# Patient Record
Sex: FEMALE | Race: WHITE | HISPANIC OR LATINO | Employment: UNEMPLOYED | URBAN - METROPOLITAN AREA
[De-identification: names, ages, dates, MRNs, and addresses within clinical notes are randomized per-mention and may not be internally consistent; named-entity substitution may affect disease eponyms.]

---

## 2018-07-16 ENCOUNTER — OFFICE VISIT (OUTPATIENT)
Dept: ENDOCRINOLOGY | Facility: CLINIC | Age: 63
End: 2018-07-16
Payer: COMMERCIAL

## 2018-07-16 VITALS
RESPIRATION RATE: 16 BRPM | DIASTOLIC BLOOD PRESSURE: 70 MMHG | HEIGHT: 65 IN | HEART RATE: 60 BPM | WEIGHT: 176.13 LBS | SYSTOLIC BLOOD PRESSURE: 116 MMHG | BODY MASS INDEX: 29.34 KG/M2

## 2018-07-16 DIAGNOSIS — D35.2 PITUITARY ADENOMA: Primary | ICD-10-CM

## 2018-07-16 DIAGNOSIS — E11.9 TYPE 2 DIABETES MELLITUS WITHOUT COMPLICATION, WITHOUT LONG-TERM CURRENT USE OF INSULIN: ICD-10-CM

## 2018-07-16 DIAGNOSIS — M85.80 OSTEOPENIA, UNSPECIFIED LOCATION: ICD-10-CM

## 2018-07-16 DIAGNOSIS — E11.42 TYPE 2 DIABETES MELLITUS WITH DIABETIC POLYNEUROPATHY, WITHOUT LONG-TERM CURRENT USE OF INSULIN: ICD-10-CM

## 2018-07-16 PROCEDURE — 99999 PR PBB SHADOW E&M-EST. PATIENT-LVL III: CPT | Mod: PBBFAC,,, | Performed by: INTERNAL MEDICINE

## 2018-07-16 PROCEDURE — 3008F BODY MASS INDEX DOCD: CPT | Mod: CPTII,S$GLB,, | Performed by: INTERNAL MEDICINE

## 2018-07-16 PROCEDURE — 99214 OFFICE O/P EST MOD 30 MIN: CPT | Mod: S$GLB,,, | Performed by: INTERNAL MEDICINE

## 2018-07-16 NOTE — PROGRESS NOTES
Subjective:      Patient ID: Tona Carlson is a 62 y.o. female.    Chief Complaint:  Diabetes      History of Present Illness      She was being seen by Dr Padilla for  pituitary adenoma.  She was put on Soniya schedule in error      She was found to have pituitary incidentaloma when MRI brain was done for balance problem. She says surgery was in march 2014. She was told her pituitary adenoma was non secreting and it was touching optic chiasm and carotid artery. She had c/o left hemianopsia ( from what she describes ) improved after surgery surgery was in [facility]/ [facility]    she was  on cabergoline but she is off of medication due to financial reasons       While taking cabergoline she did not have any S/Es          Not taking any hormones. She was treated with DDAVP for 2 months after surgery.      Denies galactorrhea / some  tenderness in breast but present since teen age years.   Post menopausal.      Denies change in shoe or ring size. Denies excessive sweating.        evaluated her in 11/2015 impression as below:     Ms. 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 Her bitemporal visual field defects spare the center of vision. I did not find significant diabetic retinopathy.          Last mri 2015\  Findings suggest postsurgical change from resection of a pituitary macroadenoma.  Enhancing soft tissue remains along the lateral walls of the sella suggesting residual macroadenoma.  For follow-up purposes on the left this measures 5.6 mm, on the right   4.5 mm.      Diagnosed with DM in her 50s      Did not bring log or meter   No recent labs       Known complications : peripheral neuropathy  some numbness and tingling     Last eye exam : > 1 year ago        Current regimen : nothing   Was on  metformin and amaryl 2 mg qd  - diarrhea with metformin   When she checked her bg < 120   Its good when she diets       Other meds tried :  insulin but after good control she was taken off of insulin.     With victoza : abdominal cramps       thyroid u/s 2016  1.) Thyroid gland is normal in size with homogenous echotexture    2.) No thyroid nodules seen    RECOMMENDATIONS:   No need for repeat neck ultrasound unless there is a clinical change.    Review of Systems   Constitutional: Negative for unexpected weight change.   Eyes: Negative for visual disturbance.   Respiratory: Negative for shortness of breath.    Cardiovascular: Negative for chest pain.   Gastrointestinal: Negative for abdominal pain.   Musculoskeletal: Negative for arthralgias.   Skin: Negative for wound.   Neurological: Negative for headaches.   Hematological: Does not bruise/bleed easily.   Psychiatric/Behavioral: Negative for sleep disturbance.       Objective:   Physical Exam   Neck: No thyromegaly present.   Cardiovascular: Normal rate.    Pulmonary/Chest: Effort normal.   Abdominal: Soft.   Musculoskeletal: She exhibits no edema.   Vitals reviewed.  Feet no cuts or  scratches  Shoes appropriate  sensation decreased to vibration and monofilament      Body mass index is 29.31 kg/m².    Lab Review:   Lab Results   Component Value Date    HGBA1C 9.5 (H) 10/05/2016     Lab Results   Component Value Date    CHOL 157 10/05/2016    HDL 37 (L) 10/05/2016    LDLCALC 87.8 10/05/2016    TRIG 161 (H) 10/05/2016    CHOLHDL 23.6 10/05/2016     Lab Results   Component Value Date     10/05/2016    K 4.1 10/05/2016     10/05/2016    CO2 22 (L) 10/05/2016     (H) 10/05/2016    BUN 6 (L) 10/05/2016    CREATININE 0.7 10/05/2016    CALCIUM 9.4 10/05/2016    PROT 6.7 10/05/2016    ALBUMIN 3.6 10/05/2016    BILITOT 1.0 10/05/2016    ALKPHOS 101 10/05/2016    AST 37 10/05/2016    ALT 53 (H) 10/05/2016    ANIONGAP 11 10/05/2016    ESTGFRAFRICA >60.0 10/05/2016    EGFRNONAA >60.0 10/05/2016    TSH 1.504 10/20/2015       Assessment and Plan     Pituitary adenoma  Recheck imaging  Refer to  eye  Check 8 am hormonal labs       Osteopenia  Check vit d and bmd     Type 2 diabetes mellitus with diabetic polyneuropathy, without long-term current use of insulin  Reviewed foot care   Check basic labs

## 2018-07-17 ENCOUNTER — LAB VISIT (OUTPATIENT)
Dept: LAB | Facility: HOSPITAL | Age: 63
End: 2018-07-17
Attending: INTERNAL MEDICINE
Payer: COMMERCIAL

## 2018-07-17 DIAGNOSIS — D35.2 PITUITARY ADENOMA: ICD-10-CM

## 2018-07-17 DIAGNOSIS — M85.80 OSTEOPENIA, UNSPECIFIED LOCATION: ICD-10-CM

## 2018-07-17 DIAGNOSIS — E11.9 TYPE 2 DIABETES MELLITUS WITHOUT COMPLICATION, WITHOUT LONG-TERM CURRENT USE OF INSULIN: ICD-10-CM

## 2018-07-17 LAB
ALBUMIN SERPL BCP-MCNC: 4.1 G/DL
ALP SERPL-CCNC: 100 U/L
ALT SERPL W/O P-5'-P-CCNC: 31 U/L
ANION GAP SERPL CALC-SCNC: 9 MMOL/L
AST SERPL-CCNC: 23 U/L
BILIRUB SERPL-MCNC: 0.8 MG/DL
BUN SERPL-MCNC: 10 MG/DL
CALCIUM SERPL-MCNC: 9.4 MG/DL
CHLORIDE SERPL-SCNC: 109 MMOL/L
CO2 SERPL-SCNC: 22 MMOL/L
CORTIS SERPL-MCNC: 15.3 UG/DL
CREAT SERPL-MCNC: 0.7 MG/DL
EST. GFR  (AFRICAN AMERICAN): >60 ML/MIN/1.73 M^2
EST. GFR  (NON AFRICAN AMERICAN): >60 ML/MIN/1.73 M^2
FSH SERPL-ACNC: 27.3 MIU/ML
GLUCOSE SERPL-MCNC: 133 MG/DL
LH SERPL-ACNC: 8.9 MIU/ML
POTASSIUM SERPL-SCNC: 4.3 MMOL/L
PROLACTIN SERPL IA-MCNC: 39.5 NG/ML
PROT SERPL-MCNC: 7.3 G/DL
SODIUM SERPL-SCNC: 140 MMOL/L
T4 FREE SERPL-MCNC: 0.88 NG/DL
TSH SERPL DL<=0.005 MIU/L-ACNC: 2.61 UIU/ML

## 2018-07-17 PROCEDURE — 83002 ASSAY OF GONADOTROPIN (LH): CPT

## 2018-07-17 PROCEDURE — 83001 ASSAY OF GONADOTROPIN (FSH): CPT

## 2018-07-17 PROCEDURE — 80053 COMPREHEN METABOLIC PANEL: CPT

## 2018-07-17 PROCEDURE — 84305 ASSAY OF SOMATOMEDIN: CPT

## 2018-07-17 PROCEDURE — 36415 COLL VENOUS BLD VENIPUNCTURE: CPT

## 2018-07-17 PROCEDURE — 82024 ASSAY OF ACTH: CPT

## 2018-07-17 PROCEDURE — 84146 ASSAY OF PROLACTIN: CPT

## 2018-07-17 PROCEDURE — 84439 ASSAY OF FREE THYROXINE: CPT

## 2018-07-17 PROCEDURE — 84443 ASSAY THYROID STIM HORMONE: CPT

## 2018-07-17 PROCEDURE — 82533 TOTAL CORTISOL: CPT

## 2018-07-19 LAB
IGF-I SERPL-MCNC: 143 NG/ML (ref 35–201)
IGF-I Z-SCORE SERPL: 1.04 SD

## 2018-07-20 LAB — ACTH PLAS-MCNC: 44 PG/ML

## 2018-07-24 ENCOUNTER — OFFICE VISIT (OUTPATIENT)
Dept: OBSTETRICS AND GYNECOLOGY | Facility: CLINIC | Age: 63
End: 2018-07-24
Payer: COMMERCIAL

## 2018-07-24 ENCOUNTER — TELEPHONE (OUTPATIENT)
Dept: OBSTETRICS AND GYNECOLOGY | Facility: CLINIC | Age: 63
End: 2018-07-24

## 2018-07-24 VITALS
SYSTOLIC BLOOD PRESSURE: 118 MMHG | BODY MASS INDEX: 29.66 KG/M2 | WEIGHT: 178 LBS | DIASTOLIC BLOOD PRESSURE: 70 MMHG | HEIGHT: 65 IN

## 2018-07-24 DIAGNOSIS — Z78.0 POSTMENOPAUSE: ICD-10-CM

## 2018-07-24 DIAGNOSIS — Z12.39 BREAST CANCER SCREENING: ICD-10-CM

## 2018-07-24 DIAGNOSIS — Z01.419 WELL WOMAN EXAM WITH ROUTINE GYNECOLOGICAL EXAM: Primary | ICD-10-CM

## 2018-07-24 PROCEDURE — 88175 CYTOPATH C/V AUTO FLUID REDO: CPT

## 2018-07-24 PROCEDURE — 99386 PREV VISIT NEW AGE 40-64: CPT | Mod: S$GLB,,, | Performed by: NURSE PRACTITIONER

## 2018-07-24 PROCEDURE — 99999 PR PBB SHADOW E&M-EST. PATIENT-LVL III: CPT | Mod: PBBFAC,,, | Performed by: NURSE PRACTITIONER

## 2018-07-24 NOTE — PROGRESS NOTES
HISTORY OF PRESENT ILLNESS:    Tona Carlson is a 62 y.o. female , presents for a routine exam and has no gyn complaints.    -Here to establish care - last saw Dr STEPHANIE Ramos in .  -Having a brain MRI tomorrow to follow up on her pituitary adenoma.    Past Medical History:   Diagnosis Date    Compressive optic atrophy 2015    Diabetes mellitus, type 2     Hyperlipidemia     Prolactinoma     Reflux 8/10/2012    Rosacea        Past Surgical History:   Procedure Laterality Date     SECTION      CHOLECYSTECTOMY      TRANSPHENOIDAL PITUITARY RESECTION          MEDICATIONS AND ALLERGIES:      Current Outpatient Prescriptions:     blood sugar diagnostic Strp, Please give compatible with the meter.  Checks 4 times  A day., Disp: 100 each, Rfl: 11    lancets Misc, Checks 4 times a day, Disp: 150 each, Rfl: 11    aspirin (ECOTRIN) 81 MG EC tablet, Take 1 tablet (81 mg total) by mouth once daily., Disp: , Rfl: 0    azelaic acid (FINACEA) 15 % cream, Apply topically once daily., Disp: 50 g, Rfl: 3    cabergoline (DOSTINEX) 0.5 mg tablet, Take 1 tablet (0.5 mg total) by mouth twice a week., Disp: 12 tablet, Rfl: 6    glimepiride (AMARYL) 2 MG tablet, Take 1 tablet (2 mg total) by mouth before breakfast., Disp: 90 tablet, Rfl: 1    oxybutynin (DITROPAN-XL) 5 MG TR24, Take 1 tablet (5 mg total) by mouth once daily., Disp: 30 tablet, Rfl: 6    Review of patient's allergies indicates:  No Known Allergies    Family History   Problem Relation Age of Onset    Diabetes Mother     Glaucoma Mother     Hypertension Mother     Heart disease Paternal Grandfather     Cancer Paternal Grandmother         liver or stomach    No Known Problems Father     Colon cancer Neg Hx     Ovarian cancer Neg Hx     Breast cancer Neg Hx        Social History     Social History    Marital status:      Spouse name: N/A    Number of children: N/A    Years of education: N/A     Occupational History  "   Not on file.     Social History Main Topics    Smoking status: Never Smoker    Smokeless tobacco: Never Used    Alcohol use No    Drug use: No    Sexual activity: Yes     Partners: Male     Other Topics Concern    Not on file     Social History Narrative    No narrative on file       OB HISTORY: Number of C/S deliveries:1 Number of vaginal deliveries:1 Number of SAB:1    COMPREHENSIVE GYN HISTORY:  PAP History:  Denies abnormal Paps. LAST PAP 8-10-12 NORMAL.  Infection History: Denies STDs. Denies PID.  Benign History: Denies uterine fibroids. Denies ovarian cysts. Denies endometriosis. Denies other conditions.  Cancer History: Denies cervical cancer. Denies uterine cancer or hyperplasia. Denies ovarian cancer. Denies vulvar cancer or pre-cancer. Denies vaginal cancer or pre-cancer. Denies breast cancer. Denies colon cancer.  Sexual Activity History: Reports currently being sexually active  Menstrual History: Denies menses. Pt is  (age 50) not on HRT.     ROS:  GENERAL: No weight changes. No swelling. No fatigue. No fever.  CARDIOVASCULAR: No chest pain. No shortness of breath. No leg cramps.   NEUROLOGICAL: No headaches. No vision changes.  BREASTS: No pain. No lumps. No discharge.  ABDOMEN: No pain. No nausea. No vomiting. No diarrhea. No constipation.  REPRODUCTIVE: No abnormal bleeding.   VULVA: No pain. No lesions. No itching.  VAGINA: No relaxation. No itching. No odor. No discharge. No lesions.  URINARY: No incontinence. No nocturia. No frequency. No dysuria.    /70   Ht 5' 5" (1.651 m)   Wt 80.7 kg (178 lb)   BMI 29.62 kg/m²     PE:  APPEARANCE: Well nourished, well developed, in no acute distress.  AFFECT: WNL, alert and oriented x 3.  SKIN: No hirsutism or acne.  NECK: Neck symmetric without masses or thyromegaly.  NODES: No inguinal, cervical, axillary or femoral lymph node enlargement.  CHEST: Good respiratory effort.   ABDOMEN: Soft. No tenderness or masses. No hepatosplenomegaly. " "No hernias.  BREASTS: Symmetrical, no skin changes or visible lesions. No palpable masses, nipple discharge bilaterally.  PELVIC: ATROPHIC EXTERNAL FEMALE GENITALIA without lesions. Normal hair distribution. Adequate perineal body, normal urethral meatus. VAGINA DRY without lesions or discharge. CERVIX STENOTIC without lesions, discharge or tenderness. No significant cystocele or rectocele. Bimanual exam shows uterus to be normal size, regular, mobile and nontender. Adnexa without masses or tenderness.  RECTAL: Rectovaginal exam confirms above with normal sphincter tone, no masses.  EXTREMITIES: No edema.    DIAGNOSIS:  1. Well woman exam with routine gynecological exam    2. Postmenopause    3. Breast cancer screening        PLAN:    Orders Placed This Encounter    Mammo Digital Screening Bilat with Tomosynthesis_CAD    Liquid-based pap smear, screening   Needs colonoscopy / BMD, but pt states "has to meet the $3000 deductible first".      COUNSELING:  The patient was counseled today on:  -osteoporosis prevention, calcium supplementation, regular weight bearing exercise;  -A.C.S. Pap and pelvic exam guidelines (pap every 3 years, no pap after age 65) and recommendations for yearly mammogram;  -to see her PCP for other health maintenance.    FOLLOW-UP annually.     "

## 2018-07-25 ENCOUNTER — HOSPITAL ENCOUNTER (OUTPATIENT)
Dept: RADIOLOGY | Facility: HOSPITAL | Age: 63
Discharge: HOME | End: 2018-07-25
Attending: INTERNAL MEDICINE
Payer: COMMERCIAL

## 2018-07-25 DIAGNOSIS — D35.2 PITUITARY ADENOMA: ICD-10-CM

## 2018-07-25 DIAGNOSIS — M85.80 OSTEOPENIA, UNSPECIFIED LOCATION: ICD-10-CM

## 2018-07-25 DIAGNOSIS — E11.9 TYPE 2 DIABETES MELLITUS WITHOUT COMPLICATION, WITHOUT LONG-TERM CURRENT USE OF INSULIN: ICD-10-CM

## 2018-07-25 PROCEDURE — 25500020 PHARM REV CODE 255: Performed by: INTERNAL MEDICINE

## 2018-07-25 PROCEDURE — A9585 GADOBUTROL INJECTION: HCPCS | Performed by: INTERNAL MEDICINE

## 2018-07-25 PROCEDURE — 70553 MRI BRAIN STEM W/O & W/DYE: CPT | Mod: TC

## 2018-07-25 PROCEDURE — 70553 MRI BRAIN STEM W/O & W/DYE: CPT | Mod: 26,,, | Performed by: RADIOLOGY

## 2018-07-25 RX ORDER — GADOBUTROL 604.72 MG/ML
4 INJECTION INTRAVENOUS
Status: COMPLETED | OUTPATIENT
Start: 2018-07-25 | End: 2018-07-25

## 2018-07-25 RX ADMIN — GADOBUTROL 4 ML: 604.72 INJECTION INTRAVENOUS at 01:07

## 2018-08-02 ENCOUNTER — TELEPHONE (OUTPATIENT)
Dept: PODIATRY | Facility: CLINIC | Age: 63
End: 2018-08-02

## 2018-08-02 ENCOUNTER — OFFICE VISIT (OUTPATIENT)
Dept: PODIATRY | Facility: CLINIC | Age: 63
End: 2018-08-02
Payer: COMMERCIAL

## 2018-08-02 ENCOUNTER — TELEPHONE (OUTPATIENT)
Dept: INTERNAL MEDICINE | Facility: CLINIC | Age: 63
End: 2018-08-02

## 2018-08-02 ENCOUNTER — HOSPITAL ENCOUNTER (OUTPATIENT)
Dept: RADIOLOGY | Facility: HOSPITAL | Age: 63
Discharge: HOME | End: 2018-08-02
Attending: PODIATRIST
Payer: COMMERCIAL

## 2018-08-02 VITALS
HEIGHT: 66 IN | DIASTOLIC BLOOD PRESSURE: 73 MMHG | BODY MASS INDEX: 28.61 KG/M2 | WEIGHT: 178 LBS | HEART RATE: 88 BPM | SYSTOLIC BLOOD PRESSURE: 114 MMHG

## 2018-08-02 DIAGNOSIS — S92.501A CLOSED FRACTURE OF PHALANX OF RIGHT FOURTH TOE, INITIAL ENCOUNTER: Primary | ICD-10-CM

## 2018-08-02 DIAGNOSIS — M79.674 TOE PAIN, RIGHT: Primary | ICD-10-CM

## 2018-08-02 DIAGNOSIS — S90.121A CONTUSION OF FOURTH TOE OF RIGHT FOOT, INITIAL ENCOUNTER: ICD-10-CM

## 2018-08-02 DIAGNOSIS — M79.671 FOOT PAIN, RIGHT: ICD-10-CM

## 2018-08-02 DIAGNOSIS — S90.121A CONTUSION OF FOURTH TOE OF RIGHT FOOT, INITIAL ENCOUNTER: Primary | ICD-10-CM

## 2018-08-02 PROCEDURE — 73630 X-RAY EXAM OF FOOT: CPT | Mod: TC,RT

## 2018-08-02 PROCEDURE — 28510 TREATMENT OF TOE FRACTURE: CPT | Mod: T8,S$GLB,, | Performed by: PODIATRIST

## 2018-08-02 PROCEDURE — 99999 PR PBB SHADOW E&M-EST. PATIENT-LVL III: CPT | Mod: PBBFAC,,, | Performed by: PODIATRIST

## 2018-08-02 PROCEDURE — 99203 OFFICE O/P NEW LOW 30 MIN: CPT | Mod: 25,S$GLB,, | Performed by: PODIATRIST

## 2018-08-02 PROCEDURE — 3008F BODY MASS INDEX DOCD: CPT | Mod: CPTII,S$GLB,, | Performed by: PODIATRIST

## 2018-08-02 PROCEDURE — 73630 X-RAY EXAM OF FOOT: CPT | Mod: 26,RT,, | Performed by: RADIOLOGY

## 2018-08-02 RX ORDER — LIDOCAINE HYDROCHLORIDE 20 MG/ML
JELLY TOPICAL
Qty: 30 ML | Refills: 2 | Status: SHIPPED | OUTPATIENT
Start: 2018-08-02 | End: 2019-01-02

## 2018-08-02 NOTE — TELEPHONE ENCOUNTER
----- Message from Danni Galloway sent at 8/1/2018  2:08 PM CDT -----  Contact: Self [phone]  Pt is requesting a call back to see if she can get an x-ray ordered for her right foot as she thinks she may have broken a toe.    Pt may be reached at [phone].    Thank you.  LC

## 2018-08-02 NOTE — TELEPHONE ENCOUNTER
----- Message from Danni Galloway sent at 8/1/2018  2:08 PM CDT -----  Contact: Self [phone]  Pt is requesting a call back to see if she can get an x-ray ordered for her right foot as she thinks she may have broken a toe.    Pt may be reached at [phone].    Thank you.  LC

## 2018-08-02 NOTE — TELEPHONE ENCOUNTER
----- Message from Mallory Carnes sent at 8/2/2018 10:34 AM CDT -----  Contact: patient  Ms Figueroa    At your request I told the patient you were doing your best to get her in today & that you would be calling her back.  I know your busy doing what you do and I thank you for being so helpful and friendly when I call you.  Patient's # is [phone]

## 2018-08-02 NOTE — TELEPHONE ENCOUNTER
Called & spoke to patient. She is complaining of decreased sensitivity in her R. 4th toe. This has been ongoing for 2 weeks after she dropped some weight on it. She noticed the toe was bent in the opposite direction. She has been applying ice to it, but is now concerned because she cannot feel the top of her toe.     I asked pt to make a appointment with the next PCP available to have the toe evaluated, due to her history of Diabetes with Polyneuropathy.    Pt voiced understanding.    Pt needs to see provider before xray can be ordered.

## 2018-08-02 NOTE — TELEPHONE ENCOUNTER
Ortho Telephone Triage Call  [phone]  Patient C/O: R 4th toe swelling/numbness s/p injuring 2 weeks ago on jumping over a box at home. Pt states she wrapped R 4th toe to 5th, then, 3rd toe and injured toe became numb. Pt denies discoloration.  Pt has, since, removed wrap stating toe feels better. Pt requests Ortho appt.   Triage Advice: Rest, elevate. Do not rewrap, as can constrict circulation with increased swelling. Fall precautions. Wear hard-sole open toe shoe/sandal and avoid further injury to R 4th toe until seen in Ortho or Podiatry.  Resolution:Pt states understanding. Appt scheduled today with Dr. Alvarez/Podiatry at 5:15pm with arrival at 4:45pm. Time and location confirmed with pt. Pt active in [facility] for additional appt information.

## 2018-08-02 NOTE — PROGRESS NOTES
Subjective:      Patient ID: Tona Carlson is a 62 y.o. female.    Chief Complaint: Diabetes Mellitus (dr villanueva/) and Diabetic Foot Exam    Sharp deep pain swelling 4th toe right.  Sudden onset stubbing about 2 weeks ago with minimal improvement since.  Aggravated by increased weight bearing, shoe gear, pressure.  No previous medical treatment.  OTC pain med not helping. Denies repeat trauma and surgery since.    Review of Systems   Constitution: Negative for chills, diaphoresis, fever, malaise/fatigue and night sweats.   Cardiovascular: Negative for claudication, cyanosis, leg swelling and syncope.   Skin: Negative for color change, dry skin, nail changes, rash, suspicious lesions and unusual hair distribution.   Musculoskeletal: Positive for joint pain. Negative for falls, joint swelling, muscle cramps, muscle weakness and stiffness.   Gastrointestinal: Negative for constipation, diarrhea, nausea and vomiting.   Neurological: Negative for brief paralysis, disturbances in coordination, focal weakness, numbness, paresthesias, sensory change and tremors.           Objective:      Physical Exam   Constitutional: She is oriented to person, place, and time. She appears well-developed and well-nourished. She is cooperative. No distress.   Cardiovascular:   Pulses:       Popliteal pulses are 2+ on the right side, and 2+ on the left side.        Dorsalis pedis pulses are 2+ on the right side, and 2+ on the left side.        Posterior tibial pulses are 2+ on the right side, and 2+ on the left side.   Capillary refill 3 seconds all toes/distal feet, all toes/both feet warm to touch.      Negative lymphadenopathy bilateral popliteal fossa and tarsal tunnel.      Negavie lower extremity edema bilateral.     Musculoskeletal:        Right ankle: She exhibits normal range of motion, no swelling, no ecchymosis, no deformity, no laceration and normal pulse. Achilles tendon normal. Achilles tendon exhibits no pain, no defect and  normal Bourne's test results.   Sharp deep pain to palpation, range of motion right 4th toe in good gross position without loss of function.    Otherwise ,Normal angle, base, station of gait. All ten toes without clubbing, cyanosis, or signs of ischemia.  No pain to palpation bilateral lower extremities.  Range of motion, stability, muscle strength, and muscle tone normal bilateral feet and legs.     Lymphadenopathy:   Negative lymphadenopathy bilateral popliteal fossa and tarsal tunnel.    Negative lymphangitic streaking bilateral feet/ankles/legs.   Neurological: She is alert and oriented to person, place, and time. She has normal strength. She displays no atrophy and no tremor. No sensory deficit. She exhibits normal muscle tone. Gait normal.   Reflex Scores:       Patellar reflexes are 2+ on the right side and 2+ on the left side.       Achilles reflexes are 2+ on the right side and 2+ on the left side.  Negative tinel sign to percussion sural, superficial peroneal, deep peroneal, saphenous, and posterior tibial nerves right and left ankles and feet.     Skin: Skin is warm, dry and intact. Capillary refill takes 2 to 3 seconds. No abrasion, no bruising, no burn, no ecchymosis, no laceration, no lesion and no rash noted. She is not diaphoretic. No cyanosis or erythema. No pallor. Nails show no clubbing.   Skin is normal age and health appropriate color, turgor, texture, and temperature bilateral lower extremities without ulceration, hyperpigmentation, discoloration, masses nodules or cords palpated.  No ecchymosis, erythema, edema, or cardinal signs of infection bilateral lower extremities.       Psychiatric: She has a normal mood and affect.             Assessment:       Encounter Diagnoses   Name Primary?    Closed fracture of phalanx of right fourth toe, initial encounter Yes    Foot pain, right          Plan:       Tona was seen today for diabetes mellitus and diabetic foot exam.    Diagnoses and all  orders for this visit:    Closed fracture of phalanx of right fourth toe, initial encounter  -     X-Ray Foot Complete Right; Future    Foot pain, right    Other orders  -     lidocaine HCL 2% (XYLOCAINE) 2 % jelly; Apply topically as needed. Apply topically once nightly to affected part of foot/feet.      I counseled the patient on her conditions, their implications and medical management.    Rx lidocaine gel nightly.    Buddy splint toes 1-5 right to immobilize, relieve pain, reduce swelling.    Dispense sx shoe right - ambulate to tolerance weaning to shoes when symptoms allow.          Follow-up in about 1 month (around 9/2/2018).

## 2018-08-16 ENCOUNTER — OFFICE VISIT (OUTPATIENT)
Dept: ENDOCRINOLOGY | Facility: CLINIC | Age: 63
End: 2018-08-16
Payer: COMMERCIAL

## 2018-08-16 VITALS
RESPIRATION RATE: 20 BRPM | SYSTOLIC BLOOD PRESSURE: 112 MMHG | WEIGHT: 180.75 LBS | DIASTOLIC BLOOD PRESSURE: 64 MMHG | HEART RATE: 96 BPM | BODY MASS INDEX: 30.12 KG/M2 | HEIGHT: 65 IN

## 2018-08-16 DIAGNOSIS — D35.2 PITUITARY ADENOMA: ICD-10-CM

## 2018-08-16 DIAGNOSIS — M85.80 OSTEOPENIA, UNSPECIFIED LOCATION: ICD-10-CM

## 2018-08-16 DIAGNOSIS — E11.42 TYPE 2 DIABETES MELLITUS WITH DIABETIC POLYNEUROPATHY, WITHOUT LONG-TERM CURRENT USE OF INSULIN: Primary | ICD-10-CM

## 2018-08-16 DIAGNOSIS — D35.2 PROLACTINOMA: ICD-10-CM

## 2018-08-16 LAB
ALBUMIN/CREAT UR: 11.9 UG/MG
CREAT UR-MCNC: 143 MG/DL
MICROALBUMIN UR DL<=1MG/L-MCNC: 17 UG/ML

## 2018-08-16 PROCEDURE — 99999 PR PBB SHADOW E&M-EST. PATIENT-LVL III: CPT | Mod: PBBFAC,,, | Performed by: INTERNAL MEDICINE

## 2018-08-16 PROCEDURE — 3044F HG A1C LEVEL LT 7.0%: CPT | Mod: CPTII,S$GLB,, | Performed by: INTERNAL MEDICINE

## 2018-08-16 PROCEDURE — 82043 UR ALBUMIN QUANTITATIVE: CPT

## 2018-08-16 PROCEDURE — 99214 OFFICE O/P EST MOD 30 MIN: CPT | Mod: S$GLB,,, | Performed by: INTERNAL MEDICINE

## 2018-08-16 PROCEDURE — 3008F BODY MASS INDEX DOCD: CPT | Mod: CPTII,S$GLB,, | Performed by: INTERNAL MEDICINE

## 2018-08-16 NOTE — PROGRESS NOTES
Subjective:      Patient ID: Tona Carlson is a 62 y.o. female.    Chief Complaint:  pituitary adenoma      History of Present Illness  She was found to have pituitary incidentaloma when MRI brain was done for balance problem. She says surgery was in march 2014. She was told her pituitary adenoma was non secreting and it was touching optic chiasm and carotid artery. She had c/o left hemianopsia ( from what she describes ) improved after surgery surgery was in [facility]/ [facility]     she was  on cabergoline but she stopped it  due to financial reasons       While taking cabergoline she did not have any S/Es           Not taking any hormones. She was treated with DDAVP for 2 months after surgery.      Denies galactorrhea / some  tenderness in breast but present since teen age years.   Post menopausal.      Denies change in shoe or ring size. Denies excessive sweating.        evaluated her in 11/2015 impression as below:     Ms. 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 Her bitemporal visual field defects spare the center of vision. I did not find significant diabetic retinopathy.           Last mri  7/25/18  Postsurgical changes from prior pituitary macroadenoma resection.  There are nodular foci of residual enhancement along the floor sella bilaterally.  The residual right-sided enhancement previously measured 4 mm, now measures 6 mm in thickness (for example sequence 15, images 8 and 9 for measurements).  Nodular left-sided lesion is stable.  There is no significant suprasellar mass effect.  Persistent leftward infundibular deviation.  There is subtle apparent atrophy of the right optic nerve and optic chiasm.         Diagnosed with DM in her 50s    Did not bring log or meter       Known complications : peripheral neuropathy  some numbness and tingling      Last eye exam : > 1 year ago        Current regimen : nothing   Was on   metformin and amaryl 2 mg qd  - diarrhea with metformin   When she checked her bg < 120   Its good when she diets         Other meds tried : insulin but after good control she was taken off of insulin.  victoza : abdominal cramps       thyroid u/s 2016  1.) Thyroid gland is normal in size with homogenous echotexture  2.) No thyroid nodules seen    RECOMMENDATIONS:   No need for repeat neck ultrasound unless there is a clinical change.       Review of Systems   Constitutional: Negative for unexpected weight change.   Eyes: Negative for visual disturbance.   Respiratory: Negative for shortness of breath.    Cardiovascular: Negative for chest pain.   Gastrointestinal: Negative for abdominal pain.   Musculoskeletal: Negative for arthralgias.   Skin: Negative for wound.   Neurological: Negative for headaches.   Hematological: Does not bruise/bleed easily.   Psychiatric/Behavioral: Negative for sleep disturbance.       Objective:   Physical Exam   Neck: No thyromegaly present.   Cardiovascular: Normal rate.   Pulmonary/Chest: Effort normal.   Abdominal: Soft.   Musculoskeletal: She exhibits no edema.   Vitals reviewed.      Body mass index is 30.08 kg/m².    Lab Review:   Lab Results   Component Value Date    HGBA1C 5.8 (H) 08/16/2018     Lab Results   Component Value Date    CHOL 205 (H) 08/16/2018    HDL 55 08/16/2018    LDLCALC 85.0 08/16/2018    TRIG 325 (H) 08/16/2018    CHOLHDL 26.8 08/16/2018     Lab Results   Component Value Date     07/17/2018    K 4.3 07/17/2018     07/17/2018    CO2 22 (L) 07/17/2018     (H) 07/17/2018    BUN 10 07/17/2018    CREATININE 0.7 07/17/2018    CALCIUM 9.4 07/17/2018    PROT 7.3 07/17/2018    ALBUMIN 4.1 07/17/2018    BILITOT 0.8 07/17/2018    ALKPHOS 100 07/17/2018    AST 23 07/17/2018    ALT 31 07/17/2018    ANIONGAP 9 07/17/2018    ESTGFRAFRICA >60 07/17/2018    EGFRNONAA >60 07/17/2018    TSH 2.612 07/17/2018     Results for DAX GAFFNEY (MRN [id number]) as of  8/16/2018 09:53   Ref. Range 7/17/2018 08:24   Cortisol -8 AM Latest Ref Range: 4.30 - 22.40 ug/dL 15.3   ACTH Latest Ref Range: 0 - 46 pg/mL 44   TSH Latest Ref Range: 0.400 - 4.000 uIU/mL 2.612   Free T4 Latest Ref Range: 0.71 - 1.51 ng/dL 0.88   FSH Latest Ref Range: See Text mIU/mL 27.30   LH Latest Ref Range: See Text mIU/mL 8.9   Prolactin Latest Ref Range: 5.2 - 26.5 ng/mL 39.5 (H)   Results for DAX GAFFNEY (MRN [id number]) as of 8/16/2018 09:53   Ref. Range 7/17/2018 08:24   Somatomedin (IGF-I) Latest Ref Range: 35 - 201 ng/mL 143     Assessment and Plan     Type 2 diabetes mellitus with diabetic polyneuropathy, without long-term current use of insulin  Labs today     Pituitary adenoma  Serial imaging  No evidence of hormonal dysfunction   Mildly elevated prolactin - follow   Reviewed indications for therapy   Eye exam

## 2018-08-17 NOTE — ASSESSMENT & PLAN NOTE
Serial imaging  No evidence of hormonal dysfunction   Mildly elevated prolactin - follow   Eye exam

## 2018-08-17 NOTE — ASSESSMENT & PLAN NOTE
discussed implications  Reassuring not fracturing.   rda calcium and vit D  Follow over time  discussed indications for offering therapy

## 2018-08-30 ENCOUNTER — HOSPITAL ENCOUNTER (OUTPATIENT)
Dept: RADIOLOGY | Facility: HOSPITAL | Age: 63
Discharge: HOME | End: 2018-08-30
Attending: PODIATRIST
Payer: COMMERCIAL

## 2018-08-30 ENCOUNTER — OFFICE VISIT (OUTPATIENT)
Dept: PODIATRY | Facility: CLINIC | Age: 63
End: 2018-08-30
Payer: COMMERCIAL

## 2018-08-30 VITALS
HEIGHT: 65 IN | DIASTOLIC BLOOD PRESSURE: 78 MMHG | WEIGHT: 182.19 LBS | HEART RATE: 79 BPM | BODY MASS INDEX: 30.35 KG/M2 | SYSTOLIC BLOOD PRESSURE: 114 MMHG

## 2018-08-30 DIAGNOSIS — S92.501A CLOSED FRACTURE OF PHALANX OF RIGHT FOURTH TOE, INITIAL ENCOUNTER: ICD-10-CM

## 2018-08-30 DIAGNOSIS — S90.121A CONTUSION OF FOURTH TOE OF RIGHT FOOT, INITIAL ENCOUNTER: Primary | ICD-10-CM

## 2018-08-30 PROCEDURE — 99024 POSTOP FOLLOW-UP VISIT: CPT | Mod: S$GLB,,, | Performed by: PODIATRIST

## 2018-08-30 PROCEDURE — 73630 X-RAY EXAM OF FOOT: CPT | Mod: TC,RT

## 2018-08-30 PROCEDURE — 73630 X-RAY EXAM OF FOOT: CPT | Mod: 26,RT,, | Performed by: RADIOLOGY

## 2018-08-30 PROCEDURE — 99999 PR PBB SHADOW E&M-EST. PATIENT-LVL III: CPT | Mod: PBBFAC,,, | Performed by: PODIATRIST

## 2018-08-30 NOTE — PROGRESS NOTES
Subjective:      Patient ID: Tona Carlson is a 62 y.o. female.    Chief Complaint: Follow-up (right 4th toe fracture)    Sharp deep pain swelling 4th toe right.  Sudden onset stubbing about 6 weeks ago with minimal improvement since.  Aggravated by increased weight bearing, shoe gear, pressure.  Improved well with splinting and shoe change and reduced activity. Denies repeat trauma and surgery since.    Review of Systems   Constitution: Negative for chills, diaphoresis, fever, malaise/fatigue and night sweats.   Cardiovascular: Negative for claudication, cyanosis, leg swelling and syncope.   Skin: Negative for color change, dry skin, nail changes, rash, suspicious lesions and unusual hair distribution.   Musculoskeletal: Positive for joint pain. Negative for falls, joint swelling, muscle cramps, muscle weakness and stiffness.   Gastrointestinal: Negative for constipation, diarrhea, nausea and vomiting.   Neurological: Negative for brief paralysis, disturbances in coordination, focal weakness, numbness, paresthesias, sensory change and tremors.           Objective:      Physical Exam   Constitutional: She is oriented to person, place, and time. She appears well-developed and well-nourished. She is cooperative. No distress.   Cardiovascular:   Pulses:       Popliteal pulses are 2+ on the right side, and 2+ on the left side.        Dorsalis pedis pulses are 2+ on the right side, and 2+ on the left side.        Posterior tibial pulses are 2+ on the right side, and 2+ on the left side.   Capillary refill 3 seconds all toes/distal feet, all toes/both feet warm to touch.      Negative lymphadenopathy bilateral popliteal fossa and tarsal tunnel.      Negavie lower extremity edema bilateral.     Musculoskeletal:        Right ankle: She exhibits normal range of motion, no swelling, no ecchymosis, no deformity, no laceration and normal pulse. Achilles tendon normal. Achilles tendon exhibits no pain, no defect and normal  Bourne's test results.   No current pain to palpation, range of motion right 4th toe in good gross position without loss of function.    Otherwise ,Normal angle, base, station of gait. All ten toes without clubbing, cyanosis, or signs of ischemia.  No pain to palpation bilateral lower extremities.  Range of motion, stability, muscle strength, and muscle tone normal bilateral feet and legs.     Lymphadenopathy:   Negative lymphadenopathy bilateral popliteal fossa and tarsal tunnel.    Negative lymphangitic streaking bilateral feet/ankles/legs.   Neurological: She is alert and oriented to person, place, and time. She has normal strength. She displays no atrophy and no tremor. No sensory deficit. She exhibits normal muscle tone. Gait normal.   Reflex Scores:       Patellar reflexes are 2+ on the right side and 2+ on the left side.       Achilles reflexes are 2+ on the right side and 2+ on the left side.  Negative tinel sign to percussion sural, superficial peroneal, deep peroneal, saphenous, and posterior tibial nerves right and left ankles and feet.     Skin: Skin is warm, dry and intact. Capillary refill takes 2 to 3 seconds. No abrasion, no bruising, no burn, no ecchymosis, no laceration, no lesion and no rash noted. She is not diaphoretic. No cyanosis or erythema. No pallor. Nails show no clubbing.   Skin is normal age and health appropriate color, turgor, texture, and temperature bilateral lower extremities without ulceration, hyperpigmentation, discoloration, masses nodules or cords palpated.  No ecchymosis, erythema, edema, or cardinal signs of infection bilateral lower extremities.       Psychiatric: She has a normal mood and affect.             Assessment:       No diagnosis found.      Plan:       There are no diagnoses linked to this encounter.  I counseled the patient on her conditions, their implications and medical management.    Discussed conservative treatment with shoes of adequate dimensions,  material, and style to alleviate symptoms and delay or prevent surgical intervention.  Function to tolerance.        No Follow-up on file.

## 2018-11-07 ENCOUNTER — HOSPITAL ENCOUNTER (OUTPATIENT)
Dept: RADIOLOGY | Facility: HOSPITAL | Age: 63
Discharge: HOME | End: 2018-11-07
Attending: NURSE PRACTITIONER
Payer: COMMERCIAL

## 2018-11-07 DIAGNOSIS — Z12.39 BREAST CANCER SCREENING: ICD-10-CM

## 2018-11-07 PROCEDURE — 77063 BREAST TOMOSYNTHESIS BI: CPT | Mod: 26,,, | Performed by: RADIOLOGY

## 2018-11-07 PROCEDURE — 77063 BREAST TOMOSYNTHESIS BI: CPT | Mod: TC

## 2018-11-07 PROCEDURE — 77067 SCR MAMMO BI INCL CAD: CPT | Mod: 26,,, | Performed by: RADIOLOGY

## 2018-11-15 ENCOUNTER — IMMUNIZATION (OUTPATIENT)
Dept: PHARMACY | Facility: CLINIC | Age: 63
End: 2018-11-15
Payer: COMMERCIAL

## 2019-01-02 ENCOUNTER — LAB VISIT (OUTPATIENT)
Dept: LAB | Facility: HOSPITAL | Age: 64
End: 2019-01-02
Attending: HOSPITALIST
Payer: COMMERCIAL

## 2019-01-02 ENCOUNTER — OFFICE VISIT (OUTPATIENT)
Dept: INTERNAL MEDICINE | Facility: CLINIC | Age: 64
End: 2019-01-02
Payer: COMMERCIAL

## 2019-01-02 VITALS
WEIGHT: 186.06 LBS | HEIGHT: 65 IN | OXYGEN SATURATION: 98 % | RESPIRATION RATE: 18 BRPM | DIASTOLIC BLOOD PRESSURE: 74 MMHG | BODY MASS INDEX: 31 KG/M2 | HEART RATE: 95 BPM | TEMPERATURE: 98 F | SYSTOLIC BLOOD PRESSURE: 110 MMHG

## 2019-01-02 DIAGNOSIS — E11.42 TYPE 2 DIABETES MELLITUS WITH DIABETIC POLYNEUROPATHY, WITHOUT LONG-TERM CURRENT USE OF INSULIN: ICD-10-CM

## 2019-01-02 DIAGNOSIS — D35.2 PITUITARY ADENOMA: ICD-10-CM

## 2019-01-02 DIAGNOSIS — N32.81 OVERACTIVE BLADDER: ICD-10-CM

## 2019-01-02 DIAGNOSIS — E11.9 ENCOUNTER FOR DIABETIC FOOT EXAM: ICD-10-CM

## 2019-01-02 DIAGNOSIS — Z12.11 COLON CANCER SCREENING: ICD-10-CM

## 2019-01-02 DIAGNOSIS — Z00.00 ANNUAL PHYSICAL EXAM: Primary | ICD-10-CM

## 2019-01-02 DIAGNOSIS — Z00.00 ANNUAL PHYSICAL EXAM: ICD-10-CM

## 2019-01-02 LAB
ALBUMIN/CREAT UR: 16.7 UG/MG
BACTERIA #/AREA URNS AUTO: ABNORMAL /HPF
BILIRUB UR QL STRIP: NEGATIVE
CLARITY UR REFRACT.AUTO: CLEAR
COLOR UR AUTO: ABNORMAL
CREAT UR-MCNC: 30 MG/DL
GLUCOSE UR QL STRIP: ABNORMAL
HGB UR QL STRIP: NEGATIVE
KETONES UR QL STRIP: ABNORMAL
LEUKOCYTE ESTERASE UR QL STRIP: NEGATIVE
MICROALBUMIN UR DL<=1MG/L-MCNC: 5 UG/ML
MICROSCOPIC COMMENT: ABNORMAL
NITRITE UR QL STRIP: NEGATIVE
PH UR STRIP: 5 [PH] (ref 5–8)
PROT UR QL STRIP: NEGATIVE
RBC #/AREA URNS AUTO: 0 /HPF (ref 0–4)
SP GR UR STRIP: >=1.03 (ref 1–1.03)
SQUAMOUS #/AREA URNS AUTO: 1 /HPF
URN SPEC COLLECT METH UR: ABNORMAL
WBC #/AREA URNS AUTO: 2 /HPF (ref 0–5)
YEAST UR QL AUTO: ABNORMAL

## 2019-01-02 PROCEDURE — 81001 URINALYSIS AUTO W/SCOPE: CPT

## 2019-01-02 PROCEDURE — 90471 PNEUMOCOCCAL POLYSACCHARIDE VACCINE 23-VALENT =>2YO SQ IM: ICD-10-PCS | Mod: S$GLB,,, | Performed by: HOSPITALIST

## 2019-01-02 PROCEDURE — 3044F PR MOST RECENT HEMOGLOBIN A1C LEVEL <7.0%: ICD-10-PCS | Mod: CPTII,S$GLB,, | Performed by: HOSPITALIST

## 2019-01-02 PROCEDURE — 82043 UR ALBUMIN QUANTITATIVE: CPT

## 2019-01-02 PROCEDURE — 90732 PNEUMOCOCCAL POLYSACCHARIDE VACCINE 23-VALENT =>2YO SQ IM: ICD-10-PCS | Mod: S$GLB,,, | Performed by: HOSPITALIST

## 2019-01-02 PROCEDURE — 90732 PPSV23 VACC 2 YRS+ SUBQ/IM: CPT | Mod: S$GLB,,, | Performed by: HOSPITALIST

## 2019-01-02 PROCEDURE — 3044F HG A1C LEVEL LT 7.0%: CPT | Mod: CPTII,S$GLB,, | Performed by: HOSPITALIST

## 2019-01-02 PROCEDURE — 99396 PREV VISIT EST AGE 40-64: CPT | Mod: 25,S$GLB,, | Performed by: HOSPITALIST

## 2019-01-02 PROCEDURE — 99999 PR PBB SHADOW E&M-EST. PATIENT-LVL III: ICD-10-PCS | Mod: PBBFAC,,, | Performed by: HOSPITALIST

## 2019-01-02 PROCEDURE — 90471 IMMUNIZATION ADMIN: CPT | Mod: S$GLB,,, | Performed by: HOSPITALIST

## 2019-01-02 PROCEDURE — 87086 URINE CULTURE/COLONY COUNT: CPT

## 2019-01-02 PROCEDURE — 99999 PR PBB SHADOW E&M-EST. PATIENT-LVL III: CPT | Mod: PBBFAC,,, | Performed by: HOSPITALIST

## 2019-01-02 PROCEDURE — 99396 PR PREVENTIVE VISIT,EST,40-64: ICD-10-PCS | Mod: 25,S$GLB,, | Performed by: HOSPITALIST

## 2019-01-02 RX ORDER — GLIMEPIRIDE 1 MG/1
1 TABLET ORAL
Qty: 30 TABLET | Refills: 3 | Status: SHIPPED | OUTPATIENT
Start: 2019-01-02 | End: 2019-01-11

## 2019-01-02 RX ORDER — OXYBUTYNIN CHLORIDE 5 MG/1
5 TABLET ORAL 2 TIMES DAILY
Qty: 60 TABLET | Refills: 3 | Status: SHIPPED | OUTPATIENT
Start: 2019-01-02 | End: 2022-08-24

## 2019-01-02 NOTE — PROGRESS NOTES
Subjective:     @Patient ID: Tona Carlson is a 63 y.o. female.    Chief Complaint: Establish Care and Annual Exam    HPI    64 yo F presents for urinary frequency x 1 month and annual exam. Has been having urinary incontinence. Reports she has been having urinary accidents/leaks as not making it to the bathroom on time. No painful urination. No hematuria or fever/chills. Denies urinary leak with cough, laughter.   Also reports recently Dm2- blood sugar in 300s. Was diet controlled and had lost 30 lbs so not taking any meds. She was being seen by Otis Gary last year and on metformin but states stopped taking it due to diarrhea. Was on amaryl in the past.     Lipid disorders/ASCVD risk (ages >/= 45 or >/= 20 if increased risk ): ordered  DM (>45y yearly or if obese, HTN): A1c ordered  Hepatitis C (one time if born between 5406-1283): ordered  STD screening: HIV ordered  Eye exam: Due  Breast Cancer (40-50y discretion of pt, 50-74y every 1-2 years): Mammogram Done 2018  Cervical Cancer (Pap Smear ages 21-65 every 3 years or Pap + HPV q5 years after 30 years of age):   Colorectal Cancer (normal risk 50-75yr): Colonoscopy Due  DEXA (F>64 yo, M >69yo, M&F 50-70 yo with risk factors (smoking, previous fx, wt <70kg; etoh abuse, chronic steroids, RA)): Done 2018       Vaccines:   Influenza (yearly): Done 2018  Tetanus (every 10 yrs - 1st tdap): Done within 10 years    PPSV23(>64yo or <65 w/ lung dz, smoking, DM) Due today  PCV13 (> 65 or <65 w/ immunocompromised): n/a today  Zoster (>61yo): defer     Exercise: None  Diet: Regular. Reports she has not been adhering to a diabetic diet.       Past Medical History:   Diagnosis Date    Compressive optic atrophy 2015    Diabetes mellitus, type 2     Hyperlipidemia     Prolactinoma     Reflux 8/10/2012    Rosacea     Toe fracture, right 2018     Past Surgical History:   Procedure Laterality Date     SECTION      CHOLECYSTECTOMY      TRANSPHENOIDAL  PITUITARY RESECTION  2014     Family History   Problem Relation Age of Onset    Diabetes Mother     Glaucoma Mother     Hypertension Mother     Heart disease Paternal Grandfather     Cancer Paternal Grandmother         liver or stomach    No Known Problems Father     Colon cancer Neg Hx     Ovarian cancer Neg Hx     Breast cancer Neg Hx      Social History     Socioeconomic History    Marital status:      Spouse name: Not on file    Number of children: Not on file    Years of education: Not on file    Highest education level: Not on file   Social Needs    Financial resource strain: Not on file    Food insecurity - worry: Not on file    Food insecurity - inability: Not on file    Transportation needs - medical: Not on file    Transportation needs - non-medical: Not on file   Occupational History    Not on file   Tobacco Use    Smoking status: Never Smoker    Smokeless tobacco: Never Used   Substance and Sexual Activity    Alcohol use: No    Drug use: No    Sexual activity: Yes     Partners: Male   Other Topics Concern    Not on file   Social History Narrative    Not on file     Review of patient's allergies indicates:  No Known Allergies    Current Outpatient Medications:     flu vac [id number] 36mos up,PF, 60 mcg (15 mcg x 4)/0.5 mL Syrg, inject 0.5ml into the muscle, Disp: 0.5 mL, Rfl: 0          Review of Systems   Constitutional: Negative for chills and fever.   HENT: Negative for congestion and sore throat.    Eyes: Negative for pain and visual disturbance.   Respiratory: Negative for cough and shortness of breath.    Cardiovascular: Negative for chest pain and leg swelling.   Gastrointestinal: Negative for abdominal pain, nausea and vomiting.   Endocrine: Negative for polydipsia and polyuria.   Genitourinary: Positive for dysuria and frequency. Negative for difficulty urinating.   Musculoskeletal: Negative for arthralgias and back pain.   Skin: Negative for rash and wound.  "  Neurological: Negative for dizziness, weakness and headaches.   Psychiatric/Behavioral: Negative for agitation and confusion.     Past medical history, surgical history, and family medical history reviewed and updated as appropriate.    Medications and allergies reviewed.     Objective:     Vitals:    01/02/19 1512   BP: 110/74   Pulse: 95   Resp: 18   Temp: 97.9 °F (36.6 °C)   SpO2: 98%   Weight: 84.4 kg (186 lb 1.1 oz)   Height: 5' 5" (1.651 m)     Body mass index is 30.96 kg/m².  Physical Exam   Constitutional: She is oriented to person, place, and time. She appears well-developed and well-nourished. No distress.   HENT:   Head: Normocephalic and atraumatic.   Mouth/Throat: Oropharynx is clear and moist. No oropharyngeal exudate.   Eyes: Conjunctivae are normal. Pupils are equal, round, and reactive to light. Right eye exhibits no discharge. Left eye exhibits no discharge.   Neck: Normal range of motion. Neck supple.   Cardiovascular: Normal rate, regular rhythm and intact distal pulses. Exam reveals no friction rub.   No murmur heard.  Pulmonary/Chest: Effort normal and breath sounds normal.   Abdominal: Soft. Bowel sounds are normal. She exhibits no distension. There is no tenderness. There is no guarding.   Musculoskeletal: Normal range of motion. She exhibits no edema.   Lymphadenopathy:     She has no cervical adenopathy.   Neurological: She is alert and oriented to person, place, and time.   Skin: Skin is warm and dry.   Psychiatric: She has a normal mood and affect. Her behavior is normal.   Vitals reviewed.      Lab Results   Component Value Date    WBC 4.57 05/22/2015    HGB 13.1 05/22/2015    HCT 36.6 (L) 05/22/2015     (L) 05/22/2015    CHOL 205 (H) 08/16/2018    TRIG 325 (H) 08/16/2018    HDL 55 08/16/2018    ALT 31 07/17/2018    AST 23 07/17/2018     07/17/2018    K 4.3 07/17/2018     07/17/2018    CREATININE 0.7 07/17/2018    BUN 10 07/17/2018    CO2 22 (L) 07/17/2018    TSH " 2.612 07/17/2018    HGBA1C 5.8 (H) 08/16/2018     Protective Sensation (w/ 10 gram monofilament):  Right: Decreased  Left: Intact    Visual Inspection:  Normal -  Bilateral    Pedal Pulses:   Right: Present  Left: Present    Posterior tibialis:   Right:Present  Left: Diminshed      Assessment:     1. Annual physical exam    2. Type 2 diabetes mellitus with diabetic polyneuropathy, without long-term current use of insulin    3. Encounter for diabetic foot exam    4. Colon cancer screening    5. Overactive bladder    6. Pituitary adenoma      Plan:   Tona was seen today for establish care and annual exam.    Diagnoses and all orders for this visit:    Annual physical exam  - Pneumonia shot today. Encouraged diabetic diet and increased exercise 150 minutes weekly for weight loss   -     Comprehensive metabolic panel; Future  -     CBC auto differential; Future  -     TSH; Future  -     Vitamin D; Future  -     Lipid panel; Future  -     Hepatitis C antibody; Future  -     HIV 1/2 Ag/Ab (4th Gen); Future  -     Urinalysis; Future  -     HEMOGLOBIN A1C; Future  -     Microalbumin/creatinine urine ratio; Future  -     (In Office Administered) Pneumococcal Polysaccharide Vaccine (23 Valent) (SQ/IM)    Type 2 diabetes mellitus with diabetic polyneuropathy, without long-term current use of insulin  -     HEMOGLOBIN A1C; Future  -     Microalbumin/creatinine urine ratio; Future  -     glimepiride (AMARYL) 1 MG tablet; Take 1 tablet (1 mg total) by mouth daily with breakfast.  -     (In Office Administered) Pneumococcal Polysaccharide Vaccine (23 Valent) (SQ/IM)    Encounter for diabetic foot exam    Colon cancer screening  -     Case request GI: COLONOSCOPY    Overactive bladder  -     Urinalysis; Future  -     oxybutynin (DITROPAN) 5 MG Tab; Take 1 tablet (5 mg total) by mouth 2 (two) times daily.  -     Urine culture; Future    Pituitary adenoma          - Monitored by endocrinology      Follow-up in about 3 months  (around 4/2/2019).    Medina Prakash MD  Internal Medicine    1/2/2019

## 2019-01-03 ENCOUNTER — LAB VISIT (OUTPATIENT)
Dept: LAB | Facility: HOSPITAL | Age: 64
End: 2019-01-03
Attending: HOSPITALIST
Payer: COMMERCIAL

## 2019-01-03 DIAGNOSIS — Z00.00 ANNUAL PHYSICAL EXAM: ICD-10-CM

## 2019-01-03 DIAGNOSIS — E11.42 TYPE 2 DIABETES MELLITUS WITH DIABETIC POLYNEUROPATHY, WITHOUT LONG-TERM CURRENT USE OF INSULIN: ICD-10-CM

## 2019-01-03 LAB
25(OH)D3+25(OH)D2 SERPL-MCNC: 19 NG/ML
ALBUMIN SERPL BCP-MCNC: 3.8 G/DL
ALP SERPL-CCNC: 129 U/L
ALT SERPL W/O P-5'-P-CCNC: 40 U/L
ANION GAP SERPL CALC-SCNC: 11 MMOL/L
AST SERPL-CCNC: 30 U/L
BASOPHILS # BLD AUTO: 0.02 K/UL
BASOPHILS NFR BLD: 0.5 %
BILIRUB SERPL-MCNC: 1.6 MG/DL
BUN SERPL-MCNC: 8 MG/DL
CALCIUM SERPL-MCNC: 9.3 MG/DL
CHLORIDE SERPL-SCNC: 106 MMOL/L
CHOLEST SERPL-MCNC: 174 MG/DL
CHOLEST/HDLC SERPL: 4.6 {RATIO}
CO2 SERPL-SCNC: 20 MMOL/L
CREAT SERPL-MCNC: 0.8 MG/DL
DIFFERENTIAL METHOD: NORMAL
EOSINOPHIL # BLD AUTO: 0.1 K/UL
EOSINOPHIL NFR BLD: 2.7 %
ERYTHROCYTE [DISTWIDTH] IN BLOOD BY AUTOMATED COUNT: 12.9 %
EST. GFR  (AFRICAN AMERICAN): >60 ML/MIN/1.73 M^2
EST. GFR  (NON AFRICAN AMERICAN): >60 ML/MIN/1.73 M^2
ESTIMATED AVG GLUCOSE: 280 MG/DL
GLUCOSE SERPL-MCNC: 304 MG/DL
HBA1C MFR BLD HPLC: 11.4 %
HCT VFR BLD AUTO: 40.8 %
HDLC SERPL-MCNC: 38 MG/DL
HDLC SERPL: 21.8 %
HGB BLD-MCNC: 13.7 G/DL
IMM GRANULOCYTES # BLD AUTO: 0.01 K/UL
IMM GRANULOCYTES NFR BLD AUTO: 0.2 %
LDLC SERPL CALC-MCNC: 105 MG/DL
LYMPHOCYTES # BLD AUTO: 2.1 K/UL
LYMPHOCYTES NFR BLD: 47.1 %
MCH RBC QN AUTO: 30.2 PG
MCHC RBC AUTO-ENTMCNC: 33.6 G/DL
MCV RBC AUTO: 90 FL
MONOCYTES # BLD AUTO: 0.3 K/UL
MONOCYTES NFR BLD: 6.3 %
NEUTROPHILS # BLD AUTO: 1.9 K/UL
NEUTROPHILS NFR BLD: 43.2 %
NONHDLC SERPL-MCNC: 136 MG/DL
NRBC BLD-RTO: 0 /100 WBC
PLATELET # BLD AUTO: 182 K/UL
PMV BLD AUTO: 10.7 FL
POTASSIUM SERPL-SCNC: 3.9 MMOL/L
PROT SERPL-MCNC: 7.1 G/DL
RBC # BLD AUTO: 4.54 M/UL
SODIUM SERPL-SCNC: 137 MMOL/L
TRIGL SERPL-MCNC: 155 MG/DL
TSH SERPL DL<=0.005 MIU/L-ACNC: 2.82 UIU/ML
WBC # BLD AUTO: 4.42 K/UL

## 2019-01-03 PROCEDURE — 80053 COMPREHEN METABOLIC PANEL: CPT

## 2019-01-03 PROCEDURE — 86803 HEPATITIS C AB TEST: CPT

## 2019-01-03 PROCEDURE — 80061 LIPID PANEL: CPT

## 2019-01-03 PROCEDURE — 84443 ASSAY THYROID STIM HORMONE: CPT

## 2019-01-03 PROCEDURE — 85025 COMPLETE CBC W/AUTO DIFF WBC: CPT

## 2019-01-03 PROCEDURE — 83036 HEMOGLOBIN GLYCOSYLATED A1C: CPT

## 2019-01-03 PROCEDURE — 82306 VITAMIN D 25 HYDROXY: CPT

## 2019-01-03 PROCEDURE — 86703 HIV-1/HIV-2 1 RESULT ANTBDY: CPT

## 2019-01-03 PROCEDURE — 36415 COLL VENOUS BLD VENIPUNCTURE: CPT | Mod: PO

## 2019-01-04 ENCOUNTER — TELEPHONE (OUTPATIENT)
Dept: INTERNAL MEDICINE | Facility: CLINIC | Age: 64
End: 2019-01-04

## 2019-01-04 ENCOUNTER — PATIENT MESSAGE (OUTPATIENT)
Dept: INTERNAL MEDICINE | Facility: CLINIC | Age: 64
End: 2019-01-04

## 2019-01-04 DIAGNOSIS — B19.20 HEPATITIS C VIRUS INFECTION WITHOUT HEPATIC COMA, UNSPECIFIED CHRONICITY: Primary | ICD-10-CM

## 2019-01-04 LAB
BACTERIA UR CULT: NORMAL
HCV AB SERPL QL IA: POSITIVE
HIV 1+2 AB+HIV1 P24 AG SERPL QL IA: NEGATIVE

## 2019-01-04 NOTE — TELEPHONE ENCOUNTER
Went over lab results with patient.   Pt to check Blood sugars and will touch base with her in 1 week to determine if need to titrate up amaryl.    Also notified of hep c ab. Will refer to hepatology clinic.

## 2019-01-10 ENCOUNTER — TELEPHONE (OUTPATIENT)
Dept: INTERNAL MEDICINE | Facility: CLINIC | Age: 64
End: 2019-01-10

## 2019-01-10 DIAGNOSIS — E11.42 TYPE 2 DIABETES MELLITUS WITH DIABETIC POLYNEUROPATHY, WITHOUT LONG-TERM CURRENT USE OF INSULIN: ICD-10-CM

## 2019-01-11 RX ORDER — GLIMEPIRIDE 2 MG/1
2 TABLET ORAL
Qty: 30 TABLET | Refills: 3 | Status: SHIPPED | OUTPATIENT
Start: 2019-01-11 | End: 2019-05-27 | Stop reason: SDUPTHER

## 2019-01-11 NOTE — TELEPHONE ENCOUNTER
----- Message from Yady Qiu sent at 1/11/2019 11:43 AM CST -----  Contact: [phone]  Patient is returning a phone call.  Who left a message for the patient: Dr. Prakash  Does patient know what this is regarding:  Regarding blood sugar results  Comments:

## 2019-01-21 ENCOUNTER — INITIAL CONSULT (OUTPATIENT)
Dept: HEPATOLOGY | Facility: CLINIC | Age: 64
End: 2019-01-21
Payer: COMMERCIAL

## 2019-01-21 ENCOUNTER — LAB VISIT (OUTPATIENT)
Dept: LAB | Facility: HOSPITAL | Age: 64
End: 2019-01-21
Payer: COMMERCIAL

## 2019-01-21 VITALS
TEMPERATURE: 98 F | BODY MASS INDEX: 30.49 KG/M2 | OXYGEN SATURATION: 100 % | WEIGHT: 183 LBS | SYSTOLIC BLOOD PRESSURE: 131 MMHG | RESPIRATION RATE: 16 BRPM | HEART RATE: 73 BPM | DIASTOLIC BLOOD PRESSURE: 67 MMHG | HEIGHT: 65 IN

## 2019-01-21 DIAGNOSIS — R76.8 HCV ANTIBODY POSITIVE: Primary | ICD-10-CM

## 2019-01-21 DIAGNOSIS — R76.8 HCV ANTIBODY POSITIVE: ICD-10-CM

## 2019-01-21 LAB
ALBUMIN SERPL BCP-MCNC: 4 G/DL
ALP SERPL-CCNC: 118 U/L
ALT SERPL W/O P-5'-P-CCNC: 50 U/L
ANION GAP SERPL CALC-SCNC: 7 MMOL/L
AST SERPL-CCNC: 31 U/L
BASOPHILS # BLD AUTO: 0.03 K/UL
BASOPHILS NFR BLD: 0.6 %
BILIRUB SERPL-MCNC: 1.2 MG/DL
BUN SERPL-MCNC: 7 MG/DL
CALCIUM SERPL-MCNC: 9.9 MG/DL
CHLORIDE SERPL-SCNC: 106 MMOL/L
CO2 SERPL-SCNC: 26 MMOL/L
CREAT SERPL-MCNC: 0.8 MG/DL
DIFFERENTIAL METHOD: NORMAL
EOSINOPHIL # BLD AUTO: 0.1 K/UL
EOSINOPHIL NFR BLD: 2.4 %
ERYTHROCYTE [DISTWIDTH] IN BLOOD BY AUTOMATED COUNT: 13 %
EST. GFR  (AFRICAN AMERICAN): >60 ML/MIN/1.73 M^2
EST. GFR  (NON AFRICAN AMERICAN): >60 ML/MIN/1.73 M^2
GLUCOSE SERPL-MCNC: 243 MG/DL
HBV CORE AB SERPL QL IA: NEGATIVE
HBV SURFACE AB SER-ACNC: NEGATIVE M[IU]/ML
HBV SURFACE AG SERPL QL IA: NEGATIVE
HCT VFR BLD AUTO: 40.6 %
HEPATITIS A ANTIBODY, IGG: POSITIVE
HGB BLD-MCNC: 13.4 G/DL
IMM GRANULOCYTES # BLD AUTO: 0.02 K/UL
IMM GRANULOCYTES NFR BLD AUTO: 0.4 %
INR PPP: 1
LYMPHOCYTES # BLD AUTO: 2.2 K/UL
LYMPHOCYTES NFR BLD: 44.2 %
MCH RBC QN AUTO: 29.6 PG
MCHC RBC AUTO-ENTMCNC: 33 G/DL
MCV RBC AUTO: 90 FL
MONOCYTES # BLD AUTO: 0.4 K/UL
MONOCYTES NFR BLD: 7 %
NEUTROPHILS # BLD AUTO: 2.3 K/UL
NEUTROPHILS NFR BLD: 45.4 %
NRBC BLD-RTO: 0 /100 WBC
PLATELET # BLD AUTO: 177 K/UL
PMV BLD AUTO: 10.2 FL
POTASSIUM SERPL-SCNC: 4 MMOL/L
PROT SERPL-MCNC: 7.7 G/DL
PROTHROMBIN TIME: 10.3 SEC
RBC # BLD AUTO: 4.53 M/UL
SODIUM SERPL-SCNC: 139 MMOL/L
WBC # BLD AUTO: 5.02 K/UL

## 2019-01-21 PROCEDURE — 86704 HEP B CORE ANTIBODY TOTAL: CPT

## 2019-01-21 PROCEDURE — 87522 HEPATITIS C REVRS TRNSCRPJ: CPT

## 2019-01-21 PROCEDURE — 86790 VIRUS ANTIBODY NOS: CPT

## 2019-01-21 PROCEDURE — 85025 COMPLETE CBC W/AUTO DIFF WBC: CPT

## 2019-01-21 PROCEDURE — 99204 OFFICE O/P NEW MOD 45 MIN: CPT | Mod: S$GLB,,, | Performed by: PHYSICIAN ASSISTANT

## 2019-01-21 PROCEDURE — 36415 COLL VENOUS BLD VENIPUNCTURE: CPT

## 2019-01-21 PROCEDURE — 99999 PR PBB SHADOW E&M-EST. PATIENT-LVL IV: CPT | Mod: PBBFAC,,, | Performed by: PHYSICIAN ASSISTANT

## 2019-01-21 PROCEDURE — 99999 PR PBB SHADOW E&M-EST. PATIENT-LVL IV: ICD-10-PCS | Mod: PBBFAC,,, | Performed by: PHYSICIAN ASSISTANT

## 2019-01-21 PROCEDURE — 86706 HEP B SURFACE ANTIBODY: CPT

## 2019-01-21 PROCEDURE — 80053 COMPREHEN METABOLIC PANEL: CPT

## 2019-01-21 PROCEDURE — 85610 PROTHROMBIN TIME: CPT

## 2019-01-21 PROCEDURE — 99204 PR OFFICE/OUTPT VISIT, NEW, LEVL IV, 45-59 MIN: ICD-10-PCS | Mod: S$GLB,,, | Performed by: PHYSICIAN ASSISTANT

## 2019-01-21 PROCEDURE — 3008F BODY MASS INDEX DOCD: CPT | Mod: CPTII,S$GLB,, | Performed by: PHYSICIAN ASSISTANT

## 2019-01-21 PROCEDURE — 87340 HEPATITIS B SURFACE AG IA: CPT

## 2019-01-21 PROCEDURE — 3008F PR BODY MASS INDEX (BMI) DOCUMENTED: ICD-10-PCS | Mod: CPTII,S$GLB,, | Performed by: PHYSICIAN ASSISTANT

## 2019-01-21 NOTE — PROGRESS NOTES
HEPATOLOGY CLINIC VISIT NOTE - HCV clinic    REFERRING PROVIDER:Dr. Medina Prakash  CHIEF COMPLAINT: HCV AB (+)    HISTORY: This is a 63 y.o. White female with HCV AB (+) here for initial evaluation. She recently went for routine physical and was screened according to birth cohort. She had previously been screened twice and HCV AB was negative. She denies h/o blood transfusion,  service, tattoos, incarceration, IV/NAFISA, work exposures. She does report she has gone to a nail salon and over the past two years has had dental work done in [facility]. Pt denies signs of hepatic decompensation including: jaundice, dark urine, abdominal distention, hematemesis, melena, slowed mentation.    Review of labs shows normal synthetic liver function, normal liver enzymes. PLTs are preserved.     She has not had abdominal imaging or fibrosis staging.     Liver enzymes occasionally elevated over time with normalization in 2018. Recent rise in BG, A1c >11.     HCV history:  HCV AB(+)    Risk Factors:  Blood transfusion- (+)   service- (-)  Tattoos- (-)  Incarceration- (-)  IV/NAFISA- (-)  [facility]       Liver staging:  No formal staging  Results for DAX GAFFNEY (MRN [id number]) as of 2019 12:22   Ref. Range 1/3/2019 08:01   Albumin Latest Ref Range: 3.5 - 5.2 g/dL 3.8   Total Bilirubin Latest Ref Range: 0.1 - 1.0 mg/dL 1.6 (H)   AST Latest Ref Range: 10 - 40 U/L 30   ALT Latest Ref Range: 10 - 44 U/L 40     Past Medical History:   Diagnosis Date    Compressive optic atrophy 2015    Diabetes mellitus, type 2     Hyperlipidemia     Prolactinoma     Reflux 8/10/2012    Rosacea     Toe fracture, right 2018     Past Surgical History:   Procedure Laterality Date     SECTION      CHOLECYSTECTOMY      TRANSPHENOIDAL PITUITARY RESECTION       FAMILY HISTORY: Negative for liver disease    SOCIAL HISTORY:   Social History     Tobacco Use   Smoking Status Never Smoker   Smokeless Tobacco  Never Used       Social History     Substance and Sexual Activity   Alcohol Use No       Social History     Substance and Sexual Activity   Drug Use No     ROS:   No fever, chills, weight loss  No chest pain, dyspnea, cough  No abdominal pain,  nausea, vomiting  No headaches, visual changes  No lower extremity edema  No depression or anxiety      PHYSICAL EXAM:  Friendly White female, in no acute distress; alert and oriented to person, place and time  VITALS: reviewed  HEENT: Sclerae anicteric.   NECK: Supple  CVS: Regular rate and rhythm. No murmurs  LUNGS: Normal respiratory effort. Clear bilaterally  ABDOMEN: Flat, soft, nontender. No organomegaly or masses. No ascites or hernias  SKIN: Warm and dry. No jaundice, No obvious rashes.   EXTREMITIES: No lower extremity edema  NEURO/PSYCH: Normal gate. Memory intact. Thought and speech pattern appropriate. Behavior normal. No depression or anxiety noted.    RECENT LABS:  Lab Results   Component Value Date    WBC 4.42 01/03/2019    HGB 13.7 01/03/2019     01/03/2019     No results found for: INR  Lab Results   Component Value Date    AST 30 01/03/2019    ALT 40 01/03/2019    BILITOT 1.6 (H) 01/03/2019    ALBUMIN 3.8 01/03/2019    ALKPHOS 129 01/03/2019    CREATININE 0.8 01/03/2019    BUN 8 01/03/2019     01/03/2019    K 3.9 01/03/2019     RECENT IMAGING:  None   ASSESSMENT  63 y.o. White female with:  1. HCV AB (+)  -- Prior HCV treatment - none, naive  -- Elevated transaminases  -- unknown immunity to HAV and HBV  -- HCV AB previously negative    EDUCATION:  The natural history of Hepatitis C, including potential progression to cirrhosis was reviewed. Complications of cirrhosis, including hepatic decompensation, liver cancer, liver failure, need for liver transplant, and death were reviewed.     We discussed the increased progression of liver disease secondary to alcohol use; patient was advised to avoid alcohol completely.     Transmission of Hepatitis C  was reviewed, including possible sexual transmission. Sexual contacts should be screened.     Risk of vertical transmission of Hepatitis C from mother to baby was reviewed. Children should be screened.     Patient should avoid sharing personal products such as razors, toothbrushes, etc.     We reviewed that vaccination against Hepatitis A and Hepatitis B is recommended if patient does not already have immunity.    Recommend avoiding raw seafood.    Limit acetaminophen to 2000mg daily.    PLAN:  1. Labs to assess viremia, if negative, repeat in 12 weeks   Liver enzymes currently normal, repeat today elevated, will get U/S and fibroscan   2. F/u TBD     Lalo Krueger PA-C

## 2019-01-21 NOTE — LETTER
January 21, 2019      Medina Prakash MD  [address]  [facility] [address]           [facility] - Hepatitis C  [address]  [facility] [address]  Phone: [phone]  Fax: [phone]          Patient: Tona Carlson   MR Number: [id number]   YOB: 1955   Date of Visit: 1/21/2019       Dear Dr. Medina Prakash:    Thank you for referring Tona Carlson to me for evaluation. Attached you will find relevant portions of my assessment and plan of care.    If you have questions, please do not hesitate to call me. I look forward to following Tona Carlson along with you.    Sincerely,    Lalo Krueger PA-C    Enclosure  CC:  No Recipients    If you would like to receive this communication electronically, please contact [email or web address] or [phone] to request more information on [facility] Link access.    For providers and/or their staff who would like to refer a patient to [facility], please contact us through our one-stop-shop provider referral line, [facility], at [phone].    If you feel you have received this communication in error or would no longer like to receive these types of communications, please e-mail [email or web address]

## 2019-01-22 LAB
HCV GENTYP SERPL NAA+PROBE: NORMAL
HCV RNA SERPL NAA+PROBE-LOG IU: <1.08 LOG (10) IU/ML
HCV RNA SERPL QL NAA+PROBE: NOT DETECTED
HCV RNA SPEC NAA+PROBE-ACNC: <12 IU/ML

## 2019-01-24 ENCOUNTER — TELEPHONE (OUTPATIENT)
Dept: HEPATOLOGY | Facility: CLINIC | Age: 64
End: 2019-01-24

## 2019-01-24 DIAGNOSIS — R74.8 ELEVATED LIVER ENZYMES: ICD-10-CM

## 2019-01-24 DIAGNOSIS — R76.8 HCV ANTIBODY POSITIVE: Primary | ICD-10-CM

## 2019-01-24 NOTE — TELEPHONE ENCOUNTER
I spoke with patient and msg from JAIRO Krueger relayed.  Testing scheduled as ordered; appt notices mailed.

## 2019-01-24 NOTE — TELEPHONE ENCOUNTER
Please let her know labs are stable. HCV was not detected, I suspect it was a false positive but would like to repeat in 12 weeks    Her liver enzymes were mildly elevated on labs and I suspect it's fatty liver but would like to get U/S and fibroscan, please schedule    Thanks

## 2019-02-15 ENCOUNTER — HOSPITAL ENCOUNTER (OUTPATIENT)
Dept: RADIOLOGY | Facility: HOSPITAL | Age: 64
Discharge: HOME | End: 2019-02-15
Attending: PHYSICIAN ASSISTANT
Payer: COMMERCIAL

## 2019-02-15 ENCOUNTER — PROCEDURE VISIT (OUTPATIENT)
Dept: HEPATOLOGY | Facility: CLINIC | Age: 64
End: 2019-02-15
Attending: PHYSICIAN ASSISTANT
Payer: COMMERCIAL

## 2019-02-15 DIAGNOSIS — R76.8 HCV ANTIBODY POSITIVE: ICD-10-CM

## 2019-02-15 DIAGNOSIS — R74.8 ELEVATED LIVER ENZYMES: ICD-10-CM

## 2019-02-15 PROCEDURE — 76700 US ABDOMEN COMPLETE: ICD-10-PCS | Mod: 26,,, | Performed by: RADIOLOGY

## 2019-02-15 PROCEDURE — 76700 US EXAM ABDOM COMPLETE: CPT | Mod: TC

## 2019-02-15 PROCEDURE — 76700 US EXAM ABDOM COMPLETE: CPT | Mod: 26,,, | Performed by: RADIOLOGY

## 2019-02-15 PROCEDURE — 91200 LIVER ELASTOGRAPHY: CPT | Mod: S$GLB,,, | Performed by: PHYSICIAN ASSISTANT

## 2019-02-15 PROCEDURE — 91200 PR LIVER ELASTOGRAPHY W/OUT IMAG W/INTERP & REPORT: ICD-10-PCS | Mod: S$GLB,,, | Performed by: PHYSICIAN ASSISTANT

## 2019-02-15 NOTE — PROCEDURES
Fibroscan Procedure     Name: Tona Carlson  Date of Procedure : 02/15/2019   :: Lalo Krueger PA-C  Diagnosis: NAFLD    Probe: XL    Fibroscan readin.1 KPa    Fibrosis:F4     CAP readin dB/m    Steatosis: :S3

## 2019-02-19 ENCOUNTER — TELEPHONE (OUTPATIENT)
Dept: HEPATOLOGY | Facility: CLINIC | Age: 64
End: 2019-02-19

## 2019-02-19 NOTE — TELEPHONE ENCOUNTER
----- Message from Lalo Krueger PA-C sent at 2/19/2019  3:32 PM CST -----  Please schedule f/u visit in 3-4 weeks  Fibroscan F4

## 2019-02-19 NOTE — TELEPHONE ENCOUNTER
MA called patient schedule her follow up visit she accepted 3/25/19 mailed appt reminder to patient.

## 2019-03-27 ENCOUNTER — LAB VISIT (OUTPATIENT)
Dept: LAB | Facility: HOSPITAL | Age: 64
End: 2019-03-27
Payer: COMMERCIAL

## 2019-03-27 ENCOUNTER — OFFICE VISIT (OUTPATIENT)
Dept: HEPATOLOGY | Facility: CLINIC | Age: 64
End: 2019-03-27
Payer: COMMERCIAL

## 2019-03-27 VITALS
OXYGEN SATURATION: 98 % | DIASTOLIC BLOOD PRESSURE: 59 MMHG | SYSTOLIC BLOOD PRESSURE: 115 MMHG | WEIGHT: 192.25 LBS | BODY MASS INDEX: 30.9 KG/M2 | TEMPERATURE: 98 F | HEART RATE: 84 BPM | HEIGHT: 66 IN

## 2019-03-27 DIAGNOSIS — K74.60 CIRRHOSIS OF LIVER WITHOUT ASCITES, UNSPECIFIED HEPATIC CIRRHOSIS TYPE: Primary | ICD-10-CM

## 2019-03-27 DIAGNOSIS — K74.60 CIRRHOSIS OF LIVER WITHOUT ASCITES, UNSPECIFIED HEPATIC CIRRHOSIS TYPE: ICD-10-CM

## 2019-03-27 LAB — AFP SERPL-MCNC: 5.5 NG/ML (ref 0–8.4)

## 2019-03-27 PROCEDURE — 99999 PR PBB SHADOW E&M-EST. PATIENT-LVL IV: CPT | Mod: PBBFAC,,, | Performed by: PHYSICIAN ASSISTANT

## 2019-03-27 PROCEDURE — 99214 PR OFFICE/OUTPT VISIT, EST, LEVL IV, 30-39 MIN: ICD-10-PCS | Mod: S$GLB,,, | Performed by: PHYSICIAN ASSISTANT

## 2019-03-27 PROCEDURE — 3008F PR BODY MASS INDEX (BMI) DOCUMENTED: ICD-10-PCS | Mod: CPTII,S$GLB,, | Performed by: PHYSICIAN ASSISTANT

## 2019-03-27 PROCEDURE — 99999 PR PBB SHADOW E&M-EST. PATIENT-LVL IV: ICD-10-PCS | Mod: PBBFAC,,, | Performed by: PHYSICIAN ASSISTANT

## 2019-03-27 PROCEDURE — 36415 COLL VENOUS BLD VENIPUNCTURE: CPT

## 2019-03-27 PROCEDURE — 82105 ALPHA-FETOPROTEIN SERUM: CPT

## 2019-03-27 PROCEDURE — 3008F BODY MASS INDEX DOCD: CPT | Mod: CPTII,S$GLB,, | Performed by: PHYSICIAN ASSISTANT

## 2019-03-27 PROCEDURE — 99214 OFFICE O/P EST MOD 30 MIN: CPT | Mod: S$GLB,,, | Performed by: PHYSICIAN ASSISTANT

## 2019-03-27 NOTE — PROGRESS NOTES
HEPATOLOGY CLINIC VISIT NOTE - HCV clinic    REFERRING PROVIDER:Dr. Medina Prakash  CHIEF COMPLAINT: HCV AB (+)    HISTORY: This is a 63 y.o. White female here for follow up. She was last seen by me for HCV AB (+) and subsequent testing didn't reveal viremia. However, work up showed elevated liver enzymes and was concerning for NAFLD/ZARCO. Her fibroscan suggested F4 fibrosis. We discussed further fibrosis staging versus continued monitoring with HCC screening. Pt elected not to pursue more aggressive work up at this time. She states she will enroll in medicare in 2 years and will plan to consider then.     Review of labs shows mildly elevated liver enzymes, tbili 1.2-1.6. PLTs WNL     Pt denies signs of hepatic decompensation including: jaundice, dark urine, abdominal distention, hematemesis, melena, slowed mentation.    Liver staging:  Fibroscan F4 at 23.1 kPa   Results for DAX GAFFNEY (MRN [id number]) as of 2019 12:22   Ref. Range 1/3/2019 08:01   Albumin Latest Ref Range: 3.5 - 5.2 g/dL 3.8   Total Bilirubin Latest Ref Range: 0.1 - 1.0 mg/dL 1.6 (H)   AST Latest Ref Range: 10 - 40 U/L 30   ALT Latest Ref Range: 10 - 44 U/L 40     Past Medical History:   Diagnosis Date    Compressive optic atrophy 2015    Diabetes mellitus, type 2     Hyperlipidemia     Prolactinoma     Reflux 8/10/2012    Rosacea     Toe fracture, right 2018     Past Surgical History:   Procedure Laterality Date     SECTION      CHOLECYSTECTOMY      TRANSPHENOIDAL PITUITARY RESECTION       FAMILY HISTORY: Negative for liver disease    SOCIAL HISTORY:   Social History     Tobacco Use   Smoking Status Never Smoker   Smokeless Tobacco Never Used       Social History     Substance and Sexual Activity   Alcohol Use No       Social History     Substance and Sexual Activity   Drug Use No     ROS:   No fever, chills, weight loss  No chest pain, dyspnea, cough  No abdominal pain,  nausea, vomiting  No headaches,  visual changes  No lower extremity edema  No depression or anxiety      PHYSICAL EXAM:  Friendly White female, in no acute distress; alert and oriented to person, place and time  VITALS: reviewed  HEENT: Sclerae anicteric.   NECK: Supple  CVS: Regular rate and rhythm. No murmurs  LUNGS: Normal respiratory effort. Clear bilaterally  ABDOMEN: Flat, soft, nontender. No organomegaly or masses. No ascites or hernias  SKIN: Warm and dry. No jaundice, No obvious rashes.   EXTREMITIES: No lower extremity edema  NEURO/PSYCH: Normal gate. Memory intact. Thought and speech pattern appropriate. Behavior normal. No depression or anxiety noted.    RECENT LABS:  Lab Results   Component Value Date    WBC 5.02 01/21/2019    HGB 13.4 01/21/2019     01/21/2019     Lab Results   Component Value Date    INR 1.0 01/21/2019     Lab Results   Component Value Date    AST 31 01/21/2019    ALT 50 (H) 01/21/2019    BILITOT 1.2 (H) 01/21/2019    ALBUMIN 4.0 01/21/2019    ALKPHOS 118 01/21/2019    CREATININE 0.8 01/21/2019    BUN 7 (L) 01/21/2019     01/21/2019    K 4.0 01/21/2019     RECENT IMAGING:  None   ASSESSMENT  63 y.o. White female with:  1. ?ZARCO CIRRHOSIS  -- intermittent elevated enzymes, tbili 1.2-1.6  -- negative viral studies, HLD, DMII; BMI of 31  -- discussed further staging with MR versus biopsy, pt elected to continue monitoring and may consider once she's on medicare   -- q 6 month HCC screening  -- EGD to be discussed at next visit, PLTs and spleen WNL, however fibroscan KPa >20     EDUCATION:  Discussed evidence that indicates that pt has cirrhosis.   -- Discussed the basics about liver fibrosis /scarring and how that relates to liver function. Reviewed the significance of the MELD scoring system as it relates to indication for transplant.  -- Signs and symptoms of hepatic decompensation were reviewed, including jaundice, ascites, and slowed mentation due to hepatic encephalopathy. The patient should seek  medical attention if any of these things occur. We discussed the potential for bleeding from esophageal varices with symptoms of hematemesis and melena. The patient should report to the Emergency Department for these symptoms.   -- We discussed the increased risk of hepatocellular carcinoma due to cirrhosis.   Continued screening every six months with ultrasound and AFP is recommended.   -- Discussed portal hypertension, including potential development of esophageal varices. Role of EGD in screening for varices was reviewed.   Cirrhosis education booklet given to pt    Recommended patient avoid alcohol and raw seafood. Limit tylenol to 2000mg daily    PLAN:  1. Labs and U/S in 6 months  2. AFP today    Lalo Krueger PA-C

## 2019-05-27 DIAGNOSIS — E11.42 TYPE 2 DIABETES MELLITUS WITH DIABETIC POLYNEUROPATHY, WITHOUT LONG-TERM CURRENT USE OF INSULIN: ICD-10-CM

## 2019-05-28 RX ORDER — GLIMEPIRIDE 2 MG/1
2 TABLET ORAL
Qty: 30 TABLET | Refills: 3 | Status: SHIPPED | OUTPATIENT
Start: 2019-05-28 | End: 2019-08-28 | Stop reason: SDUPTHER

## 2019-08-28 ENCOUNTER — NURSE TRIAGE (OUTPATIENT)
Dept: ADMINISTRATIVE | Facility: CLINIC | Age: 64
End: 2019-08-28

## 2019-08-28 DIAGNOSIS — E11.42 TYPE 2 DIABETES MELLITUS WITH DIABETIC POLYNEUROPATHY, WITHOUT LONG-TERM CURRENT USE OF INSULIN: ICD-10-CM

## 2019-08-28 RX ORDER — GLIMEPIRIDE 2 MG/1
2 TABLET ORAL
Qty: 30 TABLET | Refills: 3 | Status: SHIPPED | OUTPATIENT
Start: 2019-08-28 | End: 2019-08-30

## 2019-08-28 NOTE — TELEPHONE ENCOUNTER
"Patient called to report the following:     -needs refill on Amaryl   -advised to f/u with provider     Reason for Disposition   Caller requesting a NON-URGENT new prescription or refill and triager unable to refill per unit policy    Additional Information   Negative: Drug overdose and nurse unable to answer question   Negative: Caller requesting information not related to medicine   Negative: Caller requesting a prescription for Strep throat and has a positive culture result   Negative: Rash while taking a medication or within 3 days of stopping it   Negative: Immunization reaction suspected   Negative: [1] Asthma AND [2] having symptoms of asthma (cough, wheezing, etc)   Negative: MORE THAN A DOUBLE DOSE of a prescription or over-the-counter (OTC) drug   Negative: [1] DOUBLE DOSE (an extra dose or lesser amount) of over-the-counter (OTC) drug AND [2] any symptoms (e.g., dizziness, nausea, pain, sleepiness)   Negative: [1] DOUBLE DOSE (an extra dose or lesser amount) of prescription drug AND [2] any symptoms (e.g., dizziness, nausea, pain, sleepiness)   Negative: Took another person's prescription drug   Negative: [1] DOUBLE DOSE (an extra dose or lesser amount) of prescription drug AND [2] NO symptoms (Exception: a double dose of antibiotics)   Negative: Diabetes drug error or overdose (e.g., insulin or extra dose)   Negative: [1] Request for URGENT new prescription or refill of "essential" medication (i.e., likelihood of harm to patient if not taken) AND [2] triager unable to fill per unit policy   Negative: [1] Prescription not at pharmacy AND [2] was prescribed today by PCP   Negative: Pharmacy calling with prescription questions and triager unable to answer question   Negative: Caller has urgent medication question about med that PCP prescribed and triager unable to answer question   Negative: Caller has NON-URGENT medication question about med that PCP prescribed and triager unable to " answer question    Protocols used: MEDICATION QUESTION CALL-A-AH

## 2019-08-29 ENCOUNTER — LAB VISIT (OUTPATIENT)
Dept: LAB | Facility: HOSPITAL | Age: 64
End: 2019-08-29
Attending: HOSPITALIST
Payer: COMMERCIAL

## 2019-08-29 DIAGNOSIS — E11.42 TYPE 2 DIABETES MELLITUS WITH DIABETIC POLYNEUROPATHY, WITHOUT LONG-TERM CURRENT USE OF INSULIN: ICD-10-CM

## 2019-08-29 LAB
ESTIMATED AVG GLUCOSE: 223 MG/DL (ref 68–131)
HBA1C MFR BLD HPLC: 9.4 % (ref 4–5.6)

## 2019-08-29 PROCEDURE — 36415 COLL VENOUS BLD VENIPUNCTURE: CPT

## 2019-08-29 PROCEDURE — 83036 HEMOGLOBIN GLYCOSYLATED A1C: CPT

## 2019-08-30 ENCOUNTER — TELEPHONE (OUTPATIENT)
Dept: INTERNAL MEDICINE | Facility: CLINIC | Age: 64
End: 2019-08-30

## 2019-08-30 DIAGNOSIS — E11.42 TYPE 2 DIABETES MELLITUS WITH DIABETIC POLYNEUROPATHY, WITHOUT LONG-TERM CURRENT USE OF INSULIN: ICD-10-CM

## 2019-08-30 RX ORDER — GLIMEPIRIDE 4 MG/1
4 TABLET ORAL
Qty: 30 TABLET | Refills: 3 | Status: SHIPPED | OUTPATIENT
Start: 2019-08-30 | End: 2020-01-30 | Stop reason: SDUPTHER

## 2019-08-30 NOTE — TELEPHONE ENCOUNTER
Spoke w/ pt that A1c improving but not yet at goal. Will increase glimepiride to 4 mg daily with food. Check a1c in 3 months. Pt counseled to make f/u appt

## 2019-12-03 ENCOUNTER — LAB VISIT (OUTPATIENT)
Dept: LAB | Facility: HOSPITAL | Age: 64
End: 2019-12-03
Attending: HOSPITALIST
Payer: COMMERCIAL

## 2019-12-03 DIAGNOSIS — E11.42 TYPE 2 DIABETES MELLITUS WITH DIABETIC POLYNEUROPATHY, WITHOUT LONG-TERM CURRENT USE OF INSULIN: ICD-10-CM

## 2019-12-03 LAB
ESTIMATED AVG GLUCOSE: 117 MG/DL (ref 68–131)
HBA1C MFR BLD HPLC: 5.7 % (ref 4–5.6)

## 2019-12-03 PROCEDURE — 36415 COLL VENOUS BLD VENIPUNCTURE: CPT

## 2019-12-03 PROCEDURE — 83036 HEMOGLOBIN GLYCOSYLATED A1C: CPT

## 2019-12-06 ENCOUNTER — PATIENT MESSAGE (OUTPATIENT)
Dept: INTERNAL MEDICINE | Facility: CLINIC | Age: 64
End: 2019-12-06

## 2019-12-23 ENCOUNTER — TELEPHONE (OUTPATIENT)
Dept: OBSTETRICS AND GYNECOLOGY | Facility: CLINIC | Age: 64
End: 2019-12-23

## 2019-12-23 DIAGNOSIS — Z12.39 BREAST CANCER SCREENING: Primary | ICD-10-CM

## 2019-12-23 NOTE — TELEPHONE ENCOUNTER
----- Message from Carla Kelly sent at 12/23/2019  3:42 PM CST -----  Contact: Pt  Patient called requesting an order be placed for mammogram. Thank you.    Patient can be reached at [phone]

## 2019-12-26 ENCOUNTER — HOSPITAL ENCOUNTER (OUTPATIENT)
Dept: RADIOLOGY | Facility: HOSPITAL | Age: 64
Discharge: HOME | End: 2019-12-26
Attending: NURSE PRACTITIONER
Payer: COMMERCIAL

## 2019-12-26 DIAGNOSIS — Z12.39 BREAST CANCER SCREENING: ICD-10-CM

## 2019-12-26 PROCEDURE — 77063 MAMMO DIGITAL SCREENING BILAT WITH TOMOSYNTHESIS_CAD: ICD-10-PCS | Mod: 26,,, | Performed by: RADIOLOGY

## 2019-12-26 PROCEDURE — 77063 BREAST TOMOSYNTHESIS BI: CPT | Mod: 26,,, | Performed by: RADIOLOGY

## 2019-12-26 PROCEDURE — 77067 MAMMO DIGITAL SCREENING BILAT WITH TOMOSYNTHESIS_CAD: ICD-10-PCS | Mod: 26,,, | Performed by: RADIOLOGY

## 2019-12-26 PROCEDURE — 77067 SCR MAMMO BI INCL CAD: CPT | Mod: 26,,, | Performed by: RADIOLOGY

## 2019-12-26 PROCEDURE — 77067 SCR MAMMO BI INCL CAD: CPT | Mod: TC

## 2019-12-27 ENCOUNTER — CLINICAL SUPPORT (OUTPATIENT)
Dept: INTERNAL MEDICINE | Facility: CLINIC | Age: 64
End: 2019-12-27
Payer: COMMERCIAL

## 2019-12-27 PROCEDURE — 90686 FLU VACCINE (QUAD) GREATER THAN OR EQUAL TO 3YO PRESERVATIVE FREE IM: ICD-10-PCS | Mod: S$GLB,,, | Performed by: INTERNAL MEDICINE

## 2019-12-27 PROCEDURE — 90471 IMMUNIZATION ADMIN: CPT | Mod: S$GLB,,, | Performed by: INTERNAL MEDICINE

## 2019-12-27 PROCEDURE — 90686 IIV4 VACC NO PRSV 0.5 ML IM: CPT | Mod: S$GLB,,, | Performed by: INTERNAL MEDICINE

## 2019-12-27 PROCEDURE — 90471 FLU VACCINE (QUAD) GREATER THAN OR EQUAL TO 3YO PRESERVATIVE FREE IM: ICD-10-PCS | Mod: S$GLB,,, | Performed by: INTERNAL MEDICINE

## 2020-01-09 DIAGNOSIS — E11.65 UNCONTROLLED TYPE 2 DIABETES MELLITUS WITH HYPERGLYCEMIA: Primary | ICD-10-CM

## 2020-01-09 DIAGNOSIS — Z13.5 SCREENING FOR EYE CONDITION: ICD-10-CM

## 2020-01-09 DIAGNOSIS — E11.39 DIABETIC EYES: ICD-10-CM

## 2020-01-13 ENCOUNTER — DOCUMENTATION ONLY (OUTPATIENT)
Dept: INTERNAL MEDICINE | Facility: CLINIC | Age: 65
End: 2020-01-13

## 2020-01-13 NOTE — PROGRESS NOTES
Laura Barrett , , left voice mail for pt  to schedule PCP annual visit, A1C & annual opth followup.

## 2020-01-30 DIAGNOSIS — E11.42 TYPE 2 DIABETES MELLITUS WITH DIABETIC POLYNEUROPATHY, WITHOUT LONG-TERM CURRENT USE OF INSULIN: ICD-10-CM

## 2020-01-30 RX ORDER — GLIMEPIRIDE 4 MG/1
4 TABLET ORAL
Qty: 30 TABLET | Refills: 0 | Status: SHIPPED | OUTPATIENT
Start: 2020-01-30 | End: 2020-02-04 | Stop reason: SDUPTHER

## 2020-02-03 ENCOUNTER — PATIENT OUTREACH (OUTPATIENT)
Dept: ADMINISTRATIVE | Facility: HOSPITAL | Age: 65
End: 2020-02-03

## 2020-02-04 ENCOUNTER — OFFICE VISIT (OUTPATIENT)
Dept: INTERNAL MEDICINE | Facility: CLINIC | Age: 65
End: 2020-02-04
Payer: COMMERCIAL

## 2020-02-04 VITALS
WEIGHT: 185.44 LBS | RESPIRATION RATE: 16 BRPM | BODY MASS INDEX: 30.89 KG/M2 | HEART RATE: 72 BPM | DIASTOLIC BLOOD PRESSURE: 64 MMHG | HEIGHT: 65 IN | TEMPERATURE: 98 F | SYSTOLIC BLOOD PRESSURE: 110 MMHG

## 2020-02-04 DIAGNOSIS — Z00.00 ANNUAL PHYSICAL EXAM: Primary | ICD-10-CM

## 2020-02-04 DIAGNOSIS — R21 RASH: ICD-10-CM

## 2020-02-04 DIAGNOSIS — K76.0 FATTY LIVER DISEASE, NONALCOHOLIC: ICD-10-CM

## 2020-02-04 DIAGNOSIS — E11.42 TYPE 2 DIABETES MELLITUS WITH DIABETIC POLYNEUROPATHY, WITHOUT LONG-TERM CURRENT USE OF INSULIN: ICD-10-CM

## 2020-02-04 DIAGNOSIS — E78.5 HYPERLIPIDEMIA, UNSPECIFIED HYPERLIPIDEMIA TYPE: ICD-10-CM

## 2020-02-04 PROBLEM — R76.8 HEPATITIS C ANTIBODY TEST POSITIVE: Status: ACTIVE | Noted: 2020-02-04

## 2020-02-04 PROCEDURE — 99999 PR PBB SHADOW E&M-EST. PATIENT-LVL III: CPT | Mod: PBBFAC,,, | Performed by: HOSPITALIST

## 2020-02-04 PROCEDURE — 99999 PR PBB SHADOW E&M-EST. PATIENT-LVL III: ICD-10-PCS | Mod: PBBFAC,,, | Performed by: HOSPITALIST

## 2020-02-04 PROCEDURE — 3044F HG A1C LEVEL LT 7.0%: CPT | Mod: CPTII,S$GLB,, | Performed by: HOSPITALIST

## 2020-02-04 PROCEDURE — 99396 PREV VISIT EST AGE 40-64: CPT | Mod: S$GLB,,, | Performed by: HOSPITALIST

## 2020-02-04 PROCEDURE — 3044F PR MOST RECENT HEMOGLOBIN A1C LEVEL <7.0%: ICD-10-PCS | Mod: CPTII,S$GLB,, | Performed by: HOSPITALIST

## 2020-02-04 PROCEDURE — 99396 PR PREVENTIVE VISIT,EST,40-64: ICD-10-PCS | Mod: S$GLB,,, | Performed by: HOSPITALIST

## 2020-02-04 RX ORDER — GLIMEPIRIDE 4 MG/1
4 TABLET ORAL
Qty: 90 TABLET | Refills: 3 | Status: SHIPPED | OUTPATIENT
Start: 2020-02-04 | End: 2020-08-06

## 2020-02-04 NOTE — PROGRESS NOTES
Subjective:     @Patient ID: Tona Carlson is a 64 y.o. female.    Chief Complaint: Follow-up and Sore Throat    HPI    64 y.o. female here for annual exam and f/u of diabetes: Pt reports having sore throat. No f/c, no congestion or runny nose.   Reports left arm rash 1 month, was itchy. Worried about possible bed bugs. How with healing scars/hyperpigmentation.   Reports she has been monitoring her diet and doing well amaryl    Lipid disorders/ASCVD risk (ages >/= 45 or >/= 20 if increased risk ): ordered  DM (>45y yearly or if obese, HTN): A1c ordered  Eye exam:  ordered  Breast Cancer (40-50y discretion of pt, 50-74y every 1-2 years): Mammogram done 12/2019  Cervical Cancer (Pap Smear ages 21-65 every 3 years or Pap + HPV q5 years after 30 years of age): done 2018  Colorectal Cancer (normal risk 50-75yr): Colonoscopy pt prefers to hold off unto medicare kicks in in October  DEXA (F>66 yo, M >69yo, M&F 50-70 yo with risk factors (smoking, previous fx, wt <70kg; etoh abuse, chronic steroids, RA)): n/a     Vaccines:   Influenza (yearly) done 12/27/19  Tetanus (every 10 yrs - 1st tdap) done 2014   PPSV23(>64yo or <65 w/ lung dz, smoking, DM) done 2019.   PCV13 (> 65 or <65 w/ immunocompromised) n/a  Zoster (>59yo) due. Pt thinks she had done before. No records of this. Pt declines for now     Exercise:   Diet: low carb diet       Review of Systems   Constitutional: Negative for chills and fever.   HENT: Negative for congestion and sore throat.    Eyes: Negative for pain and visual disturbance.   Respiratory: Negative for cough and shortness of breath.    Cardiovascular: Negative for chest pain and leg swelling.   Gastrointestinal: Negative for abdominal pain, nausea and vomiting.   Endocrine: Negative for polydipsia and polyuria.   Genitourinary: Negative for difficulty urinating, dysuria and frequency.   Musculoskeletal: Negative for arthralgias and back pain.   Skin: Positive for rash. Negative for wound.  "  Neurological: Negative for dizziness, weakness and headaches.   Psychiatric/Behavioral: Negative for agitation and confusion.     Past medical history, surgical history, and family medical history reviewed and updated as appropriate.    Medications and allergies reviewed.     Objective:     Vitals:    02/04/20 1400   BP: 110/64   BP Location: Right arm   Patient Position: Sitting   BP Method: Large (Manual)   Pulse: 72   Resp: 16   Temp: 97.5 °F (36.4 °C)   TempSrc: Oral   Weight: 84.1 kg (185 lb 6.5 oz)   Height: 5' 5" (1.651 m)     Body mass index is 30.85 kg/m².  Physical Exam   Constitutional: She is oriented to person, place, and time. She appears well-developed and well-nourished. No distress.   HENT:   Head: Normocephalic and atraumatic.   Right Ear: External ear normal.   Left Ear: External ear normal.   Mouth/Throat: Oropharynx is clear and moist. No oropharyngeal exudate.   Eyes: Conjunctivae are normal. Right eye exhibits no discharge. Left eye exhibits no discharge.   Neck: Normal range of motion. Neck supple.   Cardiovascular: Normal rate, regular rhythm and intact distal pulses. Exam reveals no friction rub.   No murmur heard.  Pulmonary/Chest: Effort normal and breath sounds normal.   Abdominal: Soft. Bowel sounds are normal. She exhibits no distension. There is no tenderness. There is no guarding.   Musculoskeletal: Normal range of motion. She exhibits no edema.   Lymphadenopathy:     She has no cervical adenopathy.   Neurological: She is alert and oriented to person, place, and time.   Skin: Skin is warm and dry.   Psychiatric: She has a normal mood and affect. Her behavior is normal.   Vitals reviewed.      Lab Results   Component Value Date    WBC 5.02 01/21/2019    HGB 13.4 01/21/2019    HCT 40.6 01/21/2019     01/21/2019    CHOL 174 01/03/2019    TRIG 155 (H) 01/03/2019    HDL 38 (L) 01/03/2019    ALT 50 (H) 01/21/2019    AST 31 01/21/2019     01/21/2019    K 4.0 01/21/2019    CL " 106 01/21/2019    CREATININE 0.8 01/21/2019    BUN 7 (L) 01/21/2019    CO2 26 01/21/2019    TSH 2.823 01/03/2019    INR 1.0 01/21/2019    HGBA1C 5.7 (H) 12/03/2019       Assessment:     1. Annual physical exam    2. Fatty liver disease, nonalcoholic    3. Type 2 diabetes mellitus with diabetic polyneuropathy, without long-term current use of insulin    4. Hyperlipidemia, unspecified hyperlipidemia type    5. Rash      Plan:   Tona was seen today for follow-up and sore throat.    Diagnoses and all orders for this visit:    Annual physical exam  -     Comprehensive metabolic panel; Future  -     CBC auto differential; Future  -     TSH; Future  -     Vitamin D; Future  -     Lipid panel; Future  -     Urinalysis; Future  -     Microalbumin/creatinine urine ratio; Future    Fatty liver disease, nonalcoholic        - Will continue f/u with hepatology.     Type 2 diabetes mellitus with diabetic polyneuropathy, without long-term current use of insulin  - Check a1c   -     Microalbumin/creatinine urine ratio; Future    Hyperlipidemia, unspecified hyperlipidemia type  -     Lipid panel; Future    Rash        - Healed. Unclear etiology. If returns notify MD           Follow up in about 6 months (around 8/4/2020), or if symptoms worsen or fail to improve.    Medina Prakash MD  Internal Medicine    2/4/2020

## 2020-02-11 ENCOUNTER — LAB VISIT (OUTPATIENT)
Dept: LAB | Facility: HOSPITAL | Age: 65
End: 2020-02-11
Attending: HOSPITALIST
Payer: COMMERCIAL

## 2020-02-11 DIAGNOSIS — E11.42 TYPE 2 DIABETES MELLITUS WITH DIABETIC POLYNEUROPATHY, WITHOUT LONG-TERM CURRENT USE OF INSULIN: ICD-10-CM

## 2020-02-11 DIAGNOSIS — Z00.00 ANNUAL PHYSICAL EXAM: ICD-10-CM

## 2020-02-11 DIAGNOSIS — E78.5 HYPERLIPIDEMIA, UNSPECIFIED HYPERLIPIDEMIA TYPE: ICD-10-CM

## 2020-02-11 LAB
25(OH)D3+25(OH)D2 SERPL-MCNC: 13 NG/ML (ref 30–96)
ALBUMIN SERPL BCP-MCNC: 4 G/DL (ref 3.5–5.2)
ALP SERPL-CCNC: 106 U/L (ref 55–135)
ALT SERPL W/O P-5'-P-CCNC: 40 U/L (ref 10–44)
ANION GAP SERPL CALC-SCNC: 9 MMOL/L (ref 8–16)
AST SERPL-CCNC: 28 U/L (ref 10–40)
BASOPHILS # BLD AUTO: 0.02 K/UL (ref 0–0.2)
BASOPHILS NFR BLD: 0.5 % (ref 0–1.9)
BILIRUB SERPL-MCNC: 0.5 MG/DL (ref 0.1–1)
BUN SERPL-MCNC: 11 MG/DL (ref 8–23)
CALCIUM SERPL-MCNC: 9.2 MG/DL (ref 8.7–10.5)
CHLORIDE SERPL-SCNC: 110 MMOL/L (ref 95–110)
CHOLEST SERPL-MCNC: 158 MG/DL (ref 120–199)
CHOLEST/HDLC SERPL: 4.1 {RATIO} (ref 2–5)
CO2 SERPL-SCNC: 24 MMOL/L (ref 23–29)
CREAT SERPL-MCNC: 0.7 MG/DL (ref 0.5–1.4)
DIFFERENTIAL METHOD: ABNORMAL
EOSINOPHIL # BLD AUTO: 0.2 K/UL (ref 0–0.5)
EOSINOPHIL NFR BLD: 5.4 % (ref 0–8)
ERYTHROCYTE [DISTWIDTH] IN BLOOD BY AUTOMATED COUNT: 12.8 % (ref 11.5–14.5)
EST. GFR  (AFRICAN AMERICAN): >60 ML/MIN/1.73 M^2
EST. GFR  (NON AFRICAN AMERICAN): >60 ML/MIN/1.73 M^2
ESTIMATED AVG GLUCOSE: 120 MG/DL (ref 68–131)
GLUCOSE SERPL-MCNC: 117 MG/DL (ref 70–110)
HBA1C MFR BLD HPLC: 5.8 % (ref 4–5.6)
HCT VFR BLD AUTO: 41.8 % (ref 37–48.5)
HDLC SERPL-MCNC: 39 MG/DL (ref 40–75)
HDLC SERPL: 24.7 % (ref 20–50)
HGB BLD-MCNC: 13.2 G/DL (ref 12–16)
IMM GRANULOCYTES # BLD AUTO: 0.01 K/UL (ref 0–0.04)
IMM GRANULOCYTES NFR BLD AUTO: 0.3 % (ref 0–0.5)
LDLC SERPL CALC-MCNC: 101 MG/DL (ref 63–159)
LYMPHOCYTES # BLD AUTO: 1.5 K/UL (ref 1–4.8)
LYMPHOCYTES NFR BLD: 40.9 % (ref 18–48)
MCH RBC QN AUTO: 29.8 PG (ref 27–31)
MCHC RBC AUTO-ENTMCNC: 31.6 G/DL (ref 32–36)
MCV RBC AUTO: 94 FL (ref 82–98)
MONOCYTES # BLD AUTO: 0.3 K/UL (ref 0.3–1)
MONOCYTES NFR BLD: 8.4 % (ref 4–15)
NEUTROPHILS # BLD AUTO: 1.6 K/UL (ref 1.8–7.7)
NEUTROPHILS NFR BLD: 44.5 % (ref 38–73)
NONHDLC SERPL-MCNC: 119 MG/DL
NRBC BLD-RTO: 0 /100 WBC
PLATELET # BLD AUTO: 186 K/UL (ref 150–350)
PMV BLD AUTO: 10 FL (ref 9.2–12.9)
POTASSIUM SERPL-SCNC: 4.2 MMOL/L (ref 3.5–5.1)
PROT SERPL-MCNC: 7.4 G/DL (ref 6–8.4)
RBC # BLD AUTO: 4.43 M/UL (ref 4–5.4)
SODIUM SERPL-SCNC: 143 MMOL/L (ref 136–145)
TRIGL SERPL-MCNC: 90 MG/DL (ref 30–150)
TSH SERPL DL<=0.005 MIU/L-ACNC: 2.38 UIU/ML (ref 0.4–4)
WBC # BLD AUTO: 3.69 K/UL (ref 3.9–12.7)

## 2020-02-11 PROCEDURE — 80061 LIPID PANEL: CPT

## 2020-02-11 PROCEDURE — 85025 COMPLETE CBC W/AUTO DIFF WBC: CPT

## 2020-02-11 PROCEDURE — 84443 ASSAY THYROID STIM HORMONE: CPT

## 2020-02-11 PROCEDURE — 82306 VITAMIN D 25 HYDROXY: CPT

## 2020-02-11 PROCEDURE — 80053 COMPREHEN METABOLIC PANEL: CPT

## 2020-02-11 PROCEDURE — 36415 COLL VENOUS BLD VENIPUNCTURE: CPT

## 2020-02-11 PROCEDURE — 83036 HEMOGLOBIN GLYCOSYLATED A1C: CPT

## 2020-02-13 ENCOUNTER — TELEPHONE (OUTPATIENT)
Dept: INTERNAL MEDICINE | Facility: CLINIC | Age: 65
End: 2020-02-13

## 2020-03-11 ENCOUNTER — OFFICE VISIT (OUTPATIENT)
Dept: INTERNAL MEDICINE | Facility: CLINIC | Age: 65
End: 2020-03-11
Payer: COMMERCIAL

## 2020-03-11 ENCOUNTER — TELEPHONE (OUTPATIENT)
Dept: INTERNAL MEDICINE | Facility: CLINIC | Age: 65
End: 2020-03-11

## 2020-03-11 VITALS
SYSTOLIC BLOOD PRESSURE: 120 MMHG | TEMPERATURE: 99 F | WEIGHT: 188.94 LBS | HEART RATE: 107 BPM | OXYGEN SATURATION: 99 % | BODY MASS INDEX: 31.48 KG/M2 | HEIGHT: 65 IN | DIASTOLIC BLOOD PRESSURE: 72 MMHG

## 2020-03-11 DIAGNOSIS — R09.82 POST-NASAL DRIP: Primary | ICD-10-CM

## 2020-03-11 DIAGNOSIS — J02.9 SORETHROAT: ICD-10-CM

## 2020-03-11 PROCEDURE — 99999 PR PBB SHADOW E&M-EST. PATIENT-LVL III: CPT | Mod: PBBFAC,,, | Performed by: STUDENT IN AN ORGANIZED HEALTH CARE EDUCATION/TRAINING PROGRAM

## 2020-03-11 PROCEDURE — 99999 PR PBB SHADOW E&M-EST. PATIENT-LVL III: ICD-10-PCS | Mod: PBBFAC,,, | Performed by: STUDENT IN AN ORGANIZED HEALTH CARE EDUCATION/TRAINING PROGRAM

## 2020-03-11 PROCEDURE — 99213 OFFICE O/P EST LOW 20 MIN: CPT | Mod: S$GLB,,, | Performed by: STUDENT IN AN ORGANIZED HEALTH CARE EDUCATION/TRAINING PROGRAM

## 2020-03-11 PROCEDURE — 3008F PR BODY MASS INDEX (BMI) DOCUMENTED: ICD-10-PCS | Mod: CPTII,S$GLB,, | Performed by: STUDENT IN AN ORGANIZED HEALTH CARE EDUCATION/TRAINING PROGRAM

## 2020-03-11 PROCEDURE — 3008F BODY MASS INDEX DOCD: CPT | Mod: CPTII,S$GLB,, | Performed by: STUDENT IN AN ORGANIZED HEALTH CARE EDUCATION/TRAINING PROGRAM

## 2020-03-11 PROCEDURE — 99213 PR OFFICE/OUTPT VISIT, EST, LEVL III, 20-29 MIN: ICD-10-PCS | Mod: S$GLB,,, | Performed by: STUDENT IN AN ORGANIZED HEALTH CARE EDUCATION/TRAINING PROGRAM

## 2020-03-11 RX ORDER — FLUTICASONE PROPIONATE 50 MCG
2 SPRAY, SUSPENSION (ML) NASAL DAILY
Qty: 16 G | Refills: 0 | Status: SHIPPED | OUTPATIENT
Start: 2020-03-11 | End: 2020-04-10

## 2020-03-11 NOTE — PATIENT INSTRUCTIONS
For sinus and allergy issues:    1. Saline nasal rinse (NeilMed or Arm & Hammer Simply Saline) at least 2-3 times/day  2. Flonase (fluticasone): Two sprays (50 mcg/spray) per nostril once daily (200 mcg/day); after 1 week, may adjust to 1 or 2 sprays per nostril once daily (100 to 200 mcg/day).  3. Claritin (loratadine), Zyrtec (cetirizine), or Allegra (fexofenadine) once a day

## 2020-03-11 NOTE — TELEPHONE ENCOUNTER
----- Message from Shaista Anderson sent at 3/11/2020 11:22 AM CDT -----  Contact: Patient [phone]  Patient stated that she missed a call from [facility].    Please call and advise.    Thank You

## 2020-03-11 NOTE — PROGRESS NOTES
Tona Carlson  1955        Subjective     Chief Complaint:    History of Present Illness:  Ms. Tona Carlson is a 64 y.o. female with PMH of DM type 2, fatty liver disease and HLD, who came in to the clinic complaining of sore throat for 6 weeks.    She is a patient of Dr. Prakash, last saw her in 2/4/2020. At that time, patient was complaining of sore throat with no fever/chills. She had sneezing and myalgia. She was instructed to have saline gurggle per patient. She also reports using NightQuil and DayQuil with no significant improvement. She reports resolution of her sneezing, cough, myalgia, however, she still reports sore throat. Also mentions post nasal drip and the need to cleer her throat frequently. She also reports some discomfort in her rt ear with no noted discharge or hearing difficulties.    States she is able to tolerate PO intake and no issues with swallowing. No rash noted and no heart burn acid reflux Hx. Currently has intermittent runny nose. No recent travel and no sick contacts. No fever/chills. No swelling noted in her neck or arm bits.         Review of Systems   Constitutional: Negative for chills, diaphoresis, fever and malaise/fatigue.   HENT: Positive for sore throat. Negative for ear discharge, hearing loss, sinus pain and tinnitus. Congestion: intermittent. Ear pain: has discomfort in Rt ear.    Eyes: Negative for blurred vision, double vision and redness.   Respiratory: Negative for cough, hemoptysis, sputum production, shortness of breath and stridor.    Cardiovascular: Negative for chest pain, palpitations and orthopnea.   Gastrointestinal: Negative for abdominal pain, diarrhea, nausea and vomiting.   Genitourinary: Negative for dysuria, frequency and urgency.   Musculoskeletal: Negative for joint pain, myalgias and neck pain.   Skin: Negative for itching and rash.   Neurological: Negative for dizziness, tingling, weakness and headaches.   Psychiatric/Behavioral: Negative  for depression. The patient is not nervous/anxious.         PAST HISTORY:     Past Medical History:   Diagnosis Date    Compressive optic atrophy 2015    Diabetes mellitus, type 2     Hyperlipidemia     Prolactinoma     Reflux 8/10/2012    Rosacea     Toe fracture, right 2018       Past Surgical History:   Procedure Laterality Date    BREAST BIOPSY Right     core     SECTION      CHOLECYSTECTOMY      TRANSPHENOIDAL PITUITARY RESECTION         Family History   Problem Relation Age of Onset    Diabetes Mother     Glaucoma Mother     Hypertension Mother     Heart disease Paternal Grandfather     Cancer Paternal Grandmother         liver or stomach    No Known Problems Father     Colon cancer Neg Hx     Ovarian cancer Neg Hx     Breast cancer Neg Hx        Social History     Socioeconomic History    Marital status:      Spouse name: Not on file    Number of children: 1    Years of education: Not on file    Highest education level: Not on file   Occupational History    Not on file   Social Needs    Financial resource strain: Not on file    Food insecurity:     Worry: Not on file     Inability: Not on file    Transportation needs:     Medical: Not on file     Non-medical: Not on file   Tobacco Use    Smoking status: Never Smoker    Smokeless tobacco: Never Used   Substance and Sexual Activity    Alcohol use: No    Drug use: No    Sexual activity: Yes     Partners: Male   Lifestyle    Physical activity:     Days per week: Not on file     Minutes per session: Not on file    Stress: Not on file   Relationships    Social connections:     Talks on phone: Not on file     Gets together: Not on file     Attends Holiness service: Not on file     Active member of club or organization: Not on file     Attends meetings of clubs or organizations: Not on file     Relationship status: Not on file   Other Topics Concern    Not on file   Social History Narrative    Not on  "file       MEDICATIONS & ALLERGIES:     Current Outpatient Medications on File Prior to Visit   Medication Sig    flu vac [id number] 36mos up,PF, 60 mcg (15 mcg x 4)/0.5 mL Syrg inject 0.5ml into the muscle    glimepiride (AMARYL) 4 MG tablet Take 1 tablet (4 mg total) by mouth daily with breakfast.    oxybutynin (DITROPAN) 5 MG Tab Take 1 tablet (5 mg total) by mouth 2 (two) times daily.     No current facility-administered medications on file prior to visit.        Review of patient's allergies indicates:  No Known Allergies    OBJECTIVE:     Vital Signs:  Vitals:    03/11/20 1406   BP: 120/72   Pulse: 107   Temp: 98.6 °F (37 °C)   SpO2: 99%   Weight: 85.7 kg (188 lb 15 oz)   Height: 5' 5" (1.651 m)       Body mass index is 31.44 kg/m².     Physical Exam:  Physical Exam   Constitutional: She is oriented to person, place, and time and well-developed, well-nourished, and in no distress. No distress.   HENT:   Head: Normocephalic and atraumatic.   Right Ear: External ear normal.   Left Ear: External ear normal.   Nose: Nose normal.   Limited oropharyngeal exam due to large tongue. No visible palate ulcers or rash.  Ear wax noted in bilateral ears    Eyes: Pupils are equal, round, and reactive to light. Conjunctivae and EOM are normal. Right eye exhibits no discharge. Left eye exhibits no discharge. No scleral icterus.   Neck: Normal range of motion. Neck supple. No JVD present. No tracheal deviation present. No thyromegaly present.   Cardiovascular: Normal rate, regular rhythm, normal heart sounds and intact distal pulses.   No murmur heard.  Pulmonary/Chest: Effort normal and breath sounds normal. No stridor. No respiratory distress. She has no wheezes. She has no rales.   Abdominal: Soft. Bowel sounds are normal. She exhibits no distension. There is no tenderness. There is no rebound.   Musculoskeletal: Normal range of motion. She exhibits no edema or deformity.   Lymphadenopathy:     She has no cervical " adenopathy.   Neurological: She is alert and oriented to person, place, and time. No cranial nerve deficit. Gait normal. GCS score is 15.   Skin: Skin is warm and dry. No rash noted. She is not diaphoretic.   Psychiatric: Memory, affect and judgment normal.          Laboratory  Lab Results   Component Value Date    WBC 3.69 (L) 02/11/2020    HGB 13.2 02/11/2020    HCT 41.8 02/11/2020    MCV 94 02/11/2020     02/11/2020     Lab Results   Component Value Date     (H) 02/11/2020     02/11/2020    K 4.2 02/11/2020     02/11/2020    CO2 24 02/11/2020    BUN 11 02/11/2020    CREATININE 0.7 02/11/2020    CALCIUM 9.2 02/11/2020    MG 2.3 10/20/2015     Lab Results   Component Value Date    INR 1.0 01/21/2019     Lab Results   Component Value Date    HGBA1C 5.8 (H) 02/11/2020     No results for input(s): POCTGLUCOSE in the last 72 hours.        ASSESSMENT & PLAN:   Ms. Tona Carlson is a 64 y.o. female with PMH of DM type 2, fatty liver disease and HLD, who came in to the clinic complaining of sore throat for 6 weeks.    Patient initially had runny nose, sneezing, sore throat, cough, and myalgia 6 weeks ago. Symptoms improved in 3 weeks however, her sore throat persisted and noted post nasal drip and Rt ear discomfort. No current fever/ chills, headache, dizziness or rash. No recent travel or sick contact. Vitals stable with no fever. Exam limited by large tongue obscuring posterior phalangeal wall and tonsils. Rt ear looked benign.       Post-nasal drip  Sorethroat  -     fluticasone propionate (FLONASE) 50 mcg/actuation nasal spray; 2 sprays (100 mcg total) by Each Nostril route once daily for 5 days.  Dispense: 16 g; Refill: 0    -    OTC: Saline nasal rinse (NeilMed or Arm & Hammer Simply Saline) at least 2-3 times/day                 Claritin (loratadine), Zyrtec (cetirizine), or Allegra (fexofenadine) once a day          Beata Diez MD  Internal Medicine PGY2  [facility] Resident  [id number] [place] [address]  [phone]

## 2020-05-21 ENCOUNTER — LAB VISIT (OUTPATIENT)
Dept: PRIMARY CARE CLINIC | Facility: CLINIC | Age: 65
End: 2020-05-21
Payer: COMMERCIAL

## 2020-05-21 DIAGNOSIS — R05.9 COUGH: Primary | ICD-10-CM

## 2020-05-21 PROCEDURE — U0003 INFECTIOUS AGENT DETECTION BY NUCLEIC ACID (DNA OR RNA); SEVERE ACUTE RESPIRATORY SYNDROME CORONAVIRUS 2 (SARS-COV-2) (CORONAVIRUS DISEASE [COVID-19]), AMPLIFIED PROBE TECHNIQUE, MAKING USE OF HIGH THROUGHPUT TECHNOLOGIES AS DESCRIBED BY CMS-2020-01-R: HCPCS

## 2020-05-22 LAB — SARS-COV-2 RNA RESP QL NAA+PROBE: NOT DETECTED

## 2020-08-04 ENCOUNTER — LAB VISIT (OUTPATIENT)
Dept: LAB | Facility: HOSPITAL | Age: 65
End: 2020-08-04
Attending: HOSPITALIST
Payer: COMMERCIAL

## 2020-08-04 ENCOUNTER — OFFICE VISIT (OUTPATIENT)
Dept: INTERNAL MEDICINE | Facility: CLINIC | Age: 65
End: 2020-08-04
Payer: COMMERCIAL

## 2020-08-04 VITALS
TEMPERATURE: 98 F | BODY MASS INDEX: 32.4 KG/M2 | SYSTOLIC BLOOD PRESSURE: 120 MMHG | WEIGHT: 194.44 LBS | DIASTOLIC BLOOD PRESSURE: 80 MMHG | HEIGHT: 65 IN | HEART RATE: 88 BPM

## 2020-08-04 DIAGNOSIS — E11.42 TYPE 2 DIABETES MELLITUS WITH DIABETIC POLYNEUROPATHY, WITHOUT LONG-TERM CURRENT USE OF INSULIN: ICD-10-CM

## 2020-08-04 DIAGNOSIS — E11.42 TYPE 2 DIABETES MELLITUS WITH DIABETIC POLYNEUROPATHY, WITHOUT LONG-TERM CURRENT USE OF INSULIN: Primary | ICD-10-CM

## 2020-08-04 DIAGNOSIS — Z12.11 SCREENING FOR COLORECTAL CANCER: ICD-10-CM

## 2020-08-04 DIAGNOSIS — K74.60 CIRRHOSIS OF LIVER WITHOUT ASCITES, UNSPECIFIED HEPATIC CIRRHOSIS TYPE: ICD-10-CM

## 2020-08-04 DIAGNOSIS — M79.645 PAIN OF LEFT THUMB: ICD-10-CM

## 2020-08-04 DIAGNOSIS — Z12.12 SCREENING FOR COLORECTAL CANCER: ICD-10-CM

## 2020-08-04 DIAGNOSIS — E11.9 ENCOUNTER FOR DIABETIC FOOT EXAM: ICD-10-CM

## 2020-08-04 LAB
BASOPHILS # BLD AUTO: 0.02 K/UL (ref 0–0.2)
BASOPHILS NFR BLD: 0.4 % (ref 0–1.9)
DIFFERENTIAL METHOD: ABNORMAL
EOSINOPHIL # BLD AUTO: 0.1 K/UL (ref 0–0.5)
EOSINOPHIL NFR BLD: 2.6 % (ref 0–8)
ERYTHROCYTE [DISTWIDTH] IN BLOOD BY AUTOMATED COUNT: 12.8 % (ref 11.5–14.5)
HCT VFR BLD AUTO: 41.5 % (ref 37–48.5)
HGB BLD-MCNC: 13.5 G/DL (ref 12–16)
IMM GRANULOCYTES # BLD AUTO: 0.02 K/UL (ref 0–0.04)
IMM GRANULOCYTES NFR BLD AUTO: 0.4 % (ref 0–0.5)
LYMPHOCYTES # BLD AUTO: 2.7 K/UL (ref 1–4.8)
LYMPHOCYTES NFR BLD: 49.9 % (ref 18–48)
MCH RBC QN AUTO: 30.5 PG (ref 27–31)
MCHC RBC AUTO-ENTMCNC: 32.5 G/DL (ref 32–36)
MCV RBC AUTO: 94 FL (ref 82–98)
MONOCYTES # BLD AUTO: 0.3 K/UL (ref 0.3–1)
MONOCYTES NFR BLD: 6 % (ref 4–15)
NEUTROPHILS # BLD AUTO: 2.2 K/UL (ref 1.8–7.7)
NEUTROPHILS NFR BLD: 40.7 % (ref 38–73)
NRBC BLD-RTO: 0 /100 WBC
PLATELET # BLD AUTO: 170 K/UL (ref 150–350)
PMV BLD AUTO: 11 FL (ref 9.2–12.9)
RBC # BLD AUTO: 4.43 M/UL (ref 4–5.4)
WBC # BLD AUTO: 5.35 K/UL (ref 3.9–12.7)

## 2020-08-04 PROCEDURE — 3008F BODY MASS INDEX DOCD: CPT | Mod: CPTII,S$GLB,, | Performed by: HOSPITALIST

## 2020-08-04 PROCEDURE — 83036 HEMOGLOBIN GLYCOSYLATED A1C: CPT

## 2020-08-04 PROCEDURE — 99214 OFFICE O/P EST MOD 30 MIN: CPT | Mod: S$GLB,,, | Performed by: HOSPITALIST

## 2020-08-04 PROCEDURE — 80048 BASIC METABOLIC PNL TOTAL CA: CPT

## 2020-08-04 PROCEDURE — 3008F PR BODY MASS INDEX (BMI) DOCUMENTED: ICD-10-PCS | Mod: CPTII,S$GLB,, | Performed by: HOSPITALIST

## 2020-08-04 PROCEDURE — 99214 PR OFFICE/OUTPT VISIT, EST, LEVL IV, 30-39 MIN: ICD-10-PCS | Mod: S$GLB,,, | Performed by: HOSPITALIST

## 2020-08-04 PROCEDURE — 85025 COMPLETE CBC W/AUTO DIFF WBC: CPT

## 2020-08-04 PROCEDURE — 99999 PR PBB SHADOW E&M-EST. PATIENT-LVL III: ICD-10-PCS | Mod: PBBFAC,,, | Performed by: HOSPITALIST

## 2020-08-04 PROCEDURE — 3044F PR MOST RECENT HEMOGLOBIN A1C LEVEL <7.0%: ICD-10-PCS | Mod: CPTII,S$GLB,, | Performed by: HOSPITALIST

## 2020-08-04 PROCEDURE — 36415 COLL VENOUS BLD VENIPUNCTURE: CPT | Mod: PO

## 2020-08-04 PROCEDURE — 3044F HG A1C LEVEL LT 7.0%: CPT | Mod: CPTII,S$GLB,, | Performed by: HOSPITALIST

## 2020-08-04 PROCEDURE — 99999 PR PBB SHADOW E&M-EST. PATIENT-LVL III: CPT | Mod: PBBFAC,,, | Performed by: HOSPITALIST

## 2020-08-04 NOTE — PROGRESS NOTES
Subjective:     @Patient ID: Tona Carlson is a 64 y.o. female.    Chief Complaint: Follow-up (6 month) and Hand Pain (left)    HPI  64-year-old female presents for follow-up:  1. DM2:  Patient reports that blood sugars have been elevated lately in the past month.  Reports having highs in the 200s.  Notes that she has been having increased urination.  States that she has been compliant with her glimepiride.  She prefers to defer I appointment until her Medicare kicks in later this year.  2. Left thumb pain:  Reports chronic issues with left thumb pain.  using cream and advil and they have helped. Reports thinks due arthritis.  States that in the past she has had issues with her neck that affected her whole arm and she was told was due to arthritis.  States now it is currently her left thumb.    Review of Systems   Constitutional: Negative for chills and fever.   HENT: Negative for congestion and sore throat.    Eyes: Negative for pain and visual disturbance.   Respiratory: Negative for cough and shortness of breath.    Cardiovascular: Negative for chest pain and leg swelling.   Gastrointestinal: Negative for abdominal pain, nausea and vomiting.   Endocrine: Positive for polyphagia and polyuria. Negative for polydipsia.   Genitourinary: Negative for difficulty urinating and dysuria.   Musculoskeletal: Negative for arthralgias and back pain.   Skin: Negative for rash and wound.   Neurological: Negative for dizziness, weakness and headaches.   Psychiatric/Behavioral: Negative for agitation and confusion. The patient is nervous/anxious.      Past medical history, surgical history, and family medical history reviewed and updated as appropriate.    Medications and allergies reviewed.     Objective:     Vitals:    08/04/20 1436   BP: 120/80   BP Location: Right arm   Patient Position: Sitting   BP Method: Large (Manual)   Pulse: 88   Temp: 98.2 °F (36.8 °C)   TempSrc: Oral   Weight: 88.2 kg (194 lb 7.1 oz)   Height: 5'  "5" (1.651 m)     Body mass index is 32.36 kg/m².  Physical Exam  Vitals signs reviewed.   Constitutional:       General: She is not in acute distress.     Appearance: She is well-developed.   HENT:      Head: Normocephalic and atraumatic.      Mouth/Throat:      Mouth: Mucous membranes are moist.      Pharynx: No oropharyngeal exudate.   Eyes:      General:         Right eye: No discharge.         Left eye: No discharge.      Conjunctiva/sclera: Conjunctivae normal.   Neck:      Musculoskeletal: Normal range of motion and neck supple.   Cardiovascular:      Rate and Rhythm: Normal rate and regular rhythm.      Pulses:           Dorsalis pedis pulses are 2+ on the right side and 2+ on the left side.        Posterior tibial pulses are 2+ on the right side and 2+ on the left side.      Heart sounds: No murmur. No friction rub.   Pulmonary:      Effort: Pulmonary effort is normal.      Breath sounds: Normal breath sounds.   Abdominal:      General: Bowel sounds are normal. There is no distension.      Palpations: Abdomen is soft.      Tenderness: There is no abdominal tenderness. There is no guarding.   Musculoskeletal: Normal range of motion.      Right lower leg: No edema.      Left lower leg: No edema.      Right foot: No deformity.      Left foot: No deformity.   Feet:      Right foot:      Protective Sensation: 7 sites tested. 7 sites sensed.      Skin integrity: Dry skin present. No ulcer or skin breakdown.      Left foot:      Protective Sensation: 7 sites tested. 7 sites sensed.      Skin integrity: Dry skin present. No ulcer or skin breakdown.   Skin:     General: Skin is warm and dry.   Neurological:      Mental Status: She is alert and oriented to person, place, and time.   Psychiatric:         Mood and Affect: Mood normal.         Behavior: Behavior normal.         Lab Results   Component Value Date    WBC 3.69 (L) 02/11/2020    HGB 13.2 02/11/2020    HCT 41.8 02/11/2020     02/11/2020    CHOL 158 " 02/11/2020    TRIG 90 02/11/2020    HDL 39 (L) 02/11/2020    ALT 40 02/11/2020    AST 28 02/11/2020     02/11/2020    K 4.2 02/11/2020     02/11/2020    CREATININE 0.7 02/11/2020    BUN 11 02/11/2020    CO2 24 02/11/2020    TSH 2.381 02/11/2020    INR 1.0 01/21/2019    HGBA1C 5.8 (H) 02/11/2020       Assessment:     1. Type 2 diabetes mellitus with diabetic polyneuropathy, without long-term current use of insulin    2. Screening for colorectal cancer    3. Encounter for diabetic foot exam    4. Cirrhosis of liver without ascites, unspecified hepatic cirrhosis type    5. Pain of left thumb      Plan:   Tona was seen today for follow-up and hand pain.    Diagnoses and all orders for this visit:    Type 2 diabetes mellitus with diabetic polyneuropathy, without long-term current use of insulin  - at this time will continue glimepiride.  -     CBC auto differential; Future  -     HEMOGLOBIN A1C; Future  -     Basic metabolic panel; Future    Screening for colorectal cancer  -     Case request GI: COLONOSCOPY    Encounter for diabetic foot exam    Cirrhosis of liver without ascites         - Stable. No acute issues    Pain of left thumb          - Chronic. Suspect due to arthritis. Pt prefers to stay with advil prn. Declines xray    RTC 6 months for annual    Medina Prakash MD  Internal Medicine    8/4/2020

## 2020-08-05 LAB
ANION GAP SERPL CALC-SCNC: 11 MMOL/L (ref 8–16)
BUN SERPL-MCNC: 11 MG/DL (ref 8–23)
CALCIUM SERPL-MCNC: 9.3 MG/DL (ref 8.7–10.5)
CHLORIDE SERPL-SCNC: 109 MMOL/L (ref 95–110)
CO2 SERPL-SCNC: 20 MMOL/L (ref 23–29)
CREAT SERPL-MCNC: 0.7 MG/DL (ref 0.5–1.4)
EST. GFR  (AFRICAN AMERICAN): >60 ML/MIN/1.73 M^2
EST. GFR  (NON AFRICAN AMERICAN): >60 ML/MIN/1.73 M^2
ESTIMATED AVG GLUCOSE: 174 MG/DL (ref 68–131)
GLUCOSE SERPL-MCNC: 125 MG/DL (ref 70–110)
HBA1C MFR BLD HPLC: 7.7 % (ref 4–5.6)
POTASSIUM SERPL-SCNC: 3.7 MMOL/L (ref 3.5–5.1)
SODIUM SERPL-SCNC: 140 MMOL/L (ref 136–145)

## 2020-08-06 DIAGNOSIS — E11.42 TYPE 2 DIABETES MELLITUS WITH DIABETIC POLYNEUROPATHY, WITHOUT LONG-TERM CURRENT USE OF INSULIN: ICD-10-CM

## 2020-08-06 RX ORDER — GLIMEPIRIDE 4 MG/1
8 TABLET ORAL
Qty: 180 TABLET | Refills: 3 | Status: SHIPPED | OUTPATIENT
Start: 2020-08-06 | End: 2020-08-31

## 2020-08-07 ENCOUNTER — TELEPHONE (OUTPATIENT)
Dept: INTERNAL MEDICINE | Facility: CLINIC | Age: 65
End: 2020-08-07

## 2020-08-07 NOTE — TELEPHONE ENCOUNTER
----- Message from Medina Prakash MD sent at 8/6/2020 10:03 PM CDT -----  Please notify pt that A1c is now 7.7. Recommend follow a healthy diet/exercise. Will increase glimepiride to 2 tablets daily with breakfast. [facility]    Also electrolytes, kidney function and blood counts are within normal range    Need to repeat A1c in 3 months

## 2020-08-24 ENCOUNTER — TELEPHONE (OUTPATIENT)
Dept: INTERNAL MEDICINE | Facility: CLINIC | Age: 65
End: 2020-08-24

## 2020-08-24 NOTE — TELEPHONE ENCOUNTER
Patient is felling anxious and nervious  when she take the 8 mg glimepiride  in the am  with  her breakfast. She now taking 4 .5  And she still feel a anxious but not as bad . Her blood  sugar are running in the 300.

## 2020-08-24 NOTE — TELEPHONE ENCOUNTER
----- Message from Flip Davis sent at 8/24/2020 12:09 PM CDT -----  Regarding: call back  Patient called in to speak with someone at the office regarding her prescription. The patient mention that she would like for the doctor to prescribed a different medication.  would like a call back from the office and can be reached at    [phone]

## 2020-08-26 ENCOUNTER — TELEPHONE (OUTPATIENT)
Dept: INTERNAL MEDICINE | Facility: CLINIC | Age: 65
End: 2020-08-26

## 2020-08-26 NOTE — TELEPHONE ENCOUNTER
----- Message from Mallory Cho sent at 8/26/2020  4:31 PM CDT -----  Regarding: returned call  Contact: Patient [phone]  Patient is returning a phone call.  Who left a message for the patient: Dr. Prakash  Does patient know what this is regarding:    Comments:

## 2020-08-31 ENCOUNTER — PATIENT OUTREACH (OUTPATIENT)
Dept: ADMINISTRATIVE | Facility: OTHER | Age: 65
End: 2020-08-31

## 2020-08-31 ENCOUNTER — OFFICE VISIT (OUTPATIENT)
Dept: ENDOCRINOLOGY | Facility: CLINIC | Age: 65
End: 2020-08-31
Payer: COMMERCIAL

## 2020-08-31 VITALS
RESPIRATION RATE: 16 BRPM | WEIGHT: 193.56 LBS | HEART RATE: 88 BPM | HEIGHT: 65 IN | DIASTOLIC BLOOD PRESSURE: 84 MMHG | SYSTOLIC BLOOD PRESSURE: 118 MMHG | BODY MASS INDEX: 32.25 KG/M2 | OXYGEN SATURATION: 98 %

## 2020-08-31 DIAGNOSIS — M85.89 OSTEOPENIA OF MULTIPLE SITES: ICD-10-CM

## 2020-08-31 DIAGNOSIS — E11.42 TYPE 2 DIABETES MELLITUS WITH DIABETIC POLYNEUROPATHY, WITHOUT LONG-TERM CURRENT USE OF INSULIN: Primary | ICD-10-CM

## 2020-08-31 DIAGNOSIS — E55.9 VITAMIN D DEFICIENCY: ICD-10-CM

## 2020-08-31 DIAGNOSIS — D35.2 PITUITARY ADENOMA: ICD-10-CM

## 2020-08-31 DIAGNOSIS — E78.2 MIXED HYPERLIPIDEMIA: ICD-10-CM

## 2020-08-31 DIAGNOSIS — M85.80 OSTEOPENIA, UNSPECIFIED LOCATION: ICD-10-CM

## 2020-08-31 PROBLEM — M79.674 TOE PAIN, RIGHT: Status: RESOLVED | Noted: 2018-08-02 | Resolved: 2020-08-31

## 2020-08-31 PROCEDURE — 99999 PR PBB SHADOW E&M-EST. PATIENT-LVL V: CPT | Mod: PBBFAC,,, | Performed by: INTERNAL MEDICINE

## 2020-08-31 PROCEDURE — 3008F BODY MASS INDEX DOCD: CPT | Mod: CPTII,S$GLB,, | Performed by: INTERNAL MEDICINE

## 2020-08-31 PROCEDURE — 99214 PR OFFICE/OUTPT VISIT, EST, LEVL IV, 30-39 MIN: ICD-10-PCS | Mod: S$GLB,,, | Performed by: INTERNAL MEDICINE

## 2020-08-31 PROCEDURE — 3051F PR MOST RECENT HEMOGLOBIN A1C LEVEL 7.0 - < 8.0%: ICD-10-PCS | Mod: CPTII,S$GLB,, | Performed by: INTERNAL MEDICINE

## 2020-08-31 PROCEDURE — 3051F HG A1C>EQUAL 7.0%<8.0%: CPT | Mod: CPTII,S$GLB,, | Performed by: INTERNAL MEDICINE

## 2020-08-31 PROCEDURE — 99214 OFFICE O/P EST MOD 30 MIN: CPT | Mod: S$GLB,,, | Performed by: INTERNAL MEDICINE

## 2020-08-31 PROCEDURE — 3008F PR BODY MASS INDEX (BMI) DOCUMENTED: ICD-10-PCS | Mod: CPTII,S$GLB,, | Performed by: INTERNAL MEDICINE

## 2020-08-31 PROCEDURE — 99999 PR PBB SHADOW E&M-EST. PATIENT-LVL V: ICD-10-PCS | Mod: PBBFAC,,, | Performed by: INTERNAL MEDICINE

## 2020-08-31 RX ORDER — ACETAMINOPHEN 500 MG
1 TABLET ORAL DAILY
Start: 2020-08-31

## 2020-08-31 RX ORDER — METFORMIN HYDROCHLORIDE 500 MG/1
500 TABLET, EXTENDED RELEASE ORAL DAILY
Qty: 90 TABLET | Refills: 3 | Status: SHIPPED | OUTPATIENT
Start: 2020-08-31 | End: 2020-09-03

## 2020-08-31 RX ORDER — GLIMEPIRIDE 4 MG/1
4 TABLET ORAL
Qty: 90 TABLET | Refills: 3 | Status: SHIPPED | OUTPATIENT
Start: 2020-08-31 | End: 2021-03-09

## 2020-08-31 RX ORDER — ATORVASTATIN CALCIUM 20 MG/1
20 TABLET, FILM COATED ORAL DAILY
Qty: 90 TABLET | Refills: 3 | Status: SHIPPED | OUTPATIENT
Start: 2020-08-31 | End: 2022-05-23 | Stop reason: SDUPTHER

## 2020-08-31 NOTE — PROGRESS NOTES
Subjective:      Patient ID: Tona Carlson is a 64 y.o. female.    Chief Complaint:  Diabetes      History of Present Illness      I last saw her in 2018     Diagnosed with DM in her 50s       Known complications : peripheral neuropathy  Less numbness and tingling      Last eye exam : > 1 year ago        Current regimen :amaryl 8 mg qam   She doesn't feel good on this dose - anxious       When she checked her bg > 200       Other meds tried : insulin but after good control she was taken off of insulin.  victoza : abdominal cramps   metformin  - diarrhea     With regards to her pituitary incidentaloma:  She was found to have pituitary incidentaloma when MRI brain was done for balance problem. She says surgery was in march 2014. She was told her pituitary adenoma was non secreting and it was touching optic chiasm and carotid artery. She had c/o left hemianopsia improved after surgery surgery was in [facility]/ [facility]     she was  on cabergoline but she stopped it  due to financial reasons       While taking cabergoline she did not have any S/Es   She was treated with DDAVP for 2 months after surgery.      currently not taking any hormones.       Denies galactorrhea    Post menopausal.      Denies change in shoe or ring size. Denies excessive sweating.        evaluated her in 11/2015 impression as below:     Ms. 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 Her bitemporal visual field defects spare the center of vision. I did not find significant diabetic retinopathy.           Last mri  7/25/18  Postsurgical changes from prior pituitary macroadenoma resection.  There are nodular foci of residual enhancement along the floor sella bilaterally.  The residual right-sided enhancement previously measured 4 mm, now measures 6 mm in thickness (for example sequence 15, images 8 and 9 for measurements).  Nodular left-sided lesion is stable.  " There is no significant suprasellar mass effect.  Persistent leftward infundibular deviation.  There is subtle apparent atrophy of the right optic nerve and optic chiasm.      With regards to the vitamin d deficiency:  currently taking otc 2000 iu a day     Last BMD: osteopenia - > 2 years ago  No recent fractures           Review of Systems   Constitutional: Negative for unexpected weight change.   Eyes: Negative for visual disturbance.   Respiratory: Negative for shortness of breath.    Cardiovascular: Negative for chest pain.   Gastrointestinal: Negative for abdominal pain.   Musculoskeletal: Negative for arthralgias.   Skin: Negative for wound.   Neurological: Negative for headaches.   Hematological: Does not bruise/bleed easily.   Psychiatric/Behavioral: Negative for sleep disturbance.       Objective:     /84   Pulse 88   Resp 16   Ht 5' 5" (1.651 m)   Wt 87.8 kg (193 lb 9 oz)   SpO2 98%   BMI 32.21 kg/m²   BP Readings from Last 3 Encounters:   08/31/20 118/84   08/04/20 120/80   03/11/20 120/72     Wt Readings from Last 1 Encounters:   08/31/20 0957 87.8 kg (193 lb 9 oz)     Body mass index is 32.21 kg/m².      Physical Exam  Vitals signs reviewed.   Neck:      Thyroid: No thyromegaly.   Cardiovascular:      Rate and Rhythm: Normal rate.   Pulmonary:      Effort: Pulmonary effort is normal.   Abdominal:      Palpations: Abdomen is soft.       Protective Sensation (w/ 10 gram monofilament):  Right: Decreased  Left: Decreased    Visual Inspection:  Normal -  Bilateral    Pedal Pulses:   Right: Present  Left: Present    Posterior tibialis:   Right:Present  Left: Present        Lab Review:   Lab Results   Component Value Date    HGBA1C 7.7 (H) 08/04/2020     Lab Results   Component Value Date    CHOL 158 02/11/2020    HDL 39 (L) 02/11/2020    LDLCALC 101.0 02/11/2020    TRIG 90 02/11/2020    CHOLHDL 24.7 02/11/2020     Lab Results   Component Value Date     08/04/2020    K 3.7 08/04/2020    CL " 109 08/04/2020    CO2 20 (L) 08/04/2020     (H) 08/04/2020    BUN 11 08/04/2020    CREATININE 0.7 08/04/2020    CALCIUM 9.3 08/04/2020    PROT 7.4 02/11/2020    ALBUMIN 4.0 02/11/2020    BILITOT 0.5 02/11/2020    ALKPHOS 106 02/11/2020    AST 28 02/11/2020    ALT 40 02/11/2020    ANIONGAP 11 08/04/2020    ESTGFRAFRICA >60.0 08/04/2020    EGFRNONAA >60.0 08/04/2020    TSH 2.381 02/11/2020     Vit D, 25-Hydroxy   Date Value Ref Range Status   02/11/2020 13 (L) 30 - 96 ng/mL Final     Comment:     Vitamin D deficiency.........<10 ng/mL                              Vitamin D insufficiency......10-29 ng/mL       Vitamin D sufficiency........> or equal to 30 ng/mL  Vitamin D toxicity............>100 ng/mL         Assessment and Plan     Type 2 diabetes mellitus with diabetic polyneuropathy, without long-term current use of insulin  Reviewed goals of therapy are to get the best control we can without hypoglycemia    Refer to education    Medication changes:   Decrease  amaryl to 4 mg with the 1st meal  Start:  Januvia 100, metformin extended release 500 mg once a day       -Advised self blood glucose monitoring.  Patient encouraged to document glucose results and bring them to every clinic visit      -Hypoglycemia precautions discussed. Instructed on precautions before driving.      -Close adherence to lifestyle changes recommended.      -Periodic follow ups for eye evaluations, foot care and dental care suggested.  Refer for an eye exam  rtc in 3 months           Pituitary adenoma  Recheck MRI.  Check anterior pituitary hormone    Osteopenia  Recheck bone density    Osteopenia of multiple sites  Replete vitamin-D.    Recheck bone density    Hyperlipidemia  Start statin per ADA Recs    Vitamin D deficiency   over the counter vitamin D 2000 iu a day

## 2020-08-31 NOTE — ASSESSMENT & PLAN NOTE
Reviewed goals of therapy are to get the best control we can without hypoglycemia    Refer to education    Medication changes:   Decrease  amaryl to 4 mg with the 1st meal  Start:  Januvia 100, metformin extended release 500 mg once a day       -Advised self blood glucose monitoring.  Patient encouraged to document glucose results and bring them to every clinic visit      -Hypoglycemia precautions discussed. Instructed on precautions before driving.      -Close adherence to lifestyle changes recommended.      -Periodic follow ups for eye evaluations, foot care and dental care suggested.  Refer for an eye exam  rtc in 3 months

## 2020-08-31 NOTE — PROGRESS NOTES
Care Everywhere:   Immunization: updated  Health Maintenance: updated  Media Review: review for outside colon cancer report  Legacy Review:   Order placed:   Upcoming appts:  Referral to ophthalmology 1/9/2020  Colonoscopy case request 8/4/2020

## 2020-09-03 DIAGNOSIS — E11.42 TYPE 2 DIABETES MELLITUS WITH DIABETIC POLYNEUROPATHY, WITHOUT LONG-TERM CURRENT USE OF INSULIN: Primary | ICD-10-CM

## 2020-09-03 RX ORDER — METFORMIN HYDROCHLORIDE 500 MG/1
TABLET, EXTENDED RELEASE ORAL
Qty: 90 TABLET | Refills: 3 | Status: SHIPPED | OUTPATIENT
Start: 2020-09-03 | End: 2021-03-09

## 2020-09-04 ENCOUNTER — TELEPHONE (OUTPATIENT)
Dept: ENDOCRINOLOGY | Facility: CLINIC | Age: 65
End: 2020-09-04

## 2020-09-04 NOTE — TELEPHONE ENCOUNTER
----- Message from Dona Finch sent at 9/4/2020  2:26 PM CDT -----  Regarding: Cost for medication  Pt called to speak with the nurse concerning medication  SITagliptin (JANUVIA) 100 MG Tab price is $1,500.00.    Please give a call back at  [phone]    Thank you!

## 2020-09-25 ENCOUNTER — PATIENT MESSAGE (OUTPATIENT)
Dept: OTHER | Facility: OTHER | Age: 65
End: 2020-09-25

## 2020-10-05 ENCOUNTER — PATIENT MESSAGE (OUTPATIENT)
Dept: ADMINISTRATIVE | Facility: HOSPITAL | Age: 65
End: 2020-10-05

## 2020-10-09 ENCOUNTER — TELEPHONE (OUTPATIENT)
Dept: ENDOCRINOLOGY | Facility: CLINIC | Age: 65
End: 2020-10-09

## 2020-10-13 ENCOUNTER — HOSPITAL ENCOUNTER (OUTPATIENT)
Dept: RADIOLOGY | Facility: HOSPITAL | Age: 65
Discharge: HOME | End: 2020-10-13
Attending: INTERNAL MEDICINE
Payer: MEDICARE

## 2020-10-13 DIAGNOSIS — D35.2 PITUITARY ADENOMA: ICD-10-CM

## 2020-10-13 PROCEDURE — 25500020 PHARM REV CODE 255: Mod: HCNC | Performed by: INTERNAL MEDICINE

## 2020-10-13 PROCEDURE — 70553 MRI BRAIN STEM W/O & W/DYE: CPT | Mod: TC,HCNC

## 2020-10-13 PROCEDURE — 70553 MRI BRAIN W WO CONTRAST: ICD-10-PCS | Mod: 26,HCNC,, | Performed by: RADIOLOGY

## 2020-10-13 PROCEDURE — A9585 GADOBUTROL INJECTION: HCPCS | Mod: HCNC | Performed by: INTERNAL MEDICINE

## 2020-10-13 PROCEDURE — 70553 MRI BRAIN STEM W/O & W/DYE: CPT | Mod: 26,HCNC,, | Performed by: RADIOLOGY

## 2020-10-13 RX ORDER — GADOBUTROL 604.72 MG/ML
5 INJECTION INTRAVENOUS
Status: COMPLETED | OUTPATIENT
Start: 2020-10-13 | End: 2020-10-13

## 2020-10-13 RX ADMIN — GADOBUTROL 5 ML: 604.72 INJECTION INTRAVENOUS at 03:10

## 2020-10-19 ENCOUNTER — PATIENT OUTREACH (OUTPATIENT)
Dept: ADMINISTRATIVE | Facility: OTHER | Age: 65
End: 2020-10-19

## 2020-10-19 NOTE — PROGRESS NOTES
Chart reviewed.   Immunizations: updated  Orders placed: n/a  Upcoming appts to satisfy SELINA topics: Optometry 10/20, Hemoglobin A1c 11/19  Open case request for Colonoscopy.

## 2020-10-20 ENCOUNTER — HOSPITAL ENCOUNTER (OUTPATIENT)
Dept: RADIOLOGY | Facility: CLINIC | Age: 65
Discharge: HOME | End: 2020-10-20
Attending: INTERNAL MEDICINE
Payer: MEDICARE

## 2020-10-20 ENCOUNTER — OFFICE VISIT (OUTPATIENT)
Dept: OPTOMETRY | Facility: CLINIC | Age: 65
End: 2020-10-20
Payer: MEDICARE

## 2020-10-20 DIAGNOSIS — H47.393 OPTIC NERVE CUPPING OF BOTH EYES: ICD-10-CM

## 2020-10-20 DIAGNOSIS — Z83.511 FAMILY HISTORY OF GLAUCOMA IN MOTHER: ICD-10-CM

## 2020-10-20 DIAGNOSIS — E11.42 TYPE 2 DIABETES MELLITUS WITH DIABETIC POLYNEUROPATHY, WITHOUT LONG-TERM CURRENT USE OF INSULIN: Primary | ICD-10-CM

## 2020-10-20 DIAGNOSIS — H04.123 DRY EYE SYNDROME, BILATERAL: ICD-10-CM

## 2020-10-20 DIAGNOSIS — E11.9 TYPE 2 DIABETES MELLITUS WITHOUT OPHTHALMIC MANIFESTATIONS: ICD-10-CM

## 2020-10-20 DIAGNOSIS — H25.13 NS (NUCLEAR SCLEROSIS), BILATERAL: ICD-10-CM

## 2020-10-20 DIAGNOSIS — M85.80 OSTEOPENIA, UNSPECIFIED LOCATION: ICD-10-CM

## 2020-10-20 PROCEDURE — 99499 UNLISTED E&M SERVICE: CPT | Mod: S$GLB,,, | Performed by: OPTOMETRIST

## 2020-10-20 PROCEDURE — 92004 COMPRE OPH EXAM NEW PT 1/>: CPT | Mod: HCNC,S$GLB,, | Performed by: OPTOMETRIST

## 2020-10-20 PROCEDURE — 77080 DXA BONE DENSITY AXIAL: CPT | Mod: TC,HCNC

## 2020-10-20 PROCEDURE — 99999 PR PBB SHADOW E&M-EST. PATIENT-LVL III: ICD-10-PCS | Mod: PBBFAC,HCNC,, | Performed by: OPTOMETRIST

## 2020-10-20 PROCEDURE — 92133 CPTRZD OPH DX IMG PST SGM ON: CPT | Mod: HCNC,S$GLB,, | Performed by: OPTOMETRIST

## 2020-10-20 PROCEDURE — 77080 DEXA BONE DENSITY SPINE HIP: ICD-10-PCS | Mod: 26,HCNC,, | Performed by: INTERNAL MEDICINE

## 2020-10-20 PROCEDURE — 92004 PR EYE EXAM, NEW PATIENT,COMPREHESV: ICD-10-PCS | Mod: HCNC,S$GLB,, | Performed by: OPTOMETRIST

## 2020-10-20 PROCEDURE — 99499 RISK ADDL DX/OHS AUDIT: ICD-10-PCS | Mod: S$GLB,,, | Performed by: OPTOMETRIST

## 2020-10-20 PROCEDURE — 92133 OCT, OPTIC NERVE - OU - BOTH EYES: ICD-10-PCS | Mod: HCNC,S$GLB,, | Performed by: OPTOMETRIST

## 2020-10-20 PROCEDURE — 99999 PR PBB SHADOW E&M-EST. PATIENT-LVL III: CPT | Mod: PBBFAC,HCNC,, | Performed by: OPTOMETRIST

## 2020-10-20 PROCEDURE — 77080 DXA BONE DENSITY AXIAL: CPT | Mod: 26,HCNC,, | Performed by: INTERNAL MEDICINE

## 2020-10-20 NOTE — LETTER
October 20, 2020      Danielle Latif MD  [address] [facility] [address] [address]  [facility] [address]           [facility]  [address]  [facility] [address]  Phone: [phone]  Fax: [phone]          Patient: Tona Carlson   MR Number: [id number]   YOB: 1955   Date of Visit: 10/20/2020       Dear Dr. Danielle Latif:    Thank you for referring Tona Carlson to me for evaluation. Attached you will find relevant portions of my assessment and plan of care.    If you have questions, please do not hesitate to call me. I look forward to following Tona Carlson along with you.    Sincerely,    Dorinda Oliver, OD    Enclosure  CC:  No Recipients    If you would like to receive this communication electronically, please contact [email or web address] or [phone] to request more information on [employer] access.    For providers and/or their staff who would like to refer a patient to [facility], please contact us through our one-stop-shop provider referral line, [facility], at [phone].    If you feel you have received this communication in error or would no longer like to receive these types of communications, please e-mail [email or web address]

## 2020-10-20 NOTE — PROGRESS NOTES
HPI     Pt here for annual visit   Patient denies diplopia, headaches, flashes/floaters, pain, and   itching/burning/tearing.    Uses eye drops seldomly   Pt has rx glasses that she rarely uses because she feels like she cant   read well with them  Only uses otc reading glasses  Ok with uncorrected distance va    Hemoglobin A1C       Date                     Value               Ref Range             Status                08/04/2020               7.7 (H)             4.0 - 5.6 %           Final                     02/11/2020               5.8 (H)             4.0 - 5.6 %           Final                     12/03/2019               5.7 (H)             4.0 - 5.6 %           Final                  Last edited by Nica Zaldivar on 10/20/2020  3:17 PM. (History)            Assessment /Plan     For exam results, see Encounter Report.    Type 2 diabetes mellitus with diabetic polyneuropathy, without long-term current use of insulin  Type 2 diabetes mellitus without ophthalmic manifestations   No retinopathy, monitor yearly    Optic nerve cupping of both eyes  Family history of glaucoma in mother  -     OCT, Optic Nerve - OU - Overall thin, stable compared to previous years   RTC 6 mo HVF 24-2 and repeat OCT, IOP      NS (nuclear sclerosis), bilateral   Rx specs    RTC 1 year, sooner PRN

## 2020-10-30 ENCOUNTER — PATIENT MESSAGE (OUTPATIENT)
Dept: ADMINISTRATIVE | Facility: HOSPITAL | Age: 65
End: 2020-10-30

## 2020-11-04 DIAGNOSIS — E11.42 TYPE 2 DIABETES MELLITUS WITH DIABETIC POLYNEUROPATHY, WITHOUT LONG-TERM CURRENT USE OF INSULIN: Primary | ICD-10-CM

## 2020-11-11 DIAGNOSIS — E11.42 TYPE 2 DIABETES MELLITUS WITH DIABETIC POLYNEUROPATHY, WITHOUT LONG-TERM CURRENT USE OF INSULIN: ICD-10-CM

## 2020-12-02 ENCOUNTER — PATIENT OUTREACH (OUTPATIENT)
Dept: ADMINISTRATIVE | Facility: HOSPITAL | Age: 65
End: 2020-12-02

## 2020-12-11 ENCOUNTER — PATIENT MESSAGE (OUTPATIENT)
Dept: ENDOCRINOLOGY | Facility: CLINIC | Age: 65
End: 2020-12-11

## 2020-12-11 DIAGNOSIS — M81.0 OSTEOPOROSIS, UNSPECIFIED OSTEOPOROSIS TYPE, UNSPECIFIED PATHOLOGICAL FRACTURE PRESENCE: Primary | ICD-10-CM

## 2020-12-11 DIAGNOSIS — R79.9 ABNORMAL FINDING OF BLOOD CHEMISTRY, UNSPECIFIED: ICD-10-CM

## 2020-12-28 ENCOUNTER — LAB VISIT (OUTPATIENT)
Dept: PRIMARY CARE CLINIC | Facility: OTHER | Age: 65
End: 2020-12-28
Attending: INTERNAL MEDICINE
Payer: MEDICARE

## 2020-12-28 DIAGNOSIS — Z03.818 ENCOUNTER FOR OBSERVATION FOR SUSPECTED EXPOSURE TO OTHER BIOLOGICAL AGENTS RULED OUT: ICD-10-CM

## 2020-12-28 PROCEDURE — U0003 INFECTIOUS AGENT DETECTION BY NUCLEIC ACID (DNA OR RNA); SEVERE ACUTE RESPIRATORY SYNDROME CORONAVIRUS 2 (SARS-COV-2) (CORONAVIRUS DISEASE [COVID-19]), AMPLIFIED PROBE TECHNIQUE, MAKING USE OF HIGH THROUGHPUT TECHNOLOGIES AS DESCRIBED BY CMS-2020-01-R: HCPCS | Mod: HCNC

## 2020-12-29 LAB — SARS-COV-2 RNA RESP QL NAA+PROBE: NOT DETECTED

## 2021-01-12 ENCOUNTER — LAB VISIT (OUTPATIENT)
Dept: LAB | Facility: HOSPITAL | Age: 66
End: 2021-01-12
Attending: INTERNAL MEDICINE
Payer: MEDICARE

## 2021-01-12 DIAGNOSIS — R79.9 ABNORMAL FINDING OF BLOOD CHEMISTRY, UNSPECIFIED: ICD-10-CM

## 2021-01-12 DIAGNOSIS — M81.0 OSTEOPOROSIS, UNSPECIFIED OSTEOPOROSIS TYPE, UNSPECIFIED PATHOLOGICAL FRACTURE PRESENCE: ICD-10-CM

## 2021-01-12 LAB
25(OH)D3+25(OH)D2 SERPL-MCNC: 27 NG/ML (ref 30–96)
ALBUMIN SERPL BCP-MCNC: 4.2 G/DL (ref 3.5–5.2)
ALP SERPL-CCNC: 104 U/L (ref 55–135)
ALT SERPL W/O P-5'-P-CCNC: 53 U/L (ref 10–44)
ANION GAP SERPL CALC-SCNC: 8 MMOL/L (ref 8–16)
AST SERPL-CCNC: 34 U/L (ref 10–40)
BILIRUB SERPL-MCNC: 1 MG/DL (ref 0.1–1)
BUN SERPL-MCNC: 10 MG/DL (ref 8–23)
CALCIUM SERPL-MCNC: 9.4 MG/DL (ref 8.7–10.5)
CHLORIDE SERPL-SCNC: 104 MMOL/L (ref 95–110)
CO2 SERPL-SCNC: 27 MMOL/L (ref 23–29)
CREAT SERPL-MCNC: 0.8 MG/DL (ref 0.5–1.4)
EST. GFR  (AFRICAN AMERICAN): >60 ML/MIN/1.73 M^2
EST. GFR  (NON AFRICAN AMERICAN): >60 ML/MIN/1.73 M^2
ESTIMATED AVG GLUCOSE: 183 MG/DL (ref 68–131)
GLUCOSE SERPL-MCNC: 163 MG/DL (ref 70–110)
HBA1C MFR BLD HPLC: 8 % (ref 4–5.6)
POTASSIUM SERPL-SCNC: 4.1 MMOL/L (ref 3.5–5.1)
PROT SERPL-MCNC: 7.5 G/DL (ref 6–8.4)
SODIUM SERPL-SCNC: 139 MMOL/L (ref 136–145)

## 2021-01-12 PROCEDURE — 36415 COLL VENOUS BLD VENIPUNCTURE: CPT | Mod: HCNC

## 2021-01-12 PROCEDURE — 82306 VITAMIN D 25 HYDROXY: CPT | Mod: HCNC

## 2021-01-12 PROCEDURE — 80053 COMPREHEN METABOLIC PANEL: CPT | Mod: HCNC

## 2021-01-12 PROCEDURE — 83036 HEMOGLOBIN GLYCOSYLATED A1C: CPT | Mod: HCNC

## 2021-01-15 ENCOUNTER — CLINICAL SUPPORT (OUTPATIENT)
Dept: URGENT CARE | Facility: CLINIC | Age: 66
End: 2021-01-15
Payer: MEDICARE

## 2021-01-15 VITALS — OXYGEN SATURATION: 96 % | HEART RATE: 91 BPM | TEMPERATURE: 99 F

## 2021-01-15 DIAGNOSIS — Z11.9 SCREENING EXAMINATION FOR INFECTIOUS DISEASE: Primary | ICD-10-CM

## 2021-01-15 LAB
CTP QC/QA: YES
SARS-COV-2 RDRP RESP QL NAA+PROBE: NEGATIVE

## 2021-01-16 ENCOUNTER — PATIENT OUTREACH (OUTPATIENT)
Dept: ADMINISTRATIVE | Facility: OTHER | Age: 66
End: 2021-01-16

## 2021-01-16 DIAGNOSIS — Z12.11 SCREENING FOR COLON CANCER: ICD-10-CM

## 2021-01-16 DIAGNOSIS — Z12.31 BREAST CANCER SCREENING BY MAMMOGRAM: Primary | ICD-10-CM

## 2021-02-18 ENCOUNTER — PATIENT MESSAGE (OUTPATIENT)
Dept: ADMINISTRATIVE | Facility: HOSPITAL | Age: 66
End: 2021-02-18

## 2021-02-22 ENCOUNTER — HOSPITAL ENCOUNTER (OUTPATIENT)
Dept: RADIOLOGY | Facility: HOSPITAL | Age: 66
Discharge: HOME | End: 2021-02-22
Attending: HOSPITALIST
Payer: MEDICARE

## 2021-02-22 DIAGNOSIS — Z12.31 BREAST CANCER SCREENING BY MAMMOGRAM: ICD-10-CM

## 2021-02-22 PROCEDURE — 77063 MAMMO DIGITAL SCREENING BILAT WITH TOMO: ICD-10-PCS | Mod: 26,,, | Performed by: RADIOLOGY

## 2021-02-22 PROCEDURE — 77067 SCR MAMMO BI INCL CAD: CPT | Mod: 26,,, | Performed by: RADIOLOGY

## 2021-02-22 PROCEDURE — 77067 SCR MAMMO BI INCL CAD: CPT | Mod: TC

## 2021-02-22 PROCEDURE — 77063 BREAST TOMOSYNTHESIS BI: CPT | Mod: 26,,, | Performed by: RADIOLOGY

## 2021-02-22 PROCEDURE — 77067 MAMMO DIGITAL SCREENING BILAT WITH TOMO: ICD-10-PCS | Mod: 26,,, | Performed by: RADIOLOGY

## 2021-02-26 ENCOUNTER — IMMUNIZATION (OUTPATIENT)
Dept: PHARMACY | Facility: CLINIC | Age: 66
End: 2021-02-26
Payer: MEDICARE

## 2021-02-26 DIAGNOSIS — Z23 NEED FOR VACCINATION: Primary | ICD-10-CM

## 2021-03-04 ENCOUNTER — NURSE TRIAGE (OUTPATIENT)
Dept: ADMINISTRATIVE | Facility: CLINIC | Age: 66
End: 2021-03-04

## 2021-03-08 ENCOUNTER — PATIENT OUTREACH (OUTPATIENT)
Dept: ADMINISTRATIVE | Facility: OTHER | Age: 66
End: 2021-03-08

## 2021-03-09 ENCOUNTER — OFFICE VISIT (OUTPATIENT)
Dept: ENDOCRINOLOGY | Facility: CLINIC | Age: 66
End: 2021-03-09
Payer: MEDICARE

## 2021-03-09 ENCOUNTER — TELEPHONE (OUTPATIENT)
Dept: ENDOCRINOLOGY | Facility: CLINIC | Age: 66
End: 2021-03-09

## 2021-03-09 ENCOUNTER — CLINICAL SUPPORT (OUTPATIENT)
Dept: DIABETES | Facility: CLINIC | Age: 66
End: 2021-03-09
Payer: MEDICARE

## 2021-03-09 VITALS
HEIGHT: 65 IN | OXYGEN SATURATION: 99 % | BODY MASS INDEX: 31.29 KG/M2 | DIASTOLIC BLOOD PRESSURE: 85 MMHG | HEART RATE: 85 BPM | SYSTOLIC BLOOD PRESSURE: 132 MMHG | WEIGHT: 187.81 LBS

## 2021-03-09 DIAGNOSIS — E55.9 VITAMIN D DEFICIENCY: ICD-10-CM

## 2021-03-09 DIAGNOSIS — E11.42 TYPE 2 DIABETES MELLITUS WITH DIABETIC POLYNEUROPATHY, WITHOUT LONG-TERM CURRENT USE OF INSULIN: Primary | ICD-10-CM

## 2021-03-09 DIAGNOSIS — D35.2 PITUITARY ADENOMA: ICD-10-CM

## 2021-03-09 DIAGNOSIS — E11.42 TYPE 2 DIABETES MELLITUS WITH DIABETIC POLYNEUROPATHY, WITHOUT LONG-TERM CURRENT USE OF INSULIN: ICD-10-CM

## 2021-03-09 DIAGNOSIS — M81.0 OSTEOPOROSIS WITHOUT CURRENT PATHOLOGICAL FRACTURE, UNSPECIFIED OSTEOPOROSIS TYPE: ICD-10-CM

## 2021-03-09 DIAGNOSIS — E78.2 MIXED HYPERLIPIDEMIA: ICD-10-CM

## 2021-03-09 PROCEDURE — 99214 PR OFFICE/OUTPT VISIT, EST, LEVL IV, 30-39 MIN: ICD-10-PCS | Mod: S$GLB,,, | Performed by: INTERNAL MEDICINE

## 2021-03-09 PROCEDURE — 3072F LOW RISK FOR RETINOPATHY: CPT | Mod: S$GLB,,, | Performed by: INTERNAL MEDICINE

## 2021-03-09 PROCEDURE — 99999 PR PBB SHADOW E&M-EST. PATIENT-LVL I: CPT | Mod: PBBFAC,,,

## 2021-03-09 PROCEDURE — 99999 PR PBB SHADOW E&M-EST. PATIENT-LVL IV: CPT | Mod: PBBFAC,,, | Performed by: INTERNAL MEDICINE

## 2021-03-09 PROCEDURE — 3052F HG A1C>EQUAL 8.0%<EQUAL 9.0%: CPT | Mod: CPTII,S$GLB,, | Performed by: INTERNAL MEDICINE

## 2021-03-09 PROCEDURE — G0108 PR DIAB MANAGE TRN  PER INDIV: ICD-10-PCS | Mod: S$GLB,,, | Performed by: INTERNAL MEDICINE

## 2021-03-09 PROCEDURE — 99214 OFFICE O/P EST MOD 30 MIN: CPT | Mod: S$GLB,,, | Performed by: INTERNAL MEDICINE

## 2021-03-09 PROCEDURE — 99999 PR PBB SHADOW E&M-EST. PATIENT-LVL IV: ICD-10-PCS | Mod: PBBFAC,,, | Performed by: INTERNAL MEDICINE

## 2021-03-09 PROCEDURE — 99999 PR PBB SHADOW E&M-EST. PATIENT-LVL I: ICD-10-PCS | Mod: PBBFAC,,,

## 2021-03-09 PROCEDURE — 3008F BODY MASS INDEX DOCD: CPT | Mod: CPTII,S$GLB,, | Performed by: INTERNAL MEDICINE

## 2021-03-09 PROCEDURE — 1126F PR PAIN SEVERITY QUANTIFIED, NO PAIN PRESENT: ICD-10-PCS | Mod: S$GLB,,, | Performed by: INTERNAL MEDICINE

## 2021-03-09 PROCEDURE — G0108 DIAB MANAGE TRN  PER INDIV: HCPCS | Mod: S$GLB,,, | Performed by: INTERNAL MEDICINE

## 2021-03-09 PROCEDURE — 3008F PR BODY MASS INDEX (BMI) DOCUMENTED: ICD-10-PCS | Mod: CPTII,S$GLB,, | Performed by: INTERNAL MEDICINE

## 2021-03-09 PROCEDURE — 3072F PR LOW RISK FOR RETINOPATHY: ICD-10-PCS | Mod: S$GLB,,, | Performed by: INTERNAL MEDICINE

## 2021-03-09 PROCEDURE — 3288F PR FALLS RISK ASSESSMENT DOCUMENTED: ICD-10-PCS | Mod: CPTII,S$GLB,, | Performed by: INTERNAL MEDICINE

## 2021-03-09 PROCEDURE — 99499 RISK ADDL DX/OHS AUDIT: ICD-10-PCS | Mod: S$GLB,,, | Performed by: INTERNAL MEDICINE

## 2021-03-09 PROCEDURE — 3052F PR MOST RECENT HEMOGLOBIN A1C LEVEL 8.0 - < 9.0%: ICD-10-PCS | Mod: CPTII,S$GLB,, | Performed by: INTERNAL MEDICINE

## 2021-03-09 PROCEDURE — 99499 UNLISTED E&M SERVICE: CPT | Mod: S$GLB,,, | Performed by: INTERNAL MEDICINE

## 2021-03-09 PROCEDURE — 3288F FALL RISK ASSESSMENT DOCD: CPT | Mod: CPTII,S$GLB,, | Performed by: INTERNAL MEDICINE

## 2021-03-09 PROCEDURE — 1101F PT FALLS ASSESS-DOCD LE1/YR: CPT | Mod: CPTII,S$GLB,, | Performed by: INTERNAL MEDICINE

## 2021-03-09 PROCEDURE — 1101F PR PT FALLS ASSESS DOC 0-1 FALLS W/OUT INJ PAST YR: ICD-10-PCS | Mod: CPTII,S$GLB,, | Performed by: INTERNAL MEDICINE

## 2021-03-09 PROCEDURE — 1126F AMNT PAIN NOTED NONE PRSNT: CPT | Mod: S$GLB,,, | Performed by: INTERNAL MEDICINE

## 2021-03-09 RX ORDER — METFORMIN HYDROCHLORIDE 500 MG/1
500 TABLET, EXTENDED RELEASE ORAL 2 TIMES DAILY WITH MEALS
Qty: 180 TABLET | Refills: 3 | Status: SHIPPED | OUTPATIENT
Start: 2021-03-09 | End: 2022-02-24 | Stop reason: SDUPTHER

## 2021-03-09 RX ORDER — INSULIN GLARGINE 100 [IU]/ML
15 INJECTION, SOLUTION SUBCUTANEOUS NIGHTLY
Qty: 15 ML | Refills: 11 | Status: SHIPPED | OUTPATIENT
Start: 2021-03-09 | End: 2021-06-30 | Stop reason: SDUPTHER

## 2021-03-09 RX ORDER — ALENDRONATE SODIUM 70 MG/1
70 TABLET ORAL
Qty: 12 TABLET | Refills: 3 | Status: SHIPPED | OUTPATIENT
Start: 2021-03-09 | End: 2021-10-11

## 2021-03-09 RX ORDER — EMPAGLIFLOZIN 10 MG/1
10 TABLET, FILM COATED ORAL DAILY
Qty: 30 TABLET | Refills: 6 | Status: SHIPPED | OUTPATIENT
Start: 2021-03-09 | End: 2021-06-30

## 2021-03-09 RX ORDER — PEN NEEDLE, DIABETIC 31 GX5/16"
NEEDLE, DISPOSABLE MISCELLANEOUS
Qty: 100 EACH | Refills: 3 | Status: SHIPPED | OUTPATIENT
Start: 2021-03-09

## 2021-03-18 ENCOUNTER — PATIENT OUTREACH (OUTPATIENT)
Dept: ADMINISTRATIVE | Facility: HOSPITAL | Age: 66
End: 2021-03-18

## 2021-03-18 DIAGNOSIS — Z12.11 SCREENING FOR COLON CANCER: Primary | ICD-10-CM

## 2021-03-23 ENCOUNTER — CLINICAL SUPPORT (OUTPATIENT)
Dept: DIABETES | Facility: CLINIC | Age: 66
End: 2021-03-23
Payer: MEDICARE

## 2021-03-23 DIAGNOSIS — E11.42 TYPE 2 DIABETES MELLITUS WITH DIABETIC POLYNEUROPATHY, WITHOUT LONG-TERM CURRENT USE OF INSULIN: ICD-10-CM

## 2021-03-23 PROCEDURE — G0108 DIAB MANAGE TRN  PER INDIV: HCPCS | Mod: S$GLB,,, | Performed by: INTERNAL MEDICINE

## 2021-03-23 PROCEDURE — G0108 PR DIAB MANAGE TRN  PER INDIV: ICD-10-PCS | Mod: S$GLB,,, | Performed by: INTERNAL MEDICINE

## 2021-03-26 ENCOUNTER — IMMUNIZATION (OUTPATIENT)
Dept: PHARMACY | Facility: CLINIC | Age: 66
End: 2021-03-26
Payer: MEDICARE

## 2021-03-26 DIAGNOSIS — Z23 NEED FOR VACCINATION: Primary | ICD-10-CM

## 2021-04-05 ENCOUNTER — PATIENT MESSAGE (OUTPATIENT)
Dept: ADMINISTRATIVE | Facility: HOSPITAL | Age: 66
End: 2021-04-05

## 2021-04-14 LAB — NONINV COLON CA DNA+OCC BLD SCRN STL QL: NEGATIVE

## 2021-04-28 RX ORDER — INSULIN PUMP SYRINGE, 3 ML
EACH MISCELLANEOUS
Qty: 1 EACH | Refills: 1 | Status: SHIPPED | OUTPATIENT
Start: 2021-04-28

## 2021-04-28 RX ORDER — ISOPROPYL ALCOHOL 70 ML/100ML
1 SWAB TOPICAL
Qty: 100 EACH | Refills: 11 | Status: SHIPPED | OUTPATIENT
Start: 2021-04-28 | End: 2021-04-28

## 2021-06-21 ENCOUNTER — LAB VISIT (OUTPATIENT)
Dept: LAB | Facility: HOSPITAL | Age: 66
End: 2021-06-21
Attending: INTERNAL MEDICINE
Payer: MEDICARE

## 2021-06-21 DIAGNOSIS — D35.2 PITUITARY ADENOMA: ICD-10-CM

## 2021-06-21 DIAGNOSIS — E11.42 TYPE 2 DIABETES MELLITUS WITH DIABETIC POLYNEUROPATHY, WITHOUT LONG-TERM CURRENT USE OF INSULIN: ICD-10-CM

## 2021-06-21 LAB
ALBUMIN SERPL BCP-MCNC: 4 G/DL (ref 3.5–5.2)
ALP SERPL-CCNC: 90 U/L (ref 55–135)
ALT SERPL W/O P-5'-P-CCNC: 73 U/L (ref 10–44)
ANION GAP SERPL CALC-SCNC: 11 MMOL/L (ref 8–16)
AST SERPL-CCNC: 43 U/L (ref 10–40)
BILIRUB SERPL-MCNC: 0.8 MG/DL (ref 0.1–1)
BUN SERPL-MCNC: 7 MG/DL (ref 8–23)
CALCIUM SERPL-MCNC: 9.5 MG/DL (ref 8.7–10.5)
CHLORIDE SERPL-SCNC: 110 MMOL/L (ref 95–110)
CHOLEST SERPL-MCNC: 156 MG/DL (ref 120–199)
CHOLEST/HDLC SERPL: 3.5 {RATIO} (ref 2–5)
CO2 SERPL-SCNC: 22 MMOL/L (ref 23–29)
CORTIS SERPL-MCNC: 12.1 UG/DL (ref 4.3–22.4)
CREAT SERPL-MCNC: 0.7 MG/DL (ref 0.5–1.4)
EST. GFR  (AFRICAN AMERICAN): >60 ML/MIN/1.73 M^2
EST. GFR  (NON AFRICAN AMERICAN): >60 ML/MIN/1.73 M^2
ESTIMATED AVG GLUCOSE: 151 MG/DL (ref 68–131)
FSH SERPL-ACNC: 25.8 MIU/ML
GLUCOSE SERPL-MCNC: 144 MG/DL (ref 70–110)
HBA1C MFR BLD: 6.9 % (ref 4–5.6)
HDLC SERPL-MCNC: 44 MG/DL (ref 40–75)
HDLC SERPL: 28.2 % (ref 20–50)
LDLC SERPL CALC-MCNC: 81 MG/DL (ref 63–159)
NONHDLC SERPL-MCNC: 112 MG/DL
POTASSIUM SERPL-SCNC: 4.1 MMOL/L (ref 3.5–5.1)
PROLACTIN SERPL IA-MCNC: 20.1 NG/ML (ref 5.2–26.5)
PROT SERPL-MCNC: 7.1 G/DL (ref 6–8.4)
SODIUM SERPL-SCNC: 143 MMOL/L (ref 136–145)
T4 FREE SERPL-MCNC: 0.91 NG/DL (ref 0.71–1.51)
TRIGL SERPL-MCNC: 155 MG/DL (ref 30–150)
TSH SERPL DL<=0.005 MIU/L-ACNC: 2.52 UIU/ML (ref 0.4–4)

## 2021-06-21 PROCEDURE — 82533 TOTAL CORTISOL: CPT | Performed by: INTERNAL MEDICINE

## 2021-06-21 PROCEDURE — 84146 ASSAY OF PROLACTIN: CPT | Performed by: INTERNAL MEDICINE

## 2021-06-21 PROCEDURE — 80061 LIPID PANEL: CPT | Performed by: INTERNAL MEDICINE

## 2021-06-21 PROCEDURE — 84305 ASSAY OF SOMATOMEDIN: CPT | Performed by: INTERNAL MEDICINE

## 2021-06-21 PROCEDURE — 80053 COMPREHEN METABOLIC PANEL: CPT | Performed by: INTERNAL MEDICINE

## 2021-06-21 PROCEDURE — 36415 COLL VENOUS BLD VENIPUNCTURE: CPT | Performed by: INTERNAL MEDICINE

## 2021-06-21 PROCEDURE — 84443 ASSAY THYROID STIM HORMONE: CPT | Performed by: INTERNAL MEDICINE

## 2021-06-21 PROCEDURE — 83036 HEMOGLOBIN GLYCOSYLATED A1C: CPT | Performed by: INTERNAL MEDICINE

## 2021-06-21 PROCEDURE — 84439 ASSAY OF FREE THYROXINE: CPT | Performed by: INTERNAL MEDICINE

## 2021-06-21 PROCEDURE — 82024 ASSAY OF ACTH: CPT | Performed by: INTERNAL MEDICINE

## 2021-06-21 PROCEDURE — 83001 ASSAY OF GONADOTROPIN (FSH): CPT | Performed by: INTERNAL MEDICINE

## 2021-06-22 LAB — ACTH PLAS-MCNC: 26 PG/ML (ref 0–46)

## 2021-06-24 LAB
IGF-I SERPL-MCNC: 94 NG/ML (ref 35–201)
IGF-I Z-SCORE SERPL: 0 SD

## 2021-06-29 ENCOUNTER — PATIENT OUTREACH (OUTPATIENT)
Dept: ADMINISTRATIVE | Facility: OTHER | Age: 66
End: 2021-06-29

## 2021-06-30 ENCOUNTER — OFFICE VISIT (OUTPATIENT)
Dept: ENDOCRINOLOGY | Facility: CLINIC | Age: 66
End: 2021-06-30
Payer: MEDICARE

## 2021-06-30 VITALS
WEIGHT: 187.75 LBS | SYSTOLIC BLOOD PRESSURE: 132 MMHG | HEIGHT: 65 IN | BODY MASS INDEX: 31.28 KG/M2 | DIASTOLIC BLOOD PRESSURE: 75 MMHG

## 2021-06-30 DIAGNOSIS — D35.2 PITUITARY ADENOMA: ICD-10-CM

## 2021-06-30 DIAGNOSIS — M81.0 OSTEOPOROSIS WITHOUT CURRENT PATHOLOGICAL FRACTURE, UNSPECIFIED OSTEOPOROSIS TYPE: ICD-10-CM

## 2021-06-30 DIAGNOSIS — K76.0 FATTY LIVER DISEASE, NONALCOHOLIC: ICD-10-CM

## 2021-06-30 DIAGNOSIS — E11.42 TYPE 2 DIABETES MELLITUS WITH DIABETIC POLYNEUROPATHY, WITHOUT LONG-TERM CURRENT USE OF INSULIN: Primary | ICD-10-CM

## 2021-06-30 DIAGNOSIS — E55.9 VITAMIN D DEFICIENCY: ICD-10-CM

## 2021-06-30 DIAGNOSIS — E78.2 MIXED HYPERLIPIDEMIA: ICD-10-CM

## 2021-06-30 PROCEDURE — 99499 RISK ADDL DX/OHS AUDIT: ICD-10-PCS | Mod: S$GLB,,, | Performed by: INTERNAL MEDICINE

## 2021-06-30 PROCEDURE — 3288F PR FALLS RISK ASSESSMENT DOCUMENTED: ICD-10-PCS | Mod: CPTII,S$GLB,, | Performed by: INTERNAL MEDICINE

## 2021-06-30 PROCEDURE — 99214 PR OFFICE/OUTPT VISIT, EST, LEVL IV, 30-39 MIN: ICD-10-PCS | Mod: S$GLB,,, | Performed by: INTERNAL MEDICINE

## 2021-06-30 PROCEDURE — 99499 UNLISTED E&M SERVICE: CPT | Mod: S$GLB,,, | Performed by: INTERNAL MEDICINE

## 2021-06-30 PROCEDURE — 99999 PR PBB SHADOW E&M-EST. PATIENT-LVL III: ICD-10-PCS | Mod: PBBFAC,,, | Performed by: INTERNAL MEDICINE

## 2021-06-30 PROCEDURE — 3008F PR BODY MASS INDEX (BMI) DOCUMENTED: ICD-10-PCS | Mod: CPTII,S$GLB,, | Performed by: INTERNAL MEDICINE

## 2021-06-30 PROCEDURE — 3072F PR LOW RISK FOR RETINOPATHY: ICD-10-PCS | Mod: S$GLB,,, | Performed by: INTERNAL MEDICINE

## 2021-06-30 PROCEDURE — 1126F PR PAIN SEVERITY QUANTIFIED, NO PAIN PRESENT: ICD-10-PCS | Mod: S$GLB,,, | Performed by: INTERNAL MEDICINE

## 2021-06-30 PROCEDURE — 3072F LOW RISK FOR RETINOPATHY: CPT | Mod: S$GLB,,, | Performed by: INTERNAL MEDICINE

## 2021-06-30 PROCEDURE — 99214 OFFICE O/P EST MOD 30 MIN: CPT | Mod: S$GLB,,, | Performed by: INTERNAL MEDICINE

## 2021-06-30 PROCEDURE — 1101F PT FALLS ASSESS-DOCD LE1/YR: CPT | Mod: CPTII,S$GLB,, | Performed by: INTERNAL MEDICINE

## 2021-06-30 PROCEDURE — 3288F FALL RISK ASSESSMENT DOCD: CPT | Mod: CPTII,S$GLB,, | Performed by: INTERNAL MEDICINE

## 2021-06-30 PROCEDURE — 1126F AMNT PAIN NOTED NONE PRSNT: CPT | Mod: S$GLB,,, | Performed by: INTERNAL MEDICINE

## 2021-06-30 PROCEDURE — 3008F BODY MASS INDEX DOCD: CPT | Mod: CPTII,S$GLB,, | Performed by: INTERNAL MEDICINE

## 2021-06-30 PROCEDURE — 3044F HG A1C LEVEL LT 7.0%: CPT | Mod: CPTII,S$GLB,, | Performed by: INTERNAL MEDICINE

## 2021-06-30 PROCEDURE — 3044F PR MOST RECENT HEMOGLOBIN A1C LEVEL <7.0%: ICD-10-PCS | Mod: CPTII,S$GLB,, | Performed by: INTERNAL MEDICINE

## 2021-06-30 PROCEDURE — 1101F PR PT FALLS ASSESS DOC 0-1 FALLS W/OUT INJ PAST YR: ICD-10-PCS | Mod: CPTII,S$GLB,, | Performed by: INTERNAL MEDICINE

## 2021-06-30 PROCEDURE — 99999 PR PBB SHADOW E&M-EST. PATIENT-LVL III: CPT | Mod: PBBFAC,,, | Performed by: INTERNAL MEDICINE

## 2021-06-30 RX ORDER — INSULIN GLARGINE 100 [IU]/ML
20 INJECTION, SOLUTION SUBCUTANEOUS NIGHTLY
Qty: 15 ML | Refills: 11
Start: 2021-06-30 | End: 2021-10-11

## 2021-08-26 ENCOUNTER — TELEPHONE (OUTPATIENT)
Dept: INTERNAL MEDICINE | Facility: CLINIC | Age: 66
End: 2021-08-26

## 2021-10-04 ENCOUNTER — PATIENT MESSAGE (OUTPATIENT)
Dept: ADMINISTRATIVE | Facility: HOSPITAL | Age: 66
End: 2021-10-04

## 2021-10-04 ENCOUNTER — LAB VISIT (OUTPATIENT)
Dept: LAB | Facility: HOSPITAL | Age: 66
End: 2021-10-04
Attending: INTERNAL MEDICINE
Payer: MEDICARE

## 2021-10-04 DIAGNOSIS — E11.42 TYPE 2 DIABETES MELLITUS WITH DIABETIC POLYNEUROPATHY, WITHOUT LONG-TERM CURRENT USE OF INSULIN: ICD-10-CM

## 2021-10-04 LAB
ALBUMIN SERPL BCP-MCNC: 4 G/DL (ref 3.5–5.2)
ALP SERPL-CCNC: 102 U/L (ref 55–135)
ALT SERPL W/O P-5'-P-CCNC: 71 U/L (ref 10–44)
ANION GAP SERPL CALC-SCNC: 10 MMOL/L (ref 8–16)
AST SERPL-CCNC: 46 U/L (ref 10–40)
BILIRUB SERPL-MCNC: 1.1 MG/DL (ref 0.1–1)
BUN SERPL-MCNC: 6 MG/DL (ref 8–23)
CALCIUM SERPL-MCNC: 9.4 MG/DL (ref 8.7–10.5)
CHLORIDE SERPL-SCNC: 108 MMOL/L (ref 95–110)
CO2 SERPL-SCNC: 22 MMOL/L (ref 23–29)
CREAT SERPL-MCNC: 0.8 MG/DL (ref 0.5–1.4)
EST. GFR  (AFRICAN AMERICAN): >60 ML/MIN/1.73 M^2
EST. GFR  (NON AFRICAN AMERICAN): >60 ML/MIN/1.73 M^2
ESTIMATED AVG GLUCOSE: 180 MG/DL (ref 68–131)
GLUCOSE SERPL-MCNC: 215 MG/DL (ref 70–110)
HBA1C MFR BLD: 7.9 % (ref 4–5.6)
POTASSIUM SERPL-SCNC: 4 MMOL/L (ref 3.5–5.1)
PROT SERPL-MCNC: 7 G/DL (ref 6–8.4)
SODIUM SERPL-SCNC: 140 MMOL/L (ref 136–145)

## 2021-10-04 PROCEDURE — 83036 HEMOGLOBIN GLYCOSYLATED A1C: CPT | Mod: HCNC | Performed by: INTERNAL MEDICINE

## 2021-10-04 PROCEDURE — 80053 COMPREHEN METABOLIC PANEL: CPT | Mod: HCNC | Performed by: INTERNAL MEDICINE

## 2021-10-04 PROCEDURE — 36415 COLL VENOUS BLD VENIPUNCTURE: CPT | Mod: HCNC | Performed by: INTERNAL MEDICINE

## 2021-10-11 ENCOUNTER — TELEPHONE (OUTPATIENT)
Dept: DIABETES | Facility: CLINIC | Age: 66
End: 2021-10-11

## 2021-10-11 ENCOUNTER — OFFICE VISIT (OUTPATIENT)
Dept: ENDOCRINOLOGY | Facility: CLINIC | Age: 66
End: 2021-10-11
Payer: MEDICARE

## 2021-10-11 DIAGNOSIS — E78.2 MIXED HYPERLIPIDEMIA: ICD-10-CM

## 2021-10-11 DIAGNOSIS — K76.0 FATTY LIVER DISEASE, NONALCOHOLIC: ICD-10-CM

## 2021-10-11 DIAGNOSIS — M81.0 OSTEOPOROSIS WITHOUT CURRENT PATHOLOGICAL FRACTURE, UNSPECIFIED OSTEOPOROSIS TYPE: ICD-10-CM

## 2021-10-11 DIAGNOSIS — D35.2 PITUITARY ADENOMA: ICD-10-CM

## 2021-10-11 DIAGNOSIS — E11.42 TYPE 2 DIABETES MELLITUS WITH DIABETIC POLYNEUROPATHY, WITHOUT LONG-TERM CURRENT USE OF INSULIN: Primary | ICD-10-CM

## 2021-10-11 PROCEDURE — 1159F PR MEDICATION LIST DOCUMENTED IN MEDICAL RECORD: ICD-10-PCS | Mod: HCNC,CPTII,95, | Performed by: INTERNAL MEDICINE

## 2021-10-11 PROCEDURE — 3072F LOW RISK FOR RETINOPATHY: CPT | Mod: HCNC,CPTII,95, | Performed by: INTERNAL MEDICINE

## 2021-10-11 PROCEDURE — 99499 RISK ADDL DX/OHS AUDIT: ICD-10-PCS | Mod: 95,,, | Performed by: INTERNAL MEDICINE

## 2021-10-11 PROCEDURE — 1160F RVW MEDS BY RX/DR IN RCRD: CPT | Mod: HCNC,CPTII,95, | Performed by: INTERNAL MEDICINE

## 2021-10-11 PROCEDURE — 3051F HG A1C>EQUAL 7.0%<8.0%: CPT | Mod: HCNC,CPTII,95, | Performed by: INTERNAL MEDICINE

## 2021-10-11 PROCEDURE — 3066F PR DOCUMENTATION OF TREATMENT FOR NEPHROPATHY: ICD-10-PCS | Mod: HCNC,CPTII,95, | Performed by: INTERNAL MEDICINE

## 2021-10-11 PROCEDURE — 3066F NEPHROPATHY DOC TX: CPT | Mod: HCNC,CPTII,95, | Performed by: INTERNAL MEDICINE

## 2021-10-11 PROCEDURE — 1160F PR REVIEW ALL MEDS BY PRESCRIBER/CLIN PHARMACIST DOCUMENTED: ICD-10-PCS | Mod: HCNC,CPTII,95, | Performed by: INTERNAL MEDICINE

## 2021-10-11 PROCEDURE — 1159F MED LIST DOCD IN RCRD: CPT | Mod: HCNC,CPTII,95, | Performed by: INTERNAL MEDICINE

## 2021-10-11 PROCEDURE — 3061F NEG MICROALBUMINURIA REV: CPT | Mod: HCNC,CPTII,95, | Performed by: INTERNAL MEDICINE

## 2021-10-11 PROCEDURE — 3061F PR NEG MICROALBUMINURIA RESULT DOCUMENTED/REVIEW: ICD-10-PCS | Mod: HCNC,CPTII,95, | Performed by: INTERNAL MEDICINE

## 2021-10-11 PROCEDURE — 3051F PR MOST RECENT HEMOGLOBIN A1C LEVEL 7.0 - < 8.0%: ICD-10-PCS | Mod: HCNC,CPTII,95, | Performed by: INTERNAL MEDICINE

## 2021-10-11 PROCEDURE — 99214 OFFICE O/P EST MOD 30 MIN: CPT | Mod: HCNC,95,, | Performed by: INTERNAL MEDICINE

## 2021-10-11 PROCEDURE — 99499 UNLISTED E&M SERVICE: CPT | Mod: 95,,, | Performed by: INTERNAL MEDICINE

## 2021-10-11 PROCEDURE — 99214 PR OFFICE/OUTPT VISIT, EST, LEVL IV, 30-39 MIN: ICD-10-PCS | Mod: HCNC,95,, | Performed by: INTERNAL MEDICINE

## 2021-10-11 PROCEDURE — 3072F PR LOW RISK FOR RETINOPATHY: ICD-10-PCS | Mod: HCNC,CPTII,95, | Performed by: INTERNAL MEDICINE

## 2021-10-11 RX ORDER — ZOLEDRONIC ACID 5 MG/100ML
5 INJECTION, SOLUTION INTRAVENOUS
Status: CANCELLED | OUTPATIENT
Start: 2021-10-11

## 2021-10-11 RX ORDER — HEPARIN 100 UNIT/ML
500 SYRINGE INTRAVENOUS
Status: CANCELLED | OUTPATIENT
Start: 2021-10-11

## 2021-10-11 RX ORDER — SODIUM CHLORIDE 0.9 % (FLUSH) 0.9 %
10 SYRINGE (ML) INJECTION
Status: CANCELLED | OUTPATIENT
Start: 2021-10-11

## 2021-10-11 RX ORDER — ACETAMINOPHEN 500 MG
500 TABLET ORAL
Status: CANCELLED | OUTPATIENT
Start: 2021-10-11

## 2021-10-11 RX ORDER — INSULIN GLARGINE 100 [IU]/ML
25 INJECTION, SOLUTION SUBCUTANEOUS NIGHTLY
Qty: 15 ML | Refills: 11
Start: 2021-10-11 | End: 2021-12-07 | Stop reason: SDUPTHER

## 2021-11-16 ENCOUNTER — INFUSION (OUTPATIENT)
Dept: INFECTIOUS DISEASES | Facility: HOSPITAL | Age: 66
End: 2021-11-16
Payer: MEDICARE

## 2021-11-16 VITALS
OXYGEN SATURATION: 97 % | RESPIRATION RATE: 17 BRPM | SYSTOLIC BLOOD PRESSURE: 121 MMHG | DIASTOLIC BLOOD PRESSURE: 60 MMHG | HEART RATE: 83 BPM | BODY MASS INDEX: 30.35 KG/M2 | WEIGHT: 182.19 LBS | TEMPERATURE: 98 F | HEIGHT: 65 IN

## 2021-11-16 DIAGNOSIS — M81.0 OSTEOPOROSIS WITHOUT CURRENT PATHOLOGICAL FRACTURE, UNSPECIFIED OSTEOPOROSIS TYPE: Primary | ICD-10-CM

## 2021-11-16 PROCEDURE — 63600175 PHARM REV CODE 636 W HCPCS: Mod: HCNC | Performed by: INTERNAL MEDICINE

## 2021-11-16 PROCEDURE — 25000003 PHARM REV CODE 250: Mod: HCNC | Performed by: INTERNAL MEDICINE

## 2021-11-16 PROCEDURE — 96365 THER/PROPH/DIAG IV INF INIT: CPT | Mod: HCNC

## 2021-11-16 RX ORDER — SODIUM CHLORIDE 0.9 % (FLUSH) 0.9 %
10 SYRINGE (ML) INJECTION
Status: DISCONTINUED | OUTPATIENT
Start: 2021-11-16 | End: 2021-11-16 | Stop reason: HOSPADM

## 2021-11-16 RX ORDER — ZOLEDRONIC ACID 5 MG/100ML
5 INJECTION, SOLUTION INTRAVENOUS
Status: COMPLETED | OUTPATIENT
Start: 2021-11-16 | End: 2021-11-16

## 2021-11-16 RX ORDER — ACETAMINOPHEN 500 MG
500 TABLET ORAL
Status: DISCONTINUED | OUTPATIENT
Start: 2021-11-16 | End: 2021-11-16 | Stop reason: HOSPADM

## 2021-11-16 RX ORDER — HEPARIN 100 UNIT/ML
500 SYRINGE INTRAVENOUS
Status: CANCELLED | OUTPATIENT
Start: 2021-11-16

## 2021-11-16 RX ORDER — SODIUM CHLORIDE 0.9 % (FLUSH) 0.9 %
10 SYRINGE (ML) INJECTION
Status: CANCELLED | OUTPATIENT
Start: 2021-11-16

## 2021-11-16 RX ORDER — ACETAMINOPHEN 500 MG
500 TABLET ORAL
Status: CANCELLED | OUTPATIENT
Start: 2021-11-16

## 2021-11-16 RX ORDER — ZOLEDRONIC ACID 5 MG/100ML
5 INJECTION, SOLUTION INTRAVENOUS
Status: CANCELLED | OUTPATIENT
Start: 2021-11-16

## 2021-11-16 RX ADMIN — ZOLEDRONIC ACID 5 MG: 5 INJECTION, SOLUTION INTRAVENOUS at 01:11

## 2021-11-16 RX ADMIN — ACETAMINOPHEN 500 MG: 500 TABLET ORAL at 01:11

## 2021-11-30 ENCOUNTER — OFFICE VISIT (OUTPATIENT)
Dept: URGENT CARE | Facility: CLINIC | Age: 66
End: 2021-11-30
Payer: MEDICARE

## 2021-11-30 VITALS
SYSTOLIC BLOOD PRESSURE: 109 MMHG | HEART RATE: 88 BPM | DIASTOLIC BLOOD PRESSURE: 67 MMHG | OXYGEN SATURATION: 96 % | TEMPERATURE: 98 F | HEIGHT: 65 IN | BODY MASS INDEX: 29.99 KG/M2 | WEIGHT: 180 LBS | RESPIRATION RATE: 17 BRPM

## 2021-11-30 DIAGNOSIS — H92.01 ACUTE OTALGIA, RIGHT: Primary | ICD-10-CM

## 2021-11-30 DIAGNOSIS — H61.21 IMPACTED CERUMEN OF RIGHT EAR: ICD-10-CM

## 2021-11-30 PROCEDURE — 3066F NEPHROPATHY DOC TX: CPT | Mod: CPTII,S$GLB,, | Performed by: PHYSICIAN ASSISTANT

## 2021-11-30 PROCEDURE — 3072F LOW RISK FOR RETINOPATHY: CPT | Mod: CPTII,S$GLB,, | Performed by: PHYSICIAN ASSISTANT

## 2021-11-30 PROCEDURE — 3061F NEG MICROALBUMINURIA REV: CPT | Mod: CPTII,S$GLB,, | Performed by: PHYSICIAN ASSISTANT

## 2021-11-30 PROCEDURE — 3066F PR DOCUMENTATION OF TREATMENT FOR NEPHROPATHY: ICD-10-PCS | Mod: CPTII,S$GLB,, | Performed by: PHYSICIAN ASSISTANT

## 2021-11-30 PROCEDURE — 69209 REMOVE IMPACTED EAR WAX UNI: CPT | Mod: RT,S$GLB,, | Performed by: PHYSICIAN ASSISTANT

## 2021-11-30 PROCEDURE — 3061F PR NEG MICROALBUMINURIA RESULT DOCUMENTED/REVIEW: ICD-10-PCS | Mod: CPTII,S$GLB,, | Performed by: PHYSICIAN ASSISTANT

## 2021-11-30 PROCEDURE — 69209 EAR CERUMEN REMOVAL: ICD-10-PCS | Mod: RT,S$GLB,, | Performed by: PHYSICIAN ASSISTANT

## 2021-11-30 PROCEDURE — 99213 OFFICE O/P EST LOW 20 MIN: CPT | Mod: 25,S$GLB,, | Performed by: PHYSICIAN ASSISTANT

## 2021-11-30 PROCEDURE — 99213 PR OFFICE/OUTPT VISIT, EST, LEVL III, 20-29 MIN: ICD-10-PCS | Mod: 25,S$GLB,, | Performed by: PHYSICIAN ASSISTANT

## 2021-11-30 PROCEDURE — 3072F PR LOW RISK FOR RETINOPATHY: ICD-10-PCS | Mod: CPTII,S$GLB,, | Performed by: PHYSICIAN ASSISTANT

## 2021-12-07 ENCOUNTER — TELEPHONE (OUTPATIENT)
Dept: ENDOCRINOLOGY | Facility: CLINIC | Age: 66
End: 2021-12-07
Payer: MEDICARE

## 2021-12-07 ENCOUNTER — PATIENT MESSAGE (OUTPATIENT)
Dept: ENDOCRINOLOGY | Facility: CLINIC | Age: 66
End: 2021-12-07
Payer: MEDICARE

## 2021-12-08 RX ORDER — INSULIN GLARGINE 100 [IU]/ML
25 INJECTION, SOLUTION SUBCUTANEOUS NIGHTLY
Qty: 30 ML | Refills: 3 | Status: SHIPPED | OUTPATIENT
Start: 2021-12-08 | End: 2022-02-24

## 2021-12-08 RX ORDER — INSULIN GLARGINE 100 [IU]/ML
25 INJECTION, SOLUTION SUBCUTANEOUS NIGHTLY
Qty: 15 ML | Refills: 11
Start: 2021-12-08 | End: 2021-12-08 | Stop reason: SDUPTHER

## 2022-01-18 ENCOUNTER — LAB VISIT (OUTPATIENT)
Dept: PRIMARY CARE CLINIC | Facility: CLINIC | Age: 67
End: 2022-01-18

## 2022-01-18 DIAGNOSIS — Z20.822 CONTACT WITH AND (SUSPECTED) EXPOSURE TO COVID-19: ICD-10-CM

## 2022-01-18 LAB
CTP QC/QA: YES
SARS-COV-2 AG RESP QL IA.RAPID: NEGATIVE

## 2022-01-18 PROCEDURE — 87811 SARS-COV-2 COVID19 W/OPTIC: CPT

## 2022-01-25 ENCOUNTER — PATIENT MESSAGE (OUTPATIENT)
Dept: ADMINISTRATIVE | Facility: HOSPITAL | Age: 67
End: 2022-01-25

## 2022-01-28 ENCOUNTER — LAB VISIT (OUTPATIENT)
Dept: PRIMARY CARE CLINIC | Facility: CLINIC | Age: 67
End: 2022-01-28

## 2022-01-28 DIAGNOSIS — Z20.822 CONTACT WITH AND (SUSPECTED) EXPOSURE TO COVID-19: ICD-10-CM

## 2022-01-28 LAB
CTP QC/QA: YES
SARS-COV-2 AG RESP QL IA.RAPID: NEGATIVE

## 2022-01-28 PROCEDURE — 87811 SARS-COV-2 COVID19 W/OPTIC: CPT

## 2022-02-01 ENCOUNTER — PATIENT OUTREACH (OUTPATIENT)
Dept: ADMINISTRATIVE | Facility: OTHER | Age: 67
End: 2022-02-01
Payer: MEDICARE

## 2022-02-01 DIAGNOSIS — Z12.31 ENCOUNTER FOR SCREENING MAMMOGRAM FOR MALIGNANT NEOPLASM OF BREAST: Primary | ICD-10-CM

## 2022-02-01 DIAGNOSIS — E11.9 TYPE 2 DIABETES MELLITUS WITHOUT COMPLICATION, UNSPECIFIED WHETHER LONG TERM INSULIN USE: ICD-10-CM

## 2022-02-01 NOTE — PROGRESS NOTES
Care Everywhere: updated  Immunization: updated  Health Maintenance: updated  Media Review: review for outside eye exam report   Legacy Review:   DIS:  Order placed: mammogram, diabetic eye screening photo    Upcoming appts:  EFAX:  Task Tickets:  Referrals:

## 2022-02-02 ENCOUNTER — CLINICAL SUPPORT (OUTPATIENT)
Dept: DIABETES | Facility: CLINIC | Age: 67
End: 2022-02-02
Payer: MEDICARE

## 2022-02-02 ENCOUNTER — LAB VISIT (OUTPATIENT)
Dept: LAB | Facility: HOSPITAL | Age: 67
End: 2022-02-02
Attending: INTERNAL MEDICINE
Payer: MEDICARE

## 2022-02-02 DIAGNOSIS — E11.42 TYPE 2 DIABETES MELLITUS WITH DIABETIC POLYNEUROPATHY, WITHOUT LONG-TERM CURRENT USE OF INSULIN: ICD-10-CM

## 2022-02-02 LAB
ALBUMIN SERPL BCP-MCNC: 3.7 G/DL (ref 3.5–5.2)
ALP SERPL-CCNC: 97 U/L (ref 55–135)
ALT SERPL W/O P-5'-P-CCNC: 80 U/L (ref 10–44)
ANION GAP SERPL CALC-SCNC: 11 MMOL/L (ref 8–16)
AST SERPL-CCNC: 74 U/L (ref 10–40)
BILIRUB SERPL-MCNC: 1 MG/DL (ref 0.1–1)
BUN SERPL-MCNC: 8 MG/DL (ref 8–23)
CALCIUM SERPL-MCNC: 9.4 MG/DL (ref 8.7–10.5)
CHLORIDE SERPL-SCNC: 104 MMOL/L (ref 95–110)
CO2 SERPL-SCNC: 22 MMOL/L (ref 23–29)
CREAT SERPL-MCNC: 0.8 MG/DL (ref 0.5–1.4)
EST. GFR  (AFRICAN AMERICAN): >60 ML/MIN/1.73 M^2
EST. GFR  (NON AFRICAN AMERICAN): >60 ML/MIN/1.73 M^2
ESTIMATED AVG GLUCOSE: 255 MG/DL (ref 68–131)
GLUCOSE SERPL-MCNC: 400 MG/DL (ref 70–110)
HBA1C MFR BLD: 10.5 % (ref 4–5.6)
POTASSIUM SERPL-SCNC: 4 MMOL/L (ref 3.5–5.1)
PROT SERPL-MCNC: 7.2 G/DL (ref 6–8.4)
SODIUM SERPL-SCNC: 137 MMOL/L (ref 136–145)

## 2022-02-02 PROCEDURE — 36415 COLL VENOUS BLD VENIPUNCTURE: CPT | Mod: PO | Performed by: INTERNAL MEDICINE

## 2022-02-02 PROCEDURE — G0108 DIAB MANAGE TRN  PER INDIV: HCPCS | Mod: ,,, | Performed by: DIETITIAN, REGISTERED

## 2022-02-02 PROCEDURE — 99999 PR PBB SHADOW E&M-EST. PATIENT-LVL I: ICD-10-PCS | Mod: PBBFAC,,, | Performed by: DIETITIAN, REGISTERED

## 2022-02-02 PROCEDURE — 80053 COMPREHEN METABOLIC PANEL: CPT | Performed by: INTERNAL MEDICINE

## 2022-02-02 PROCEDURE — 99999 PR PBB SHADOW E&M-EST. PATIENT-LVL I: CPT | Mod: PBBFAC,,, | Performed by: DIETITIAN, REGISTERED

## 2022-02-02 PROCEDURE — 83036 HEMOGLOBIN GLYCOSYLATED A1C: CPT | Performed by: INTERNAL MEDICINE

## 2022-02-02 PROCEDURE — G0108 PR DIAB MANAGE TRN  PER INDIV: ICD-10-PCS | Mod: ,,, | Performed by: DIETITIAN, REGISTERED

## 2022-02-02 NOTE — PROGRESS NOTES
Diabetes Care Specialist Progress Note  Author: Christy Hill RD, CDE  Date: 2/2/2022    Program Intake  Reason for Diabetes Program Visit:: Intervention  Type of Intervention:: Individual  Individual: Education  Education: Self-Management Skill Review,Nutrition and Meal Planning  Current diabetes risk level:: moderate  In the last 12 months, have you:: none    Lab Results   Component Value Date    HGBA1C 7.9 (H) 10/04/2021       Clinical    Problem Review  Reviewed Problem List with Patient: yes  Comorbidities: Neuropathy  Reviewed health maintenance: yes    Clinical Assessment  Current Diabetes Treatment: Oral Medication,Insulin  Have you ever experienced hypoglycemia (low blood sugar)?: no  Have you ever experienced hyperglycemia (high blood sugar)?: yes  Are you able to tell when your blood sugar is high?: No (comment)  Have you ever been hospitalized because your blood sugar was high?: no    Medication Information  How do you obtain your medications?: Patient drives  How many days a week do you miss your medications?:  (Occasionally skips Metformin due to diarrhea)  Do you sometimes have difficulty refilling your medications?: No  Medication adherence impacting ability to self-manage diabetes?: Yes    Labs  Do you have regular lab work to monitor your medications?: Yes    Nutritional Status  Diet: Regular (3 meals daily plus snacks; drinks water, coffee w/ splenda, diet drinks)  Change in appetite?: No  Dentation:: Intact  Recent Changes in Weight: No Recent Weight Change  Current nutritional status an area of need that is impacting patient's ability to self-manage diabetes?: No    Additional Social History    Support  Does anyone support you with your diabetes care?: yes  Who supports you?: family member,self  Who takes you to your medical appointments?: self  Does the current support meet the patient's needs?: Yes  Is Support an area impacting ability to self-manage diabetes?: No    Access to [employer] &  Technology  Does the patient have access to any of the following devices or technologies?: Smart phone  Media or technology needs impacting ability to self-manage diabetes?: No    Cognitive/Behavioral Health  Alert and Oriented: Yes  Difficulty Thinking: No  Requires Prompting: No  Requires assistance for routine expression?: No  Cognitive or behavioral barriers impacting ability to self-manage diabetes?: No    Culture/Christian  Culture or Restorationist beliefs that may impact ability to access healthcare: No    Communication  Language preference: English  Hearing Problems: No  Vision Problems: No  Communication needs impacting ability to self-manage diabetes?: No    Health Literacy  Preferred Learning Method: Face to Face  How often do you need to have someone help you read instructions, pamphlets, or written material from your doctor or pharmacy?: Never  Health literacy needs impacting ability to self-manage diabetes?: No      Diabetes Self-Management Skills Assessment    Diabetes Disease Process/Treatment Options  Patient/caregiver able to state what happens when someone has diabetes.: somewhat  Patient/caregiver knows what type of diabetes they have.: yes  Diabetes Type : Type II  Patient/caregiver able to identify at least three signs and symptoms of diabetes.: no  Patient able to identify at least three risk factors for diabetes.: no  Diabetes Disease Process/Treatment Options: Skills Assessment Completed: Yes  Assessment indicates:: Instruction Needed  Area of need?: Yes     Reviewed diabetes disease process and treatment options    Nutrition/Healthy Eating  Challenges to healthy eating:: snacking between meals and at night  Method of carbohydrate measurement:: carb counting/reading labels  Patient can identify foods that impact blood sugar.: yes  Patient-identified foods:: sweets  Nutrition/Healthy Eating Skills Assessment Completed:: Yes  Assessment indicates:: Instruction Needed  Area of need?: Yes      Reviewed CHO counting, label reading and addt'l resources to assist w/ CHO counting; plate method reviewed; alternatives to sugary beverages discussed    Physical Activity/Exercise  Patient's daily activity level:: sedentary  Patient formally exercises outside of work.: no  Reasons for not exercising:: other (see comments) (No limitations)  Patient can identify forms of physical activity.: yes  Stated forms of physical activity:: any movement performed by muscles that uses energy  Patient can identify reasons why exercise/physical activity is important in diabetes management.: yes  Identified reasons:: lowers blood glucose, blood pressure, and cholesterol  Physical Activity/Exercise Skills Assessment Completed: : Yes  Assessment indicates:: Instruction Needed  Area of need?: Yes     Reviewed goals and benefits; discussed possible use of foot/arm cycle for exercise    Medications  Patient is able to describe current diabetes management routine.: yes  Diabetes management routine:: insulin,oral medications  Patient is able to identify current diabetes medications, dosages, and appropriate timing of medications.: yes  Patient understands the purpose of the medications taken for diabetes.: yes  Patient reports problems or concerns with current medication regimen.: yes  Medication regimen problems/concerns:: concerned about side effects  Medication Skills Assessment Completed:: Yes  Assessment indicates:: Instruction Needed  Area of need?: Yes     Reviewed MOA of medication and regimen; advised to discuss with provider issues with Metformin to determine if should continue or try something else    Home Blood Glucose Monitoring  Patient states that blood sugar is checked at home daily.: yes  Monitoring Method:: home glucometer  How often do you check your blood sugar?: Once a day  Blood glucose logs reviewed today?: no  Home Blood Glucose Monitoring Skills Assessment Completed: : Yes  Assessment indicates:: Instruction  Needed  Area of need?: Yes     Reviewed SMBG schedule and BG goals    Acute Complications  Patient is able to identify types of acute complications: No  Acute Complications Skills Assessment Completed: : Yes  Assessment indicates:: Instruction Needed  Area of need?: Yes    Reviewed s/s and treatment of hyperglycemia/hypoglycemia    Chronic Complications  Patient can identify major chronic complications of diabetes.: no  Patient can identify ways to prevent or delay diabetes complications.: no  Patient is taking statin?: Yes  Chronic Complications Skills Assessment Completed: : Yes  Assessment indicates:: Instruction Needed  Area of need?: Yes     Reviewed standards of care    Psychosocial/Coping  Patient can identify ways of coping with chronic disease.: yes  Patient-stated ways of coping with chronic disease:: support from loved ones  Psychosocial/Coping Skills Assessment Completed: : Yes  Assessment indicates:: Adequate understanding  Area of need?: No      Assessment Summary and Plan    Based on today's diabetes care assessment, the following areas of need were identified:      Social 2/2/2022   Support No   Access to Mass Media/Tech No   Cognitive/Behavioral Health No   Culture/Judaism No   Communication No   Health Literacy No        Clinical 2/2/2022   Medication Adherence Yes   Nutritional Status No        Diabetes Self-Management Skills 2/2/2022   Diabetes Disease Process/Treatment Options Yes   Nutrition/Healthy Eating Yes   Physical Activity/Exercise Yes   Medication Yes   Home Blood Glucose Monitoring Yes   Acute Complications Yes   Chronic Complications Yes   Psychosocial/Coping No          Today's interventions were provided through individual discussion, instruction, and written materials were provided.      Patient verbalized understanding of instruction and written materials.  Pt was able to return back demonstration of instructions today. Patient understood key points, needs reinforcement and  further instruction.     Diabetes Self-Management Care Plan:    Today's Diabetes Self-Management Care Plan was developed with Tona's input. Tona has agreed to work toward the following goal(s) to improve his/her overall diabetes control.      Care Plan: Diabetes Management   Updates made since 1/3/2022 12:00 AM      Problem: Medications       Goal: Patient Agrees to take Diabetes Medication(s) as prescribed.    Start Date: 3/10/2021   Expected End Date: 6/14/2021   This Visit's Progress: On track   Note:    Patient agrees to take all her diabetes medications as ordered     Problem: Healthy Eating       Goal: Eat 2-3 meals daily with 30-45g/2-3 servings of Carbohydrate per meal.    Start Date: 3/23/2021   Expected End Date: 6/21/2021   This Visit's Progress: On track   Note:    Patient agreed to choose healthier snacks and to avoid consuming sweets.          Follow Up Plan     Follow up in about 6 months (around 8/2/2022).    Today's care plan and follow up schedule was discussed with patient.  Tona verbalized understanding of the care plan, goals, and agrees to follow up plan.        The patient was encouraged to communicate with his/her health care provider/physician and care team regarding his/her condition(s) and treatment.  I provided the patient with my contact information today and encouraged to contact me via phone or [facility] Patient Portal as needed.     Length of Visit   Total Time: 60 Minutes

## 2022-02-24 ENCOUNTER — LAB VISIT (OUTPATIENT)
Dept: LAB | Facility: HOSPITAL | Age: 67
End: 2022-02-24
Attending: INTERNAL MEDICINE
Payer: MEDICARE

## 2022-02-24 ENCOUNTER — OFFICE VISIT (OUTPATIENT)
Dept: ENDOCRINOLOGY | Facility: CLINIC | Age: 67
End: 2022-02-24
Payer: MEDICARE

## 2022-02-24 VITALS
WEIGHT: 176.38 LBS | SYSTOLIC BLOOD PRESSURE: 115 MMHG | HEIGHT: 65 IN | DIASTOLIC BLOOD PRESSURE: 70 MMHG | BODY MASS INDEX: 29.38 KG/M2

## 2022-02-24 DIAGNOSIS — D35.2 PITUITARY ADENOMA: ICD-10-CM

## 2022-02-24 DIAGNOSIS — E11.42 TYPE 2 DIABETES MELLITUS WITH DIABETIC POLYNEUROPATHY, WITHOUT LONG-TERM CURRENT USE OF INSULIN: Primary | ICD-10-CM

## 2022-02-24 DIAGNOSIS — E78.2 MIXED HYPERLIPIDEMIA: ICD-10-CM

## 2022-02-24 DIAGNOSIS — B37.31 CANDIDA VAGINITIS: ICD-10-CM

## 2022-02-24 DIAGNOSIS — E11.42 TYPE 2 DIABETES MELLITUS WITH DIABETIC POLYNEUROPATHY, WITHOUT LONG-TERM CURRENT USE OF INSULIN: ICD-10-CM

## 2022-02-24 DIAGNOSIS — M81.0 OSTEOPOROSIS WITHOUT CURRENT PATHOLOGICAL FRACTURE, UNSPECIFIED OSTEOPOROSIS TYPE: ICD-10-CM

## 2022-02-24 LAB
ALBUMIN/CREAT UR: 43.9 UG/MG (ref 0–30)
CREAT UR-MCNC: 132 MG/DL (ref 15–325)
MICROALBUMIN UR DL<=1MG/L-MCNC: 58 UG/ML

## 2022-02-24 PROCEDURE — 99999 PR PBB SHADOW E&M-EST. PATIENT-LVL III: ICD-10-PCS | Mod: PBBFAC,,, | Performed by: INTERNAL MEDICINE

## 2022-02-24 PROCEDURE — 82570 ASSAY OF URINE CREATININE: CPT | Performed by: INTERNAL MEDICINE

## 2022-02-24 PROCEDURE — 99214 OFFICE O/P EST MOD 30 MIN: CPT | Mod: S$GLB,,, | Performed by: INTERNAL MEDICINE

## 2022-02-24 PROCEDURE — 99214 PR OFFICE/OUTPT VISIT, EST, LEVL IV, 30-39 MIN: ICD-10-PCS | Mod: S$GLB,,, | Performed by: INTERNAL MEDICINE

## 2022-02-24 PROCEDURE — 99999 PR PBB SHADOW E&M-EST. PATIENT-LVL III: CPT | Mod: PBBFAC,,, | Performed by: INTERNAL MEDICINE

## 2022-02-24 RX ORDER — METFORMIN HYDROCHLORIDE 500 MG/1
500 TABLET, EXTENDED RELEASE ORAL 2 TIMES DAILY WITH MEALS
Qty: 180 TABLET | Refills: 3 | Status: SHIPPED | OUTPATIENT
Start: 2022-02-24 | End: 2022-02-28

## 2022-02-24 RX ORDER — INSULIN ASPART 100 [IU]/ML
10 INJECTION, SOLUTION INTRAVENOUS; SUBCUTANEOUS
Qty: 15 ML | Refills: 11 | Status: SHIPPED | OUTPATIENT
Start: 2022-02-24 | End: 2023-02-23

## 2022-02-24 RX ORDER — LANCETS
EACH MISCELLANEOUS
Qty: 204 EACH | Refills: 3 | Status: SHIPPED | OUTPATIENT
Start: 2022-02-24 | End: 2023-02-23

## 2022-02-24 RX ORDER — INSULIN GLARGINE 100 [IU]/ML
30 INJECTION, SOLUTION SUBCUTANEOUS NIGHTLY
Qty: 30 ML | Refills: 3 | Status: SHIPPED | OUTPATIENT
Start: 2022-02-24 | End: 2023-02-23

## 2022-02-24 RX ORDER — FLUCONAZOLE 150 MG/1
150 TABLET ORAL ONCE
Qty: 1 TABLET | Refills: 1 | Status: SHIPPED | OUTPATIENT
Start: 2022-02-24 | End: 2022-02-25

## 2022-02-24 NOTE — ASSESSMENT & PLAN NOTE
Recheck bmd 10/2022     Reviewed basics of quantity versus quality  Reassuring she is not fracturing         RDA of calcium (8136-7854 mg /day in divided doses) and vitamin D 2000iu a day  discussed  Calcium from food sources preferred  Fall precautions emphasized  +weight bearing exercise  [email or web address] website information given    Treatment options and potential side effects discussed         Low risk for ONJ and atypical fractures reviewed and discussed. Alerted that if dental work needs to be done it should be done prior to initiating therapy

## 2022-02-24 NOTE — PROGRESS NOTES
Subjective:      Patient ID: Tona Carlson is a 66 y.o. female.    Chief Complaint:  DM     History of Present Illness  Diagnosed with DM in her 50s       Known complications : peripheral neuropathy  Less numbness and tingling      Last eye exam : > 1 year ago        Current regimen :      metformin 500 mg bid xr- some gi issues  - will skip if she has diarrhea   basal lantus 25     When she checks her bg 200-400           Other meds tried :  victoza : abdominal cramps    Off amaryl   januvia 100 mg qd - stopped due to cost    jardiance 10 -stopped due to cost        Some polyuria, polydipsia, nocturia,   Some weight loss   C/o yeast infection     With regards to her pituitary incidentaloma:  She was found to have pituitary incidentaloma when MRI brain was done for balance problem. She says surgery was in march 2014. She was told her pituitary adenoma was non secreting and it was touching optic chiasm and carotid artery. She had c/o left hemianopsia improved after surgery surgery was in [facility]/ [facility]     she was  on cabergoline but she stopped it  due to financial reasons       While taking cabergoline she did not have any S/Es   She was treated with DDAVP for 2 months after surgery.      currently not taking any pituitary hormones.       Denies galactorrhea    Post menopausal.      Denies change in shoe or ring size. Denies excessive sweating.        evaluated her in 11/2015 impression as below:     Ms. 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 Her bitemporal visual field defects spare the center of vision. I did not find significant diabetic retinopathy.           Last mri  10/13/20  Impression:     Postsurgical change prior transsphenoidal pituitary adenoma resection.  Residual lobular enhancing tissue along the margin of the sella appears stable from the prior exam of 07/25/2018.    With regards to the vitamin d  "deficiency and osteoporosis:  currently taking otc 2000 iu a day     Last BMD: 10/20/20  FRAX:     7.4% risk of a major osteoporotic fracture in the next 10 years.     1.6% risk of hip fracture in the next 10 years.     Impression:     1. Osteoporosis of the lumbar spine and hip.  2. Can not compare to prior study done using different analysis method.      No recent fractures     Was on fosamax weekly  - march 2021 then we changed to relcast #1 11/16/21      Was dx with fatty liver - saw hepatology in the past     ROS:   As above    Objective:     /70   Ht 5' 5" (1.651 m)   Wt 80 kg (176 lb 5.9 oz)   BMI 29.35 kg/m²   BP Readings from Last 3 Encounters:   02/24/22 115/70   11/30/21 109/67   11/16/21 121/60     Wt Readings from Last 1 Encounters:   02/24/22 0843 80 kg (176 lb 5.9 oz)     Body mass index is 29.35 kg/m².      Physical Exam  Vitals reviewed.   Constitutional:       Appearance: Normal appearance.   Psychiatric:         Mood and Affect: Mood normal.         Behavior: Behavior normal.         Thought Content: Thought content normal.         Judgment: Judgment normal.          Protective Sensation (w/ 10 gram monofilament):  Right: Intact  Left: Intact    Visual Inspection:  Normal -  Bilateral    Pedal Pulses:   Right: Present  Left: Present    Posterior tibialis:   Right:Present  Left: Present          Lab Review:   Lab Results   Component Value Date    HGBA1C 10.5 (H) 02/02/2022     Lab Results   Component Value Date    CHOL 156 06/21/2021    HDL 44 06/21/2021    LDLCALC 81.0 06/21/2021    TRIG 155 (H) 06/21/2021    CHOLHDL 28.2 06/21/2021     Lab Results   Component Value Date     02/02/2022    K 4.0 02/02/2022     02/02/2022    CO2 22 (L) 02/02/2022     (H) 02/02/2022    BUN 8 02/02/2022    CREATININE 0.8 02/02/2022    CALCIUM 9.4 02/02/2022    PROT 7.2 02/02/2022    ALBUMIN 3.7 02/02/2022    BILITOT 1.0 02/02/2022    ALKPHOS 97 02/02/2022    AST 74 (H) 02/02/2022    ALT 80 " (H) 02/02/2022    ANIONGAP 11 02/02/2022    ESTGFRAFRICA >60.0 02/02/2022    EGFRNONAA >60.0 02/02/2022    TSH 2.519 06/21/2021     Vit D, 25-Hydroxy   Date Value Ref Range Status   01/12/2021 27 (L) 30 - 96 ng/mL Final     Comment:     Vitamin D deficiency.........<10 ng/mL                              Vitamin D insufficiency......10-29 ng/mL       Vitamin D sufficiency........> or equal to 30 ng/mL  Vitamin D toxicity............>100 ng/mL         Assessment and Plan     Type 2 diabetes mellitus with diabetic polyneuropathy, without long-term current use of insulin  Reviewed goals of therapy are to get the best control we can without hypoglycemia     Medication changes:    continue metformin extended release 500 mg  Bid ( we are unable to push up the dose due to diarrhea)    Increase  Basal insulin  To 30  Start prandial 10 ac tid   Refer to eye exam   Needs education      Off Januvia and SGLT2 due to cost        -Advised self blood glucose monitoring.  Patient encouraged to document glucose results and bring them to every clinic visit      -Hypoglycemia precautions discussed. Instructed on precautions before driving.      -Close adherence to lifestyle changes recommended.      -Periodic follow ups for eye evaluations, foot care and dental care suggested.     rtc in 3  months           Pituitary adenoma  Periodic  MRI.  Periodic anterior pituitary hormones    Osteoporosis    Recheck bmd 10/2022     Reviewed basics of quantity versus quality  Reassuring she is not fracturing         RDA of calcium (2374-0725 mg /day in divided doses) and vitamin D 2000iu a day  discussed  Calcium from food sources preferred  Fall precautions emphasized  +weight bearing exercise  [email or web address] website information given    Treatment options and potential side effects discussed         Low risk for ONJ and atypical fractures reviewed and discussed. Alerted that if dental work needs to be done it should be done prior to initiating therapy          Hyperlipidemia   On statin per ADA Recs     yeast infection-  Address hyperglycemia  Diflucan x 1 - urged ot see gyn

## 2022-02-26 ENCOUNTER — OFFICE VISIT (OUTPATIENT)
Dept: URGENT CARE | Facility: CLINIC | Age: 67
End: 2022-02-26
Payer: MEDICARE

## 2022-02-26 VITALS
HEART RATE: 87 BPM | BODY MASS INDEX: 29.32 KG/M2 | OXYGEN SATURATION: 96 % | WEIGHT: 176 LBS | RESPIRATION RATE: 18 BRPM | HEIGHT: 65 IN | SYSTOLIC BLOOD PRESSURE: 134 MMHG | TEMPERATURE: 97 F | DIASTOLIC BLOOD PRESSURE: 72 MMHG

## 2022-02-26 DIAGNOSIS — R09.81 NASAL CONGESTION: Primary | ICD-10-CM

## 2022-02-26 LAB
CTP QC/QA: YES
SARS-COV-2 RDRP RESP QL NAA+PROBE: NEGATIVE

## 2022-02-26 PROCEDURE — U0002 COVID-19 LAB TEST NON-CDC: HCPCS | Mod: QW,S$GLB,,

## 2022-02-26 PROCEDURE — 99213 OFFICE O/P EST LOW 20 MIN: CPT | Mod: S$GLB,CS,,

## 2022-02-26 PROCEDURE — 99213 PR OFFICE/OUTPT VISIT, EST, LEVL III, 20-29 MIN: ICD-10-PCS | Mod: S$GLB,CS,,

## 2022-02-26 PROCEDURE — U0002: ICD-10-PCS | Mod: QW,S$GLB,,

## 2022-02-26 NOTE — PROGRESS NOTES
"Subjective:       Patient ID: Tona Carlson is a 66 y.o. female.    Vitals:  height is 5' 5" (1.651 m) and weight is 79.8 kg (176 lb). Her temporal temperature is 97.3 °F (36.3 °C). Her blood pressure is 134/72 and her pulse is 87. Her respiration is 18 and oxygen saturation is 96%.     Chief Complaint: Sore Throat    Patient presents with a sore throat, congestion, and post nasal drip with a slight cough. Symptoms began Wednesday. Patient has not been directly exposed to covid or strep. Patient is vaccinated and boosted. She has been taking tylenol for severe cold which has helped. She feels better today.     Sore Throat   This is a new problem. The current episode started in the past 7 days. The problem has been gradually improving. Neither side of throat is experiencing more pain than the other. There has been no fever. The pain is at a severity of 7/10. The pain is moderate. Associated symptoms include congestion and coughing. Pertinent negatives include no diarrhea, ear pain, headaches, trouble swallowing or vomiting. She has had no exposure to strep or mono. She has tried acetaminophen for the symptoms. The treatment provided moderate relief.       Constitution: Positive for fatigue. Negative for chills, sweating and fever.   HENT: Positive for congestion, postnasal drip, sinus pain, sinus pressure, sore throat and voice change. Negative for ear pain and trouble swallowing.    Eyes: Negative for eye pain and eye redness.   Respiratory: Positive for cough. Negative for sputum production.    Gastrointestinal: Negative for nausea, vomiting and diarrhea.   Musculoskeletal: Positive for muscle ache.   Neurological: Negative for dizziness, headaches, disorientation and altered mental status.   Psychiatric/Behavioral: Negative for altered mental status, disorientation and confusion.       Objective:      Physical Exam   Constitutional: She is oriented to person, place, and time. She appears well-developed. She is " cooperative.  Non-toxic appearance. She does not appear ill. No distress.      Comments:Patient sits comfortably in exam chair. Answers questions in complete sentences. Does not show any signs of distress or discoloration.      HENT:   Head: Normocephalic and atraumatic.   Ears:   Right Ear: Hearing, tympanic membrane, external ear and ear canal normal.   Left Ear: Hearing, tympanic membrane, external ear and ear canal normal. impacted cerumen  Nose: Rhinorrhea and congestion present. No mucosal edema or nasal deformity. No epistaxis. Right sinus exhibits no maxillary sinus tenderness and no frontal sinus tenderness. Left sinus exhibits no maxillary sinus tenderness and no frontal sinus tenderness.   Mouth/Throat: Uvula is midline and mucous membranes are normal. No trismus in the jaw. Normal dentition. No uvula swelling. Posterior oropharyngeal erythema present. No oropharyngeal exudate or posterior oropharyngeal edema.   Eyes: Conjunctivae and lids are normal. No scleral icterus.   Neck: Trachea normal and phonation normal. Neck supple. No edema present. No erythema present. No neck rigidity present.   Cardiovascular: Normal rate, regular rhythm, normal heart sounds and normal pulses.   Pulmonary/Chest: Effort normal and breath sounds normal. No stridor. No respiratory distress. She has no decreased breath sounds. She has no wheezes. She has no rhonchi. She has no rales.   Abdominal: Normal appearance.   Musculoskeletal: Normal range of motion.         General: No deformity. Normal range of motion.   Lymphadenopathy:     She has no cervical adenopathy.        Right cervical: No superficial cervical, no deep cervical and no posterior cervical adenopathy present.       Left cervical: No superficial cervical, no deep cervical and no posterior cervical adenopathy present.   Neurological: She is alert and oriented to person, place, and time. She exhibits normal muscle tone. Coordination normal.   Skin: Skin is warm,  dry, intact, not diaphoretic and not pale.   Psychiatric: Her speech is normal and behavior is normal. Judgment and thought content normal.   Nursing note and vitals reviewed.        Results for orders placed or performed in visit on 02/26/22   POCT COVID-19 Rapid Screening   Result Value Ref Range    POC Rapid COVID Negative Negative     Acceptable Yes        Assessment:       1. Nasal congestion          Plan:         Nasal congestion  -     POCT COVID-19 Rapid Screening                  Patient Instructions   Cerumen Impaction:  - Mix equal parts of hydrogen peroxide and warm water together. Place in affected ear and let sit for 5 minutes. Then let the affected ear drain. This should help soften the wax, so that it easily comes out. Steam from hot showers also help to loosen up ear wax as well.     - Rest.    - Drink plenty of fluids.    - Acetaminophen (tylenol) or Ibuprofen (advil,motrin) as directed as needed for fever/pain. Avoid tylenol if you have a history of liver disease. Do not take ibuprofen if you have a history of GI bleeding, kidney disease, or if you take blood thinners.     - You must understand that you have received an Urgent Care treatment only and that you may be released before all of your medical problems are known or treated.   - You, the patient, will arrange for follow up care as instructed.   - If your condition worsens or fails to improve we recommend that you receive another evaluation at the ER immediately or contact your PCP to discuss your concerns or return here.   - Follow up with your PCP or specialty clinic as directed in the next 1-2 weeks if not improved or as needed.  You can call [phone] to schedule an appointment with the appropriate provider.    If your symptoms do not improve or worsen, go to the emergency room immediately.

## 2022-02-26 NOTE — PATIENT INSTRUCTIONS
Cerumen Impaction:  - Mix equal parts of hydrogen peroxide and warm water together. Place in affected ear and let sit for 5 minutes. Then let the affected ear drain. This should help soften the wax, so that it easily comes out. Steam from hot showers also help to loosen up ear wax as well.     - Rest.    - Drink plenty of fluids.    - Acetaminophen (tylenol) or Ibuprofen (advil,motrin) as directed as needed for fever/pain. Avoid tylenol if you have a history of liver disease. Do not take ibuprofen if you have a history of GI bleeding, kidney disease, or if you take blood thinners.     - You must understand that you have received an Urgent Care treatment only and that you may be released before all of your medical problems are known or treated.   - You, the patient, will arrange for follow up care as instructed.   - If your condition worsens or fails to improve we recommend that you receive another evaluation at the ER immediately or contact your PCP to discuss your concerns or return here.   - Follow up with your PCP or specialty clinic as directed in the next 1-2 weeks if not improved or as needed.  You can call [phone] to schedule an appointment with the appropriate provider.    If your symptoms do not improve or worsen, go to the emergency room immediately.

## 2022-03-08 DIAGNOSIS — E11.42 TYPE 2 DIABETES MELLITUS WITH DIABETIC POLYNEUROPATHY, WITHOUT LONG-TERM CURRENT USE OF INSULIN: ICD-10-CM

## 2022-03-08 RX ORDER — METFORMIN HYDROCHLORIDE 500 MG/1
500 TABLET, EXTENDED RELEASE ORAL DAILY
Qty: 90 TABLET | Refills: 0 | Status: SHIPPED | OUTPATIENT
Start: 2022-03-08 | End: 2022-05-23 | Stop reason: SDUPTHER

## 2022-03-16 ENCOUNTER — PATIENT MESSAGE (OUTPATIENT)
Dept: ADMINISTRATIVE | Facility: HOSPITAL | Age: 67
End: 2022-03-16
Payer: MEDICARE

## 2022-03-20 ENCOUNTER — OFFICE VISIT (OUTPATIENT)
Dept: URGENT CARE | Facility: CLINIC | Age: 67
End: 2022-03-20
Payer: MEDICARE

## 2022-03-20 VITALS
WEIGHT: 178 LBS | RESPIRATION RATE: 17 BRPM | TEMPERATURE: 98 F | HEART RATE: 80 BPM | SYSTOLIC BLOOD PRESSURE: 120 MMHG | OXYGEN SATURATION: 97 % | BODY MASS INDEX: 29.66 KG/M2 | HEIGHT: 65 IN | DIASTOLIC BLOOD PRESSURE: 76 MMHG

## 2022-03-20 DIAGNOSIS — E11.42 TYPE 2 DIABETES MELLITUS WITH DIABETIC POLYNEUROPATHY, WITHOUT LONG-TERM CURRENT USE OF INSULIN: ICD-10-CM

## 2022-03-20 DIAGNOSIS — S90.32XA CONTUSION OF LEFT FOOT, INITIAL ENCOUNTER: Primary | ICD-10-CM

## 2022-03-20 PROCEDURE — 99214 PR OFFICE/OUTPT VISIT, EST, LEVL IV, 30-39 MIN: ICD-10-PCS | Mod: S$GLB,,, | Performed by: FAMILY MEDICINE

## 2022-03-20 PROCEDURE — 73630 X-RAY EXAM OF FOOT: CPT | Mod: FY,LT,S$GLB, | Performed by: RADIOLOGY

## 2022-03-20 PROCEDURE — 73630 XR FOOT COMPLETE 3 VIEW LEFT: ICD-10-PCS | Mod: FY,LT,S$GLB, | Performed by: RADIOLOGY

## 2022-03-20 PROCEDURE — 99214 OFFICE O/P EST MOD 30 MIN: CPT | Mod: S$GLB,,, | Performed by: FAMILY MEDICINE

## 2022-03-20 NOTE — PROGRESS NOTES
"Subjective:       Patient ID: Tona Carlson is a 66 y.o. female.    Vitals:  height is 5' 5" (1.651 m) and weight is 80.7 kg (178 lb). Her temperature is 97.9 °F (36.6 °C). Her blood pressure is 120/76 and her pulse is 80. Her respiration is 17 and oxygen saturation is 97%.     Chief Complaint: Fall    Pt reports that she fell around four days ago. She tripped over uneven floor and hurt her left foot.     Fall  The accident occurred 3 to 5 days ago. The fall occurred while walking. She fell from a height of 3 to 5 ft. She landed on concrete. There was no blood loss. The point of impact was the left foot. The pain is present in the left foot. The pain is at a severity of 5/10. The pain is mild. The symptoms are aggravated by pressure on injury, standing and ambulation. She has tried NSAID for the symptoms. The treatment provided mild relief.     ROS    Objective:      Physical Exam   Constitutional: She is oriented to person, place, and time. She does not appear ill. No distress. obesity  HENT:   Head: Normocephalic and atraumatic.   Cardiovascular: Normal rate, regular rhythm, normal heart sounds and normal pulses.   Pulmonary/Chest: Effort normal and breath sounds normal.   Abdominal: Normal appearance.   Neurological: no focal deficit. She is alert and oriented to person, place, and time.   Skin: bruising (2-4 MTP joint left foot dorsum, mildly tender. No pincture wounds noted)   Nursing note and vitals reviewed.        Assessment:       1. Contusion of left foot, initial encounter    2. Type 2 diabetes mellitus with diabetic polyneuropathy, without long-term current use of insulin          Plan:         Contusion of left foot, initial encounter  -     X-Ray Foot Complete 3 view Left; Future; Expected date: 03/20/2022    Type 2 diabetes mellitus with diabetic polyneuropathy, without long-term current use of insulin  -     Ambulatory referral/consult to Podiatry    RICE to affected limb. RTC as needed.           "

## 2022-03-28 ENCOUNTER — OFFICE VISIT (OUTPATIENT)
Dept: PODIATRY | Facility: CLINIC | Age: 67
End: 2022-03-28
Payer: MEDICARE

## 2022-03-28 VITALS
HEART RATE: 81 BPM | SYSTOLIC BLOOD PRESSURE: 124 MMHG | HEIGHT: 65 IN | DIASTOLIC BLOOD PRESSURE: 73 MMHG | BODY MASS INDEX: 29.65 KG/M2 | WEIGHT: 177.94 LBS

## 2022-03-28 DIAGNOSIS — E11.9 ENCOUNTER FOR COMPREHENSIVE DIABETIC FOOT EXAMINATION, TYPE 2 DIABETES MELLITUS: Primary | ICD-10-CM

## 2022-03-28 PROCEDURE — 99203 PR OFFICE/OUTPT VISIT, NEW, LEVL III, 30-44 MIN: ICD-10-PCS | Mod: S$GLB,,, | Performed by: PODIATRIST

## 2022-03-28 PROCEDURE — 99999 PR PBB SHADOW E&M-EST. PATIENT-LVL III: ICD-10-PCS | Mod: PBBFAC,,, | Performed by: PODIATRIST

## 2022-03-28 PROCEDURE — 99999 PR PBB SHADOW E&M-EST. PATIENT-LVL III: CPT | Mod: PBBFAC,,, | Performed by: PODIATRIST

## 2022-03-28 PROCEDURE — 99203 OFFICE O/P NEW LOW 30 MIN: CPT | Mod: S$GLB,,, | Performed by: PODIATRIST

## 2022-03-29 NOTE — PROGRESS NOTES
Subjective:      Patient ID: Tona Carlson is a 66 y.o. female.    Chief Complaint: Foot Injury (Left foot /foot turned blackish purple/swelling/Pcp-VANDANA  2 years ago)    Tona is a 66 y.o. female who presents to the clinic upon referral from Dr. Garcia  for evaluation and treatment of diabetic feet. 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 Patient relates no major problem with feet. Only complaints today consist of diabetic foot exam and some foot pain.  Pt states she fell last week and had some bruising, pt went to urgent care for x-rays that were negative for fractures. Pt relates some mild pain in foot.     PCP: Medina Prakash MD    Date Last Seen by PCP:   Chief Complaint   Patient presents with    Foot Injury     Left foot /foot turned blackish purple/swelling  Pcp-VANDANA  2 years ago         Current shoe gear: Tennis shoes    Hemoglobin A1C   Date Value Ref Range Status   02/24/2022 10.4 (H) 4.0 - 5.6 % Final     Comment:     ADA Screening Guidelines:  5.7-6.4%  Consistent with prediabetes  >or=6.5%  Consistent with diabetes    High levels of fetal hemoglobin interfere with the HbA1C  assay. Heterozygous hemoglobin variants (HbS, HgC, etc)do  not significantly interfere with this assay.   However, presence of multiple variants may affect accuracy.     02/02/2022 10.5 (H) 4.0 - 5.6 % Final     Comment:     ADA Screening Guidelines:  5.7-6.4%  Consistent with prediabetes  >or=6.5%  Consistent with diabetes    High levels of fetal hemoglobin interfere with the HbA1C  assay. Heterozygous hemoglobin variants (HbS, HgC, etc)do  not significantly interfere with this assay.   However, presence of multiple variants may affect accuracy.     10/04/2021 7.9 (H) 4.0 - 5.6 % Final     Comment:     ADA Screening Guidelines:  5.7-6.4%  Consistent with  prediabetes  >or=6.5%  Consistent with diabetes    High levels of fetal hemoglobin interfere with the HbA1C  assay. Heterozygous hemoglobin variants (HbS, HgC, etc)do  not significantly interfere with this assay.   However, presence of multiple variants may affect accuracy.             Review of Systems   Constitutional: Negative for chills, fever and malaise/fatigue.   HENT: Negative for hearing loss.    Cardiovascular: Negative for claudication.   Respiratory: Negative for shortness of breath.    Skin: Negative for flushing and rash.   Musculoskeletal: Negative for joint pain and myalgias.   Gastrointestinal: Negative for nausea and vomiting.   Neurological: Negative for loss of balance, numbness, paresthesias and sensory change.   Psychiatric/Behavioral: Negative for altered mental status.           Objective:      Physical Exam  Vitals reviewed.   Cardiovascular:      Pulses:           Dorsalis pedis pulses are 2+ on the right side and 2+ on the left side.        Posterior tibial pulses are 2+ on the right side and 2+ on the left side.      Comments: No edema noted to b/L LEs  Musculoskeletal:         General: Normal range of motion.      Comments: Adequate joint ROM noted to all lower extremity muscle groups with no pain or crepitation noted. Muscle strength is 5/5 in all groups bilaterally.     Feet:      Right foot:      Protective Sensation: 5 sites tested. 5 sites sensed.      Left foot:      Protective Sensation: 5 sites tested. 5 sites sensed.   Skin:     General: Skin is warm.      Capillary Refill: Capillary refill takes 2 to 3 seconds.      Findings: Bruising present.      Comments: Normal skin tugor noted.   No open lesion noted b/L  Skin temp is warm to warm from proximal to distal b/L.  Webspaces clean, dry, and intact  Nails x10 short  Mild bruising noted to left foot   Neurological:      Mental Status: She is alert and oriented to person, place, and time.      Comments: Intact gross sensation  noted to b/L LEs               Assessment:       Encounter Diagnosis   Name Primary?    Encounter for comprehensive diabetic foot examination, type 2 diabetes mellitus Yes         Plan:       Tona was seen today for foot injury.    Diagnoses and all orders for this visit:    Encounter for comprehensive diabetic foot examination, type 2 diabetes mellitus      I counseled the patient on her conditions, their implications and medical management.      Radiographics independently reviewed in detail with patient. Findings discussed. All questions in regards to radiographs were answered  Discussed DM foot care:  Wear comfortable, proper fitting shoes. Wash feet daily. Dry well. After drying, apply moisturizer to feet (no lotion to webspaces). Inspect feet daily for skin breaks, blisters, swelling, or redness. Wear cotton socks (preferably white)  Change socks every day. Do NOT walk barefoot. Do NOT use heating pads or warm/hot water soaks      - Discussed importance of daily moisturizer to the feet such as Gold bonds diabetic foot cream     - Patient is low risk for developing lower extremity issues secondary to diabetes     - Advised the patient that fungus likes warm, dark, and moist environments such as bathrooms, gyms, and pools. Advised to spray shower, shower mat , and any shoes w/ Lysol periodically  RTC in 1 year or sooner if any new pedal problems should arise.  .

## 2022-05-03 ENCOUNTER — OFFICE VISIT (OUTPATIENT)
Dept: DERMATOLOGY | Facility: CLINIC | Age: 67
End: 2022-05-03
Payer: MEDICARE

## 2022-05-03 DIAGNOSIS — L71.9 ROSACEA: Primary | ICD-10-CM

## 2022-05-03 DIAGNOSIS — L72.0 EIC (EPIDERMAL INCLUSION CYST): ICD-10-CM

## 2022-05-03 DIAGNOSIS — L66.9 SCARRING ALOPECIA: ICD-10-CM

## 2022-05-03 PROCEDURE — 99204 OFFICE O/P NEW MOD 45 MIN: CPT | Mod: S$GLB,,, | Performed by: DERMATOLOGY

## 2022-05-03 PROCEDURE — 99204 PR OFFICE/OUTPT VISIT, NEW, LEVL IV, 45-59 MIN: ICD-10-PCS | Mod: S$GLB,,, | Performed by: DERMATOLOGY

## 2022-05-03 RX ORDER — METRONIDAZOLE 7.5 MG/G
GEL TOPICAL
Qty: 45 G | Refills: 5 | Status: SHIPPED | OUTPATIENT
Start: 2022-05-03

## 2022-05-03 NOTE — PROGRESS NOTES
Patient Information  Name: Tona Carlson  : 1955  MRN: [id number]     Referring Physician:  Self   Primary Care Physician:  Medina Prakash MD   Date of Visit: 2022      Subjective:     History of Present lllness:    Tona Carlson is a 66 y.o. female who presents with a chief complaint of hair loss.    Location: L frontal scalp  Duration: 1 year  Signs/Symptoms: baldness in one area of the left frontal scalp  Relieving factors/Prior treatments: OTC treatments with minoxidil     Location: midline chest  Duration: 5 years  Signs/Symptoms: pus-filled bump under the skin growing  Relieving factors/Prior treatments: none (it has burst before and filled back up)    Location: face  Duration:  Few years  Signs/Symptoms: red blemishes come and go  Relieving factors/Prior treatments: Unsure of name of previous tx, SPF     Clinical documentation obtained by nursing staff reviewed.    Review of Systems   Constitutional: Negative for weight gain, fatigue and malaise.   HENT: Negative for headaches.    Eyes: Negative for itching, eye watering, eye irritation and eyelid inflammation.   Gastrointestinal: Negative for nausea, vomiting, diarrhea and Sensitivity to oral antibiotics.   Skin: Positive for activity-related sunscreen use. Negative for itching, rash, dry skin, daily sunscreen use and recent sunburn.   Neurological: Negative for headaches.   Psychiatric/Behavioral: Negative for high stress.   Endocrine: Negative for cold intolerance and heat intolerance.       Objective:   Physical Exam   Constitutional: She appears well-developed and well-nourished. No distress.   Neurological: She is alert and oriented to person, place, and time. She is not disoriented.   Psychiatric: She has a normal mood and affect.   Skin:   Areas Examined (abnormalities noted in diagram):   Head / Face Inspection Performed  Neck Inspection Performed  Chest / Axilla Inspection Performed                 Diagram Legend     Erythematous  scaling macule/papule c/w actinic keratosis       Vascular papule c/w angioma      Pigmented verrucoid papule/plaque c/w seborrheic keratosis      Yellow umbilicated papule c/w sebaceous hyperplasia      Irregularly shaped tan macule c/w lentigo     1-2 mm smooth white papules consistent with Milia      Movable subcutaneous cyst with punctum c/w epidermal inclusion cyst      Subcutaneous movable cyst c/w pilar cyst      Firm pink to brown papule c/w dermatofibroma      Pedunculated fleshy papule(s) c/w skin tag(s)      Evenly pigmented macule c/w junctional nevus     Mildly variegated pigmented, slightly irregular-bordered macule c/w mildly atypical nevus      Flesh colored to evenly pigmented papule c/w intradermal nevus       Pink pearly papule/plaque c/w basal cell carcinoma      Erythematous hyperkeratotic cursted plaque c/w SCC      Surgical scar with no sign of skin cancer recurrence      Open and closed comedones      Inflammatory papules and pustules      Verrucoid papule consistent consistent with wart     Erythematous eczematous patches and plaques     Dystrophic onycholytic nail with subungual debris c/w onychomycosis     Umbilicated papule    Erythematous-base heme-crusted tan verrucoid plaque consistent with inflamed seborrheic keratosis     Erythematous Silvery Scaling Plaque c/w Psoriasis     See annotation      Assessment / Plan:        Rosacea  - chronic problem, not at treatment goal  Rosacea is a chronic condition without a definitive cure.  There are several well-known triggers, such as exercise, temperature extremes, alcohol, and spicy foods, that should be avoided to prevent a rosacea flare.  Use gentle, ceramide-containing products (such as CeraVe Moisturizing Cream or La Roche-Posay Toleriane Double Repair Face Moisturizer) to repair the skin barrier and to minimize irritation. Avoid harsh soaps, exfoliants, and scrubs.  -     metroNIDAZOLE (METROGEL) 0.75 % gel; Apply to face BID.  Dispense:  45 g; Refill: 5    EIC (epidermal inclusion cyst)  This is a benign cyst of the hair follicle. Reassurance provided. Discussed excision as it has been symptomatic.   Discussed risks, benefits, and alternatives of excision, including but not limited to scarring, bleeding, infection, recurrence, and need for additional treatment of site.  The patient was given the phone number for the [facility] cost transparency educators who can review her out-of-pocket costs for procedures based on her specific insurance plan. The appropriate ICD-10 and CPT codes were provided.    Scarring alopecia  - new problem with uncertain prognosis  Patient was referred to hair specialist Jackie Stark MD, for further evaluation and management.    Follow up in about 3 months (around 8/3/2022) for follow up, or sooner if symptoms worsening or not improving, or for surgery.      Ofelia Bradford MD, FAAD  [facility]

## 2022-05-03 NOTE — PATIENT INSTRUCTIONS
Patient Financial Resources    Online   Available at [email or web address], our online  tool is the most convenient way for patients to estimate care costs. The tool is available 24 hours a day, 7 days a week, and helps patients create estimates for 300 of the most common services and procedures. Patients generated nearly 3,000 estimates last year through online tools.    Personal Assistance  Our expert financial counselors are available to provide patients with assistance for personalized cost estimates.   For personal assistance, patients can:    Call, [phone] to speak with an [employer] financial counselor.   Live Chat, accessed through [email or web address] or the [employer] patient portal.   Email, request a personalized estimate by email through [email or web address].   [employer] messaging, accessed through the [employer] patient portal.     ICD-10 code: L72.0  CPT codes: 64073 and 03847

## 2022-05-10 ENCOUNTER — PES CALL (OUTPATIENT)
Dept: ADMINISTRATIVE | Facility: CLINIC | Age: 67
End: 2022-05-10
Payer: MEDICARE

## 2022-05-19 NOTE — PROGRESS NOTES
"Subjective:      Patient ID: Tona Carlson is a 66 y.o. female.    Chief Complaint:  DM     History of Present Illness  66 y.o. woman with Type 2 diabetes complicated by PN, pituitary microadenoma, osteoporosis, dyslipidemia, cirrhosis, vitamin D deficiency, and reflux who presents for follow up. Previous patient of Dr. Latif. Last visit in Feb 2022. This is her first visit with me.     Denies changes in medical history since her last visit.     With regards to the diabetes:    Diagnosed with Type 2 DM in her 50's  Family History of Type 2 DM: mother and MGM  Known complications:  DKA/HHS: on diagnosis   RN: needs appt  PN: +- right comes and goes  Nephropathy: +  Gastroparesis: denies  CAD: denies     Current regimen:  metformin  mg 1-2 times daily- some gi issues  - will skip if she has diarrhea   Lantus 30 units daily   novolog 10 units before meals    She is taking right before or in the middle of her meal. She is changing needle every time and rotating injection sites.     Missed doses- metformin as above; "seldom" missed doses of novolog or lantus but reports she skips lantus if she falls asleep before taking    She has been missing doses of novolog when she is going out to lunch-- on weekends.     Other meds tried :    victoza : abdominal cramps   Off amaryl   januvia 100 mg qd - stopped due to cost   jardiance 10 -stopped due to cost       1 # times a day testing  Log reviewed: oral recall   States average 124  States yesterday at 104    Hypoglycemic event-denies and denies s/s of hypoglycemia  Yes, did not need assistance   Knows how to correct with 15 grams of carbs- juice, coke, or a peppermint.     Dietary recall: She feels that she is "most of time" following diabetic diet.   Eats 3 meals a day.   Snacks: fruit -- bananas or oranges or mauri  Drinks: water; diet soda; coffee with splenda     Exercise - tried to stay active. No formal exercise but plans on going back.     Education - last " visit: [facility]    Has a Medic alert tag-given information   Glucagon/Baqsimi- lives alone    Denies FH of thyroid cancer or personal history of pancreatitis.    Diabetes Management Status  Statin: Not taking  ACE/ARB: Not taking    Lab Results   Component Value Date    HGBA1C 10.4 (H) 02/24/2022    HGBA1C 10.5 (H) 02/02/2022    HGBA1C 7.9 (H) 10/04/2021     Screening or Prevention Patient's value Goal Complete/Controlled?   HgA1C Testing and Control   Lab Results   Component Value Date    HGBA1C 10.4 (H) 02/24/2022      Annually/Less than 8% No   Lipid profile : 06/21/2021 Annually Yes   LDL control Lab Results   Component Value Date    LDLCALC 81.0 06/21/2021    Annually/Less than 100 mg/dl  Yes   Nephropathy screening Lab Results   Component Value Date    LABMICR 58.0 02/24/2022     Lab Results   Component Value Date    PROTEINUA Negative 02/11/2020    Annually Yes   Blood pressure BP Readings from Last 1 Encounters:   05/23/22 108/82    Less than 140/90 Yes   Dilated retinal exam : 10/20/2020 Annually Yes   Foot exam   : 02/24/2022 Annually Yes     With regards to her pituitary incidentaloma:    She was found to have pituitary incidentaloma when MRI brain was done for balance problem. She says surgery was in march 2014. She was told her pituitary adenoma was non secreting and it was touching optic chiasm and carotid artery. She had c/o left hemianopsia improved after surgery surgery was in [facility]/ [facility]     she was  on cabergoline but she stopped it  due to financial reasons       While taking cabergoline she did not have any S/Es  She was treated with DDAVP for 2 months after surgery.  currently not taking any pituitary hormones.       Denies galactorrhea    Post menopausal.      Denies change in shoe or ring size. Denies excessive sweating.       evaluated her in 11/2015 impression as below:     Ms. Carlson has evidence of permanent optic nerve damage from the pituitary  "adenoma but is functioning well. Her bitemporal visual field defects spare the center of vision. I did not find significant diabetic retinopathy.        Last mri  10/13/20  Impression:     Postsurgical change prior transsphenoidal pituitary adenoma resection.  Residual lobular enhancing tissue along the margin of the sella appears stable from the prior exam of 07/25/2018.    With regards to the vitamin d deficiency and osteoporosis:  currently taking otc 2000 iu a day; has calcium in diet  FH of osteoporosis in her MGM    Was on fosamax weekly  - March 2021 then we changed to relcast #1 11/16/21      Last BMD: 10/20/20  FRAX:   7.4% risk of a major osteoporotic fracture in the next 10 years.   1.6% risk of hip fracture in the next 10 years.  Impression:   1. Osteoporosis of the lumbar spine and hip.  2. Can not compare to prior study done using different analysis method.    Denies alcohol or tobacco   Denies exercise    No recent fractures   Fall around March tripped picking up trash can     Latest Reference Range & Units 01/12/21 08:40   Vit D, 25-Hydroxy 30 - 96 ng/mL 27 (L) [1]     With regards to dyslipidemia,     She ran out of atorvastatin "a while ago"       Was dx with fatty liver - saw hepatology in the past     Review of Systems   Constitutional: Negative for fatigue and unexpected weight change.   Eyes: Negative for visual disturbance.   Endocrine: Positive for polydipsia and polyphagia. Negative for polyuria.       Objective:     /82   Pulse 80   Ht 5' 5" (1.651 m)   Wt 82.6 kg (182 lb 1.6 oz)   SpO2 98%   BMI 30.30 kg/m²   BP Readings from Last 3 Encounters:   05/23/22 108/82   03/28/22 124/73   03/20/22 120/76     Wt Readings from Last 1 Encounters:   05/23/22 1032 82.6 kg (182 lb 1.6 oz)     Body mass index is 30.3 kg/m².      Physical Exam  Vitals reviewed.   Constitutional:       Appearance: Normal appearance.   Psychiatric:         Mood and Affect: Mood normal.         Behavior: Behavior " normal.         Thought Content: Thought content normal.         Judgment: Judgment normal.     injection sites are without edema or erythema. No lipo hypertropthy or atrophy  Diabetes Foot Exam:   Denies sores or lesions to bilateral feet  Shoes appropriate  DM foot exam done in Feb 2022; deferred today      Lab Review:   Lab Results   Component Value Date    HGBA1C 10.4 (H) 02/24/2022     Lab Results   Component Value Date    CHOL 156 06/21/2021    HDL 44 06/21/2021    LDLCALC 81.0 06/21/2021    TRIG 155 (H) 06/21/2021    CHOLHDL 28.2 06/21/2021     Lab Results   Component Value Date     02/24/2022    K 3.8 02/24/2022     02/24/2022    CO2 25 02/24/2022     (H) 02/24/2022    BUN 7 (L) 02/24/2022    CREATININE 0.8 02/24/2022    CALCIUM 9.7 02/24/2022    PROT 7.4 02/24/2022    ALBUMIN 4.1 02/24/2022    BILITOT 1.2 (H) 02/24/2022    ALKPHOS 131 02/24/2022    AST 48 (H) 02/24/2022    ALT 80 (H) 02/24/2022    ANIONGAP 10 02/24/2022    ESTGFRAFRICA >60.0 02/24/2022    EGFRNONAA >60.0 02/24/2022    TSH 2.519 06/21/2021     Vit D, 25-Hydroxy   Date Value Ref Range Status   01/12/2021 27 (L) 30 - 96 ng/mL Final     Comment:     Vitamin D deficiency.........<10 ng/mL                              Vitamin D insufficiency......10-29 ng/mL       Vitamin D sufficiency........> or equal to 30 ng/mL  Vitamin D toxicity............>100 ng/mL         Assessment and Plan     1. Type 2 diabetes mellitus with diabetic polyneuropathy, without long-term current use of insulin  atorvastatin (LIPITOR) 20 MG tablet    Comprehensive Metabolic Panel    Hemoglobin A1C    metFORMIN (GLUCOPHAGE-XR) 500 MG ER 24hr tablet    dulaglutide (TRULICITY) 0.75 mg/0.5 mL pen injector    GLUCOSE MONITORING CONTINUOUS MIN 72 HOURS   2. Pituitary adenoma  TSH    T4, Free   3. Osteoporosis without current pathological fracture, unspecified osteoporosis type     4. Mixed hyperlipidemia  Lipid Panel   5. Vitamin D deficiency       Type 2  diabetes mellitus with diabetic polyneuropathy, without long-term current use of insulin  -- Reviewed goals of therapy are to get the best control we can without hypoglycemia  -- Declines diabetes education  A1c goal <7%  Encouraged to make appointment with eye doctor    Medication changes:   I fear she is missing more doses of insulin than she admits. We will perform a professional continuous glucose monitor in order to assess blood sugar trends and patterns, highs and lows, and determine treatment plan. Consider personal CGM next time.    She is interested in trying medications that help her lose weight. Has had issues with cost in the past but is willing to pay.     Check A1c   Encouraged exercise    Medication Changes:  Increase Metformin to 2 tabs daily  Continue Lantus 30 units daily  Continue Novolog 10 units before meals.     Start Trulicity 0.75mg weekly for 4 weeks & then increase to 1.5 mg weekly indefinitely. Discussed MOA & SE. Return demonstration of how to use pen done in office today. Discussed one pen can stay out of the refrigerator for 14 days. Please take Trulicity pen out of the refrigerator 10 minutes prior to injection. Let me know if you have intolerable side effects.     -- Reviewed patient's current insulin regimen. Clarified proper insulin dose and timing in relation to meals, etc. Insulin injection sites and proper rotation instructed.    -- Advised frequent self blood glucose monitoring.  Patient encouraged to document glucose results and bring them to every clinic visit    -- Hypoglycemia precautions discussed. Instructed on precautions before driving.    -- Call for Bg repeatedly < 90 or > 180.   -- Close adherence to lifestyle changes recommended.   -- Periodic follow ups for eye evaluations, foot care and dental care suggested.    Pituitary adenoma  Periodic  MRI.  Periodic anterior pituitary hormones    Osteoporosis   Recheck bmd 10/2022     Continue Reclast    Reviewed basics of  quantity versus quality  Reassuring she is not fracturing       RDA of calcium (0905-9006 mg /day in divided doses) and vitamin D 2000iu a day  discussed  Calcium from food sources preferred  Fall precautions emphasized  +weight bearing exercise  [email or web address] website information given    Treatment options and potential side effects discussed       Low risk for ONJ and atypical fractures reviewed and discussed. Alerted that if dental work needs to be done it should be done prior to initiating therapy         Hyperlipidemia  No longer on statin per ADA Recs  Check LP and reorder statin ()    Vitamin D deficiency  Continue Vitamin D 2000 IU daily    Follow up in about 4 months (around 2022).  Labs tomorrow

## 2022-05-23 ENCOUNTER — OFFICE VISIT (OUTPATIENT)
Dept: ENDOCRINOLOGY | Facility: CLINIC | Age: 67
End: 2022-05-23
Payer: MEDICARE

## 2022-05-23 VITALS
OXYGEN SATURATION: 98 % | WEIGHT: 182.13 LBS | BODY MASS INDEX: 30.34 KG/M2 | HEART RATE: 80 BPM | HEIGHT: 65 IN | DIASTOLIC BLOOD PRESSURE: 82 MMHG | SYSTOLIC BLOOD PRESSURE: 108 MMHG

## 2022-05-23 DIAGNOSIS — D35.2 PITUITARY ADENOMA: Chronic | ICD-10-CM

## 2022-05-23 DIAGNOSIS — E11.42 TYPE 2 DIABETES MELLITUS WITH DIABETIC POLYNEUROPATHY, WITHOUT LONG-TERM CURRENT USE OF INSULIN: Chronic | ICD-10-CM

## 2022-05-23 DIAGNOSIS — E78.2 MIXED HYPERLIPIDEMIA: Chronic | ICD-10-CM

## 2022-05-23 DIAGNOSIS — E55.9 VITAMIN D DEFICIENCY: ICD-10-CM

## 2022-05-23 DIAGNOSIS — M81.0 OSTEOPOROSIS WITHOUT CURRENT PATHOLOGICAL FRACTURE, UNSPECIFIED OSTEOPOROSIS TYPE: Chronic | ICD-10-CM

## 2022-05-23 PROCEDURE — 99214 OFFICE O/P EST MOD 30 MIN: CPT | Mod: S$GLB,,, | Performed by: NURSE PRACTITIONER

## 2022-05-23 PROCEDURE — 99999 PR PBB SHADOW E&M-EST. PATIENT-LVL III: CPT | Mod: PBBFAC,,, | Performed by: NURSE PRACTITIONER

## 2022-05-23 PROCEDURE — 99214 PR OFFICE/OUTPT VISIT, EST, LEVL IV, 30-39 MIN: ICD-10-PCS | Mod: S$GLB,,, | Performed by: NURSE PRACTITIONER

## 2022-05-23 PROCEDURE — 99999 PR PBB SHADOW E&M-EST. PATIENT-LVL III: ICD-10-PCS | Mod: PBBFAC,,, | Performed by: NURSE PRACTITIONER

## 2022-05-23 RX ORDER — METFORMIN HYDROCHLORIDE 500 MG/1
500 TABLET, EXTENDED RELEASE ORAL 2 TIMES DAILY WITH MEALS
Qty: 90 TABLET | Refills: 0 | Status: SHIPPED | OUTPATIENT
Start: 2022-05-23 | End: 2022-10-12 | Stop reason: SDUPTHER

## 2022-05-23 RX ORDER — DULAGLUTIDE 0.75 MG/.5ML
0.75 INJECTION, SOLUTION SUBCUTANEOUS
Qty: 4 PEN | Refills: 0 | Status: SHIPPED | OUTPATIENT
Start: 2022-05-23 | End: 2022-06-21

## 2022-05-23 RX ORDER — ATORVASTATIN CALCIUM 20 MG/1
20 TABLET, FILM COATED ORAL DAILY
Qty: 90 TABLET | Refills: 3 | Status: SHIPPED | OUTPATIENT
Start: 2022-05-23 | End: 2023-06-30

## 2022-05-23 NOTE — PATIENT INSTRUCTIONS
Pituitary incidentaloma   Check TSH and fT4    Consider MRI in Oct 2022    Bone Health    Avoid falls and fractures   Continue OTC Vitamin D 2000 IU daily and calcium in diet.   Recommend calcium in diet 4450-7004 mg daily, preferably from food and given list below  Encouraged exercise    Estimated Calcium Content of Foods:  Produce  Serving Size Estimated Calcium*    Roel greens, frozen 8 oz 360 mg   Broccoli john 8 oz 200 mg   Kale, frozen 8 oz 180 mg   Soy Beans, green, boiled 8 oz 175 mg   Bok Laure, cooked, boiled 8 oz 160 mg   Figs, dried 2 figs 65 mg   Broccoli, fresh, cooked 8 oz 60 mg   Oranges 1 whole 55 mg   Seafood Serving Size Estimated Calcium*    Sardines, canned with bones 3 oz 325 mg   [place], canned with bones 3 oz 180 mg   Shrimp, canned 3 oz 125 mg   Dairy Serving Size Estimated Calcium*    Ricotta, part-skim 4 oz 335 mg   Yogurt, plain, low-fat 6 oz 310 mg   Milk, skim, low-fat, whole 8 oz 300 mg   Yogurt with fruit, low-fat 6 oz 260 mg   Mozzarella, part-skim 1 oz 210 mg   Cheddar 1 oz 205 mg   Yogurt, Greek 6 oz 200 mg   American Cheese 1 oz 195 mg   Feta Cheese 4 oz 140 mg   Cottage Cheese, 2% 4 oz 105 mg   Frozen yogurt, vanilla 8 oz 105 mg   Ice Cream, vanilla 8 oz 85 mg   Parmesan 1 tbsp 55 mg   Fortified Food Serving Size Estimated Calcium*   [place] milk, rice milk or soy milk, fortified 8 oz 300 mg   Orange juice and other fruit juices, fortified 8 oz 300 mg   Tofu, prepared with calcium 4 oz 205 mg   Waffle, frozen, fortified 2 pieces 200 mg   Oatmeal, fortified 1 packet 140 mg   English muffin, fortified 1 muffin 100 mg   Cereal, fortified 8 oz 100-1,000 mg   Other Serving Size Estimated Calcium*   Mac & cheese, frozen 1 package 325 mg   Pizza, cheese, frozen 1 serving 115 mg   Pudding, chocolate, prepared with 2% milk 4 oz 160 mg   Beans, baked, canned 4 oz 160 mg   *The calcium content listed for most foods is estimated and can vary due to multiple factors. Check the food label to  "determine how much calcium is in a particular product.  If you read the nutrition label for a food source, it lists the % calcium in that food.  For an 8 oz glass of milk, for example, the label states calcium 30%.  This is equivalent to 300 mg of calcium (multiply the listed number by 10).   **Table from the National Osteoporosis Foundation    Cholesterol    Check LP    She is off of cholesterol medication for "awhile"    Diabetes   Please make appointment with eye doctor    We will perform a professional continuous glucose monitor in order to assess blood sugar trends and patterns, highs and lows, and determine treatment plan.     Check A1c   Encouraged exercise    Medication Changes:  Increase Metformin to 2 tabs daily  Continue Lantus 30 units daily  Continue Novolog 10 units before meals.     Start Trulicity 0.75mg weekly for 4 weeks & then increase to 1.5 mg weekly indefinitely. Discussed MOA & SE. Return demonstration of how to use pen done in office today. Discussed one pen can stay out of the refrigerator for 14 days. Please take Trulicity pen out of the refrigerator 10 minutes prior to injection. Let me know if you have intolerable side effects.     UNDERSTANDING CONTINUOUS GLUCOSE MONITORING     What is continuous glucose monitoring?   Continuous glucose monitoring system (CGMS) is a method used to record your blood sugar levels over a period of time (usually 5 days). Your home blood glucose monitoring is very helpful, but only measures blood glucose levels at various points in time and can miss dangerous high and low patterns, especially during the night while you sleep. Continuous glucose monitoring provides a continuous 24-hour measurement of your blood glucose levels.     Who benefits from continuous glucose monitoring?   ? People who experience dangerous high and low blood glucose readings.   ? People who suffer from hypoglycemia unawareness and cannot feel when their blood glucose is dropping.   ? " People who desire better blood glucose control.   ? People with elevated A1C levels.     How does continuous glucose monitoring work?   A glucose sensor is inserted on the top of your skin on the back of your arm. We do not leave a needle under your skin. The blood glucose sensor measures your blood sugar level every 15 minutes, 24 hours a day. At the end of the 5 days, the CGM Sensor is then downloaded and reviewed so your healthcare provider can see your blood sugar trends and patterns.     What are your responsibilities to ensure successful testing?   It is recommended that you monitor your blood readings as directed by your healthcare provider.  It is vital that you document the correct times of your medications, meals, snacks, blood sugar readings, and any exercise.     How do you prepare for the test?   There is nothing you need to do to prepare for the test. You do not need to fast (restrict food intake) the night before the test.     Revised: 01/2017    © 2015 [facility] ([email or web address]) is a non-profit, academic, multi-specialty, healthcare delivery system dedicated to patient care, research and education.         Snacks can be an important part of a balanced, healthy meal plan. They allow you to eat more frequently, feeling full and satisfied throughout the day. Also, they allow you to spread carbohydrates evenly, which may stabilize blood sugars.  Plus, snacks are enjoyable!     The amount of carbohydrate needed at snacks varies. Generally, about 15-30 grams of carbohydrate per snack is recommended.  Below you will find some tasty treats.       0-5 gm carb  Crystal Light  Vitamin Water Zero  Herbal tea, unsweetened  2 tsp peanut butter on celery  1./2 cup sugar-free jell-o  1 sugar-free popsicle  ¼ cup blueberries  8oz Blue Eun unsweetened almond milk  5 baby carrots & celery sticks, cucumbers, bell peppers dipped in ¼ cup salsa, 2Tbsp light ranch dressing or 2Tbsp plain Greek yogurt  10  Goldfish crackers  ½ oz low-fat cheese or string cheese  1 closed handful of nuts, unsalted  1 Tbsp of sunflower seeds, unsalted  1 cup Smart Pop popcorn  1 whole grain brown rice cake        15 gm carb  1 small piece of fruit or ½ banana or 1/2 cup lite canned fruit  3 tomás cracker squares  3 cups Smart Pop popcorn, top spray butter, Grigsby lite salt or cinnamon and Truvia  5 Vanilla Wafers  ½ cup low fat, no added sugar ice cream or frozen yogurt (Blue bell, Blue Bunny, Weight Watchers, Skinny Cow)  ½ turkey, ham, or chicken sandwich  ½ c fruit with ½ c Cottage cheese  4-6 unsalted wheat crackers with 1 oz low fat cheese or 1 tbsp peanut butter   30-45 goldfish crackers (depending on flavor)   7-8 Amish mini brown rice cakes (caramel, apple cinnamon, chocolate)   12 Amish mini brown rice cakes (cheddar, bbq, ranch)   1/3 cup hummus dip with raw veg  1/2 whole wheat farhad, 1Tbsp hummus  Mini Pizza (1/2 whole wheat English muffin, low-fat  cheese, tomato sauce)  100 calorie snack pack (Oreo, Chips Ahoy, Ritz Mix, Baked Cheetos)  4-6 oz. light or Greek Style yogurt (Chobani, Yoplait, Okios, [place])  ½ cup sugar-free pudding    6 in. wheat tortilla or farhad oven toasted chips (topped with spray butter flavoring, cinnamon, Truvia OR spray butter, garlic powder, chili powder)   18 BBQ Popchips (available at [employer], Whole Foods, Fresh Market)       Eating the Right Number of Calories (9943-6038 Guidelines)    Calories are a measure of the energy you get from food. If you eat more calories than you use, you will gain weight. If you eat fewer calories than you use, you will lose weight. Below are tables that give the number of calories needed each day. Look for your gender, age, and activity level. If you stick to this number, you should neither gain nor lose weight. Note that this is an estimated number of calories.* Your exact number may differ.    Women  Age in years Low activity level (calories/day) Moderate  activity level (calories/day) High activity level (calories/day)   19 to 30 1,800-2,000 2,000-2,200 2,400   31 to 50 1,800 2,000 2,200   51 and older 1,600 1,800 2,000-2,200      Men  Age in years Low activity level  (calories/day) Moderate activity level (calories/day) High activity level (calories/day)   19 to 30 2,400-2,600 2,600-2,800 3,000   31 to 50 2,200-2,400 2,400-2,600 2,800-3,000   51 and older 2,000-2,200 2,200-2,400 2,400-2,800     Activity levels defined  Low. Only light physical activity such as that done during typical daily life.  Moderate. Light physical activity done during typical daily life AND physical activity equal to walking about 1.5 to 3 miles a day at 3 to 4 miles per hour.  High. Light physical activity done during typical daily life AND physical activity equal to walking more than 3 miles a day at 3 to 4 miles per hour.  *From Dietary Guidelines for Americans, 7095-6341, [place] Department of Health and Human Services.    Eat less fat  A gram of fat has almost 2.5 times the calories of a gram of protein or carbohydrates. Try to balance your food choices so that only 20% to 35% of your calories comes from total fat. This means an average of 2½ to 3½ grams of fat for each 100 calories you eat.    Eat more fiber  High-fiber foods are digested more slowly than low-fiber foods, so you feel full longer. Try to get at least 25 grams of fiber each day for a 2000 calorie diet. Foods high in fiber include:  Vegetables and fruits  Whole-grain or bran breads, pastas, and cereals  Legumes (beans) and peas  As you begin to eat more fiber, be sure to drink plenty of water to keep your digestive system working smoothly.    Tips  Do's and don'ts include:   Dont skip meals. This often leads to overeating later on. Its best to spread your eating throughout the day.  Eat a variety of foods, not just a few favorites.  If you find yourself eating when youre not hungry, ask yourself why. Many of us eat when  were bored, stressed, or just to be polite. Listen to your body. If youre not hungry, get busy doing something else instead of eating.  Eat slower, shooting for 20 to 30 minutes for each meal. It takes 20 minutes for your stomach to tell your brain that its full. Slow eaters tend to eat less and are still satisfied, while fast eaters may tend to be overeaters.   Pay attention to what you eat. Dont read or watch TV during your meal.     ---------------------------------------------------------------------------------------------------------------------------------------------------    Losing Weight for Heart Health  Excess weight is a major risk factor for heart disease. Losing weight has many benefits including lowering your blood pressure, improving your cholesterol level, and decreasing your risk for diseases such as diabetes and heart disease. It may help keep your arteries open so that your heart can get the oxygen-rich blood it needs. All in all, losing weight makes you healthier.       Exercise with a friend. When activity is fun, you're more likely to stick with it.     Calories and weight loss  Calories are the fuel your body burns for energy. You get the calories you need from the food you eat. For healthy weight loss, women should eat at least 1,200 calories a day, men at least 1,500.  When you eat more calories than you need, your body stores the extra calories as fat. One pound of fat equals 3,500 calories.  To lose weight, try to reduce your total calorie intake by 500 calories. To do this, eat 250 calories less each day. Add activity to burn the other 250 calories. Walking 2.5 miles burns about 250 calories. Other more intense activities can burn more calories in the time you spend doing them, such as swimming and running. It is important to understand that reducing calorie intake is much more effective at weight loss than is exercise.  Eat a variety of healthy foods to get the nutrients you  need.    Tips for losing weight  Drink 8 to 10 glasses of water a day.  Dont skip meals. Instead, eat smaller portions.  Eat your meals earlier in the day.  Cut out sugary drinks such as soda and fruit juices.  Make your later meals lighter than your earlier meals.     What can exercise help?  Blood sugar. Regular exercise improves blood sugar control by helping your body use insulin.  Mental and emotional health. Physical activity relieves stress and helps you sleep better.  Heart health. With regular exercise, you can reduce your risk of heart disease and high blood pressure. You can also improve your cholesterol and triglyceride levels.  Weight. Exercise helps you lose fat, gain muscle, and control your weight.  Health of blood vessels and nerves. Activity helps lower blood sugar. This helps prevent damage to blood vessels and nerves that can cause problems with your brain, eyes, feet, and legs.  Finances. If you manage your blood sugar, you may spend less on medical care.    2 types of exercise  Two types of exercise help your body use blood sugar. Experts advise both types of exercise for people with diabetes:  Aerobic exercise. This is a rhythmic, repeated, continued movement of large muscle groups for at least 10 minutes at a time. You should do this about 30 minutes a day on most days of the week. Examples include walking, bicycling, jogging, swimming, water aerobics, and many sports.  Resistance exercise (strength training). This type of exercise uses muscles to move weight or work against resistance. You can do it with free weights, machines, resistance tubing, or your own body weight. Adults with diabetes should aim for 2 to 3 sessions of resistance exercise each week. Its best to skip a day in between.    A goal to shoot for  Your main goal is to become more active. Even a little bit helps. Choose an activity that you like. Walking is one great form of exercise that everyone can do. Talk to your  healthcare provider about any limits you may have before starting with an exercise program. Then aim for 150 minutes a week of physical activity. Dont let more than 2 days go by without exercise. When you are sitting for long periods of time, get up for short sessions of light activity every 30 minutes.    Getting activity into your day  Being more active doesnt have to be hard work. Try these to get more activity into your day:  Take the stairs instead of the elevator  Garden, do housework, and yard work  Choose a parking space farther from the store  Walk to talk to a co-worker instead of calling  Take a 10-minute walk around the block at lunch  Walk to a bus stop a little farther from your home or office  Walk the dog after dinner     ---------------------------------------------------------------------------------------------------------------------------------------------------    Reading Food Labels  Look for the Nutrition Facts label on packaged foods. Reading labels is a big step toward eating healthier. The tips below help you know what to look for.    Serving size. Read this closely because the package, jar, or can may contain more than 1 serving. This is how to measure 1 serving of the food in the package. If you eat more than 1 serving, you get more of everything on the label -- including fat, cholesterol, and calories.  Total fat. This tells you how many grams (g) of fat are in 1 serving. Fat is high in calories. A healthy goal is to have less than 25% of your daily calories come from fat.  Saturated fat. This tells you how much saturated fat is in 1 serving. Saturated fat raises your cholesterol the most. Look for foods that have little or no saturated fat.  Trans fat. This tells you how much trans fat is in 1 serving. Even a small amount of trans fat can harm your health. Choose foods that have no trans fat.  Cholesterol. This tells you how much cholesterol is in 1 serving. For many years, it had  been recommended to eat less than 300 milligrams (mg) of cholesterol a day. New guidelines have removed this limitation as cholesterol has been recently shown to not raise blood cholesterol levels as significantly as previously thought. However, many foods high in cholesterol are also high in saturated fat. It is recommended to limit saturated fat in your diet.  Calories from fat. This number tells you how many calories from fat are in 1 serving (there are 9 calories per gram of fat). Look for foods with few calories from fat.  % Daily value. The higher the number, the more 1 serving has of that nutrient. Look for foods that have low numbers for total fat, saturated fat, cholesterol, and sodium.  Sodium. This tells you how much sodium (salt) is in 1 serving. Choose foods with low numbers for sodium.  Dietary fiber. This number tells you how much fiber is in 1 serving. Foods that are high in fiber can help you feel full. They can also be good for your heart and digestion. The recommended daily amount of fiber is 25 grams for women and 38 grams for men. After age 50, your daily fiber needs drop to 21 grams for women and 30 grams for men.       ---------------------------------------------------------------------------------------------------------------------------------------------------    Understanding Carbohydrates, Fats, and Protein  Food is a source of fuel and nourishment for your body. Its also a source of pleasure. Having diabetes doesnt mean you have to eat special foods or give up desserts. Instead, your dietitian can show you how to plan meals to suit your body. To start, learn how different foods affect blood sugar.    Carbohydrates  Carbohydrates are the main source of fuel for the body. Carbohydrates raise blood sugar. Many people think carbohydrates are only found in pasta or bread. But carbohydrates are actually in many kinds of foods:  Sugars occur naturally in foods such as fruit, milk, honey, and  molasses. Sugars can also be added to many foods, from cereals and yogurt to candy and desserts. Sugars raise blood sugar.  Starches are found in bread, cereals, pasta, and dried beans. Theyre also found in corn, peas, potatoes, yam, acorn squash, and butternut squash. Starches also raise blood sugar.   Fiber is found in foods such as vegetables, fruits, beans, and whole grains. Unlike other carbs, fiber isnt digested or absorbed. So it doesnt raise blood sugar. In fact, fiber can help keep blood sugar from rising too fast. It also helps keep blood cholesterol at a healthy level.  Did you know?  Even though carbohydrates raise blood sugar, its best to have some in every meal. They are an important part of a healthy diet.     Fat  Fat is an energy source that can be stored until needed. Fat does not raise blood sugar. However, it can raise blood cholesterol, increasing the risk of heart disease. Fat is also high in calories, which can cause weight gain. Not all types of fat are the same.    More Healthy:  Monounsaturated fats are mostly found in vegetable oils, such as olive, canola, and peanut oils. They are also found in avocados and some nuts. Monounsaturated fats are healthy for your heart. Thats because they lower LDL (unhealthy) cholesterol.  Polyunsaturated fats are mostly found in vegetable oils, such as corn, safflower, and soybean oils. They are also found in some seeds, nuts, and fish. Polyunsaturated fats lower LDL (unhealthy) cholesterol. So, choosing them instead of saturated fats is healthy for your heart. Certain unsaturated fats can help lower triglycerides.     Less Healthy:  Saturated fats are found in animal products, such as meat, poultry, whole milk, lard, and butter. Saturated fats raise LDL cholesterol and are not healthy for your heart.  Hydrogenated oils and trans fats are formed when vegetable oils are processed into solid fats. They are found in many processed foods. Hydrogenated oils  and trans fats raise LDL cholesterol and lower HDL (healthy) cholesterol. They are not healthy for your heart.    Protein  Protein helps the body build and repair muscle and other tissue. Protein has little or no effect on blood sugar. However, many foods that contain protein also contain saturated fat. By choosing low-fat protein sources, you can get the benefits of protein without the extra fat:  Plant protein is found in dry beans and peas, nuts, and soy products, such as tofu and soymilk. These sources tend to be cholesterol-free and low in saturated fat.  Animal protein is found in fish, poultry, meat, cheese, milk, and eggs. These contain cholesterol and can be high in saturated fat. Aim for lean, lower-fat choices.    ---------------------------------------------------------------------------------------------------------------------------------------------------    Understanding Carbohydrates    A car needs the right type of fuel to run. And you need the right kind of food to function. To keep your energy level up, your body needs food that has carbohydrates. But carbohydrates raise blood sugar levels higher and faster than other kinds of food. Your dietitian will work with you to figure out the amount of carbohydrates you need.    Starches  Starches are found in grains, some vegetables, and beans. Grain products include bread, pasta, cereal, and tortillas. Starchy vegetables include potatoes, peas, corn, lima beans, yams, and squash. Kidney beans, enriquez beans, black beans, garbanzo beans, and lentils also contain starches.    Sugars  Sugars are found naturally in many foods. Or sugar can be added. Foods that contain natural sugar include fruits and fruit juices, dairy products, honey, and molasses. Added sugars are found in most desserts, processed foods, candy, regular soda, and fruit drinks. These are very helpful for treating low blood sugar, or hypoglycemia. They provide sugar quickly. Try to keep at  least 15 to 20 grams of these simple sugars with you at all times. Eat this if you begin to have low blood sugar symptoms.    Fiber  Fiber comes from plant foods. Most fiber isnt digested by the body. Instead of raising blood sugar levels like other carbohydrates, it actually stops blood sugar from rising too fast. Fiber is found in fruits, vegetables, whole grains, beans, peas, and many nuts.    Carb counting  Keep track of the amount of carbohydrates you eat. This can help you keep the right balance of physical activity and medicine. The amount of carbohydrates needed will vary for each person. It depends on many things such as your health, the medicines you take, and how active you are. Your healthcare team will help you figure out the right amount of carbohydrates for you. You may start with around 45 to 60 grams of carbohydrate per meal, depending on your situation. Carb counting is a system that helps you keep track of the carbohydrates you eat at each meal.  Carbohydrates come from a variety of foods. These include grains, starchy vegetables, fruit, milk, beans, and snack foods. You can either count carbohydrate grams or carbohydrate servings. When you count carbohydrate servings, 1 carbohydrate serving = 15 grams of carbohydrates.  Here are some examples of foods containing about 15 grams of carbohydrates (1 serving of carbohydrates):  1/2 cup of canned or frozen fruit  A small piece of fresh fruit (4 ounces)  1 slice of bread  1/2 cup of oatmeal  1/3 cup of rice  4 to 6 crackers  1/2 English muffin  1/2 cup of black beans  1/4 of a large baked potato (3 ounces)  2/3 cup of plain fat-free yogurt  1 cup of soup  1/2 cup of casserole  6 chicken nuggets  2-inch-square brownie or cake without frosting  2 small cookies  1/2 cup of ice cream or sherbet    Carb counting is easier when food labels are available. Look at the label to see how many grams of total carbohydrates the food contains. Then you can figure  out how much you should eat.  Two very important lines to look at on the label are the serving size and the total carbohydrate amount. Here are some tips for using food labels to count your carbohydrate intake:  Check the serving size. The information on the label is based on that serving size. If you eat more than the listed serving size, you may have to double or triple the other information on the label.   Check the total grams of carbohydrates. Total carbohydrate from the label includes sugar, starch, and fiber. Be sure to use the total carbohydrate number and not sugar alone.  Know how many grams of carbohydrates you can have.  Be familiar with the matching portion sizes.  Compare labels. Compare the labels of different products, looking at serving sizes and total carbohydrates to find the products that work best for you.   Don't forget protein and fat. With all the focus on carb counting, it might be easy to forget protein and fat in your meals. Don't forget to include sources of protein and healthy fat to balance your meals.  Its also important to be consistent with the amount and time you eat when taking a fixed dose of diabetes medicine. Work with your healthcare provider or dietitian if you need additional help. He or she can help you keep track of your carbohydrate intake. He or she can also help you figure out how many grams of carbohydrates you should have.      ---------------------------------------------------------------------------------------------------------------------------------------------------    Diabetes: Learning About Serving and Portion Sizes     A good rule of thumb: Devote half your plate to vegetables and green salad. Split the other half between protein and starchy carbohydrates. Fruit makes a good dessert.     Servings and portions. Whats the difference? These terms can be very confusing. But learning to measure serving sizes can help you figure out how many carbohydrates  (carbs) and other foods you eat each day. They are also powerful tools for managing your weight.    Servings and portions  Many different words are used to describe amounts of food. If your health care provider uses a term youre not sure of, dont be afraid to ask. It helps to know the difference between servings and portions:  A serving size is a fixed size. Food producers use this term to describe their products. For example, the label on a cereal box could say that 1 cup of dry cereal = 1 serving.  A portion (also called a helping) is how much you eat or how much you put on your plate at a meal. For example, you might eat 2 cups of cereal at breakfast.    Using serving information  The portion you choose to eat (such as 2 cups of cereal) may be more than one serving as listed on the food label (such as 1 cup of cereal). Thats why it helps to measure or weigh the food you eat. Because the food label values are based on servings, youll need to know how many servings you eat at one sitting.     Ounces: 2 to 3 ounces is about the size of your palm.       1 Cup: 1 cup (or a medium-sized piece) is about the size of your fist.       1/2 Cup: 1/2 cup is about the size of your cupped hand.      One tablespoon is about the size of your thumb.  One teaspoon is about the size of the tip of your thumb.    Keeping track of serving sizes  When youre planning for a snack or a meal, keep servings in mind. If you dont have measuring cups or a scale handy, there are ways to eyeball serving sizes, such as comparing your food to the size of your hand (see pictures above).    Managing portion sizes  If your weight is a concern, reducing your portions can help. You can eat more than one serving of a food at once. But to keep from eating too much at one meal, learn how to manage your portions. A portion is the amount of each type of food on your plate. See the plate diagram for an example of balanced  "portions.    ---------------------------------------------------------------------------------------------------------------------------------------------------    Diabetes: Shopping for and Preparing Meals    Having diabetes doesnt mean you have to shop in a special aisle or look for special foods. But you will need to make choices. By comparing items and reading food labels, you can find the healthiest foods for you and your family.  Comparing items  When you shop, compare items to find the best ones for your needs. Keep these facts in mind:  No sugar added does not mean a product is sugar-free.  "Sugar-free" means less than 1/2 gram (g) of sugar per serving.  Fat free means less than 1/2 g of fat per serving. This does not necessarily mean the product is low in calories.  Low fat means 3 g fat or less per serving. Reduced fat or less fat means 25% less fat than the regular version. Some of this fat may be saturated or trans fat. And calories per serving may be similar to the regular version.    Making small changes  Dont try to change all of your eating habits at once. Here are some ideas to start with:  Try fat-free or low-fat cheese, milk, and yogurt. Also try leaner cuts of meat. This will help you cut down on saturated fat.  Try whole-grain breads, brown rice, and whole-wheat pasta.  Load up on fresh or frozen vegetables. If you buy canned, choose low-sodium vegetables.  Avoid processed foods as much as possible. They tend to be low in fiber and high in trans fats and sodium.  Try tofu, soymilk, or meat substitutes.?They can help you cut cholesterol and saturated fat out of your diet.    Learning to read food labels  To find healthy foods that help you control blood sugar, learn how to read food labels. Look for the Nutrition Facts label on packaged foods. It will tell you how much carbohydrate, sugar, fat, and fiber is in each serving. Then, you can decide whether or not the food fits into your " meal plan.    Using the food label  So, once you have the food label, what do you do with it? The food label helps in many ways. Use it to:  Compare items and decide which is the best for your health needs.  Track the number of carbohydrates in your portions.  Figure out how many servings of a food you can have and still stay within the number of carbohydrates for that meal.    Planning meals  For good blood sugar control, plan what and when youll eat. Start by creating a meal plan that includes all the food groups. Then, time your meals to help keep your blood sugar level steady. You may need to adjust your plan for special situations.    Eat from all the food groups  The basis of a healthy meal plan is variety (eating many different types of foods). Look for lean meats, fresh fruits and vegetables, whole grains, and low-fat or non-fat dairy products. Eating a wide variety of foods provides the nutrients your body needs. It can also keep you from getting bored with your meal plan.    Reduce liquid sugars  Extra calories from sodas, sports drinks, and fruit drinks make it hard to keep blood sugar in range. Cut as many liquid sugars from your meal plan as you can. This includes most fruit juices, which are often high in natural or added sugar. Instead, drink plenty of water and other sugar-free beverages.    Eat less fat  If you need to lose some weight, try to reduce the amount of fat in your diet. This can also help lower your cholesterol level to keep blood vessels healthier. Cut fat by using only small amounts of liquid oil for cooking. Read food labels carefully to avoid foods with unhealthy trans fats.    Timing your meals  When it comes to blood sugar control, when you eat is as important as what you eat. You may need to eat several small meals spaced evenly throughout the day to stay in your target range. So dont skip breakfast or wait until late in the day to get most of your calories. Doing so can cause  your blood sugar to rise too high or fall too low.    Cooking wisely  Broil, steam, bake, or grill meats and vegetables, instead of frying.  Instead of cream-based sauces or sugary glazes, flavor foods with vegetable purée, lemon or lime juice, or herb seasonings.  Remove skin from chicken and turkey before serving.  Look in cookbooks for easy, low-fat, low-sugar recipes. When making your usual recipes, cut sugar by 1/2 and fat by 1/3.      ---------------------------------------------------------------------------------------------------------------------------------------------------    Healthy Meals for Diabetes    Ask your healthcare team to help you make a meal plan that fits your needs. Your meal plan tells you when to eat your meals and snacks, what kinds of foods to eat, and how much of each food to eat. You dont have to give up all the foods you like. But you do need to follow some guidelines.  Choose healthy carbohydrates  Starches, sugars, and fiber are all types of carbohydrates. Fiber can help lower your cholesterol and triglycerides. Fiber is also healthy for your heart. You should have 20 to 35 grams of total fiber each day. Fiber-rich foods include:  Whole-grain breads and cereals  Bulgur wheat  Brown rice     Whole-wheat pasta  Fruits and vegetables  Dry beans, and peas   Keep track of the amount of carbohydrates you eat. This can help you keep the right balance of physical activity and medicine. The amount of carbohydrates needed will vary for each person. It depends on many things such as your health, the medicines you take, and how active you are. Your healthcare team will help you figure out the right amount of carbohydrates for you. You may start with around 45 to 60 grams of carbohydrates per meal, depending on your situation.   Here are some examples of foods containing about 15 grams of carbohydrates (1 serving of carbohydrates):  1/2 cup of canned or frozen fruit  A small piece of fresh  fruit (4 ounces)  1 slice of bread  1/2 cup of oatmeal  1/3 cup of rice  4 to 6 crackers  1/2 English muffin  1/2 cup of black beans  1/4 of a large baked potato (3 ounces)  2/3 cup of plain fat-free yogurt  1 cup of soup  1/2 cup of casserole  6 chicken nuggets  2-inch-square brownie or cake without frosting  2 small cookies  1/2 cup of ice cream or sherbet    Choose healthy protein foods  Eating protein that is low in fat can help you control your weight. It also helps keep your heart healthy. Low-fat protein foods include:  Fish  Plant proteins, such as dry beans and peas, nuts, and soy products like tofu and soymilk  Lean meat with all visible fat removed  Poultry with the skin removed  Low-fat or nonfat milk, cheese, and yogurt    Limit unhealthy fats and sugar  Saturated and trans fats are unhealthy for your heart. They raise LDL (bad) cholesterol. Fat is also high in calories, so it can make you gain weight. To cut down on unhealthy fats and sugar, limit these foods:  Butter or margarine  Palm and palm kernel oils and coconut oil  Cream  Cheese  Javier  Lunch meats     Ice cream  Sweet bakery goods such as pies, muffins, and donuts  Jams and jellies  Candy bars  Regular sodas     How much to eat  The amount of food you eat affects your blood sugar. It also affects your weight. Your healthcare team will tell you how much of each type of food you should eat.  Use measuring cups and spoons and a food scale to measure serving sizes.  Learn what a correct serving size looks like on your plate. This will help when you are away from home and cant measure your servings.  Eat only the number of servings given on your meal plan for each food. Dont take seconds.  Learn to read food labels. Be sure to look at serving size, total carbohydrates, fiber, calories, sugar, and saturated and trans fats. Look for healthier alternatives to foods that have added sugar.  Plan ahead for parties so you can still have a good time  without going overboard with unhealthy food choices. Set a good example yourself by bringing a healthy dish to pot lucks.     Choose healthy snacks  When it comes to snacks, we usually think about foods with added sugar and fats. But there are many other options for healthier snack choices. Here are a few snack ideas to choose from:  Snacks with less than 5 grams of carbohydrates  1 piece of string cheese  3 celery sticks plus 1 tablespoon of peanut butter  5 cherry tomatoes plus 1 tablespoon of ranch dressing  1 hard-boiled egg  1/4 cup of fresh blueberries   5 baby carrots  1 cup of light popcorn  1/2 cup of sugar-free gelatin  15 almonds  Snacks with about 10 to 20 grams of carbohydrates  1/3 cup of hummus plus 1 cup of fresh cut nonstarchy vegetables (carrots, green peppers, broccoli, celery, or a combination)  1/2 cup of fresh or canned fruit plus 1/4 cup of cottage cheese  1/2 cup of tuna salad with 4 crackers  2 rice cakes and a tablespoon of peanut butter  1 small apple or orange  3 cups light popcorn  1/2 of a turkey sandwich (1 slice of whole-wheat bread, 2 ounces of turkey, and mustard)  Portion sizes are important to controlling your blood sugar and staying at a healthy weight. Stock up on healthy snack items so you always have them on hand.    When to eat  Your meal plan will likely include breakfast, lunch, dinner, and some snacks.  Try to eat your meals and snacks at about the same times each day.  Eat all your meals and snacks. Skipping a meal or snack can make your blood sugar drop too low. It can also cause you to eat too much at the next meal or snack. Then your blood sugar could get too high.    ---------------------------------------------------------------------------------------------------------------------------------------------------    Eating Out When You Have Diabetes  Eating right is an important part of keeping your blood sugar in your target range. You just need to make healthy  choices.    Be creative when eating out. Many places dont make you stick strictly to the menu. Instead of ordering a large entree, choose an appetizer or two and a bowl of soup. A mix of side orders can also make a good meal. Read the descriptions of other entrees and specials. If another entree comes with baby carrots, ask for a side order of them even if they dont come with your entree. Ask how food is prepared or if it can be made differently.  Tips for making the most of your meal out  Ask to have high-fat, high-calorie extras, such as French fries and potato chips, left off your plate so you wont be tempted.   Ask what substitutions are available. Instead of French fries, you may be able to have a side of salad with low-calorie dressing.  Order low-fat milk instead of cream for your coffee.  Ask for vegetables and main courses to be served without sauces, butter, margarine, or oil.  Be aware of portion size. You dont have to clean your plate. Take half your meal home in a doggie bag to eat the next day.  Look for heart-healthy or low-fat entrees that include whole grains, vegetables, or fruits.  Choose foods that are grilled, broiled, or steamed.  Avoid dishes that are described on the menu as fried, breaded, smothered, rich, or creamy.    Tips for restaurant meals  When you eat away from home try these tips:  Try to schedule your dining-out meal at your normal meal time. Make a reservation if possible, so you don't have to wait to eat. If you can't make a reservation, try to arrive at the restaurant at a less-busy time to cut down your wait time. Eat a small fruit or starch snack at your regular mealtime if your restaurant meal is going to be later than usual.   Call ahead to see if the restaurant can meet your dietary needs if you've never been there before. Or you can go online to see the menu ahead of time.  Carry some crackers with you in case the restaurant needs you to wait until you can be  served.  Ask how foods are prepared before you order.  Instead of fried, sautéed, or breaded foods, choose ones that are broiled, steamed, grilled, or baked.  Ask for sauces, gravies, and dressings on the side.  Only eat an amount that fits your meal plan. Remember: You can take home the leftovers.  Save dessert for special occasions. Then choose a small dessert or share one with a friend or family member.    Make healthy choices  Fast food  Garden salad with light dressing on the side  Baked potato with vegetables or herbs  Broiled, roasted, or grilled chicken sandwich  Sliced turkey or lean roast beef sandwich    Mexican  Chicken enchilada, without cheese or sour cream   Small burrito with whole beans and chicken  Whole beans (not refried) and rice  Chicken or fish fajitas    Steakhouse  Grilled or broiled lean cuts of beef  Baked potato with vegetables or herbs  Broiled or baked chicken. Dont eat the skin.  Steamed vegetables    Asian  Steamed dumplings or potstickers  Broiled, boiled, or steamed meats or fish  Sushi or sashimi  Steamed rice or boiled noodles. One serving is equal to 1/3 cup.      © 0158-7854 [employer]. [address] [place] [address], [place] [address]. All rights reserved. This information is not intended as a substitute for professional medical care. Always follow your healthcare professional's instructions.

## 2022-05-23 NOTE — ASSESSMENT & PLAN NOTE
-- Reviewed goals of therapy are to get the best control we can without hypoglycemia  -- Declines diabetes education  A1c goal <7%  Encouraged to make appointment with eye doctor    Medication changes:   I fear she is missing more doses of insulin than she admits. We will perform a professional continuous glucose monitor in order to assess blood sugar trends and patterns, highs and lows, and determine treatment plan. Consider personal CGM next time.    She is interested in trying medications that help her lose weight. Has had issues with cost in the past but is willing to pay.     Check A1c   Encouraged exercise    Medication Changes:  Increase Metformin to 2 tabs daily  Continue Lantus 30 units daily  Continue Novolog 10 units before meals.     Start Trulicity 0.75mg weekly for 4 weeks & then increase to 1.5 mg weekly indefinitely. Discussed MOA & SE. Return demonstration of how to use pen done in office today. Discussed one pen can stay out of the refrigerator for 14 days. Please take Trulicity pen out of the refrigerator 10 minutes prior to injection. Let me know if you have intolerable side effects.     -- Reviewed patient's current insulin regimen. Clarified proper insulin dose and timing in relation to meals, etc. Insulin injection sites and proper rotation instructed.    -- Advised frequent self blood glucose monitoring.  Patient encouraged to document glucose results and bring them to every clinic visit    -- Hypoglycemia precautions discussed. Instructed on precautions before driving.    -- Call for Bg repeatedly < 90 or > 180.   -- Close adherence to lifestyle changes recommended.   -- Periodic follow ups for eye evaluations, foot care and dental care suggested.

## 2022-05-23 NOTE — ASSESSMENT & PLAN NOTE
Recheck bmd 10/2022     Continue Reclast    Reviewed basics of quantity versus quality  Reassuring she is not fracturing       RDA of calcium (9478-1602 mg /day in divided doses) and vitamin D 2000iu a day  discussed  Calcium from food sources preferred  Fall precautions emphasized  +weight bearing exercise  [email or web address] website information given    Treatment options and potential side effects discussed       Low risk for ONJ and atypical fractures reviewed and discussed. Alerted that if dental work needs to be done it should be done prior to initiating therapy

## 2022-05-24 ENCOUNTER — LAB VISIT (OUTPATIENT)
Dept: LAB | Facility: HOSPITAL | Age: 67
End: 2022-05-24
Attending: NURSE PRACTITIONER
Payer: MEDICARE

## 2022-05-24 DIAGNOSIS — E11.42 TYPE 2 DIABETES MELLITUS WITH DIABETIC POLYNEUROPATHY, WITHOUT LONG-TERM CURRENT USE OF INSULIN: Chronic | ICD-10-CM

## 2022-05-24 DIAGNOSIS — E78.2 MIXED HYPERLIPIDEMIA: Chronic | ICD-10-CM

## 2022-05-24 DIAGNOSIS — D35.2 PITUITARY ADENOMA: Chronic | ICD-10-CM

## 2022-05-24 LAB
ALBUMIN SERPL BCP-MCNC: 3.7 G/DL (ref 3.5–5.2)
ALP SERPL-CCNC: 94 U/L (ref 55–135)
ALT SERPL W/O P-5'-P-CCNC: 105 U/L (ref 10–44)
ANION GAP SERPL CALC-SCNC: 9 MMOL/L (ref 8–16)
AST SERPL-CCNC: 79 U/L (ref 10–40)
BILIRUB SERPL-MCNC: 0.9 MG/DL (ref 0.1–1)
BUN SERPL-MCNC: 5 MG/DL (ref 8–23)
CALCIUM SERPL-MCNC: 9.2 MG/DL (ref 8.7–10.5)
CHLORIDE SERPL-SCNC: 108 MMOL/L (ref 95–110)
CHOLEST SERPL-MCNC: 159 MG/DL (ref 120–199)
CHOLEST/HDLC SERPL: 3.3 {RATIO} (ref 2–5)
CO2 SERPL-SCNC: 26 MMOL/L (ref 23–29)
CREAT SERPL-MCNC: 0.7 MG/DL (ref 0.5–1.4)
EST. GFR  (AFRICAN AMERICAN): >60 ML/MIN/1.73 M^2
EST. GFR  (NON AFRICAN AMERICAN): >60 ML/MIN/1.73 M^2
ESTIMATED AVG GLUCOSE: 154 MG/DL (ref 68–131)
GLUCOSE SERPL-MCNC: 123 MG/DL (ref 70–110)
HBA1C MFR BLD: 7 % (ref 4–5.6)
HDLC SERPL-MCNC: 48 MG/DL (ref 40–75)
HDLC SERPL: 30.2 % (ref 20–50)
LDLC SERPL CALC-MCNC: 90.4 MG/DL (ref 63–159)
NONHDLC SERPL-MCNC: 111 MG/DL
POTASSIUM SERPL-SCNC: 3.8 MMOL/L (ref 3.5–5.1)
PROT SERPL-MCNC: 6.7 G/DL (ref 6–8.4)
SODIUM SERPL-SCNC: 143 MMOL/L (ref 136–145)
T4 FREE SERPL-MCNC: 0.87 NG/DL (ref 0.71–1.51)
TRIGL SERPL-MCNC: 103 MG/DL (ref 30–150)
TSH SERPL DL<=0.005 MIU/L-ACNC: 3.24 UIU/ML (ref 0.4–4)

## 2022-05-24 PROCEDURE — 80061 LIPID PANEL: CPT | Performed by: NURSE PRACTITIONER

## 2022-05-24 PROCEDURE — 84439 ASSAY OF FREE THYROXINE: CPT | Performed by: NURSE PRACTITIONER

## 2022-05-24 PROCEDURE — 84443 ASSAY THYROID STIM HORMONE: CPT | Performed by: NURSE PRACTITIONER

## 2022-05-24 PROCEDURE — 83036 HEMOGLOBIN GLYCOSYLATED A1C: CPT | Performed by: NURSE PRACTITIONER

## 2022-05-24 PROCEDURE — 80053 COMPREHEN METABOLIC PANEL: CPT | Performed by: NURSE PRACTITIONER

## 2022-05-24 PROCEDURE — 36415 COLL VENOUS BLD VENIPUNCTURE: CPT | Performed by: NURSE PRACTITIONER

## 2022-05-25 ENCOUNTER — PATIENT MESSAGE (OUTPATIENT)
Dept: ENDOCRINOLOGY | Facility: CLINIC | Age: 67
End: 2022-05-25
Payer: MEDICARE

## 2022-05-25 DIAGNOSIS — E23.7 DISORDER OF PITUITARY GLAND, UNSPECIFIED: ICD-10-CM

## 2022-05-25 NOTE — TELEPHONE ENCOUNTER
Latest Reference Range & Units 05/24/22 08:26   Sodium 136 - 145 mmol/L 143   Potassium 3.5 - 5.1 mmol/L 3.8   Chloride 95 - 110 mmol/L 108   CO2 23 - 29 mmol/L 26   Anion Gap 8 - 16 mmol/L 9   BUN 8 - 23 mg/dL 5 (L)   Creatinine 0.5 - 1.4 mg/dL 0.7   eGFR if non African American >60 mL/min/1.73 m^2 >60.0   eGFR if African American >60 mL/min/1.73 m^2 >60.0   Glucose 70 - 110 mg/dL 123 (H)   Calcium 8.7 - 10.5 mg/dL 9.2   Alkaline Phosphatase 55 - 135 U/L 94   PROTEIN TOTAL 6.0 - 8.4 g/dL 6.7   Albumin 3.5 - 5.2 g/dL 3.7   BILIRUBIN TOTAL 0.1 - 1.0 mg/dL 0.9   AST 10 - 40 U/L 79 (H)   ALT 10 - 44 U/L 105 (H)   Cholesterol 120 - 199 mg/dL 159   HDL 40 - 75 mg/dL 48   HDL/Cholesterol Ratio 20.0 - 50.0 % 30.2   LDL Cholesterol External 63.0 - 159.0 mg/dL 90.4   Non-HDL Cholesterol mg/dL 111   Total Cholesterol/HDL Ratio 2.0 - 5.0  3.3   Triglycerides 30 - 150 mg/dL 103   Hemoglobin A1C External 4.0 - 5.6 % 7.0 (H)   Estimated Avg Glucose 68 - 131 mg/dL 154 (H)   TSH 0.400 - 4.000 uIU/mL 3.239   Free T4 0.71 - 1.51 ng/dL 0.87   (L): Data is abnormally low  (H): Data is abnormally high    Message sent with below  - Your A1c has decreased significantly to 7.0%__ great job! How are you doing on the Trulicity? We can likely decrease your insulin doses. Please send me a log in one week  - Your electrolytes were normal   - your kidney function was normal  - Your liver function tests were elevated-- Please make appointment with hepatology if you have not seen them in awhile  - Your cholesterol was above goal- please restart the atorvastatin (I called this in for you at your visit)

## 2022-05-25 NOTE — PROGRESS NOTES
Patient needs to reestablish care in pituitary Clinic with me and Dr. Farmer (last seen by Dr. Farmer in 2015 with plans to repeat MRI and follow-up visit in 1 year).  Please schedule MRI in 10/2022 with same day visit with me and Dr. Farmer in pituitary clinic.  Will need 8 am fasting labs 1 wk prior to visit.   Please send medical record request for her pathology report from pituitary surgery in [place] or see if patient can bring copy to her visit.     Will continue to be followed in general endocrine clinic for T2DM.     1. Disorder of pituitary gland, unspecified  - ACTH; Future  - Cortisol, 8AM; Future  - Follicle Stimulating Hormone; Future  - Prolactin; Future  - T4, Free; Future  - TSH; Future  - MRI Pituitary W W/O Contrast; Future    Paris Young MD

## 2022-05-26 PROBLEM — S90.121A: Status: RESOLVED | Noted: 2018-08-02 | Resolved: 2022-05-26

## 2022-05-26 PROBLEM — E11.9 DM TYPE 2 WITHOUT RETINOPATHY: Status: ACTIVE | Noted: 2022-05-26

## 2022-05-30 ENCOUNTER — OFFICE VISIT (OUTPATIENT)
Dept: INTERNAL MEDICINE | Facility: CLINIC | Age: 67
End: 2022-05-30
Payer: MEDICARE

## 2022-05-30 VITALS
BODY MASS INDEX: 30.34 KG/M2 | HEIGHT: 65 IN | WEIGHT: 182.13 LBS | HEART RATE: 84 BPM | OXYGEN SATURATION: 97 % | SYSTOLIC BLOOD PRESSURE: 135 MMHG | RESPIRATION RATE: 14 BRPM | DIASTOLIC BLOOD PRESSURE: 70 MMHG

## 2022-05-30 DIAGNOSIS — H47.20 COMPRESSIVE OPTIC ATROPHY: ICD-10-CM

## 2022-05-30 DIAGNOSIS — Z13.5 SCREENING FOR EYE CONDITION: ICD-10-CM

## 2022-05-30 DIAGNOSIS — H61.22 IMPACTED CERUMEN OF LEFT EAR: ICD-10-CM

## 2022-05-30 DIAGNOSIS — E78.5 HYPERLIPIDEMIA ASSOCIATED WITH TYPE 2 DIABETES MELLITUS: ICD-10-CM

## 2022-05-30 DIAGNOSIS — E66.09 CLASS 1 OBESITY DUE TO EXCESS CALORIES WITH SERIOUS COMORBIDITY AND BODY MASS INDEX (BMI) OF 30.0 TO 30.9 IN ADULT: ICD-10-CM

## 2022-05-30 DIAGNOSIS — M81.0 AGE RELATED OSTEOPOROSIS, UNSPECIFIED PATHOLOGICAL FRACTURE PRESENCE: Chronic | ICD-10-CM

## 2022-05-30 DIAGNOSIS — K76.0 FATTY LIVER DISEASE, NONALCOHOLIC: ICD-10-CM

## 2022-05-30 DIAGNOSIS — E11.42 TYPE 2 DIABETES MELLITUS WITH DIABETIC POLYNEUROPATHY, WITHOUT LONG-TERM CURRENT USE OF INSULIN: Chronic | ICD-10-CM

## 2022-05-30 DIAGNOSIS — Z00.00 ENCOUNTER FOR PREVENTIVE HEALTH EXAMINATION: ICD-10-CM

## 2022-05-30 DIAGNOSIS — K74.60 CIRRHOSIS OF LIVER WITHOUT ASCITES, UNSPECIFIED HEPATIC CIRRHOSIS TYPE: ICD-10-CM

## 2022-05-30 DIAGNOSIS — E11.9 DM TYPE 2 WITHOUT RETINOPATHY: ICD-10-CM

## 2022-05-30 DIAGNOSIS — E11.69 HYPERLIPIDEMIA ASSOCIATED WITH TYPE 2 DIABETES MELLITUS: ICD-10-CM

## 2022-05-30 DIAGNOSIS — R79.89 ELEVATED LFTS: ICD-10-CM

## 2022-05-30 DIAGNOSIS — Z01.419 WELL WOMAN EXAM WITH ROUTINE GYNECOLOGICAL EXAM: ICD-10-CM

## 2022-05-30 DIAGNOSIS — E55.9 VITAMIN D DEFICIENCY: Primary | ICD-10-CM

## 2022-05-30 DIAGNOSIS — D35.2 PITUITARY ADENOMA: Chronic | ICD-10-CM

## 2022-05-30 DIAGNOSIS — H53.47 HETERONYMOUS BILATERAL VISUAL FIELD DEFECTS: ICD-10-CM

## 2022-05-30 DIAGNOSIS — B19.20 HEPATITIS C TEST POSITIVE: ICD-10-CM

## 2022-05-30 DIAGNOSIS — R76.8 HEPATITIS C ANTIBODY TEST POSITIVE: ICD-10-CM

## 2022-05-30 PROBLEM — E66.9 CLASS 1 OBESITY WITH SERIOUS COMORBIDITY IN ADULT: Status: ACTIVE | Noted: 2022-05-30

## 2022-05-30 PROBLEM — E66.811 CLASS 1 OBESITY WITH SERIOUS COMORBIDITY IN ADULT: Status: ACTIVE | Noted: 2022-05-30

## 2022-05-30 PROCEDURE — 99999 PR PBB SHADOW E&M-EST. PATIENT-LVL V: ICD-10-PCS | Mod: PBBFAC,,, | Performed by: NURSE PRACTITIONER

## 2022-05-30 PROCEDURE — 99999 PR PBB SHADOW E&M-EST. PATIENT-LVL V: CPT | Mod: PBBFAC,,, | Performed by: NURSE PRACTITIONER

## 2022-05-30 PROCEDURE — G0439 PR MEDICARE ANNUAL WELLNESS SUBSEQUENT VISIT: ICD-10-PCS | Mod: S$GLB,,, | Performed by: NURSE PRACTITIONER

## 2022-05-30 PROCEDURE — G0439 PPPS, SUBSEQ VISIT: HCPCS | Mod: S$GLB,,, | Performed by: NURSE PRACTITIONER

## 2022-05-30 NOTE — PATIENT INSTRUCTIONS
Counseling and Referral of Other Preventative  (Italic type indicates deductible and co-insurance are waived)    Patient Name: Tona Carlson  Today's Date: 5/30/2022    Health Maintenance       Date Due Completion Date    Eye Exam Due   10/20/2020    Mammogram Ordered    2/22/2021    COVID-19 Vaccine (4 - Booster for Moderna series) 07/06/2022  10/20/2021    Shingles Vaccine (1 of 2) 06/07/2023    ---    DEXA Scan 10/20/2022   10/20/2020    Hemoglobin A1c 11/24/2022 5/24/2022    Diabetes Urine Screening 02/24/2023 2/24/2022    Foot Exam 02/24/2023 2/24/2022    Lipid Panel 05/24/2023 5/24/2022    Pneumococcal Vaccines (Age 65+) (2 - PPSV23 or PCV20) 01/02/2024 12/8/2020    Colorectal Cancer Screening 04/08/2024 4/8/2021    TETANUS VACCINE      Dr Farmer & Dr Young & MRI- due in Oct 2022    GYN exam- due    L ear wax- ENT to remove    reclast due 11/16/2022    Gradual weight loss, diabetic low fat diet    Prevention of fall handouts 08/07/2024 8/7/2014        Orders Placed This Encounter   Procedures    Ambulatory referral/consult to Ophthalmology    Ambulatory referral/consult to Gynecology     The following information is provided to all patients.  This information is to help you find resources for any of the problems found today that may be affecting your health:                Living healthy guide: www.[facility].[place].gov      Understanding Diabetes: www.diabetes.org      Eating healthy: www.cdc.gov/healthyweight      CDC home safety checklist: www.cdc.gov/steadi/patient.html      Agency on Aging: www.goea.[place].[facility]      Alcoholics anonymous (AA): www.aa.org      Physical Activity: www.srinath.nih.gov/qk7ikhl      Tobacco use: www.quitwithusla.org

## 2022-05-30 NOTE — PROGRESS NOTES
"Tona Carlson presented for a  Medicare AWV and comprehensive Health Risk Assessment today. The following components were reviewed and updated:    · Medical history  · Family History  · Social history  · Allergies and Current Medications  · Health Risk Assessment  · Health Maintenance  · Care Team     ** See Completed Assessments for Annual Wellness Visit within the encounter summary.**       The following assessments were completed:  · Living Situation  · CAGE  · Depression Screening  · Timed Get Up and Go  · Whisper Test  · Cognitive Function Screening  · Nutrition Screening  · ADL Screening  · PAQ Screening    Vitals:    05/30/22 1004   BP: 135/70   Pulse: 84   Resp: 14   SpO2: 97%   Weight: 82.6 kg (182 lb 1.6 oz)   Height: 5' 4.5" (1.638 m)     Body mass index is 30.77 kg/m².  Physical Exam  Constitutional:       Appearance: She is well-developed.   HENT:      Head: Normocephalic.      Comments: Wears face mask     Right Ear: External ear normal.      Left Ear: There is impacted cerumen.      Mouth/Throat:      Pharynx: No posterior oropharyngeal erythema.   Eyes:      General: No scleral icterus.  Neck:      Vascular: No carotid bruit.   Cardiovascular:      Rate and Rhythm: Normal rate and regular rhythm.   Pulmonary:      Effort: Pulmonary effort is normal.      Breath sounds: Normal breath sounds.   Abdominal:      Palpations: Abdomen is soft.      Comments: obese   Musculoskeletal:         General: Swelling present.   Skin:     General: Skin is warm and dry.      Findings: No rash.   Neurological:      Mental Status: She is alert and oriented to person, place, and time.      Cranial Nerves: No cranial nerve deficit.      Motor: No abnormal muscle tone.      Deep Tendon Reflexes: Reflexes are normal and symmetric.   Psychiatric:         Mood and Affect: Mood normal.         Behavior: Behavior normal.         Thought Content: Thought content normal.         Judgment: Judgment normal.           Lab Results "   Component Value Date    HGBA1C 7.0 (H) 05/24/2022    CHOL 159 05/24/2022    HDL 48 05/24/2022    LDLCALC 90.4 05/24/2022    TRIG 103 05/24/2022    CHOLHDL 30.2 05/24/2022      Results for orders placed during the hospital encounter of 10/20/20    DXA Bone Density Spine And Hip    Narrative  EXAMINATION:  DEXA BONE DENSITY SPINE HIP    CLINICAL HISTORY:  Other specified disorders of bone density and structure, unspecified site.  64 y/o female with no history of fractures.  She had menopausal symptoms at 49 y/o.  Pt's Sibling had a wrist fracture.  She is taking 2,000 units of Vit D supplements.   Pt has a history of Diabetes.  She does not exercise or smoke.    TECHNIQUE:  DXA specification: Main [place] A ([id number])    Bone Mineral Density scanning was performed over the hip and lumbar spine.    Review of the images confirms satisfactory positioning and technique.    COMPARISON:  Comparison study done on 05/31/2007.    Lumbar spine:  BMD 0.843 g/cm2 and T-score -1.9.    Total Hip:        BMD 0.797 g/cm2 and T-score -1.2.    FINDINGS:  Lumbar spine (L1-L4):              BMD is 0.774 g/cm2, T-score is -2.5, and Z-score is -0.7.    Total hip:                               BMD is 0.658 g/cm2, T-score is -2.3, and Z-score is -1.2.    Femoral neck:                         BMD is 0.540 g/cm2, T-score is -2.8, and Z-score is -1.4.    FRAX:    7.4% risk of a major osteoporotic fracture in the next 10 years.    1.6% risk of hip fracture in the next 10 years.    Impression  1. Osteoporosis of the lumbar spine and hip.  2. Can not compare to prior study done using different analysis method.    RECOMMENDATIONS:  RECOMMENDATIONS of [facility]:    1. Recommend daily calcium intake 5761-2099 mg, dietary sources preferred; Vitamin D 9791-9168 IU daily.  2. Adequate exercise and fall precautions.  3. Recommend medical therapy for osteoporosis. Consider bisphosphonates  (alendronate, risedronate, zoledronic acid), or denosumab.  4. Repeat BMD in 2 years.    EXPLANATION OF RESULTS:  The T-score compares these results to the average bone density of a 20-29 year-old of the same gender. The Z-score compares this result to the average bone density to people of the same age, gender, and race. The amounts indicate the number of standard deviations above or below the mean.    * Osteoporosis is generally defined as having a T-score between less than or equal to -2.5.    * Osteopenia is generally defined as having a T-score between -1.0 and -2.5.    * The normal range is generally defined as having a T-score greater than or equal to -1.0.    * Calculated FRAX scores for fracture risk prediction may not be accurate in the setting of certain clinical factors such as pharmacologic therapy for osteoporosis, prior fragility fractures, high dose glucocorticoid use.      Electronically signed by: Elen Costello MD  Date:    11/10/2020  Time:    16:22    Results for orders placed in visit on 08/13/12    Mammo Digital Screening Bilat with CAD    Narrative  The present examination has been compared to prior imaging studies dated  06/23/2011 and 01/05/2010.    BILATERAL MAMMOGRAM FINDINGS:  The breasts are almost entirely fat.    Stable benign calcifications in the breast.    These images  were processed to produce digital images analyzed for potential  abnormalities.    IMPRESSION:    Findings in both breasts are benign-negative.    Mammogram in 1 year is recommended.    ACR BI-RADS Category 2: Benign      MIKY LYNN M.D.  08/13/2012                Diagnoses and health risks identified today and associated recommendations/orders:    1. DM type 2 without retinopathy  Stable- followed by PCP    2. Vitamin D deficiency  Stable- followed by PCP      3. Type 2 diabetes mellitus with diabetic polyneuropathy, without long-term current use of insulin  Stable- followed by PCP      4. Pituitary  adenoma  Stable- followed by Dr Darian Young    5. Hyperlipidemia associated with type 2 diabetes mellitus  Stable- followed by PCP      6. Heteronymous bilateral visual field defects  Stable- followed by ophth    7. Fatty liver disease, nonalcoholic  Stable- followed by hepatology    8. Compressive optic atrophy  Stable- followed by ophth    9. Cirrhosis of liver without ascites, unspecified hepatic cirrhosis type  Stable- followed by hepatology  - Ambulatory referral/consult to Hepatology; Future    10. Age related osteoporosis, unspecified pathological fracture presence  Stable- followed by PCP    11. Screening for eye condition  Stable- followed by PCP  - Ambulatory referral/consult to Ophthalmology; Future    12. Well woman exam with routine gynecological exam  Stable- followed by PCP  - Ambulatory referral/consult to Gynecology; Future    13. Encounter for preventive health examination  Here for Health Risk Assessment/Annual Wellness Visit.  Health maintenance reviewed and updated. Follow up in one year.       14. Impacted cerumen of left ear  Stable- followed by PCP  - Ambulatory referral/consult to ENT; Future    15. Hepatitis C test positive  Stable- followed by PCP  - Ambulatory referral/consult to Hepatology; Future    16. Elevated LFTs  Stable- followed by PCP  - Ambulatory referral/consult to Hepatology; Future    17. Class 1 obesity due to excess calories with serious comorbidity and body mass index (BMI) of 30.0 to 30.9 in adult  Chronic. Followed by PCP.   Centers for Disease Control and Prevention (CDC)  weight recommendations for current BMI & ideal BMI range discussed with patient.  Recommended  gradual weight loss, Low fat  low cholesterol diet, diabetic diet , no added salt diet , start structured regular exercise x 1 hour every day.      18. Hepatitis C antibody test positive  Stable- followed by hepatology      Jose Antonio Carbone with a 5-10 year written screening schedule and personal  prevention plan. Recommendations were developed using the USPSTF age appropriate recommendations. Education, counseling, and referrals were provided as needed. After Visit Summary printed and given to patient which includes a list of additional screenings\tests needed. Overdue with hepatology F/U- referral carolyn to reestablish self. Fariha & Dr Young & MRI due in Oct 2022. Annual eye exam due. GYN due. ENT to remove L ear wax-referral given. Next reclast 11/16/2021- DXA due in 10/2022. Mammogram ordered noted in epic- pt reminded it is due & to schedule. Diabetic diet & gradual wt loss, low fat diet. Fall prevention handouts. Verbalizes understanding. Problem list reviewed & updated with patient.    Follow up in about 1 year (around 5/30/2023) for HRA.    Manju Brown NP I offered to discuss advanced care planning, including how to pick a person who would make decisions for you if you were unable to make them for yourself, called a health care power of , and what kind of decisions you might make such as use of life sustaining treatments such as ventilators and tube feeding when faced with a life limiting illness recorded on a living will that they will need to know. (How you want to be cared for as you near the end of your natural life)     X  Patient has advanced directives written and agrees to provide copies to the institution.

## 2022-05-31 ENCOUNTER — OFFICE VISIT (OUTPATIENT)
Dept: OPTOMETRY | Facility: CLINIC | Age: 67
End: 2022-05-31
Payer: MEDICARE

## 2022-05-31 DIAGNOSIS — H52.4 BILATERAL PRESBYOPIA: ICD-10-CM

## 2022-05-31 DIAGNOSIS — H25.13 NS (NUCLEAR SCLEROSIS), BILATERAL: ICD-10-CM

## 2022-05-31 DIAGNOSIS — Z13.5 SCREENING FOR EYE CONDITION: ICD-10-CM

## 2022-05-31 DIAGNOSIS — D35.2 PITUITARY ADENOMA: ICD-10-CM

## 2022-05-31 DIAGNOSIS — H40.013 OAG (OPEN ANGLE GLAUCOMA) SUSPECT, LOW RISK, BILATERAL: ICD-10-CM

## 2022-05-31 DIAGNOSIS — E11.9 TYPE 2 DIABETES MELLITUS WITHOUT OPHTHALMIC MANIFESTATIONS: Primary | ICD-10-CM

## 2022-05-31 PROCEDURE — 92250 FUNDUS PHOTOGRAPHY W/I&R: CPT | Mod: S$GLB,,, | Performed by: OPTOMETRIST

## 2022-05-31 PROCEDURE — 92015 DETERMINE REFRACTIVE STATE: CPT | Mod: S$GLB,,, | Performed by: OPTOMETRIST

## 2022-05-31 PROCEDURE — 92250 COLOR FUNDUS PHOTOGRAPHY - OU - BOTH EYES: ICD-10-PCS | Mod: S$GLB,,, | Performed by: OPTOMETRIST

## 2022-05-31 PROCEDURE — 92015 PR REFRACTION: ICD-10-PCS | Mod: S$GLB,,, | Performed by: OPTOMETRIST

## 2022-05-31 PROCEDURE — 92014 COMPRE OPH EXAM EST PT 1/>: CPT | Mod: S$GLB,,, | Performed by: OPTOMETRIST

## 2022-05-31 PROCEDURE — 99999 PR PBB SHADOW E&M-EST. PATIENT-LVL III: ICD-10-PCS | Mod: PBBFAC,,, | Performed by: OPTOMETRIST

## 2022-05-31 PROCEDURE — 99999 PR PBB SHADOW E&M-EST. PATIENT-LVL III: CPT | Mod: PBBFAC,,, | Performed by: OPTOMETRIST

## 2022-05-31 PROCEDURE — 92014 PR EYE EXAM, EST PATIENT,COMPREHESV: ICD-10-PCS | Mod: S$GLB,,, | Performed by: OPTOMETRIST

## 2022-05-31 NOTE — PROGRESS NOTES
HPI     JONI: 10/20  Chief complaint (CC): Patient is here for annual eye exam.  Patient had   prescription bifocals but she had trouble reading with them and uses OTC   +3.00.  Distance vision seems fine without glasses.  Glasses? + 3.00 OTC  Contacts? -  H/o eye surgery, injections or laser: lasik OU  H/o eye injury: -  Known eye conditions? See above  Family h/o eye conditions? +mother, brother and sister with glaucoma  Eye gtts? -      (-) Flashes (-)  Floaters (-) Mucous   (-)  Tearing (-) Itching (-) Burning   (-) Headaches (-) Eye Pain/discomfort (-) Irritation   (-)  Redness (-) Double vision (-) Blurry vision    Diabetic? + BS  A1c? Hemoglobin A1C       Date                     Value               Ref Range             Status                05/24/2022               7.0 (H)             4.0 - 5.6 %           Final                   02/24/2022               10.4 (H)            4.0 - 5.6 %           Final                   02/02/2022               10.5 (H)            4.0 - 5.6 %           Final                 Last edited by Carly Pierre on 5/31/2022  1:43 PM. (History)            Assessment /Plan     For exam results, see Encounter Report.    Type 2 diabetes mellitus without ophthalmic manifestations    Screening for eye condition  -     Ambulatory referral/consult to Ophthalmology    NS (nuclear sclerosis), bilateral    Pituitary adenoma  -     Posterior Segment OCT Optic Nerve- Both eyes; Future  -     Rodas Visual Field - OU - Extended - Both Eyes; Future  -     Color Fundus Photography - OU - Both Eyes    OAG (open angle glaucoma) suspect, low risk, bilateral  -     Posterior Segment OCT Optic Nerve- Both eyes; Future  -     Rodas Visual Field - OU - Extended - Both Eyes; Future  -     Color Fundus Photography - OU - Both Eyes    Bilateral presbyopia      1. BS control. No signs of diabetic retinopathy. Monitor with annual exam.  3. Nuclear sclerotic cataract - not visually significant.  Observe.  4-5. (+) FHx- mom, aunt. IOP 19 OD, 17 OS. C/d 0.65OD, OS. Pallor OU. Educated pt on findings w/understanding. RTC 1 mo IOP/bmo ppole OCT/ 24-2 SS/pachy  6. SRx released to patient. Patient educated on lens options. Normal ocular health. RTC 1 year for routine exam.

## 2022-06-01 ENCOUNTER — PATIENT MESSAGE (OUTPATIENT)
Dept: ADMINISTRATIVE | Facility: HOSPITAL | Age: 67
End: 2022-06-01
Payer: MEDICARE

## 2022-06-02 ENCOUNTER — TELEPHONE (OUTPATIENT)
Dept: HEPATOLOGY | Facility: CLINIC | Age: 67
End: 2022-06-02
Payer: MEDICARE

## 2022-06-02 NOTE — TELEPHONE ENCOUNTER
Manju Brown NP ordered that patient f/u with hepatology.  Patient last seen by JAIRO Krueger in 2019.  Hep c cured.  Patient needs f/u for cirrhosis.  I spoke with patient.  Appt with DIONICIO Hollingsworth scheduled 6/7/22.

## 2022-06-08 ENCOUNTER — PATIENT OUTREACH (OUTPATIENT)
Dept: ADMINISTRATIVE | Facility: HOSPITAL | Age: 67
End: 2022-06-08
Payer: MEDICARE

## 2022-06-14 ENCOUNTER — LAB VISIT (OUTPATIENT)
Dept: LAB | Facility: HOSPITAL | Age: 67
End: 2022-06-14
Payer: MEDICARE

## 2022-06-14 ENCOUNTER — OFFICE VISIT (OUTPATIENT)
Dept: HEPATOLOGY | Facility: CLINIC | Age: 67
End: 2022-06-14
Payer: MEDICARE

## 2022-06-14 VITALS
BODY MASS INDEX: 30.67 KG/M2 | HEART RATE: 80 BPM | OXYGEN SATURATION: 97 % | WEIGHT: 184.06 LBS | SYSTOLIC BLOOD PRESSURE: 123 MMHG | HEIGHT: 65 IN | TEMPERATURE: 98 F | RESPIRATION RATE: 18 BRPM | DIASTOLIC BLOOD PRESSURE: 58 MMHG

## 2022-06-14 DIAGNOSIS — R79.89 ELEVATED LFTS: ICD-10-CM

## 2022-06-14 DIAGNOSIS — K74.60 CIRRHOSIS OF LIVER WITHOUT ASCITES, UNSPECIFIED HEPATIC CIRRHOSIS TYPE: ICD-10-CM

## 2022-06-14 DIAGNOSIS — K76.0 FATTY LIVER DISEASE, NONALCOHOLIC: ICD-10-CM

## 2022-06-14 DIAGNOSIS — K74.60 CIRRHOSIS OF LIVER WITHOUT ASCITES, UNSPECIFIED HEPATIC CIRRHOSIS TYPE: Primary | ICD-10-CM

## 2022-06-14 DIAGNOSIS — B19.20 HEPATITIS C TEST POSITIVE: ICD-10-CM

## 2022-06-14 LAB
AFP SERPL-MCNC: 5.6 NG/ML (ref 0–8.4)
ALBUMIN SERPL BCP-MCNC: 3.9 G/DL (ref 3.5–5.2)
ALP SERPL-CCNC: 113 U/L (ref 55–135)
ALT SERPL W/O P-5'-P-CCNC: 80 U/L (ref 10–44)
AMMONIA PLAS-SCNC: 17 UMOL/L (ref 10–50)
ANION GAP SERPL CALC-SCNC: 9 MMOL/L (ref 8–16)
AST SERPL-CCNC: 63 U/L (ref 10–40)
BASOPHILS # BLD AUTO: 0.02 K/UL (ref 0–0.2)
BASOPHILS NFR BLD: 0.5 % (ref 0–1.9)
BILIRUB SERPL-MCNC: 1.1 MG/DL (ref 0.1–1)
BUN SERPL-MCNC: 7 MG/DL (ref 8–23)
CALCIUM SERPL-MCNC: 9.6 MG/DL (ref 8.7–10.5)
CHLORIDE SERPL-SCNC: 104 MMOL/L (ref 95–110)
CO2 SERPL-SCNC: 24 MMOL/L (ref 23–29)
CREAT SERPL-MCNC: 0.6 MG/DL (ref 0.5–1.4)
DIFFERENTIAL METHOD: ABNORMAL
EOSINOPHIL # BLD AUTO: 0.2 K/UL (ref 0–0.5)
EOSINOPHIL NFR BLD: 3.5 % (ref 0–8)
ERYTHROCYTE [DISTWIDTH] IN BLOOD BY AUTOMATED COUNT: 12.6 % (ref 11.5–14.5)
EST. GFR  (AFRICAN AMERICAN): >60 ML/MIN/1.73 M^2
EST. GFR  (NON AFRICAN AMERICAN): >60 ML/MIN/1.73 M^2
GLUCOSE SERPL-MCNC: 146 MG/DL (ref 70–110)
HCT VFR BLD AUTO: 38.9 % (ref 37–48.5)
HGB BLD-MCNC: 13.2 G/DL (ref 12–16)
IMM GRANULOCYTES # BLD AUTO: 0.01 K/UL (ref 0–0.04)
IMM GRANULOCYTES NFR BLD AUTO: 0.2 % (ref 0–0.5)
INR PPP: 1 (ref 0.8–1.2)
LYMPHOCYTES # BLD AUTO: 1.9 K/UL (ref 1–4.8)
LYMPHOCYTES NFR BLD: 43.5 % (ref 18–48)
MCH RBC QN AUTO: 31.4 PG (ref 27–31)
MCHC RBC AUTO-ENTMCNC: 33.9 G/DL (ref 32–36)
MCV RBC AUTO: 92 FL (ref 82–98)
MONOCYTES # BLD AUTO: 0.3 K/UL (ref 0.3–1)
MONOCYTES NFR BLD: 6.2 % (ref 4–15)
NEUTROPHILS # BLD AUTO: 2 K/UL (ref 1.8–7.7)
NEUTROPHILS NFR BLD: 46.1 % (ref 38–73)
NRBC BLD-RTO: 0 /100 WBC
PLATELET # BLD AUTO: 158 K/UL (ref 150–450)
PMV BLD AUTO: 9.9 FL (ref 9.2–12.9)
POTASSIUM SERPL-SCNC: 3.8 MMOL/L (ref 3.5–5.1)
PROT SERPL-MCNC: 7.3 G/DL (ref 6–8.4)
PROTHROMBIN TIME: 10.8 SEC (ref 9–12.5)
RBC # BLD AUTO: 4.21 M/UL (ref 4–5.4)
SODIUM SERPL-SCNC: 137 MMOL/L (ref 136–145)
WBC # BLD AUTO: 4.34 K/UL (ref 3.9–12.7)

## 2022-06-14 PROCEDURE — 85610 PROTHROMBIN TIME: CPT | Performed by: NURSE PRACTITIONER

## 2022-06-14 PROCEDURE — 80321 ALCOHOLS BIOMARKERS 1OR 2: CPT | Performed by: NURSE PRACTITIONER

## 2022-06-14 PROCEDURE — 99214 OFFICE O/P EST MOD 30 MIN: CPT | Mod: S$GLB,,, | Performed by: NURSE PRACTITIONER

## 2022-06-14 PROCEDURE — 85025 COMPLETE CBC W/AUTO DIFF WBC: CPT | Performed by: NURSE PRACTITIONER

## 2022-06-14 PROCEDURE — 80053 COMPREHEN METABOLIC PANEL: CPT | Performed by: NURSE PRACTITIONER

## 2022-06-14 PROCEDURE — 99999 PR PBB SHADOW E&M-EST. PATIENT-LVL V: CPT | Mod: PBBFAC,,, | Performed by: NURSE PRACTITIONER

## 2022-06-14 PROCEDURE — 36415 COLL VENOUS BLD VENIPUNCTURE: CPT | Performed by: NURSE PRACTITIONER

## 2022-06-14 PROCEDURE — 99214 PR OFFICE/OUTPT VISIT, EST, LEVL IV, 30-39 MIN: ICD-10-PCS | Mod: S$GLB,,, | Performed by: NURSE PRACTITIONER

## 2022-06-14 PROCEDURE — 99999 PR PBB SHADOW E&M-EST. PATIENT-LVL V: ICD-10-PCS | Mod: PBBFAC,,, | Performed by: NURSE PRACTITIONER

## 2022-06-14 PROCEDURE — 82140 ASSAY OF AMMONIA: CPT | Performed by: NURSE PRACTITIONER

## 2022-06-14 PROCEDURE — 82105 ALPHA-FETOPROTEIN SERUM: CPT | Performed by: NURSE PRACTITIONER

## 2022-06-14 RX ORDER — CYANOCOBALAMIN (VITAMIN B-12) 500 MCG
3 TABLET ORAL NIGHTLY
COMMUNITY

## 2022-06-14 NOTE — PATIENT INSTRUCTIONS
1. Repeat Fibroscan in 2 weeks to assess for fat and scar tissue in the liver.  2. Schedule Ultrasound of the liver now.  3. Repeat liver function tests today   4. Recommend vaccination for Hepatitis B.  5. Avoid alcohol and herbal supplements/alternative remedies.  6. Return to clinic in 2 weeks.    There is no FDA approved therapy for non-alcoholic fatty liver disease. Therefore, these things are important:  1. Weight loss goal of 15-20 lbs.  2. Low carb/sugar, high fiber and protein diet.Try to limit your carb intake to LESS than 30-45 grams of carbs with a meal or LESS than 5-10 grams with any snack (total of any snack foods eaten during that time). Use MyFitness Pal josephine to add up your carbs through the day. Do NOT drink any beverages with calories or carbs (this can lead to high blood sugar and weight gain). Also, some of our patients have been very successful with weight loss using the pre made/planned meal planning services that limit calories and portion size (one example is Sensible Meals but there are many out there).  3. Exercise, 5 days per week, 30 minutes per day, as tolerated.  4. Recommend good control of cholesterol, blood pressure, & blood sugar levels.    In some people, fatty liver can progress to steatohepatitis (inflamatory fatty liver) and possibly to cirrhosis, putting one at increased risk for liver cancer, liver failure, and death. Therefore, the lifestyle changes are very important to decrease this risk.     Website with information about fatty liver and inflammation related to fatty liver (ZARCO) = www.nashtruth.com  AND www.NASHactually.com

## 2022-06-14 NOTE — PROGRESS NOTES
HEPATOLOGY CLINIC VISIT NOTE    CHIEF COMPLAINT: Cirrhosis    HISTORY: This is a 66 y.o. White female here to re-establish care for cirrhosis monitoring. She was last seen in clinic by Lalo Krueger PA-C in 3/2019. She was referred for HCV AB (+), but subsequent testing didn't reveal viremia. Work up showed elevated liver enzymes, and was concerning for NAFLD/ZARCO. She has multiple metabolic risk factors, including DMII. Last HgbA1c was 7.0 % (2022). She is followed by Dr. Latif.  Fibroscan in 2019 was suggestive of F4 fibrosis and a high likelihood of cirrhosis. , AST 79 on most recent labs in May; synthetic function was normal. She has been exposed to Hepatitis A and is immune through prior exposure. HBV negative. She is not immune to Hepatitis B. She endorses fatigue, but denies any other signs or symptoms of hepatic decompensation including: jaundice, dark urine, abdominal distention, hematemesis, melena, or periods of confusion suggestive of hepatic encephalopathy.     Past Medical History:   Diagnosis Date    Cataract     Compressive optic atrophy 2015    Diabetes mellitus, type 2     Fatty liver disease, nonalcoholic     Hyperlipidemia     Prolactinoma     Reflux 08/10/2012    Rosacea     Toe fracture, right 2018    Unspecified cirrhosis of liver      Past Surgical History:   Procedure Laterality Date    BREAST BIOPSY Right     core     SECTION      CHOLECYSTECTOMY      TRANSPHENOIDAL PITUITARY RESECTION       FAMILY HISTORY: Negative for liver disease    SOCIAL HISTORY:   Social History     Tobacco Use   Smoking Status Never Smoker   Smokeless Tobacco Never Used       Social History     Substance and Sexual Activity   Alcohol Use No       Social History     Substance and Sexual Activity   Drug Use No     ROS:   No fever, chills, weight loss + weight gain and fatigue  No chest pain, dyspnea, cough  No abdominal pain,  nausea, vomiting  No headaches, visual  "changes  No lower extremity edema  No depression or anxiety      PHYSICAL EXAM:  Friendly White female, in no acute distress; alert and oriented to person, place and time.  VITALS: BP (!) 123/58 (BP Location: Right arm, Patient Position: Sitting, BP Method: Medium (Automatic))   Pulse 80   Temp 97.9 °F (36.6 °C) (Oral)   Resp 18   Ht 5' 4.5" (1.638 m)   Wt 83.5 kg (184 lb 1.4 oz)   SpO2 97%   BMI 31.11 kg/m²   HEENT: Sclerae anicteric.   NECK: No obvious masses.  CVS: Regular rate. No peripheral edema.  LUNGS: Normal respiratory effort. Clear bilaterally  ABDOMEN: Soft obese abdomen.  SKIN: Warm and dry. No jaundice, No obvious rashes.   EXTREMITIES: No lower extremity edema  NEURO/PSYCH: Normal gate. Memory intact. Thought and speech pattern appropriate. Behavior normal. No depression or anxiety noted.    RECENT LABS:  Lab Results   Component Value Date    WBC 5.35 08/04/2020    HGB 13.5 08/04/2020     08/04/2020     Lab Results   Component Value Date    INR 1.0 01/21/2019     Lab Results   Component Value Date    AST 79 (H) 05/24/2022     (H) 05/24/2022    BILITOT 0.9 05/24/2022    ALBUMIN 3.7 05/24/2022    ALKPHOS 94 05/24/2022    CREATININE 0.7 05/24/2022    BUN 5 (L) 05/24/2022     05/24/2022    K 3.8 05/24/2022    AFP 5.5 03/27/2019     RECENT IMAGING:    US ABDOMEN COMPLETE 2/15/2019:    FINDINGS:    Liver: Normal in size, measuring 16.0 cm. Fatty liver infiltration.  No focal hepatic lesions.     Gallbladder: The gallbladder is surgically absent.     Biliary system: The common duct is not dilated, measuring 3 mm.  No intrahepatic ductal dilatation.     Spleen: Normal in size with a homogeneous echotexture, measuring 11.6 x 3.1 cm.     Pancreas: The visualized portions of pancreas appear normal.     Right kidney: Normal in size with no hydronephrosis, measuring 11.5 cm.     Left kidney:  Normal in size with no hydronephrosis, measuring 12.1 cm.     Aorta: No aneurysm.     Inferior " vena cava: Normal in appearance.     Miscellaneous: No ascites.     IMPRESSION:      Hepatic steatosis.     Cholecystectomy    ASSESSMENT  66 y.o. White female with:    1. Cirrhosis of liver without ascites, unspecified hepatic cirrhosis type  Ambulatory referral/consult to Hepatology    AFP Tumor Marker    Comprehensive Metabolic Panel    CBC Auto Differential    Protime-INR    US Abdomen Complete    Phosphatidylethanol (PETH)    FibroScan (Vibration Controlled Transient Elastography)    AMMONIA   2. Fatty liver disease, nonalcoholic  AFP Tumor Marker    Comprehensive Metabolic Panel    CBC Auto Differential    Protime-INR    US Abdomen Complete    Phosphatidylethanol (PETH)    FibroScan (Vibration Controlled Transient Elastography)    AMMONIA   3. History of Hepatitis C  Ambulatory referral/consult to Hepatology    AFP Tumor Marker    Comprehensive Metabolic Panel    CBC Auto Differential    Protime-INR    US Abdomen Complete    Phosphatidylethanol (PETH)    FibroScan (Vibration Controlled Transient Elastography)    AMMONIA   4. Elevated LFTs  Ambulatory referral/consult to Hepatology    AFP Tumor Marker    Comprehensive Metabolic Panel    CBC Auto Differential    Protime-INR    US Abdomen Complete    Phosphatidylethanol (PETH)    FibroScan (Vibration Controlled Transient Elastography)    AMMONIA     - Repeat Fibroscan in 2 weeks to restage liver disease.  - Schedule Ultrasound of the liver now.  - Repeat liver function tests today   - Recommend vaccination for Hepatitis B.  - Avoid alcohol and herbal supplements/alternative remedies.  - Recommend weight loss of 15-20 lbs, through diet and exercise.  - Recommend good control of cholesterol, blood pressure, & blood sugar levels.  - Return to clinic in 2 weeks to review lab and ultrasound results with Fibroscan before visit.       Hepatology Nurse Practitioner  [facility]  6/14/2022 @ [phone]

## 2022-06-16 LAB
PETH 16:0/18.1 (POPETH): <10 NG/ML
PETH 16:0/18.2 (PLPETH): <10 NG/ML

## 2022-06-21 ENCOUNTER — PATIENT MESSAGE (OUTPATIENT)
Dept: ENDOCRINOLOGY | Facility: CLINIC | Age: 67
End: 2022-06-21
Payer: MEDICARE

## 2022-06-21 DIAGNOSIS — E11.42 TYPE 2 DIABETES MELLITUS WITH DIABETIC POLYNEUROPATHY, WITHOUT LONG-TERM CURRENT USE OF INSULIN: Primary | ICD-10-CM

## 2022-06-23 ENCOUNTER — HOSPITAL ENCOUNTER (OUTPATIENT)
Dept: RADIOLOGY | Facility: HOSPITAL | Age: 67
Discharge: HOME | End: 2022-06-23
Attending: NURSE PRACTITIONER
Payer: MEDICARE

## 2022-06-23 DIAGNOSIS — K76.0 FATTY LIVER DISEASE, NONALCOHOLIC: ICD-10-CM

## 2022-06-23 DIAGNOSIS — K74.60 CIRRHOSIS OF LIVER WITHOUT ASCITES, UNSPECIFIED HEPATIC CIRRHOSIS TYPE: ICD-10-CM

## 2022-06-23 DIAGNOSIS — R79.89 ELEVATED LFTS: ICD-10-CM

## 2022-06-23 DIAGNOSIS — B19.20 HEPATITIS C TEST POSITIVE: ICD-10-CM

## 2022-06-23 PROCEDURE — 76700 US EXAM ABDOM COMPLETE: CPT | Mod: TC

## 2022-06-23 PROCEDURE — 76700 US EXAM ABDOM COMPLETE: CPT | Mod: 26,,, | Performed by: STUDENT IN AN ORGANIZED HEALTH CARE EDUCATION/TRAINING PROGRAM

## 2022-06-23 PROCEDURE — 76700 US ABDOMEN COMPLETE: ICD-10-PCS | Mod: 26,,, | Performed by: STUDENT IN AN ORGANIZED HEALTH CARE EDUCATION/TRAINING PROGRAM

## 2022-06-28 ENCOUNTER — PROCEDURE VISIT (OUTPATIENT)
Dept: HEPATOLOGY | Facility: CLINIC | Age: 67
End: 2022-06-28
Payer: MEDICARE

## 2022-06-28 ENCOUNTER — OFFICE VISIT (OUTPATIENT)
Dept: HEPATOLOGY | Facility: CLINIC | Age: 67
End: 2022-06-28
Payer: MEDICARE

## 2022-06-28 VITALS
HEART RATE: 80 BPM | DIASTOLIC BLOOD PRESSURE: 60 MMHG | TEMPERATURE: 98 F | WEIGHT: 183 LBS | SYSTOLIC BLOOD PRESSURE: 120 MMHG | BODY MASS INDEX: 30.92 KG/M2

## 2022-06-28 DIAGNOSIS — K74.60 CIRRHOSIS OF LIVER WITHOUT ASCITES, UNSPECIFIED HEPATIC CIRRHOSIS TYPE: Primary | ICD-10-CM

## 2022-06-28 DIAGNOSIS — K75.81 LIVER CIRRHOSIS SECONDARY TO NASH: Primary | ICD-10-CM

## 2022-06-28 DIAGNOSIS — B19.20 HEPATITIS C TEST POSITIVE: ICD-10-CM

## 2022-06-28 DIAGNOSIS — R74.8 ELEVATED LIVER ENZYMES: ICD-10-CM

## 2022-06-28 DIAGNOSIS — R79.89 ELEVATED LFTS: ICD-10-CM

## 2022-06-28 DIAGNOSIS — I70.0 AORTIC ATHEROSCLEROSIS: ICD-10-CM

## 2022-06-28 DIAGNOSIS — Z08 ENCOUNTER FOR FOLLOW-UP SURVEILLANCE OF LIVER CANCER: ICD-10-CM

## 2022-06-28 DIAGNOSIS — K74.60 LIVER CIRRHOSIS SECONDARY TO NASH: Primary | ICD-10-CM

## 2022-06-28 DIAGNOSIS — Z85.05 ENCOUNTER FOR FOLLOW-UP SURVEILLANCE OF LIVER CANCER: ICD-10-CM

## 2022-06-28 DIAGNOSIS — R16.1 SPLENOMEGALY: ICD-10-CM

## 2022-06-28 DIAGNOSIS — K76.0 FATTY LIVER DISEASE, NONALCOHOLIC: ICD-10-CM

## 2022-06-28 DIAGNOSIS — Z23 NEED FOR HEPATITIS B VACCINATION: ICD-10-CM

## 2022-06-28 PROCEDURE — 99214 PR OFFICE/OUTPT VISIT, EST, LEVL IV, 30-39 MIN: ICD-10-PCS | Mod: S$GLB,,, | Performed by: NURSE PRACTITIONER

## 2022-06-28 PROCEDURE — 91200 LIVER ELASTOGRAPHY: CPT | Mod: S$GLB,,, | Performed by: NURSE PRACTITIONER

## 2022-06-28 PROCEDURE — 99214 OFFICE O/P EST MOD 30 MIN: CPT | Mod: S$GLB,,, | Performed by: NURSE PRACTITIONER

## 2022-06-28 PROCEDURE — 99999 PR PBB SHADOW E&M-EST. PATIENT-LVL V: ICD-10-PCS | Mod: PBBFAC,,, | Performed by: NURSE PRACTITIONER

## 2022-06-28 PROCEDURE — 91200 FIBROSCAN (VIBRATION CONTROLLED TRANSIENT ELASTOGRAPHY): ICD-10-PCS | Mod: S$GLB,,, | Performed by: NURSE PRACTITIONER

## 2022-06-28 PROCEDURE — 99999 PR PBB SHADOW E&M-EST. PATIENT-LVL V: CPT | Mod: PBBFAC,,, | Performed by: NURSE PRACTITIONER

## 2022-06-28 NOTE — PROCEDURES
FibroScan (Vibration Controlled Transient Elastography)    Date/Time: 6/28/2022 11:15 AM  Performed by: Danni Calderón NP  Authorized by: Danni Calderón NP     Diagnosis:  NAFLD    Probe:  XL    Universal Protocol: Patient's identity, procedure and site were verified, confirmatory pause was performed.  Discussed procedure including risks and potential complications.  Questions answered.  Patient verbalizes understanding and wishes to proceed with VCTE.     Procedure: After providing explanations of the procedure, patient was placed in the supine position with right arm in maximum abduction to allow optimal exposure of right lateral abdomen.  Patient was briefly assessed, Testing was performed in the mid-axillary location, 50Hz Shear Wave pulses were applied and the resulting Shear Wave and Propagation Speed detected with a 3.5 MHz ultrasonic signal, using the FibroScan probe, Skin to liver capsule distance and liver parenchyma were accessed during the entire examination with the FibroScan probe, Patient was instructed to breathe normally and to abstain from sudden movements during the procedure, allowing for random measurements of liver stiffness. At least 10 Shear Waves were produced, Individual measurements of each Shear Wave were calculated.  Patient tolerated the procedure well with no complications.  Meets discharge criteria as was dismissed.  Rates pain 0 out of 10.  Patient will follow up with ordering provider to review results.      Findings  Median liver stiffness score:  28  CAP Reading: dB/m:  330    IQR/med %:  14  Interpretation  Fibrosis interpretation is based on medial liver stiffness - Kilopascal (kPa).    Fibrosis Stage:  F4  Steatosis interpretation is based on controlled attenuation parameter - (dB/m).    Steatosis Grade:  S3

## 2022-06-28 NOTE — PATIENT INSTRUCTIONS
- Fibroscan today to restage liver disease. Your Fibroscan today is suggestive of severe fatty infiltration of the liver, with stage 4 fibrosis (cirrhosis).   - Repeat Ultrasound of the liver every 6 months for HCC surveillance, next due 12/2022.  - Repeat liver function tests every 6 months, next due 12/2022.  - Referral placed to GI for surveillance EGD.  - Recommend vaccination for Hepatitis B. (RX sent to [facility] in [place]).  - Avoid alcohol and herbal supplements/alternative remedies.  - Recommend weight loss of 15-20 lbs, through diet and exercise.  - Recommend good control of cholesterol, blood pressure, & blood sugar levels.  - Referral placed to [facility] for screening for [place] ZARCO Cirrhosis study.  - Recommend Cardiology consultation for aortic atherosclerosis seen on ultrasound. Please call [phone] to schedule an appointment.  - Return to clinic in 6 months, sooner if needed

## 2022-06-28 NOTE — PROGRESS NOTES
HEPATOLOGY CLINIC VISIT NOTE    CHIEF COMPLAINT: Cirrhosis    HISTORY: This is a 66 y.o.  female here to re-establish care for cirrhosis monitoring. She was last seen in clinic by myself on 2022. Prior to that visit she had been followed by Lalo Krueger PA-C. She was referred for HCV AB (+), but subsequent testing didn't reveal viremia. Work up showed elevated liver enzymes, and was concerning for NAFLD/ZAROC. She has multiple metabolic risk factors, including DMII. Last HgbA1c was improved at 7.0 % (2022). She is followed by Dr. Latif. Fibroscan in 2019 was suggestive of F4 fibrosis and a high likelihood of cirrhosis. , AST 79 on labs in May; synthetic function was normal. Repeat labs this month showed improvement with an ALT of 80, AST of 63. She has been exposed to Hepatitis A and is immune through prior exposure. HBV negative. She is not immune to Hepatitis B. Repeat Fibroscan today is again suggestive of F4 fibrosis, with severe fatty infiltration of the liver (S3). US shows cirrhosis and fatty liver, with no focal liver lesions; US also showed mild splenomegaly and aortic atherosclerosis. MELD-Na is 7, CTP Class A Cirrhosis. Family history is significant for paternal grandmother with ESLD and liver cancer. She endorses chronic fatigue, but denies any other signs or symptoms of hepatic decompensation including: jaundice, dark urine, abdominal distention, hematemesis, melena, or periods of confusion suggestive of hepatic encephalopathy.     Past Medical History:   Diagnosis Date    Cataract     Compressive optic atrophy 2015    Diabetes mellitus, type 2     Fatty liver disease, nonalcoholic     Hyperlipidemia     Prolactinoma     Reflux 08/10/2012    Rosacea     Toe fracture, right 2018    Unspecified cirrhosis of liver      Past Surgical History:   Procedure Laterality Date    BREAST BIOPSY Right     core     SECTION      CHOLECYSTECTOMY       TRANSPHENOIDAL PITUITARY RESECTION       FAMILY HISTORY: Negative for liver disease    SOCIAL HISTORY:   Social History     Tobacco Use   Smoking Status Never Smoker   Smokeless Tobacco Never Used       Social History     Substance and Sexual Activity   Alcohol Use No       Social History     Substance and Sexual Activity   Drug Use No     ROS:   No fever, chills, weight loss + weight gain and fatigue  No chest pain, dyspnea, cough  No abdominal pain,  nausea, vomiting  No headaches, visual changes  No lower extremity edema  No depression or anxiety    PHYSICAL EXAM:  Friendly  female, in no acute distress; alert and oriented to person, place and time.  VITALS: /60 (BP Location: Right arm, Patient Position: Sitting, BP Method: Large (Automatic))   Pulse 80   Temp 98.4 °F (36.9 °C) (Oral)   Wt 83 kg (182 lb 15.7 oz)   BMI 30.92 kg/m²   HEENT: Sclerae anicteric.   NECK: No obvious masses.  CVS: Regular rate. No peripheral edema.  LUNGS: Normal respiratory effort. Clear bilaterally  ABDOMEN: Soft obese abdomen.  SKIN: Warm and dry. No jaundice, No obvious rashes.   EXTREMITIES: No lower extremity edema  NEURO/PSYCH: Normal gate. Memory intact. Thought and speech pattern appropriate. Behavior normal. No depression or anxiety noted.    RECENT LABS:  Lab Results   Component Value Date    WBC 4.34 2022    HGB 13.2 2022     2022     Lab Results   Component Value Date    INR 1.0 2022     Lab Results   Component Value Date    AST 63 (H) 2022    ALT 80 (H) 2022    BILITOT 1.1 (H) 2022    ALBUMIN 3.9 2022    ALKPHOS 113 2022    CREATININE 0.6 2022    BUN 7 (L) 2022     2022    K 3.8 2022    AFP 5.6 2022     DIAGNOSTIC STUDIES:    FIBROSCAN 2/15/2019:    Fibroscan readin.1 KPa     Fibrosis:F4      CAP readin dB/m     Steatosis: :S3      US ABDOMEN COMPLETE 2/15/2019:    FINDINGS:    Liver: Normal in  size, measuring 16.0 cm. Fatty liver infiltration.  No focal hepatic lesions.     Gallbladder: The gallbladder is surgically absent.     Biliary system: The common duct is not dilated, measuring 3 mm.  No intrahepatic ductal dilatation.     Spleen: Normal in size with a homogeneous echotexture, measuring 11.6 x 3.1 cm.     Pancreas: The visualized portions of pancreas appear normal.     Right kidney: Normal in size with no hydronephrosis, measuring 11.5 cm.     Left kidney:  Normal in size with no hydronephrosis, measuring 12.1 cm.     Aorta: No aneurysm.     Inferior vena cava: Normal in appearance.     Miscellaneous: No ascites.     IMPRESSION:      Hepatic steatosis.     Cholecystectomy    FIBROSCAN 6/28/2022:    Findings  Median liver stiffness score:  28  CAP Reading: dB/m:  330     IQR/med %:  14  Interpretation  Fibrosis interpretation is based on medial liver stiffness - Kilopascal (kPa).     Fibrosis Stage:  F4  Steatosis interpretation is based on controlled attenuation parameter - (dB/m).     Steatosis Grade:  S3    US Abdomen Complete  Narrative: EXAMINATION:  US ABDOMEN COMPLETE    CLINICAL HISTORY:  Unspecified cirrhosis of liver    TECHNIQUE:  Complete abdominal ultrasound (including pancreas, aorta, liver, gallbladder, common bile duct, IVC, kidneys, and spleen) was performed.    COMPARISON:  Ultrasound abdomen 02/15/2019    FINDINGS:  Pancreas: The visualized portions of pancreas appear normal.    Aorta: No aneurysm.  Atherosclerotic plaque is present.    Liver: 17.3 cm, normal in size. Heterogeneous echotexture, nodular contour, increased echogenicity compatible with reported history of cirrhosis.  No focal lesions.The hepatorenal index is 1.9.    Gallbladder: Surgically absent.    Biliary system: 1 mm common bile duct.  No intrahepatic ductal dilatation.    Inferior vena cava: Normal in appearance.    Right kidney: 12.4 cm. No hydronephrosis.    Left kidney: 11.6 cm. No hydronephrosis.    Spleen:  12 x 4.5 cm.  Mildly enlarged.  Small splenic calcification or granuloma.    Miscellaneous: No ascites.  Impression: Cirrhotic liver.  No focal hepatic lesion.    Mild splenomegaly.    Aortic atherosclerosis.    Electronically signed by resident: Mirna Pierce  Date:    06/23/2022  Time:    07:59    Electronically signed by: Ayan Iglesias  Date:    06/23/2022  Time:    08:28    ASSESSMENT  66 y.o.  female with:    1. Liver cirrhosis secondary to ZARCO  Case Request Endoscopy: EGD (ESOPHAGOGASTRODUODENOSCOPY)    AFP Tumor Marker    Comprehensive Metabolic Panel    CBC Auto Differential    Protime-INR    US Abdomen Complete   2. Splenomegaly  Case Request Endoscopy: EGD (ESOPHAGOGASTRODUODENOSCOPY)    AFP Tumor Marker    Comprehensive Metabolic Panel    CBC Auto Differential    Protime-INR    US Abdomen Complete   3. Elevated liver enzymes  Case Request Endoscopy: EGD (ESOPHAGOGASTRODUODENOSCOPY)    AFP Tumor Marker    Comprehensive Metabolic Panel    CBC Auto Differential    Protime-INR    US Abdomen Complete   4. Encounter for follow-up surveillance of liver cancer  AFP Tumor Marker    Comprehensive Metabolic Panel    CBC Auto Differential    Protime-INR    US Abdomen Complete     MELD-Na score: 7 at 6/14/2022 10:36 AM  MELD score: 7 at 6/14/2022 10:36 AM  Calculated from:  Serum Creatinine: 0.6 mg/dL (Using min of 1 mg/dL) at 6/14/2022 10:36 AM  Serum Sodium: 137 mmol/L at 6/14/2022 10:36 AM  Total Bilirubin: 1.1 mg/dL at 6/14/2022 10:36 AM  INR(ratio): 1.0 at 6/14/2022 10:36 AM  Age: 66 years    - Fibroscan today to restage liver disease.  - Repeat Ultrasound of the liver every 6 months for HCC surveillance, next due 12/2022.  - Repeat liver function tests every 6 months, next due 12/2022.  - Referral placed to GI for surveillance EGD.  - Recommend vaccination for Hepatitis B. (RX sent to [facility] in [place]).   - Avoid alcohol and herbal supplements/alternative remedies.  - Recommend weight  loss of 15-20 lbs, through diet and exercise.  - Recommend good control of cholesterol, blood pressure, & blood sugar levels.  - Referral placed to [facility] for screening for [place] ZARCO Cirrhosis study.  - Recommend Cardiology consultation for aortic atherosclerosis seen on ultrasound.  - Return to clinic in 6 months, sooner if needed.       Hepatology Nurse Practitioner  [facility] [place] [facility]  6/28/2022 @ [id number]

## 2022-06-29 ENCOUNTER — TELEPHONE (OUTPATIENT)
Dept: HEPATOLOGY | Facility: CLINIC | Age: 67
End: 2022-06-29
Payer: MEDICARE

## 2022-06-29 NOTE — TELEPHONE ENCOUNTER
Telephone call to patient to discuss the [place] ZARCO Cirrhosis study more in depth. Unfortunately, the patient does not meet criteria to move forward with a screening visit, due to recent initiation of Trulicity (started in May, per Endocrinology provider Laurence Fuller NP). Patient verbalized understanding. She will RTC for a follow up visit with me in 6 months for ongoing cirrhosis monitoring. All questions answered to the patient's satisfaction.       Hepatology Nurse Practitioner  [facility] [place]

## 2022-06-29 NOTE — TELEPHONE ENCOUNTER
MA contacted patient. Arranged labs, u/s and follow up visit in 6 mos. Mailed appointment letter to patient.

## 2022-07-12 ENCOUNTER — PATIENT MESSAGE (OUTPATIENT)
Dept: ADMINISTRATIVE | Facility: HOSPITAL | Age: 67
End: 2022-07-12
Payer: MEDICARE

## 2022-08-02 PROBLEM — I70.0 AORTIC ATHEROSCLEROSIS: Status: ACTIVE | Noted: 2022-08-02

## 2022-08-02 PROBLEM — Z98.890 S/P SELECTIVE TRANSSPHENOIDAL PITUITARY ADENOMECTOMY: Status: ACTIVE | Noted: 2022-08-02

## 2022-08-02 PROBLEM — Z86.018 S/P SELECTIVE TRANSSPHENOIDAL PITUITARY ADENOMECTOMY: Status: ACTIVE | Noted: 2022-08-02

## 2022-08-02 NOTE — PROGRESS NOTES
"Chart has been dictated using voice recognition software.  It is not been reviewed carefully for any transcriptional errors due to this technology.   Subjective:   Patient ID:  Tona Carlson is a 66 y.o. female who presents for evaluation of Establish Care and Follow-up      HPI: Patientwith cirrhosis due to ZARCO, Type 2 diabetes, obesity, and h/o pituitarty adenoma s/p surgical removal,  Patient seen to have aortic atherosclerosis on an abdominal ultrasound and referred to further evaluation.    Patient denies any chest discomfort on exertion or at rest.  Patient denies any dyspnea at rest or on exertion, orthopnea, or PND. Has occasional edema.  Patient denies any palpitations, lightheadedness, or syncope. Patient denies lower extremity claudication.    Cardiac risk factors: diabetes, obesity, positive family history, sedentary lifestyle    Past Medical History:   Diagnosis Date    Cataract     Compressive optic atrophy 2015    Diabetes mellitus, type 2     Fatty liver disease, nonalcoholic     Hyperlipidemia     Prolactinoma     Reflux 08/10/2012    Rosacea     Toe fracture, right 2018    Unspecified cirrhosis of liver        Past Surgical History:   Procedure Laterality Date    BREAST BIOPSY Right     core     SECTION      CHOLECYSTECTOMY      TRANSPHENOIDAL PITUITARY RESECTION         Social History     Tobacco Use    Smoking status: Never Smoker    Smokeless tobacco: Never Used   Substance Use Topics    Alcohol use: No    Drug use: No       Outpatient Medications Prior to Visit   Medication Sig Dispense Refill    alcohol swabs (BD ALCOHOL SWABS) PadM Use as needed 200 each 3    BD ULTRA-FINE WON PEN NEEDLE 32 gauge x 5/32" Ndle Use with insulin pens 100 each 3    blood glucose control, normal Soln Check glucose 1-2 x day 1 each 1    blood sugar diagnostic Strp Patient testing 4 times daily 100 strip 4    blood sugar diagnostic Strp Patient testing 4 times " daily 200 strip 3    blood-glucose meter Misc TEST 4 TIMES DAILY 1 each 0    cholecalciferol, vitamin D3, (VITAMIN D3) 50 mcg (2,000 unit) Cap Take 1 capsule (2,000 Units total) by mouth once daily.      dulaglutide (TRULICITY) 1.5 mg/0.5 mL pen injector Inject 1.5 mg into the skin once a week. 4 pen 0    insulin (LANTUS SOLOSTAR U-100 INSULIN) glargine 100 units/mL (3mL) SubQ pen Inject 30 Units into the skin every evening. 30 mL 3    insulin aspart U-100 (NOVOLOG FLEXPEN U-100 INSULIN) 100 unit/mL (3 mL) InPn pen Inject 10 Units into the skin 3 (three) times daily with meals. 15 mL 11    lancets (ACCU-CHEK SOFTCLIX LANCETS) Misc Patient testing 4 times daily 100 each 4    lancets Misc accu- check fast clix; test four times a day (Patient taking differently: accu- check fast clix; test four times a day) 204 each 3    melatonin (MELATIN) Take 3 mg by mouth every evening.      metFORMIN (GLUCOPHAGE-XR) 500 MG ER 24hr tablet Take 1 tablet (500 mg total) by mouth 2 (two) times daily with meals. 90 tablet 0    metroNIDAZOLE (METROGEL) 0.75 % gel Apply to face BID. 45 g 5    atorvastatin (LIPITOR) 20 MG tablet Take 1 tablet (20 mg total) by mouth once daily. (Patient not taking: Reported on 8/3/2022) 90 tablet 3    oxybutynin (DITROPAN) 5 MG Tab Take 1 tablet (5 mg total) by mouth 2 (two) times daily. 60 tablet 3     No facility-administered medications prior to visit.       Review of patient's allergies indicates:  No Known Allergies    Review of Systems   Constitutional: Positive for malaise/fatigue and weight gain (7 pounds over 6 months).   Respiratory: Positive for snoring. Negative for hemoptysis.    Hematologic/Lymphatic: Negative for bleeding problem.   Gastrointestinal: Positive for diarrhea (medication induced). Negative for constipation, hematemesis, hematochezia, nausea and vomiting.   Genitourinary: Negative for hematuria.   Neurological: Positive for loss of balance (occasional) and paresthesias  "(in toes). Negative for focal weakness, numbness and weakness.   Psychiatric/Behavioral: Positive for memory loss (mild).      Objective:   Physical Exam  Constitutional:       Appearance: She is well-developed. She is obese.      Comments: BP (!) 103/55 (BP Location: Left arm, Patient Position: Sitting, BP Method: Large (Automatic))   Pulse 85   Ht 5' 4.5" (1.638 m)   Wt 82.8 kg (182 lb 8.7 oz)   SpO2 98%   BMI 30.85 kg/m²    Neck:      Thyroid: No thyromegaly.      Vascular: No carotid bruit or JVD.   Cardiovascular:      Rate and Rhythm: Normal rate and regular rhythm.      Pulses: Intact distal pulses.      Heart sounds: S1 normal and S2 normal. No murmur heard.    No friction rub. Gallop present. S4 sounds present.   Pulmonary:      Effort: Pulmonary effort is normal.      Breath sounds: Normal breath sounds. No wheezing or rales.   Abdominal:      General: Bowel sounds are normal.      Palpations: Abdomen is soft.      Tenderness: There is no abdominal tenderness.   Musculoskeletal:      Cervical back: Neck supple.   Skin:     Nails: There is no clubbing.   Neurological:      Mental Status: She is alert and oriented to person, place, and time.         Lab Results   Component Value Date    WBC 4.34 06/14/2022    HGB 13.2 06/14/2022    HCT 38.9 06/14/2022    MCV 92 06/14/2022     06/14/2022       Lab Results   Component Value Date     06/14/2022    K 3.8 06/14/2022    BUN 7 (L) 06/14/2022    CREATININE 0.6 06/14/2022     (H) 06/14/2022    HGBA1C 7.0 (H) 05/24/2022    CHOL 159 05/24/2022    HDL 48 05/24/2022    LDLCALC 90.4 05/24/2022    TRIG 103 05/24/2022    CHOLHDL 30.2 05/24/2022    HGB 13.2 06/14/2022    HCT 38.9 06/14/2022     06/14/2022    INR 1.0 06/14/2022     ECG (today) shows normal sinus rhythm with a rightward axis and is an otherwise normal electrocardiogram.  Assessment:     1. Aortic atherosclerosis    2. S/P selective transsphenoidal pituitary adenomectomy    3. " Type 2 diabetes mellitus with diabetic polyneuropathy, without long-term current use of insulin    4. Hyperlipidemia associated with type 2 diabetes mellitus    5. Liver cirrhosis secondary to ZARCO    6. Class 1 obesity due to excess calories with serious comorbidity and body mass index (BMI) of 30.0 to 30.9 in adult      Patient has no symptoms of cardiac ischemia, heart failure, or significant arrhythmias.  Given the patient's diabetes and known aortic atherosclerosis, her LDL cholesterol should ideally be less than 70 mg/dL.  However, given her cirrhosis and abnormal liver enzymes, it is not clear to me that reinstituting the use of a statin is in the patient's best interest.  Additionally, given how asymptomatic the patient is in that her cholesterol is less than 100 mg/dL, it is not clear that the patient should have a PCSK9 inhibitor instituted at this time given its high cost.  Therefore, will check with hepatology to see if the patient can be started on his statin to lower her LDL to less than 70 mg/dL.  Otherwise, given that the patient is asymptomatic, no further evaluation is needed for this patient at this time.    Plan:     Tona was seen today for establish care and follow-up.    Diagnoses and all orders for this visit:    Aortic atherosclerosis  -     Ambulatory referral/consult to Cardiology    S/P selective transsphenoidal pituitary adenomectomy    Type 2 diabetes mellitus with diabetic polyneuropathy, without long-term current use of insulin    Hyperlipidemia associated with type 2 diabetes mellitus    Liver cirrhosis secondary to ZARCO    Class 1 obesity due to excess calories with serious comorbidity and body mass index (BMI) of 30.0 to 30.9 in adult          Shashi Mckenzie MD  Consultative Cardiology

## 2022-08-03 ENCOUNTER — OFFICE VISIT (OUTPATIENT)
Dept: CARDIOLOGY | Facility: CLINIC | Age: 67
End: 2022-08-03
Payer: MEDICARE

## 2022-08-03 ENCOUNTER — PATIENT MESSAGE (OUTPATIENT)
Dept: CARDIOLOGY | Facility: CLINIC | Age: 67
End: 2022-08-03

## 2022-08-03 VITALS
SYSTOLIC BLOOD PRESSURE: 103 MMHG | HEART RATE: 85 BPM | BODY MASS INDEX: 30.42 KG/M2 | OXYGEN SATURATION: 98 % | DIASTOLIC BLOOD PRESSURE: 55 MMHG | WEIGHT: 182.56 LBS | HEIGHT: 65 IN

## 2022-08-03 DIAGNOSIS — I70.0 AORTIC ATHEROSCLEROSIS: Primary | ICD-10-CM

## 2022-08-03 DIAGNOSIS — E66.09 CLASS 1 OBESITY DUE TO EXCESS CALORIES WITH SERIOUS COMORBIDITY AND BODY MASS INDEX (BMI) OF 30.0 TO 30.9 IN ADULT: ICD-10-CM

## 2022-08-03 DIAGNOSIS — Z98.890 S/P SELECTIVE TRANSSPHENOIDAL PITUITARY ADENOMECTOMY: ICD-10-CM

## 2022-08-03 DIAGNOSIS — E11.69 HYPERLIPIDEMIA ASSOCIATED WITH TYPE 2 DIABETES MELLITUS: ICD-10-CM

## 2022-08-03 DIAGNOSIS — E78.5 HYPERLIPIDEMIA ASSOCIATED WITH TYPE 2 DIABETES MELLITUS: ICD-10-CM

## 2022-08-03 DIAGNOSIS — R79.89 ELEVATED LFTS: ICD-10-CM

## 2022-08-03 DIAGNOSIS — B19.20 HEPATITIS C TEST POSITIVE: ICD-10-CM

## 2022-08-03 DIAGNOSIS — K74.60 LIVER CIRRHOSIS SECONDARY TO NASH: Primary | ICD-10-CM

## 2022-08-03 DIAGNOSIS — Z86.018 S/P SELECTIVE TRANSSPHENOIDAL PITUITARY ADENOMECTOMY: ICD-10-CM

## 2022-08-03 DIAGNOSIS — K75.81 LIVER CIRRHOSIS SECONDARY TO NASH: Primary | ICD-10-CM

## 2022-08-03 DIAGNOSIS — K75.81 LIVER CIRRHOSIS SECONDARY TO NASH: ICD-10-CM

## 2022-08-03 DIAGNOSIS — E11.42 TYPE 2 DIABETES MELLITUS WITH DIABETIC POLYNEUROPATHY, WITHOUT LONG-TERM CURRENT USE OF INSULIN: Chronic | ICD-10-CM

## 2022-08-03 DIAGNOSIS — K74.60 LIVER CIRRHOSIS SECONDARY TO NASH: ICD-10-CM

## 2022-08-03 DIAGNOSIS — K74.60 CIRRHOSIS OF LIVER WITHOUT ASCITES, UNSPECIFIED HEPATIC CIRRHOSIS TYPE: ICD-10-CM

## 2022-08-03 PROCEDURE — 3060F PR POS MICROALBUMINURIA RESULT DOCUMENTED/REVIEW: ICD-10-PCS | Mod: CPTII,S$GLB,, | Performed by: INTERNAL MEDICINE

## 2022-08-03 PROCEDURE — 1101F PR PT FALLS ASSESS DOC 0-1 FALLS W/OUT INJ PAST YR: ICD-10-PCS | Mod: CPTII,S$GLB,, | Performed by: INTERNAL MEDICINE

## 2022-08-03 PROCEDURE — 3288F FALL RISK ASSESSMENT DOCD: CPT | Mod: CPTII,S$GLB,, | Performed by: INTERNAL MEDICINE

## 2022-08-03 PROCEDURE — 93000 ELECTROCARDIOGRAM COMPLETE: CPT | Mod: S$GLB,,, | Performed by: INTERNAL MEDICINE

## 2022-08-03 PROCEDURE — 1160F RVW MEDS BY RX/DR IN RCRD: CPT | Mod: CPTII,S$GLB,, | Performed by: INTERNAL MEDICINE

## 2022-08-03 PROCEDURE — 1126F AMNT PAIN NOTED NONE PRSNT: CPT | Mod: CPTII,S$GLB,, | Performed by: INTERNAL MEDICINE

## 2022-08-03 PROCEDURE — 3074F PR MOST RECENT SYSTOLIC BLOOD PRESSURE < 130 MM HG: ICD-10-PCS | Mod: CPTII,S$GLB,, | Performed by: INTERNAL MEDICINE

## 2022-08-03 PROCEDURE — 93000 EKG 12-LEAD: ICD-10-PCS | Mod: S$GLB,,, | Performed by: INTERNAL MEDICINE

## 2022-08-03 PROCEDURE — 1159F PR MEDICATION LIST DOCUMENTED IN MEDICAL RECORD: ICD-10-PCS | Mod: CPTII,S$GLB,, | Performed by: INTERNAL MEDICINE

## 2022-08-03 PROCEDURE — 1159F MED LIST DOCD IN RCRD: CPT | Mod: CPTII,S$GLB,, | Performed by: INTERNAL MEDICINE

## 2022-08-03 PROCEDURE — 3078F DIAST BP <80 MM HG: CPT | Mod: CPTII,S$GLB,, | Performed by: INTERNAL MEDICINE

## 2022-08-03 PROCEDURE — 3008F BODY MASS INDEX DOCD: CPT | Mod: CPTII,S$GLB,, | Performed by: INTERNAL MEDICINE

## 2022-08-03 PROCEDURE — 3066F PR DOCUMENTATION OF TREATMENT FOR NEPHROPATHY: ICD-10-PCS | Mod: CPTII,S$GLB,, | Performed by: INTERNAL MEDICINE

## 2022-08-03 PROCEDURE — 3066F NEPHROPATHY DOC TX: CPT | Mod: CPTII,S$GLB,, | Performed by: INTERNAL MEDICINE

## 2022-08-03 PROCEDURE — 99999 PR PBB SHADOW E&M-EST. PATIENT-LVL V: ICD-10-PCS | Mod: PBBFAC,,, | Performed by: INTERNAL MEDICINE

## 2022-08-03 PROCEDURE — 3060F POS MICROALBUMINURIA REV: CPT | Mod: CPTII,S$GLB,, | Performed by: INTERNAL MEDICINE

## 2022-08-03 PROCEDURE — 3051F PR MOST RECENT HEMOGLOBIN A1C LEVEL 7.0 - < 8.0%: ICD-10-PCS | Mod: CPTII,S$GLB,, | Performed by: INTERNAL MEDICINE

## 2022-08-03 PROCEDURE — 1126F PR PAIN SEVERITY QUANTIFIED, NO PAIN PRESENT: ICD-10-PCS | Mod: CPTII,S$GLB,, | Performed by: INTERNAL MEDICINE

## 2022-08-03 PROCEDURE — 3051F HG A1C>EQUAL 7.0%<8.0%: CPT | Mod: CPTII,S$GLB,, | Performed by: INTERNAL MEDICINE

## 2022-08-03 PROCEDURE — 99999 PR PBB SHADOW E&M-EST. PATIENT-LVL V: CPT | Mod: PBBFAC,,, | Performed by: INTERNAL MEDICINE

## 2022-08-03 PROCEDURE — 99204 OFFICE O/P NEW MOD 45 MIN: CPT | Mod: S$GLB,,, | Performed by: INTERNAL MEDICINE

## 2022-08-03 PROCEDURE — 3008F PR BODY MASS INDEX (BMI) DOCUMENTED: ICD-10-PCS | Mod: CPTII,S$GLB,, | Performed by: INTERNAL MEDICINE

## 2022-08-03 PROCEDURE — 3074F SYST BP LT 130 MM HG: CPT | Mod: CPTII,S$GLB,, | Performed by: INTERNAL MEDICINE

## 2022-08-03 PROCEDURE — 3288F PR FALLS RISK ASSESSMENT DOCUMENTED: ICD-10-PCS | Mod: CPTII,S$GLB,, | Performed by: INTERNAL MEDICINE

## 2022-08-03 PROCEDURE — 1160F PR REVIEW ALL MEDS BY PRESCRIBER/CLIN PHARMACIST DOCUMENTED: ICD-10-PCS | Mod: CPTII,S$GLB,, | Performed by: INTERNAL MEDICINE

## 2022-08-03 PROCEDURE — 1101F PT FALLS ASSESS-DOCD LE1/YR: CPT | Mod: CPTII,S$GLB,, | Performed by: INTERNAL MEDICINE

## 2022-08-03 PROCEDURE — 99204 PR OFFICE/OUTPT VISIT, NEW, LEVL IV, 45-59 MIN: ICD-10-PCS | Mod: S$GLB,,, | Performed by: INTERNAL MEDICINE

## 2022-08-03 PROCEDURE — 3078F PR MOST RECENT DIASTOLIC BLOOD PRESSURE < 80 MM HG: ICD-10-PCS | Mod: CPTII,S$GLB,, | Performed by: INTERNAL MEDICINE

## 2022-08-03 NOTE — Clinical Note
Thank you for referring Tona Carlson for evaluation of aortic atherosclerosis. Please see my note for details of this encounter. If you have any questions, please contact me.  Thank you again for the referral.

## 2022-08-09 ENCOUNTER — OFFICE VISIT (OUTPATIENT)
Dept: OTOLARYNGOLOGY | Facility: CLINIC | Age: 67
End: 2022-08-09
Payer: MEDICARE

## 2022-08-09 VITALS — WEIGHT: 183 LBS | BODY MASS INDEX: 30.93 KG/M2

## 2022-08-09 DIAGNOSIS — H61.22 IMPACTED CERUMEN OF LEFT EAR: ICD-10-CM

## 2022-08-09 PROCEDURE — 99499 UNLISTED E&M SERVICE: CPT | Mod: S$GLB,,, | Performed by: OTOLARYNGOLOGY

## 2022-08-09 PROCEDURE — 3066F NEPHROPATHY DOC TX: CPT | Mod: CPTII,S$GLB,, | Performed by: OTOLARYNGOLOGY

## 2022-08-09 PROCEDURE — 99499 NO LOS: ICD-10-PCS | Mod: S$GLB,,, | Performed by: OTOLARYNGOLOGY

## 2022-08-09 PROCEDURE — 3060F PR POS MICROALBUMINURIA RESULT DOCUMENTED/REVIEW: ICD-10-PCS | Mod: CPTII,S$GLB,, | Performed by: OTOLARYNGOLOGY

## 2022-08-09 PROCEDURE — 3051F HG A1C>EQUAL 7.0%<8.0%: CPT | Mod: CPTII,S$GLB,, | Performed by: OTOLARYNGOLOGY

## 2022-08-09 PROCEDURE — 1159F PR MEDICATION LIST DOCUMENTED IN MEDICAL RECORD: ICD-10-PCS | Mod: CPTII,S$GLB,, | Performed by: OTOLARYNGOLOGY

## 2022-08-09 PROCEDURE — 3051F PR MOST RECENT HEMOGLOBIN A1C LEVEL 7.0 - < 8.0%: ICD-10-PCS | Mod: CPTII,S$GLB,, | Performed by: OTOLARYNGOLOGY

## 2022-08-09 PROCEDURE — 1159F MED LIST DOCD IN RCRD: CPT | Mod: CPTII,S$GLB,, | Performed by: OTOLARYNGOLOGY

## 2022-08-09 PROCEDURE — 99999 PR PBB SHADOW E&M-EST. PATIENT-LVL III: CPT | Mod: PBBFAC,,, | Performed by: OTOLARYNGOLOGY

## 2022-08-09 PROCEDURE — 3060F POS MICROALBUMINURIA REV: CPT | Mod: CPTII,S$GLB,, | Performed by: OTOLARYNGOLOGY

## 2022-08-09 PROCEDURE — 1101F PT FALLS ASSESS-DOCD LE1/YR: CPT | Mod: CPTII,S$GLB,, | Performed by: OTOLARYNGOLOGY

## 2022-08-09 PROCEDURE — 3288F FALL RISK ASSESSMENT DOCD: CPT | Mod: CPTII,S$GLB,, | Performed by: OTOLARYNGOLOGY

## 2022-08-09 PROCEDURE — 3288F PR FALLS RISK ASSESSMENT DOCUMENTED: ICD-10-PCS | Mod: CPTII,S$GLB,, | Performed by: OTOLARYNGOLOGY

## 2022-08-09 PROCEDURE — 1101F PR PT FALLS ASSESS DOC 0-1 FALLS W/OUT INJ PAST YR: ICD-10-PCS | Mod: CPTII,S$GLB,, | Performed by: OTOLARYNGOLOGY

## 2022-08-09 PROCEDURE — 69210 EAR CERUMEN REMOVAL: ICD-10-PCS | Mod: S$GLB,,, | Performed by: OTOLARYNGOLOGY

## 2022-08-09 PROCEDURE — 1126F PR PAIN SEVERITY QUANTIFIED, NO PAIN PRESENT: ICD-10-PCS | Mod: CPTII,S$GLB,, | Performed by: OTOLARYNGOLOGY

## 2022-08-09 PROCEDURE — 69210 REMOVE IMPACTED EAR WAX UNI: CPT | Mod: S$GLB,,, | Performed by: OTOLARYNGOLOGY

## 2022-08-09 PROCEDURE — 99999 PR PBB SHADOW E&M-EST. PATIENT-LVL III: ICD-10-PCS | Mod: PBBFAC,,, | Performed by: OTOLARYNGOLOGY

## 2022-08-09 PROCEDURE — 3066F PR DOCUMENTATION OF TREATMENT FOR NEPHROPATHY: ICD-10-PCS | Mod: CPTII,S$GLB,, | Performed by: OTOLARYNGOLOGY

## 2022-08-09 PROCEDURE — 3008F BODY MASS INDEX DOCD: CPT | Mod: CPTII,S$GLB,, | Performed by: OTOLARYNGOLOGY

## 2022-08-09 PROCEDURE — 3008F PR BODY MASS INDEX (BMI) DOCUMENTED: ICD-10-PCS | Mod: CPTII,S$GLB,, | Performed by: OTOLARYNGOLOGY

## 2022-08-09 PROCEDURE — 1126F AMNT PAIN NOTED NONE PRSNT: CPT | Mod: CPTII,S$GLB,, | Performed by: OTOLARYNGOLOGY

## 2022-08-09 NOTE — PROCEDURES
Ear Cerumen Removal    Date/Time: 8/9/2022 10:30 AM  Performed by: Oleksandr Benites MD  Authorized by: Oleksandr Benites MD     Location details:  Left ear  Procedure type: curette    Cerumen  Removal Results:  Cerumen completely removed  Patient tolerance:  Patient tolerated the procedure well with no immediate complications     Binocular microscopy used to examine and remove cerumen

## 2022-08-10 ENCOUNTER — PATIENT MESSAGE (OUTPATIENT)
Dept: ADMINISTRATIVE | Facility: HOSPITAL | Age: 67
End: 2022-08-10
Payer: MEDICARE

## 2022-08-10 ENCOUNTER — PATIENT OUTREACH (OUTPATIENT)
Dept: ADMINISTRATIVE | Facility: HOSPITAL | Age: 67
End: 2022-08-10
Payer: MEDICARE

## 2022-08-24 ENCOUNTER — OFFICE VISIT (OUTPATIENT)
Dept: INTERNAL MEDICINE | Facility: CLINIC | Age: 67
End: 2022-08-24
Payer: MEDICARE

## 2022-08-24 VITALS
RESPIRATION RATE: 20 BRPM | HEIGHT: 64 IN | DIASTOLIC BLOOD PRESSURE: 68 MMHG | SYSTOLIC BLOOD PRESSURE: 102 MMHG | TEMPERATURE: 98 F | OXYGEN SATURATION: 97 % | HEART RATE: 91 BPM | WEIGHT: 182.75 LBS | BODY MASS INDEX: 31.2 KG/M2

## 2022-08-24 DIAGNOSIS — K74.60 LIVER CIRRHOSIS SECONDARY TO NASH: ICD-10-CM

## 2022-08-24 DIAGNOSIS — E78.5 HYPERLIPIDEMIA ASSOCIATED WITH TYPE 2 DIABETES MELLITUS: ICD-10-CM

## 2022-08-24 DIAGNOSIS — E66.09 CLASS 1 OBESITY DUE TO EXCESS CALORIES WITH SERIOUS COMORBIDITY AND BODY MASS INDEX (BMI) OF 30.0 TO 30.9 IN ADULT: ICD-10-CM

## 2022-08-24 DIAGNOSIS — B19.20 HEPATITIS C TEST POSITIVE: ICD-10-CM

## 2022-08-24 DIAGNOSIS — E55.9 VITAMIN D DEFICIENCY: ICD-10-CM

## 2022-08-24 DIAGNOSIS — E11.42 TYPE 2 DIABETES MELLITUS WITH DIABETIC POLYNEUROPATHY, WITHOUT LONG-TERM CURRENT USE OF INSULIN: Primary | ICD-10-CM

## 2022-08-24 DIAGNOSIS — E11.69 HYPERLIPIDEMIA ASSOCIATED WITH TYPE 2 DIABETES MELLITUS: ICD-10-CM

## 2022-08-24 DIAGNOSIS — L02.92 BOIL: ICD-10-CM

## 2022-08-24 DIAGNOSIS — K76.0 FATTY LIVER DISEASE, NONALCOHOLIC: ICD-10-CM

## 2022-08-24 DIAGNOSIS — I70.0 AORTIC ATHEROSCLEROSIS: ICD-10-CM

## 2022-08-24 DIAGNOSIS — Z98.890 S/P SELECTIVE TRANSSPHENOIDAL PITUITARY ADENOMECTOMY: ICD-10-CM

## 2022-08-24 DIAGNOSIS — K75.81 LIVER CIRRHOSIS SECONDARY TO NASH: ICD-10-CM

## 2022-08-24 DIAGNOSIS — R79.89 ELEVATED LFTS: ICD-10-CM

## 2022-08-24 DIAGNOSIS — D35.2 PITUITARY ADENOMA: ICD-10-CM

## 2022-08-24 DIAGNOSIS — Z86.018 S/P SELECTIVE TRANSSPHENOIDAL PITUITARY ADENOMECTOMY: ICD-10-CM

## 2022-08-24 PROCEDURE — 99999 PR PBB SHADOW E&M-EST. PATIENT-LVL V: CPT | Mod: PBBFAC,,, | Performed by: HOSPITALIST

## 2022-08-24 PROCEDURE — 3288F PR FALLS RISK ASSESSMENT DOCUMENTED: ICD-10-PCS | Mod: CPTII,S$GLB,, | Performed by: HOSPITALIST

## 2022-08-24 PROCEDURE — 3066F PR DOCUMENTATION OF TREATMENT FOR NEPHROPATHY: ICD-10-PCS | Mod: CPTII,S$GLB,, | Performed by: HOSPITALIST

## 2022-08-24 PROCEDURE — 1126F AMNT PAIN NOTED NONE PRSNT: CPT | Mod: CPTII,S$GLB,, | Performed by: HOSPITALIST

## 2022-08-24 PROCEDURE — 3074F PR MOST RECENT SYSTOLIC BLOOD PRESSURE < 130 MM HG: ICD-10-PCS | Mod: CPTII,S$GLB,, | Performed by: HOSPITALIST

## 2022-08-24 PROCEDURE — 99397 PER PM REEVAL EST PAT 65+ YR: CPT | Mod: S$GLB,,, | Performed by: HOSPITALIST

## 2022-08-24 PROCEDURE — 3051F PR MOST RECENT HEMOGLOBIN A1C LEVEL 7.0 - < 8.0%: ICD-10-PCS | Mod: CPTII,S$GLB,, | Performed by: HOSPITALIST

## 2022-08-24 PROCEDURE — 1101F PT FALLS ASSESS-DOCD LE1/YR: CPT | Mod: CPTII,S$GLB,, | Performed by: HOSPITALIST

## 2022-08-24 PROCEDURE — 1126F PR PAIN SEVERITY QUANTIFIED, NO PAIN PRESENT: ICD-10-PCS | Mod: CPTII,S$GLB,, | Performed by: HOSPITALIST

## 2022-08-24 PROCEDURE — 3288F FALL RISK ASSESSMENT DOCD: CPT | Mod: CPTII,S$GLB,, | Performed by: HOSPITALIST

## 2022-08-24 PROCEDURE — 3051F HG A1C>EQUAL 7.0%<8.0%: CPT | Mod: CPTII,S$GLB,, | Performed by: HOSPITALIST

## 2022-08-24 PROCEDURE — 3008F PR BODY MASS INDEX (BMI) DOCUMENTED: ICD-10-PCS | Mod: CPTII,S$GLB,, | Performed by: HOSPITALIST

## 2022-08-24 PROCEDURE — 1101F PR PT FALLS ASSESS DOC 0-1 FALLS W/OUT INJ PAST YR: ICD-10-PCS | Mod: CPTII,S$GLB,, | Performed by: HOSPITALIST

## 2022-08-24 PROCEDURE — 3008F BODY MASS INDEX DOCD: CPT | Mod: CPTII,S$GLB,, | Performed by: HOSPITALIST

## 2022-08-24 PROCEDURE — 3066F NEPHROPATHY DOC TX: CPT | Mod: CPTII,S$GLB,, | Performed by: HOSPITALIST

## 2022-08-24 PROCEDURE — 3078F PR MOST RECENT DIASTOLIC BLOOD PRESSURE < 80 MM HG: ICD-10-PCS | Mod: CPTII,S$GLB,, | Performed by: HOSPITALIST

## 2022-08-24 PROCEDURE — 1159F PR MEDICATION LIST DOCUMENTED IN MEDICAL RECORD: ICD-10-PCS | Mod: CPTII,S$GLB,, | Performed by: HOSPITALIST

## 2022-08-24 PROCEDURE — 3060F PR POS MICROALBUMINURIA RESULT DOCUMENTED/REVIEW: ICD-10-PCS | Mod: CPTII,S$GLB,, | Performed by: HOSPITALIST

## 2022-08-24 PROCEDURE — 1160F PR REVIEW ALL MEDS BY PRESCRIBER/CLIN PHARMACIST DOCUMENTED: ICD-10-PCS | Mod: CPTII,S$GLB,, | Performed by: HOSPITALIST

## 2022-08-24 PROCEDURE — 99999 PR PBB SHADOW E&M-EST. PATIENT-LVL V: ICD-10-PCS | Mod: PBBFAC,,, | Performed by: HOSPITALIST

## 2022-08-24 PROCEDURE — 1160F RVW MEDS BY RX/DR IN RCRD: CPT | Mod: CPTII,S$GLB,, | Performed by: HOSPITALIST

## 2022-08-24 PROCEDURE — 99397 PR PREVENTIVE VISIT,EST,65 & OVER: ICD-10-PCS | Mod: S$GLB,,, | Performed by: HOSPITALIST

## 2022-08-24 PROCEDURE — 3078F DIAST BP <80 MM HG: CPT | Mod: CPTII,S$GLB,, | Performed by: HOSPITALIST

## 2022-08-24 PROCEDURE — 3074F SYST BP LT 130 MM HG: CPT | Mod: CPTII,S$GLB,, | Performed by: HOSPITALIST

## 2022-08-24 PROCEDURE — 3060F POS MICROALBUMINURIA REV: CPT | Mod: CPTII,S$GLB,, | Performed by: HOSPITALIST

## 2022-08-24 PROCEDURE — 1159F MED LIST DOCD IN RCRD: CPT | Mod: CPTII,S$GLB,, | Performed by: HOSPITALIST

## 2022-08-24 RX ORDER — DOXYCYCLINE 100 MG/1
100 CAPSULE ORAL EVERY 12 HOURS
Qty: 14 CAPSULE | Refills: 0 | Status: SHIPPED | OUTPATIENT
Start: 2022-08-24 | End: 2022-08-31

## 2022-08-24 NOTE — PROGRESS NOTES
Subjective:     @Patient ID: Tona Carlson is a 66 y.o. female.    Chief Complaint: Annual Exam    HPI    64 y.o. female with DM2, HLD, liver cirrhosis, ZARCO, hepatitis C ab+, pituitary adenoma s/p resection, obesity, vit d deficiency, here for annual exam.    reports having cyst on chest wall that was [place] 1[place]2 [place] .states still has bump with redness. In the past has had pus drainage not sure if any drainage this time     Lipid disorders/ASCVD risk (ages >/= 45 or >/= 20 if increased risk ): ordered  DM (>45y yearly or if obese, HTN): A1c ordered  Eye exam:  ordered   Breast Cancer (40-50y discretion of pt, 50-74y every 1-2 years): Mammogram DUE   Cervical Cancer (Pap Smear ages 21-65 every 3 years or Pap + HPV q5 years after 30 years of age): done 2018  Colorectal Cancer (normal risk 50-75yr):  cologuard done 4/2021  DEXA (F>64 yo, M >71yo, M&F 50-68 yo with risk factors (smoking, previous fx, wt <70kg; etoh abuse, chronic steroids, RA)): utd 2020     Vaccines:   Influenza (yearly) done 12/27/19  Tetanus (every 10 yrs - 1st tdap) done 2014   PPSV23(>66yo or <65 w/ lung dz, smoking, DM) done 2019.   PCV13 (> 65 or <65 w/ immunocompromised) utd  Zoster (>59yo) defer  Covid19: utd      Exercise:   Diet: low carb diet      Review of Systems   Constitutional: Negative for chills and fever.   HENT: Negative for congestion and sore throat.    Eyes: Negative for pain and visual disturbance.   Respiratory: Negative for cough and shortness of breath.    Cardiovascular: Negative for chest pain and leg swelling.   Gastrointestinal: Negative for abdominal pain, nausea and vomiting.   Endocrine: Negative for polydipsia and polyuria.   Genitourinary: Negative for difficulty urinating and dysuria.   Musculoskeletal: Negative for arthralgias and back pain.   Skin: Positive for color change. Negative for rash and wound.   Neurological: Negative for dizziness, weakness and headaches.    Psychiatric/Behavioral: Negative for agitation and confusion.     Past medical history, surgical history, and family medical history reviewed and updated as appropriate.    Medications and allergies reviewed.     Objective:     There were no vitals filed for this visit.  There is no height or weight on file to calculate BMI.  Physical Exam  Vitals reviewed.   Constitutional:       General: She is not in acute distress.     Appearance: She is well-developed.   HENT:      Head: Normocephalic and atraumatic.      Right Ear: Tympanic membrane normal.      Left Ear: Tympanic membrane normal.      Mouth/Throat:      Mouth: Mucous membranes are moist.      Pharynx: No oropharyngeal exudate.   Eyes:      General:         Right eye: No discharge.         Left eye: No discharge.      Conjunctiva/sclera: Conjunctivae normal.   Cardiovascular:      Rate and Rhythm: Normal rate and regular rhythm.      Heart sounds: No murmur heard.    No friction rub.   Pulmonary:      Effort: Pulmonary effort is normal.      Breath sounds: Normal breath sounds.   Abdominal:      General: Bowel sounds are normal. There is no distension.      Palpations: Abdomen is soft.      Tenderness: There is no abdominal tenderness. There is no guarding.   Musculoskeletal:         General: Normal range of motion.      Cervical back: Normal range of motion and neck supple.      Right lower leg: No edema.      Left lower leg: No edema.   Lymphadenopathy:      Cervical: No cervical adenopathy.   Skin:     General: Skin is warm and dry.      Comments: Red bump of center chest (no drainage). Possible boil vs inflamed sebaceous cyst    Neurological:      Mental Status: She is alert and oriented to person, place, and time.   Psychiatric:         Mood and Affect: Mood normal.         Behavior: Behavior normal.         Lab Results   Component Value Date    WBC 4.34 06/14/2022    HGB 13.2 06/14/2022    HCT 38.9 06/14/2022     06/14/2022    CHOL 159  05/24/2022    TRIG 103 05/24/2022    HDL 48 05/24/2022    ALT 80 (H) 06/14/2022    AST 63 (H) 06/14/2022     06/14/2022    K 3.8 06/14/2022     06/14/2022    CREATININE 0.6 06/14/2022    BUN 7 (L) 06/14/2022    CO2 24 06/14/2022    TSH 3.239 05/24/2022    INR 1.0 06/14/2022    HGBA1C 7.0 (H) 05/24/2022       Assessment:     1. Type 2 diabetes mellitus with diabetic polyneuropathy, without long-term current use of insulin    2. Hyperlipidemia associated with type 2 diabetes mellitus    3. Liver cirrhosis secondary to ZARCO    4. Fatty liver disease, nonalcoholic    5. Elevated LFTs    6. Hepatitis C test positive    7. Aortic atherosclerosis    8. Pituitary adenoma    9. S/P selective transsphenoidal pituitary adenomectomy    10. Class 1 obesity due to excess calories with serious comorbidity and body mass index (BMI) of 30.0 to 30.9 in adult    11. Vitamin D deficiency    12. Boil      Plan:   Tona was seen today for annual exam.    Diagnoses and all orders for this visit:    Type 2 diabetes mellitus with diabetic polyneuropathy, without long-term current use of insulin  - Continue home meds. Not on trulicity as pt felt decreased appetite. She has upcoming appt with endocrine   -     Vitamin D; Future  -     Comprehensive Metabolic Panel; Future  -     CBC Auto Differential; Future  -     Lipid Panel; Future  -     TSH; Future  -     Urinalysis; Future  -     Hemoglobin A1C; Future    Hyperlipidemia associated with type 2 diabetes mellitus  -     Vitamin D; Future  -     Comprehensive Metabolic Panel; Future  -     CBC Auto Differential; Future  -     Lipid Panel; Future  -     TSH; Future  -     Urinalysis; Future  -     Hemoglobin A1C; Future    Liver cirrhosis secondary to ZARCO  -     Vitamin D; Future  -     Comprehensive Metabolic Panel; Future  -     CBC Auto Differential; Future  -     Lipid Panel; Future  -     TSH; Future  -     Urinalysis; Future  -     Hemoglobin A1C; Future    Fatty liver  disease, nonalcoholic  -     Vitamin D; Future  -     Comprehensive Metabolic Panel; Future  -     CBC Auto Differential; Future  -     Lipid Panel; Future  -     TSH; Future  -     Urinalysis; Future  -     Hemoglobin A1C; Future    Elevated LFTs  - check lfts. Follows with hepatology    Hepatitis C test positive  - follows with hepatology    Aortic atherosclerosis  - continue statin    Pituitary adenoma  S/P selective transsphenoidal pituitary adenomectomy    Class 1 obesity due to excess calories with serious comorbidity and body mass index (BMI) of 30.0 to 30.9 in adult  -     Vitamin D; Future  -     Comprehensive Metabolic Panel; Future  -     CBC Auto Differential; Future  -     Lipid Panel; Future  -     TSH; Future  -     Urinalysis; Future  -     Hemoglobin A1C; Future    Vitamin D deficiency  -     Vitamin D; Future    Boil  -     doxycycline (VIBRAMYCIN) 100 MG Cap; Take 1 capsule (100 mg total) by mouth every 12 (twelve) hours. for 7 days          No follow-ups on file.    Medina Prakash MD  Internal Medicine    8/24/2022

## 2022-08-26 ENCOUNTER — HOSPITAL ENCOUNTER (OUTPATIENT)
Dept: RADIOLOGY | Facility: HOSPITAL | Age: 67
Discharge: HOME | End: 2022-08-26
Attending: HOSPITALIST
Payer: MEDICARE

## 2022-08-26 DIAGNOSIS — Z12.31 ENCOUNTER FOR SCREENING MAMMOGRAM FOR MALIGNANT NEOPLASM OF BREAST: ICD-10-CM

## 2022-08-26 PROCEDURE — 77063 BREAST TOMOSYNTHESIS BI: CPT | Mod: 26,,, | Performed by: RADIOLOGY

## 2022-08-26 PROCEDURE — 77063 MAMMO DIGITAL SCREENING BILAT WITH TOMO: ICD-10-PCS | Mod: 26,,, | Performed by: RADIOLOGY

## 2022-08-26 PROCEDURE — 77067 MAMMO DIGITAL SCREENING BILAT WITH TOMO: ICD-10-PCS | Mod: 26,,, | Performed by: RADIOLOGY

## 2022-08-26 PROCEDURE — 77063 BREAST TOMOSYNTHESIS BI: CPT | Mod: TC

## 2022-08-26 PROCEDURE — 77067 SCR MAMMO BI INCL CAD: CPT | Mod: TC

## 2022-08-26 PROCEDURE — 77067 SCR MAMMO BI INCL CAD: CPT | Mod: 26,,, | Performed by: RADIOLOGY

## 2022-08-29 ENCOUNTER — LAB VISIT (OUTPATIENT)
Dept: LAB | Facility: HOSPITAL | Age: 67
End: 2022-08-29
Attending: HOSPITALIST
Payer: MEDICARE

## 2022-08-29 DIAGNOSIS — E66.09 CLASS 1 OBESITY DUE TO EXCESS CALORIES WITH SERIOUS COMORBIDITY AND BODY MASS INDEX (BMI) OF 30.0 TO 30.9 IN ADULT: ICD-10-CM

## 2022-08-29 DIAGNOSIS — E11.69 HYPERLIPIDEMIA ASSOCIATED WITH TYPE 2 DIABETES MELLITUS: ICD-10-CM

## 2022-08-29 DIAGNOSIS — K76.0 FATTY LIVER DISEASE, NONALCOHOLIC: ICD-10-CM

## 2022-08-29 DIAGNOSIS — E78.5 HYPERLIPIDEMIA ASSOCIATED WITH TYPE 2 DIABETES MELLITUS: ICD-10-CM

## 2022-08-29 DIAGNOSIS — K75.81 LIVER CIRRHOSIS SECONDARY TO NASH: ICD-10-CM

## 2022-08-29 DIAGNOSIS — K74.60 LIVER CIRRHOSIS SECONDARY TO NASH: ICD-10-CM

## 2022-08-29 DIAGNOSIS — E11.42 TYPE 2 DIABETES MELLITUS WITH DIABETIC POLYNEUROPATHY, WITHOUT LONG-TERM CURRENT USE OF INSULIN: ICD-10-CM

## 2022-08-29 DIAGNOSIS — E55.9 VITAMIN D DEFICIENCY: ICD-10-CM

## 2022-08-29 LAB
25(OH)D3+25(OH)D2 SERPL-MCNC: 24 NG/ML (ref 30–96)
ALBUMIN SERPL BCP-MCNC: 3.8 G/DL (ref 3.5–5.2)
ALP SERPL-CCNC: 110 U/L (ref 55–135)
ALT SERPL W/O P-5'-P-CCNC: 86 U/L (ref 10–44)
ANION GAP SERPL CALC-SCNC: 9 MMOL/L (ref 8–16)
AST SERPL-CCNC: 55 U/L (ref 10–40)
BASOPHILS # BLD AUTO: 0.03 K/UL (ref 0–0.2)
BASOPHILS NFR BLD: 0.6 % (ref 0–1.9)
BILIRUB SERPL-MCNC: 0.8 MG/DL (ref 0.1–1)
BUN SERPL-MCNC: 9 MG/DL (ref 8–23)
CALCIUM SERPL-MCNC: 9.4 MG/DL (ref 8.7–10.5)
CHLORIDE SERPL-SCNC: 108 MMOL/L (ref 95–110)
CHOLEST SERPL-MCNC: 154 MG/DL (ref 120–199)
CHOLEST/HDLC SERPL: 3.9 {RATIO} (ref 2–5)
CO2 SERPL-SCNC: 23 MMOL/L (ref 23–29)
CREAT SERPL-MCNC: 0.8 MG/DL (ref 0.5–1.4)
DIFFERENTIAL METHOD: ABNORMAL
EOSINOPHIL # BLD AUTO: 0.2 K/UL (ref 0–0.5)
EOSINOPHIL NFR BLD: 3.6 % (ref 0–8)
ERYTHROCYTE [DISTWIDTH] IN BLOOD BY AUTOMATED COUNT: 12.6 % (ref 11.5–14.5)
EST. GFR  (NO RACE VARIABLE): >60 ML/MIN/1.73 M^2
ESTIMATED AVG GLUCOSE: 151 MG/DL (ref 68–131)
GLUCOSE SERPL-MCNC: 127 MG/DL (ref 70–110)
HBA1C MFR BLD: 6.9 % (ref 4–5.6)
HCT VFR BLD AUTO: 38.2 % (ref 37–48.5)
HDLC SERPL-MCNC: 40 MG/DL (ref 40–75)
HDLC SERPL: 26 % (ref 20–50)
HGB BLD-MCNC: 13.4 G/DL (ref 12–16)
IMM GRANULOCYTES # BLD AUTO: 0.02 K/UL (ref 0–0.04)
IMM GRANULOCYTES NFR BLD AUTO: 0.4 % (ref 0–0.5)
LDLC SERPL CALC-MCNC: 77.4 MG/DL (ref 63–159)
LYMPHOCYTES # BLD AUTO: 2.5 K/UL (ref 1–4.8)
LYMPHOCYTES NFR BLD: 45.9 % (ref 18–48)
MCH RBC QN AUTO: 32.1 PG (ref 27–31)
MCHC RBC AUTO-ENTMCNC: 35.1 G/DL (ref 32–36)
MCV RBC AUTO: 91 FL (ref 82–98)
MONOCYTES # BLD AUTO: 0.3 K/UL (ref 0.3–1)
MONOCYTES NFR BLD: 6.2 % (ref 4–15)
NEUTROPHILS # BLD AUTO: 2.3 K/UL (ref 1.8–7.7)
NEUTROPHILS NFR BLD: 43.3 % (ref 38–73)
NONHDLC SERPL-MCNC: 114 MG/DL
NRBC BLD-RTO: 0 /100 WBC
PLATELET # BLD AUTO: 157 K/UL (ref 150–450)
PMV BLD AUTO: 10.2 FL (ref 9.2–12.9)
POTASSIUM SERPL-SCNC: 4 MMOL/L (ref 3.5–5.1)
PROT SERPL-MCNC: 6.7 G/DL (ref 6–8.4)
RBC # BLD AUTO: 4.18 M/UL (ref 4–5.4)
SODIUM SERPL-SCNC: 140 MMOL/L (ref 136–145)
TRIGL SERPL-MCNC: 183 MG/DL (ref 30–150)
TSH SERPL DL<=0.005 MIU/L-ACNC: 3.06 UIU/ML (ref 0.4–4)
WBC # BLD AUTO: 5.34 K/UL (ref 3.9–12.7)

## 2022-08-29 PROCEDURE — 82306 VITAMIN D 25 HYDROXY: CPT | Performed by: HOSPITALIST

## 2022-08-29 PROCEDURE — 84443 ASSAY THYROID STIM HORMONE: CPT | Performed by: HOSPITALIST

## 2022-08-29 PROCEDURE — 80061 LIPID PANEL: CPT | Performed by: HOSPITALIST

## 2022-08-29 PROCEDURE — 80053 COMPREHEN METABOLIC PANEL: CPT | Performed by: HOSPITALIST

## 2022-08-29 PROCEDURE — 83036 HEMOGLOBIN GLYCOSYLATED A1C: CPT | Performed by: HOSPITALIST

## 2022-08-29 PROCEDURE — 85025 COMPLETE CBC W/AUTO DIFF WBC: CPT | Performed by: HOSPITALIST

## 2022-08-29 PROCEDURE — 36415 COLL VENOUS BLD VENIPUNCTURE: CPT | Mod: PO | Performed by: HOSPITALIST

## 2022-09-23 ENCOUNTER — OFFICE VISIT (OUTPATIENT)
Dept: ENDOCRINOLOGY | Facility: CLINIC | Age: 67
End: 2022-09-23
Payer: MEDICARE

## 2022-09-23 DIAGNOSIS — E11.42 TYPE 2 DIABETES MELLITUS WITH DIABETIC POLYNEUROPATHY, WITHOUT LONG-TERM CURRENT USE OF INSULIN: Primary | Chronic | ICD-10-CM

## 2022-09-23 DIAGNOSIS — D35.2 PITUITARY ADENOMA: Chronic | ICD-10-CM

## 2022-09-23 DIAGNOSIS — M81.0 AGE RELATED OSTEOPOROSIS, UNSPECIFIED PATHOLOGICAL FRACTURE PRESENCE: Chronic | ICD-10-CM

## 2022-09-23 PROCEDURE — 1160F PR REVIEW ALL MEDS BY PRESCRIBER/CLIN PHARMACIST DOCUMENTED: ICD-10-PCS | Mod: CPTII,S$GLB,, | Performed by: NURSE PRACTITIONER

## 2022-09-23 PROCEDURE — 3060F PR POS MICROALBUMINURIA RESULT DOCUMENTED/REVIEW: ICD-10-PCS | Mod: CPTII,S$GLB,, | Performed by: NURSE PRACTITIONER

## 2022-09-23 PROCEDURE — 1159F PR MEDICATION LIST DOCUMENTED IN MEDICAL RECORD: ICD-10-PCS | Mod: CPTII,S$GLB,, | Performed by: NURSE PRACTITIONER

## 2022-09-23 PROCEDURE — 99499 NO LOS: ICD-10-PCS | Mod: S$GLB,,, | Performed by: NURSE PRACTITIONER

## 2022-09-23 PROCEDURE — 1160F RVW MEDS BY RX/DR IN RCRD: CPT | Mod: CPTII,S$GLB,, | Performed by: NURSE PRACTITIONER

## 2022-09-23 PROCEDURE — 3044F PR MOST RECENT HEMOGLOBIN A1C LEVEL <7.0%: ICD-10-PCS | Mod: CPTII,S$GLB,, | Performed by: NURSE PRACTITIONER

## 2022-09-23 PROCEDURE — 3066F NEPHROPATHY DOC TX: CPT | Mod: CPTII,S$GLB,, | Performed by: NURSE PRACTITIONER

## 2022-09-23 PROCEDURE — 3066F PR DOCUMENTATION OF TREATMENT FOR NEPHROPATHY: ICD-10-PCS | Mod: CPTII,S$GLB,, | Performed by: NURSE PRACTITIONER

## 2022-09-23 PROCEDURE — 99499 UNLISTED E&M SERVICE: CPT | Mod: S$GLB,,, | Performed by: NURSE PRACTITIONER

## 2022-09-23 PROCEDURE — 1159F MED LIST DOCD IN RCRD: CPT | Mod: CPTII,S$GLB,, | Performed by: NURSE PRACTITIONER

## 2022-09-23 PROCEDURE — 3060F POS MICROALBUMINURIA REV: CPT | Mod: CPTII,S$GLB,, | Performed by: NURSE PRACTITIONER

## 2022-09-23 PROCEDURE — 3044F HG A1C LEVEL LT 7.0%: CPT | Mod: CPTII,S$GLB,, | Performed by: NURSE PRACTITIONER

## 2022-10-03 PROBLEM — Z85.05 ENCOUNTER FOR FOLLOW-UP SURVEILLANCE OF LIVER CANCER: Status: RESOLVED | Noted: 2022-06-28 | Resolved: 2022-10-03

## 2022-10-03 PROBLEM — Z08 ENCOUNTER FOR FOLLOW-UP SURVEILLANCE OF LIVER CANCER: Status: RESOLVED | Noted: 2022-06-28 | Resolved: 2022-10-03

## 2022-10-04 ENCOUNTER — HOSPITAL ENCOUNTER (OUTPATIENT)
Dept: RADIOLOGY | Facility: HOSPITAL | Age: 67
Discharge: HOME | End: 2022-10-04
Attending: INTERNAL MEDICINE
Payer: MEDICARE

## 2022-10-04 DIAGNOSIS — E23.7 DISORDER OF PITUITARY GLAND, UNSPECIFIED: ICD-10-CM

## 2022-10-04 PROCEDURE — 70553 MRI PITUITARY W W/O CONTRAST: ICD-10-PCS | Mod: 26,,, | Performed by: RADIOLOGY

## 2022-10-04 PROCEDURE — 70553 MRI BRAIN STEM W/O & W/DYE: CPT | Mod: TC

## 2022-10-04 PROCEDURE — 25500020 PHARM REV CODE 255: Performed by: INTERNAL MEDICINE

## 2022-10-04 PROCEDURE — A9585 GADOBUTROL INJECTION: HCPCS | Performed by: INTERNAL MEDICINE

## 2022-10-04 PROCEDURE — 70553 MRI BRAIN STEM W/O & W/DYE: CPT | Mod: 26,,, | Performed by: RADIOLOGY

## 2022-10-04 RX ORDER — GADOBUTROL 604.72 MG/ML
4 INJECTION INTRAVENOUS
Status: COMPLETED | OUTPATIENT
Start: 2022-10-04 | End: 2022-10-04

## 2022-10-04 RX ADMIN — GADOBUTROL 4 ML: 604.72 INJECTION INTRAVENOUS at 01:10

## 2022-10-14 ENCOUNTER — LAB VISIT (OUTPATIENT)
Dept: LAB | Facility: HOSPITAL | Age: 67
End: 2022-10-14
Payer: MEDICARE

## 2022-10-14 DIAGNOSIS — E23.7 DISORDER OF PITUITARY GLAND, UNSPECIFIED: ICD-10-CM

## 2022-10-14 LAB
CORTIS SERPL-MCNC: 14.5 UG/DL (ref 4.3–22.4)
FSH SERPL-ACNC: 26.29 MIU/ML
PROLACTIN SERPL IA-MCNC: 18.5 NG/ML (ref 5.2–26.5)
T4 FREE SERPL-MCNC: 0.93 NG/DL (ref 0.71–1.51)
TSH SERPL DL<=0.005 MIU/L-ACNC: 3.19 UIU/ML (ref 0.4–4)

## 2022-10-14 PROCEDURE — 84443 ASSAY THYROID STIM HORMONE: CPT | Performed by: INTERNAL MEDICINE

## 2022-10-14 PROCEDURE — 82533 TOTAL CORTISOL: CPT | Performed by: INTERNAL MEDICINE

## 2022-10-14 PROCEDURE — 84439 ASSAY OF FREE THYROXINE: CPT | Performed by: INTERNAL MEDICINE

## 2022-10-14 PROCEDURE — 84146 ASSAY OF PROLACTIN: CPT | Performed by: INTERNAL MEDICINE

## 2022-10-14 PROCEDURE — 83001 ASSAY OF GONADOTROPIN (FSH): CPT | Performed by: INTERNAL MEDICINE

## 2022-10-14 PROCEDURE — 82024 ASSAY OF ACTH: CPT | Performed by: INTERNAL MEDICINE

## 2022-10-17 NOTE — PROGRESS NOTES
Subjective:      Patient ID: Tona Carlson is a 67 y.o. female.    Chief Complaint:  Diabetes    History of Present Illness  Tona Carlson is here for follow up of DM.  Previously seen by JOSELIN Fuller NP.  Last seen 2022.  This is their first visit with me.      With regards to diabetes:    Diagnosed: in her 50s  DE: 2022  Known complications:  DKA Denies  RN Denies  Eye Exam: 2022  PN Denies  Podiatry: 3/2022  Nephropathy Denies  CAD Denies  Denies history of pancreatitis & personal/family history of medullary thyroid cancer.     Current regimen:  Metformin 500mg twice a daily - will skip evening dose if AM dose gives her GI intolerance   Lantus 30 units nightly   Novolog 15 units before meals but only taking it once or twice daily     Took Trulicity for 2 months then self stopped due to decreased appetite.     Other medications tried:  Jardiance - cost  Glimepiride  Victoza - GI intolerance  Januvia - cost    Glucose Monitor:   1 times a day testing  Log reviewed: oral recall  Fastin    Hypoglycemia:  Denies  Knows how to correct with 15 grams of carbs- juice, coke, or a peppermint.     Diet/Exercise:  Eats 3 meals a day.   Snacks : occasionally   Drinks : diet soft drinks, water  Exercise - tries to stay active.      Diabetes Management Status    Hemoglobin A1C   Date Value Ref Range Status   2022 6.9 (H) 4.0 - 5.6 % Final     Comment:     ADA Screening Guidelines:  5.7-6.4%  Consistent with prediabetes  >or=6.5%  Consistent with diabetes    High levels of fetal hemoglobin interfere with the HbA1C  assay. Heterozygous hemoglobin variants (HbS, HgC, etc)do  not significantly interfere with this assay.   However, presence of multiple variants may affect accuracy.     2022 7.0 (H) 4.0 - 5.6 % Final     Comment:     ADA Screening Guidelines:  5.7-6.4%  Consistent with prediabetes  >or=6.5%  Consistent with diabetes    High levels of fetal hemoglobin interfere with the HbA1C  assay.  Heterozygous hemoglobin variants (HbS, HgC, etc)do  not significantly interfere with this assay.   However, presence of multiple variants may affect accuracy.     02/24/2022 10.4 (H) 4.0 - 5.6 % Final     Comment:     ADA Screening Guidelines:  5.7-6.4%  Consistent with prediabetes  >or=6.5%  Consistent with diabetes    High levels of fetal hemoglobin interfere with the HbA1C  assay. Heterozygous hemoglobin variants (HbS, HgC, etc)do  not significantly interfere with this assay.   However, presence of multiple variants may affect accuracy.         Statin: Taking  ACE/ARB: Not taking  Screening or Prevention Patient's value Goal Complete/Controlled?   HgA1C Testing and Control   Lab Results   Component Value Date    HGBA1C 6.9 (H) 08/29/2022      Annually/Less than 8% Yes     Lipid profile : 08/29/2022 Annually Yes     LDL control Lab Results   Component Value Date    LDLCALC 77.4 08/29/2022    Annually/Less than 100 mg/dl  Yes     Nephropathy screening Lab Results   Component Value Date    LABMICR 58.0 02/24/2022     Lab Results   Component Value Date    PROTEINUA Negative 08/29/2022    Annually Yes     Blood pressure BP Readings from Last 1 Encounters:   10/20/22 112/64    Less than 140/90 Yes     Dilated retinal exam : 05/31/2022 Annually Yes     Foot exam   : 02/24/2022 Annually Yes       With regards to osteoporosis:     BMD:   10/20/2020  Lumbar spine (L1-L4):   BMD is 0.774 g/cm2, T-score is -2.5, and Z-score is -0.7.  Total hip: BMD is 0.658 g/cm2, T-score is -2.3, and Z-score is -1.2.  Femoral neck:  BMD is 0.540 g/cm2, T-score is -2.8, and Z-score is -1.4.  FRAX:  7.4% risk of a major osteoporotic fracture in the next 10 years.  1.6% risk of hip fracture in the next 10 years.  Impression:  1. Osteoporosis of the lumbar spine and hip.  2. Can not compare to prior study done using different analysis method.  RECOMMENDATIONS:  RECOMMENDATIONS of [facility]:  1. Recommend  "daily calcium intake 3672-1929 mg, dietary sources preferred; Vitamin D 5667-4483 IU daily.  2. Adequate exercise and fall precautions.  3. Recommend medical therapy for osteoporosis. Consider bisphosphonates (alendronate, risedronate, zoledronic acid), or denosumab.  4. Repeat BMD in 2 years.    Medications:   Fosamax - unsure of start and stop dates - believes she took it for a year     Reclast  11/16/2021    Calcium intake: None   Vit D intake: recently started over the counter vitamin D3 1000iu daily     Weight bearing exercise: active lifestyle   Falls: Denies  Fractures: Denies    Following with Dr Young and Dr Famrer for Pituitary Incidentaloma     Review of Systems  As above    Objective:   Physical Exam  Vitals reviewed.   Neck:      Thyroid: No thyromegaly.   Cardiovascular:      Rate and Rhythm: Normal rate.      Comments: No edema present  Pulmonary:      Effort: Pulmonary effort is normal.   Abdominal:      Palpations: Abdomen is soft.     10/2016  IMPRESSION:   1.) Thyroid gland is normal in size with homogenous echotexture  2.) No thyroid nodules seen     RECOMMENDATIONS:   No need for repeat neck ultrasound unless there is a clinical change.    Visit Vitals  /64   Pulse 80   Ht 5' 4" (1.626 m)   Wt 84.1 kg (185 lb 4.8 oz)   SpO2 98%   BMI 31.81 kg/m²       Body mass index is 31.81 kg/m².    Lab Review:   Lab Results   Component Value Date    HGBA1C 6.9 (H) 08/29/2022    HGBA1C 7.0 (H) 05/24/2022    HGBA1C 10.4 (H) 02/24/2022       Lab Results   Component Value Date    CHOL 154 08/29/2022    HDL 40 08/29/2022    LDLCALC 77.4 08/29/2022    TRIG 183 (H) 08/29/2022    CHOLHDL 26.0 08/29/2022     Lab Results   Component Value Date     08/29/2022    K 4.0 08/29/2022     08/29/2022    CO2 23 08/29/2022     (H) 08/29/2022    BUN 9 08/29/2022    CREATININE 0.8 08/29/2022    CALCIUM 9.4 08/29/2022    PROT 6.7 08/29/2022    ALBUMIN 3.8 08/29/2022    BILITOT 0.8 08/29/2022    ALKPHOS 110 " 08/29/2022    AST 55 (H) 08/29/2022    ALT 86 (H) 08/29/2022    ANIONGAP 9 08/29/2022    ESTGFRAFRICA >60.0 06/14/2022    EGFRNONAA >60.0 06/14/2022    TSH 3.193 10/14/2022     Vit D, 25-Hydroxy   Date Value Ref Range Status   08/29/2022 24 (L) 30 - 96 ng/mL Final     Comment:     Vitamin D deficiency.........<10 ng/mL                              Vitamin D insufficiency......10-29 ng/mL       Vitamin D sufficiency........> or equal to 30 ng/mL  Vitamin D toxicity............>100 ng/mL       Assessment and Plan     1. Type 2 diabetes mellitus with diabetic polyneuropathy, without long-term current use of insulin  semaglutide (OZEMPIC) 0.25 mg or 0.5 mg(2 mg/1.5 mL) pen injector    GLUCOSE MONITORING CONTINUOUS MIN 72 HOURS    Hemoglobin A1C    Comprehensive Metabolic Panel      2. Vitamin D deficiency  Vitamin D      3. Pituitary adenoma        4. Age related osteoporosis, unspecified pathological fracture presence  DXA Bone Density Spine And Hip          Type 2 diabetes mellitus with diabetic polyneuropathy, without long-term current use of insulin  -- Labs prior to follow up.  -- A1c goal <7%.  -- Medications discussed:  MFM   GLP1-DPP4   RIVERA   SGLT2 - cost  Insulin   -- Reviewed logs/CGM:  Only checking fasting.  Encouraged to check before meals.  Professional CGM.  -- Medication Changes:   Metformin 500mg twice a daily  Lantus 30 units nightly   Novolog 10 units before meals  Ozempic 0.2.5mg weekly    Self stopped Trulicity due to decreased appetite. Patient requesting to try Ozempic. Discussed same MOA and suspect she will experience same side effect- patient would still like to try it.    -- Reviewed goals of therapy are to get the best control we can without hypoglycemia.  -- Reviewed patient's current insulin regimen. Clarified proper insulin dose and timing in relation to meals, etc. Insulin injection sites and proper rotation instructed.    -- Advised frequent self blood glucose monitoring.  Patient  encouraged to document glucose results and bring them to every clinic visit.  -- Hypoglycemia precautions discussed. Instructed on precautions before driving.    -- Call for Bg repeatedly < 90 or > 180.   -- Close adherence to lifestyle changes recommended.   -- Periodic follow ups for eye evaluations, foot care and dental care suggested.    Vitamin D deficiency  -- Check vitamin D.    Pituitary adenoma  -- Continue following with Pituitary Clinic.    Osteoporosis  -- Due for BMD.  -- Treatment  Fosamax - unsure of start and stop dates - believes she took it for a year     Reclast  11/16/2021    Therapy plan reordered for 11/2022.      Follow up in about 4 months (around 2/20/2023).

## 2022-10-18 LAB — ACTH PLAS-MCNC: 33 PG/ML (ref 0–46)

## 2022-10-20 ENCOUNTER — OFFICE VISIT (OUTPATIENT)
Dept: ENDOCRINOLOGY | Facility: CLINIC | Age: 67
End: 2022-10-20
Payer: MEDICARE

## 2022-10-20 VITALS
SYSTOLIC BLOOD PRESSURE: 112 MMHG | DIASTOLIC BLOOD PRESSURE: 64 MMHG | OXYGEN SATURATION: 98 % | HEART RATE: 80 BPM | WEIGHT: 185.31 LBS | HEIGHT: 64 IN | BODY MASS INDEX: 31.64 KG/M2

## 2022-10-20 DIAGNOSIS — M81.0 AGE RELATED OSTEOPOROSIS, UNSPECIFIED PATHOLOGICAL FRACTURE PRESENCE: Chronic | ICD-10-CM

## 2022-10-20 DIAGNOSIS — E11.42 TYPE 2 DIABETES MELLITUS WITH DIABETIC POLYNEUROPATHY, WITHOUT LONG-TERM CURRENT USE OF INSULIN: Primary | Chronic | ICD-10-CM

## 2022-10-20 DIAGNOSIS — E55.9 VITAMIN D DEFICIENCY: ICD-10-CM

## 2022-10-20 DIAGNOSIS — D35.2 PITUITARY ADENOMA: Chronic | ICD-10-CM

## 2022-10-20 PROCEDURE — 1159F MED LIST DOCD IN RCRD: CPT | Mod: CPTII,S$GLB,, | Performed by: NURSE PRACTITIONER

## 2022-10-20 PROCEDURE — 1159F PR MEDICATION LIST DOCUMENTED IN MEDICAL RECORD: ICD-10-PCS | Mod: CPTII,S$GLB,, | Performed by: NURSE PRACTITIONER

## 2022-10-20 PROCEDURE — 1101F PT FALLS ASSESS-DOCD LE1/YR: CPT | Mod: CPTII,S$GLB,, | Performed by: NURSE PRACTITIONER

## 2022-10-20 PROCEDURE — 3044F HG A1C LEVEL LT 7.0%: CPT | Mod: CPTII,S$GLB,, | Performed by: NURSE PRACTITIONER

## 2022-10-20 PROCEDURE — 3078F DIAST BP <80 MM HG: CPT | Mod: CPTII,S$GLB,, | Performed by: NURSE PRACTITIONER

## 2022-10-20 PROCEDURE — 3074F PR MOST RECENT SYSTOLIC BLOOD PRESSURE < 130 MM HG: ICD-10-PCS | Mod: CPTII,S$GLB,, | Performed by: NURSE PRACTITIONER

## 2022-10-20 PROCEDURE — 1160F PR REVIEW ALL MEDS BY PRESCRIBER/CLIN PHARMACIST DOCUMENTED: ICD-10-PCS | Mod: CPTII,S$GLB,, | Performed by: NURSE PRACTITIONER

## 2022-10-20 PROCEDURE — 3066F NEPHROPATHY DOC TX: CPT | Mod: CPTII,S$GLB,, | Performed by: NURSE PRACTITIONER

## 2022-10-20 PROCEDURE — 3078F PR MOST RECENT DIASTOLIC BLOOD PRESSURE < 80 MM HG: ICD-10-PCS | Mod: CPTII,S$GLB,, | Performed by: NURSE PRACTITIONER

## 2022-10-20 PROCEDURE — 99999 PR PBB SHADOW E&M-EST. PATIENT-LVL IV: ICD-10-PCS | Mod: PBBFAC,,, | Performed by: NURSE PRACTITIONER

## 2022-10-20 PROCEDURE — 99214 PR OFFICE/OUTPT VISIT, EST, LEVL IV, 30-39 MIN: ICD-10-PCS | Mod: S$GLB,,, | Performed by: NURSE PRACTITIONER

## 2022-10-20 PROCEDURE — 3288F PR FALLS RISK ASSESSMENT DOCUMENTED: ICD-10-PCS | Mod: CPTII,S$GLB,, | Performed by: NURSE PRACTITIONER

## 2022-10-20 PROCEDURE — 99999 PR PBB SHADOW E&M-EST. PATIENT-LVL IV: CPT | Mod: PBBFAC,,, | Performed by: NURSE PRACTITIONER

## 2022-10-20 PROCEDURE — 1126F PR PAIN SEVERITY QUANTIFIED, NO PAIN PRESENT: ICD-10-PCS | Mod: CPTII,S$GLB,, | Performed by: NURSE PRACTITIONER

## 2022-10-20 PROCEDURE — 3060F PR POS MICROALBUMINURIA RESULT DOCUMENTED/REVIEW: ICD-10-PCS | Mod: CPTII,S$GLB,, | Performed by: NURSE PRACTITIONER

## 2022-10-20 PROCEDURE — 1126F AMNT PAIN NOTED NONE PRSNT: CPT | Mod: CPTII,S$GLB,, | Performed by: NURSE PRACTITIONER

## 2022-10-20 PROCEDURE — 3060F POS MICROALBUMINURIA REV: CPT | Mod: CPTII,S$GLB,, | Performed by: NURSE PRACTITIONER

## 2022-10-20 PROCEDURE — 3288F FALL RISK ASSESSMENT DOCD: CPT | Mod: CPTII,S$GLB,, | Performed by: NURSE PRACTITIONER

## 2022-10-20 PROCEDURE — 3044F PR MOST RECENT HEMOGLOBIN A1C LEVEL <7.0%: ICD-10-PCS | Mod: CPTII,S$GLB,, | Performed by: NURSE PRACTITIONER

## 2022-10-20 PROCEDURE — 3074F SYST BP LT 130 MM HG: CPT | Mod: CPTII,S$GLB,, | Performed by: NURSE PRACTITIONER

## 2022-10-20 PROCEDURE — 3066F PR DOCUMENTATION OF TREATMENT FOR NEPHROPATHY: ICD-10-PCS | Mod: CPTII,S$GLB,, | Performed by: NURSE PRACTITIONER

## 2022-10-20 PROCEDURE — 1101F PR PT FALLS ASSESS DOC 0-1 FALLS W/OUT INJ PAST YR: ICD-10-PCS | Mod: CPTII,S$GLB,, | Performed by: NURSE PRACTITIONER

## 2022-10-20 PROCEDURE — 1160F RVW MEDS BY RX/DR IN RCRD: CPT | Mod: CPTII,S$GLB,, | Performed by: NURSE PRACTITIONER

## 2022-10-20 PROCEDURE — 99214 OFFICE O/P EST MOD 30 MIN: CPT | Mod: S$GLB,,, | Performed by: NURSE PRACTITIONER

## 2022-10-20 RX ORDER — SODIUM CHLORIDE 0.9 % (FLUSH) 0.9 %
10 SYRINGE (ML) INJECTION
Status: CANCELLED | OUTPATIENT
Start: 2022-11-20

## 2022-10-20 RX ORDER — HEPARIN 100 UNIT/ML
500 SYRINGE INTRAVENOUS
Status: CANCELLED | OUTPATIENT
Start: 2022-11-20

## 2022-10-20 RX ORDER — ZOLEDRONIC ACID 5 MG/100ML
5 INJECTION, SOLUTION INTRAVENOUS
Status: CANCELLED | OUTPATIENT
Start: 2022-11-20

## 2022-10-20 RX ORDER — ACETAMINOPHEN 500 MG
500 TABLET ORAL
Status: CANCELLED | OUTPATIENT
Start: 2022-11-20

## 2022-10-20 NOTE — ASSESSMENT & PLAN NOTE
-- Labs prior to follow up.  -- A1c goal <7%.  -- Medications discussed:  MFM   GLP1-DPP4   RIVERA   SGLT2 - cost  Insulin   -- Reviewed logs/CGM:  Only checking fasting.  Encouraged to check before meals.  Professional CGM.  -- Medication Changes:   Metformin 500mg twice a daily  Lantus 30 units nightly   Novolog 10 units before meals  Ozempic 0.2.5mg weekly    Self stopped Trulicity due to decreased appetite. Patient requesting to try Ozempic. Discussed same MOA and suspect she will experience same side effect- patient would still like to try it.    -- Reviewed goals of therapy are to get the best control we can without hypoglycemia.  -- Reviewed patient's current insulin regimen. Clarified proper insulin dose and timing in relation to meals, etc. Insulin injection sites and proper rotation instructed.    -- Advised frequent self blood glucose monitoring.  Patient encouraged to document glucose results and bring them to every clinic visit.  -- Hypoglycemia precautions discussed. Instructed on precautions before driving.    -- Call for Bg repeatedly < 90 or > 180.   -- Close adherence to lifestyle changes recommended.   -- Periodic follow ups for eye evaluations, foot care and dental care suggested.

## 2022-10-20 NOTE — ASSESSMENT & PLAN NOTE
-- Due for BMD.  -- Treatment  Fosamax - unsure of start and stop dates - believes she took it for a year     Reclast  11/16/2021    Therapy plan reordered for 11/2022.

## 2022-10-25 ENCOUNTER — CLINICAL SUPPORT (OUTPATIENT)
Dept: ENDOCRINOLOGY | Facility: CLINIC | Age: 67
End: 2022-10-25
Payer: MEDICARE

## 2022-10-25 DIAGNOSIS — E11.42 TYPE 2 DIABETES MELLITUS WITH DIABETIC POLYNEUROPATHY, WITHOUT LONG-TERM CURRENT USE OF INSULIN: Chronic | ICD-10-CM

## 2022-10-25 NOTE — PROGRESS NOTES
"PLACEMENT OF DEXCOM G6 PRO SENSOR  CONTINOUS GLUCOSE MONITORING SYSTEM (CGMS)     Patient is here in clinic today for placement of continuous glucose monitoring sensor.                Each patient verified that they were here for CGMS procedure ordered by their provider and that they have a working glucose meter and supplies at home.   Patient will be provided with a Dexcom G6 Pro sensor, transmitter, and a copy of the Continuous Glucose Monitoring Patient Log to fill out during the study.              A detailed  explanation of Continuous Glucose Monitoring was  provided. Patient informed that this is a blind procedure and that they will not actually see the blood sugar tracing in real time.    Instructed patient to check blood sugar using home glucometer and to record the following on provided patient log sheets:Blood sugar taken at home, Meals and snacks, Activity, and Diabetes medications taken and dosage               Patient was brought to a private location.  Site selected and prepared and allowed to dry. Glucose Transmitter Serial Number [id number]  was inserted to patient's abdomen.               The following forms  were given and reviewed in detail with patient and all questions answered.   Continuous Glucose Monitoring Patient Log   Dexcom G6 PRO Patient Handout "Blinded CGM Patient Handout"                 Instructions: Time: 15 min   Insertion of sensor:  Time: 5 minutes      "

## 2022-10-31 ENCOUNTER — CLINICAL SUPPORT (OUTPATIENT)
Dept: ENDOCRINOLOGY | Facility: CLINIC | Age: 67
End: 2022-10-31
Payer: MEDICARE

## 2022-10-31 DIAGNOSIS — E11.42 TYPE 2 DIABETES MELLITUS WITH DIABETIC POLYNEUROPATHY, WITHOUT LONG-TERM CURRENT USE OF INSULIN: Primary | Chronic | ICD-10-CM

## 2022-10-31 PROCEDURE — 95250 PR GLUCOSE MONITORING,72 HRS,SUB-Q SENSOR: ICD-10-PCS | Mod: S$GLB,,, | Performed by: NURSE PRACTITIONER

## 2022-10-31 PROCEDURE — 95251 PR GLUCOSE MONITOR, 72 HOUR, PHYS INTERP: ICD-10-PCS | Mod: S$GLB,,, | Performed by: NURSE PRACTITIONER

## 2022-10-31 PROCEDURE — 95251 CONT GLUC MNTR ANALYSIS I&R: CPT | Mod: S$GLB,,, | Performed by: NURSE PRACTITIONER

## 2022-10-31 PROCEDURE — 95250 CONT GLUC MNTR PHYS/QHP EQP: CPT | Mod: S$GLB,,, | Performed by: NURSE PRACTITIONER

## 2022-11-01 ENCOUNTER — PATIENT MESSAGE (OUTPATIENT)
Dept: ENDOCRINOLOGY | Facility: CLINIC | Age: 67
End: 2022-11-01
Payer: MEDICARE

## 2022-11-01 ENCOUNTER — PATIENT MESSAGE (OUTPATIENT)
Dept: ENDOCRINOLOGY | Facility: CLINIC | Age: 67
End: 2022-11-01

## 2022-11-01 ENCOUNTER — OFFICE VISIT (OUTPATIENT)
Dept: ENDOCRINOLOGY | Facility: CLINIC | Age: 67
End: 2022-11-01
Payer: MEDICARE

## 2022-11-01 ENCOUNTER — OFFICE VISIT (OUTPATIENT)
Dept: NEUROSURGERY | Facility: CLINIC | Age: 67
End: 2022-11-01
Payer: MEDICARE

## 2022-11-01 VITALS
OXYGEN SATURATION: 98 % | BODY MASS INDEX: 31.41 KG/M2 | HEIGHT: 64 IN | HEART RATE: 76 BPM | SYSTOLIC BLOOD PRESSURE: 114 MMHG | SYSTOLIC BLOOD PRESSURE: 114 MMHG | WEIGHT: 184.06 LBS | HEIGHT: 64 IN | HEART RATE: 76 BPM | WEIGHT: 184 LBS | TEMPERATURE: 98 F | BODY MASS INDEX: 31.42 KG/M2 | DIASTOLIC BLOOD PRESSURE: 73 MMHG | OXYGEN SATURATION: 98 % | DIASTOLIC BLOOD PRESSURE: 73 MMHG

## 2022-11-01 DIAGNOSIS — D35.2 PITUITARY ADENOMA: Primary | Chronic | ICD-10-CM

## 2022-11-01 DIAGNOSIS — D35.2 PITUITARY ADENOMA: Primary | ICD-10-CM

## 2022-11-01 PROCEDURE — 99204 PR OFFICE/OUTPT VISIT, NEW, LEVL IV, 45-59 MIN: ICD-10-PCS | Mod: S$GLB,,, | Performed by: NEUROLOGICAL SURGERY

## 2022-11-01 PROCEDURE — 99214 OFFICE O/P EST MOD 30 MIN: CPT | Mod: S$GLB,,, | Performed by: INTERNAL MEDICINE

## 2022-11-01 PROCEDURE — 3078F PR MOST RECENT DIASTOLIC BLOOD PRESSURE < 80 MM HG: ICD-10-PCS | Mod: CPTII,S$GLB,, | Performed by: NEUROLOGICAL SURGERY

## 2022-11-01 PROCEDURE — 1101F PT FALLS ASSESS-DOCD LE1/YR: CPT | Mod: CPTII,S$GLB,, | Performed by: INTERNAL MEDICINE

## 2022-11-01 PROCEDURE — 3044F PR MOST RECENT HEMOGLOBIN A1C LEVEL <7.0%: ICD-10-PCS | Mod: CPTII,S$GLB,, | Performed by: NEUROLOGICAL SURGERY

## 2022-11-01 PROCEDURE — 99999 PR PBB SHADOW E&M-EST. PATIENT-LVL IV: CPT | Mod: PBBFAC,,, | Performed by: INTERNAL MEDICINE

## 2022-11-01 PROCEDURE — 3074F PR MOST RECENT SYSTOLIC BLOOD PRESSURE < 130 MM HG: ICD-10-PCS | Mod: CPTII,S$GLB,, | Performed by: INTERNAL MEDICINE

## 2022-11-01 PROCEDURE — 3044F HG A1C LEVEL LT 7.0%: CPT | Mod: CPTII,S$GLB,, | Performed by: NEUROLOGICAL SURGERY

## 2022-11-01 PROCEDURE — 3060F PR POS MICROALBUMINURIA RESULT DOCUMENTED/REVIEW: ICD-10-PCS | Mod: CPTII,S$GLB,, | Performed by: NEUROLOGICAL SURGERY

## 2022-11-01 PROCEDURE — 3078F PR MOST RECENT DIASTOLIC BLOOD PRESSURE < 80 MM HG: ICD-10-PCS | Mod: CPTII,S$GLB,, | Performed by: INTERNAL MEDICINE

## 2022-11-01 PROCEDURE — 3066F NEPHROPATHY DOC TX: CPT | Mod: CPTII,S$GLB,, | Performed by: NEUROLOGICAL SURGERY

## 2022-11-01 PROCEDURE — 3288F PR FALLS RISK ASSESSMENT DOCUMENTED: ICD-10-PCS | Mod: CPTII,S$GLB,, | Performed by: INTERNAL MEDICINE

## 2022-11-01 PROCEDURE — 3044F PR MOST RECENT HEMOGLOBIN A1C LEVEL <7.0%: ICD-10-PCS | Mod: CPTII,S$GLB,, | Performed by: INTERNAL MEDICINE

## 2022-11-01 PROCEDURE — 99999 PR PBB SHADOW E&M-EST. PATIENT-LVL III: ICD-10-PCS | Mod: PBBFAC,,, | Performed by: NEUROLOGICAL SURGERY

## 2022-11-01 PROCEDURE — 3288F FALL RISK ASSESSMENT DOCD: CPT | Mod: CPTII,S$GLB,, | Performed by: INTERNAL MEDICINE

## 2022-11-01 PROCEDURE — 3074F SYST BP LT 130 MM HG: CPT | Mod: CPTII,S$GLB,, | Performed by: NEUROLOGICAL SURGERY

## 2022-11-01 PROCEDURE — 3008F PR BODY MASS INDEX (BMI) DOCUMENTED: ICD-10-PCS | Mod: CPTII,S$GLB,, | Performed by: NEUROLOGICAL SURGERY

## 2022-11-01 PROCEDURE — 99999 PR PBB SHADOW E&M-EST. PATIENT-LVL IV: ICD-10-PCS | Mod: PBBFAC,,, | Performed by: INTERNAL MEDICINE

## 2022-11-01 PROCEDURE — 3074F PR MOST RECENT SYSTOLIC BLOOD PRESSURE < 130 MM HG: ICD-10-PCS | Mod: CPTII,S$GLB,, | Performed by: NEUROLOGICAL SURGERY

## 2022-11-01 PROCEDURE — 99999 PR PBB SHADOW E&M-EST. PATIENT-LVL III: CPT | Mod: PBBFAC,,, | Performed by: NEUROLOGICAL SURGERY

## 2022-11-01 PROCEDURE — 3044F HG A1C LEVEL LT 7.0%: CPT | Mod: CPTII,S$GLB,, | Performed by: INTERNAL MEDICINE

## 2022-11-01 PROCEDURE — 3060F POS MICROALBUMINURIA REV: CPT | Mod: CPTII,S$GLB,, | Performed by: INTERNAL MEDICINE

## 2022-11-01 PROCEDURE — 3008F BODY MASS INDEX DOCD: CPT | Mod: CPTII,S$GLB,, | Performed by: NEUROLOGICAL SURGERY

## 2022-11-01 PROCEDURE — 3078F DIAST BP <80 MM HG: CPT | Mod: CPTII,S$GLB,, | Performed by: INTERNAL MEDICINE

## 2022-11-01 PROCEDURE — 1159F MED LIST DOCD IN RCRD: CPT | Mod: CPTII,S$GLB,, | Performed by: INTERNAL MEDICINE

## 2022-11-01 PROCEDURE — 3060F PR POS MICROALBUMINURIA RESULT DOCUMENTED/REVIEW: ICD-10-PCS | Mod: CPTII,S$GLB,, | Performed by: INTERNAL MEDICINE

## 2022-11-01 PROCEDURE — 3074F SYST BP LT 130 MM HG: CPT | Mod: CPTII,S$GLB,, | Performed by: INTERNAL MEDICINE

## 2022-11-01 PROCEDURE — 1159F PR MEDICATION LIST DOCUMENTED IN MEDICAL RECORD: ICD-10-PCS | Mod: CPTII,S$GLB,, | Performed by: INTERNAL MEDICINE

## 2022-11-01 PROCEDURE — 1160F PR REVIEW ALL MEDS BY PRESCRIBER/CLIN PHARMACIST DOCUMENTED: ICD-10-PCS | Mod: CPTII,S$GLB,, | Performed by: NEUROLOGICAL SURGERY

## 2022-11-01 PROCEDURE — 3066F NEPHROPATHY DOC TX: CPT | Mod: CPTII,S$GLB,, | Performed by: INTERNAL MEDICINE

## 2022-11-01 PROCEDURE — 3078F DIAST BP <80 MM HG: CPT | Mod: CPTII,S$GLB,, | Performed by: NEUROLOGICAL SURGERY

## 2022-11-01 PROCEDURE — 1159F PR MEDICATION LIST DOCUMENTED IN MEDICAL RECORD: ICD-10-PCS | Mod: CPTII,S$GLB,, | Performed by: NEUROLOGICAL SURGERY

## 2022-11-01 PROCEDURE — 99204 OFFICE O/P NEW MOD 45 MIN: CPT | Mod: S$GLB,,, | Performed by: NEUROLOGICAL SURGERY

## 2022-11-01 PROCEDURE — 3066F PR DOCUMENTATION OF TREATMENT FOR NEPHROPATHY: ICD-10-PCS | Mod: CPTII,S$GLB,, | Performed by: NEUROLOGICAL SURGERY

## 2022-11-01 PROCEDURE — 3066F PR DOCUMENTATION OF TREATMENT FOR NEPHROPATHY: ICD-10-PCS | Mod: CPTII,S$GLB,, | Performed by: INTERNAL MEDICINE

## 2022-11-01 PROCEDURE — 3060F POS MICROALBUMINURIA REV: CPT | Mod: CPTII,S$GLB,, | Performed by: NEUROLOGICAL SURGERY

## 2022-11-01 PROCEDURE — 1160F RVW MEDS BY RX/DR IN RCRD: CPT | Mod: CPTII,S$GLB,, | Performed by: NEUROLOGICAL SURGERY

## 2022-11-01 PROCEDURE — 1160F PR REVIEW ALL MEDS BY PRESCRIBER/CLIN PHARMACIST DOCUMENTED: ICD-10-PCS | Mod: CPTII,S$GLB,, | Performed by: INTERNAL MEDICINE

## 2022-11-01 PROCEDURE — 1160F RVW MEDS BY RX/DR IN RCRD: CPT | Mod: CPTII,S$GLB,, | Performed by: INTERNAL MEDICINE

## 2022-11-01 PROCEDURE — 1126F PR PAIN SEVERITY QUANTIFIED, NO PAIN PRESENT: ICD-10-PCS | Mod: CPTII,S$GLB,, | Performed by: INTERNAL MEDICINE

## 2022-11-01 PROCEDURE — 99214 PR OFFICE/OUTPT VISIT, EST, LEVL IV, 30-39 MIN: ICD-10-PCS | Mod: S$GLB,,, | Performed by: INTERNAL MEDICINE

## 2022-11-01 PROCEDURE — 1159F MED LIST DOCD IN RCRD: CPT | Mod: CPTII,S$GLB,, | Performed by: NEUROLOGICAL SURGERY

## 2022-11-01 PROCEDURE — 1126F AMNT PAIN NOTED NONE PRSNT: CPT | Mod: CPTII,S$GLB,, | Performed by: INTERNAL MEDICINE

## 2022-11-01 PROCEDURE — 1101F PR PT FALLS ASSESS DOC 0-1 FALLS W/OUT INJ PAST YR: ICD-10-PCS | Mod: CPTII,S$GLB,, | Performed by: INTERNAL MEDICINE

## 2022-11-01 RX ORDER — DEXAMETHASONE 1 MG/1
1 TABLET ORAL ONCE
Qty: 1 TABLET | Refills: 0 | Status: SHIPPED | OUTPATIENT
Start: 2022-11-01 | End: 2022-11-01

## 2022-11-01 NOTE — PATIENT INSTRUCTIONS
Please look through your medical records and send me a picture of your pathology report.     Bring all records to next endocrine appointment so we can scan them.     dexamethasone suppression test:  Please take 1 mg tablet of dexamethasone at it exactly 11:00 p.m. the night before your 8:00 a.m. blood test is scheduled.  Have blood drawn exactly at 8:00 a.m. as the timings of the medication and blood draw are very important.    I have sent a prescription for 1 mg of dexamethasone (one dose) to your pharmacy.  A normal response to taking the dexamethasone is a low cortisol level the next day. Do not be alarmed if your cortisol level is marked as abnormally low because that is a normal response.

## 2022-11-01 NOTE — ASSESSMENT & PLAN NOTE
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  Will obtain path report to determine type of tumor although per hx likely nonfunctional adenoma.  Pituitary labs normal but has not had dexamethasone supression test (DST) to r/o cushing's disease so will complete evaluation with dexamethasone supression test (DST).      Recent MRI with lobular mass in right sella extending into right cavernous sinus and possible small residual tumor on the left as well, optic chiasm clear.  Per radiology report stable in size but on review with Dr. Farmer some possible growth since 2015.      If labs normal will RTC in 1 year with 8 am fasting pituitary labs prior.

## 2022-11-01 NOTE — PROGRESS NOTES
Subjective:   I, Yola Purdy, attest that this documentation has been prepared under the direction and in the presence of Oskar Farmer MD.     Patient ID: Tona Carlson is a 67 y.o. female     Chief Complaint: No chief complaint on file.      HPI  Ms. Tona Carlson is a 67 y.o. woman with T2DM, pituitary incidentaloma s/p TSR in March 2014 at [facility], who presents today for follow up. Patient was last seen in clinic on 10/20/2015, at which time the pt reports she is doing well with improved vision. Today the pt reports she continues to do well but was lost in follow up due to a missed appointment reminder. Patient with no other complaints or concerns at this time.    Review of Systems   Constitutional:  Negative for activity change, appetite change, fatigue, fever and unexpected weight change.   HENT:  Negative for facial swelling.    Eyes: Negative.    Respiratory: Negative.     Cardiovascular: Negative.    Gastrointestinal:  Negative for diarrhea, nausea and vomiting.   Endocrine: Negative.    Genitourinary: Negative.    Musculoskeletal:  Negative for back pain, joint swelling, myalgias and neck pain.   Neurological:  Negative for dizziness, seizures, weakness, numbness and headaches.   Psychiatric/Behavioral: Negative.        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     Appearance: Normal appearance.   HENT:      Head: Normocephalic and atraumatic.   Pulmonary:      Effort: Pulmonary effort is normal.   Musculoskeletal:      Cervical back: Neck supple.   Neurological:       Mental Status: She is alert and oriented to person, place, and time.      GCS: GCS eye subscore is 4. GCS verbal subscore is 5. GCS motor subscore is 6.      Cranial Nerves: No cranial nerve deficit.     IMAGING:  MRI Pituitary W WO Contrast (10/4/2022):  Stable postoperative change of transsphenoidal pituitary adenoma resection.  Residual lobular enhancing tissue along the margins of the sella is unchanged from 07/25/2018.    I have personally reviewed the images with the pt.      I, Dr. Oskar Farmer, personally performed the services described in this documentation. All medical record entries made by the scribe, Yola Purdy, were at my direction and in my presence.  I have reviewed the chart and agree that the record reflects my personal performance and is accurate and complete. Oskar Farmer MD. 11/01/2022    Assessment:       Pituitary adenoma.     Plan:   I have personally reviewed the MRI pituitary with the pt which shows stable postoperative change of transsphenoidal pituitary adenoma resection.  Residual lobular enhancing tissue along the margins of the sella is unchanged from 07/25/2018.    I will schedule the patient for 1 year follow up with MRI pituitary.

## 2022-11-01 NOTE — PATIENT INSTRUCTIONS
I have personally reviewed the MRI pituitary with the pt which shows stable postoperative change of transsphenoidal pituitary adenoma resection.  Residual lobular enhancing tissue along the margins of the sella is unchanged from 07/25/2018.    I will schedule the patient for 1 year follow up with MRI pituitary.

## 2022-11-01 NOTE — PROGRESS NOTES
"PITUITARY [facility] ENDOCRINOLOGY FOLLOW UP  11/01/2022     Patient is being followed in general endocrine clinic for T2DM and osteoporosis and re-establishing care in pituitary clinic.  Last seen by Dr. Darian ruiz 2015.       Subjective:      CC: follow up pituitary adenoma s/p resection    HPI:   Tona Carlson is a 67 y.o. female who presents to re-establish care for hx of pituitary macroadenoma s/p resection.      Initial presentation:  She was found to have pituitary incidentaloma when MRI brain was done for balance problem. Patient reports that adenoma was touching the optic chiasm and carotid artery and caused left hemianopsia.  She underwent TSR in march 2014 at [facility] (records not available but patient has them at home and will bring in for next visit) with improved vision. She briefly required DDAVP for 2 month(s) after resection but then did not require any pituitary hormone replacement.       she was on cabergoline for elevated prolactin after her surgery however stopped for financial reasons around 2019 with normal prolactin off DA.     Imaging:      MRI pituitary 10/4/22- stable  Postoperative changes of transsphenoidal resection of pituitary adenoma.  Stable size and enhancement of residual lobular enhancing tissue along the right and left margins of the sella.  No new suprasellar extension or mass effect.  Right-sided lobular marginal tissue is larger than the left and abuts and partially surrounds the right cavernous internal carotid artery.  Normal T2 flow void is maintained.  The infundibulum is not thickened, slightly deviated to the left of midline.    Headache:    denies    Vision change:   Chronic bitemporal deficits - per patient unchanged, sees eye doctor regularly    Formal Visual fields:   Hvf (bean visual reyes)  with  11/2015, per note:  "evidence of permanent optic nerve damage from the pituitary adenoma but is functioning well. Her bitemporal visual field defects spare the " "center of vision. I did not find significant diabetic retinopathy"    Pituitary labs: normal   Low dose dexamethasone supression test (DST) not done   Latest Reference Range & Units 10/14/22 08:35   Cortisol, 8 AM 4.30 - 22.40 ug/dL 14.50   ACTH 0 - 46 pg/mL 33   TSH 0.400 - 4.000 uIU/mL 3.193   Free T4 0.71 - 1.51 ng/dL 0.93   FSH See Text mIU/mL 26.29   Prolactin 5.2 - 26.5 ng/mL 18.5     Current symptoms:  Hyperprolactinemia:  []  breast tenderness []  nipple discharge []  Menstrual Irregularity [x]  Denies   Never had  Lab Results   Component Value Date    PROLACTIN 18.5 10/14/2022    PROLACTIN 20.1 06/21/2021    PROLACTIN 23.9 02/15/2019    PROLACTIN 39.5 (H) 07/17/2018    PROLACTIN 59.2 (H) 10/12/2016    PROLACTIN 68.3 (H) 11/03/2015     Thyroid:    Lab Results   Component Value Date    TSH 3.193 10/14/2022    FREET4 0.93 10/14/2022       Growth Hormone:  Last IGF-1:   Lab Results   Component Value Date    SOMATMDN 94 06/21/2021    SOMATMDN 143 07/17/2018    SOMATMDN 119 10/20/2015        Cushing's syndrome:   [x]  Easy bruising []  Weight gain  []  Worse glycemic control   []  HTN  []  Acne  []  Hirsutism  []  Striae  []  Menstrual change []  VTE   []  Fractures  []  Denies All    denies symptoms of adrenal insufficiency (no lightheadedness, N/V/abd pain, hypotension).      Gonadotrophs:     []  Irregular menses [x]  Postmenopausal  []  Decreased libido   []  ED   []  Denies    Menopause at age 50, regular prior to that    DI:   []  polyuria  []  Polydipsia  []  Nocturia   [x]  Denies    T2DM and osteoporosis managed by NP in general endocrine clinic.  Last seen 10/17/22  T2DM well controled on MDI, ozempic, metformin.      Review of patient's allergies indicates:  No Known Allergies      Current Outpatient Medications:     alcohol swabs (BD ALCOHOL SWABS) PadM, Use as needed, Disp: 200 each, Rfl: 3    atorvastatin (LIPITOR) 20 MG tablet, Take 1 tablet (20 mg total) by mouth once daily. (Patient not taking: " "Reported on 10/20/2022), Disp: 90 tablet, Rfl: 3    BD ULTRA-FINE WON PEN NEEDLE 32 gauge x 5/32" Ndle, Use with insulin pens, Disp: 100 each, Rfl: 3    blood glucose control, normal Soln, Check glucose 1-2 x day, Disp: 1 each, Rfl: 1    blood sugar diagnostic Strp, Patient testing 4 times daily, Disp: 100 strip, Rfl: 4    blood sugar diagnostic Strp, Patient testing 4 times daily, Disp: 200 strip, Rfl: 3    blood-glucose meter kit, Use as directed, Disp: 1 each, Rfl: 0    blood-glucose meter Misc, TEST 4 TIMES DAILY, Disp: 1 each, Rfl: 0    cholecalciferol, vitamin D3, (VITAMIN D3) 50 mcg (2,000 unit) Cap, Take 1 capsule (2,000 Units total) by mouth once daily., Disp: , Rfl:     insulin (LANTUS SOLOSTAR U-100 INSULIN) glargine 100 units/mL SubQ pen, Inject 30 Units into the skin every evening., Disp: 30 mL, Rfl: 3    insulin aspart U-100 (NOVOLOG FLEXPEN U-100 INSULIN) 100 unit/mL (3 mL) InPn pen, Inject 10 Units into the skin 3 (three) times daily with meals., Disp: 15 mL, Rfl: 11    lancets (ACCU-CHEK SOFTCLIX LANCETS) Misc, Patient testing 4 times daily, Disp: 100 each, Rfl: 4    lancets 28 gauge Misc, Use to test blood glucose four times daily, Disp: 400 each, Rfl: 0    lancets Misc, accu- check fast clix; test four times a day (Patient taking differently: accu- check fast clix; test four times a day), Disp: 204 each, Rfl: 3    melatonin (MELATIN), Take 3 mg by mouth every evening., Disp: , Rfl:     metFORMIN (GLUCOPHAGE-XR) 500 MG ER 24hr tablet, Take 1 tablet (500 mg total) by mouth 2 (two) times daily with meals., Disp: 180 tablet, Rfl: 3    metroNIDAZOLE (METROGEL) 0.75 % gel, Apply to face BID., Disp: 45 g, Rfl: 5    semaglutide (OZEMPIC) 0.25 mg or 0.5 mg(2 mg/1.5 mL) pen injector, Inject 0.25 mg into the skin every 7 days for 30 days, THEN 0.5 mg every 7 days., Disp: 3 pen, Rfl: 3  ROS:  see HPI    Objective:   Physical Exam   /73 (BP Location: Left arm, Patient Position: Sitting, BP Method: " "Medium (Manual))   Pulse 76   Ht 5' 4" (1.626 m)   Wt 83.5 kg (184 lb 1.4 oz)   SpO2 98%   BMI 31.60 kg/m²   Wt Readings from Last 3 Encounters:   11/01/22 83.5 kg (184 lb)   11/01/22 83.5 kg (184 lb 1.4 oz)   10/20/22 84.1 kg (185 lb 4.8 oz)   ]    Constitutional:  Pleasant,  in no acute distress.   HENT:   Head:    Normocephalic and atraumatic.   Eyes:    EOMI. No scleral icterus. Visual fields to confrontation with Right inf deficit      LABORATORY REVIEW:    Chemistry        Component Value Date/Time     08/29/2022 0807    K 4.0 08/29/2022 0807     08/29/2022 0807    CO2 23 08/29/2022 0807    BUN 9 08/29/2022 0807    CREATININE 0.8 08/29/2022 0807     (H) 08/29/2022 0807        Component Value Date/Time    CALCIUM 9.4 08/29/2022 0807    ALKPHOS 110 08/29/2022 0807    AST 55 (H) 08/29/2022 0807    ALT 86 (H) 08/29/2022 0807    BILITOT 0.8 08/29/2022 0807    ESTGFRAFRICA >60.0 06/14/2022 1036    EGFRNONAA >60.0 06/14/2022 1036        Pituitary MRI       Assessment/Plan:     Problem List Items Addressed This Visit          1 - High    Pituitary adenoma - Primary (Chronic)     Hx of pituitary macroadenoma s/p resection with mildly elevated prolactin after surgery, now normal off cabergoline.  Will obtain path report to determine type of tumor although per hx likely nonfunctional adenoma.  Pituitary labs normal but has not had dexamethasone supression test (DST) to r/o cushing's disease so will complete evaluation with dexamethasone supression test (DST).      Recent MRI with lobular mass in right sella extending into right cavernous sinus and possible small residual tumor on the left as well, optic chiasm clear.  Per radiology report stable in size but on review with Dr. Farmer some possible growth since 2015.      If labs normal will RTC in 1 year with 8 am fasting pituitary labs prior.           Relevant Medications    dexAMETHasone (DECADRON) 1 MG Tab    Other Relevant Orders    Cortisol, " 8AM    Dexamethasone    ACTH    Cortisol, 8AM    Follicle Stimulating Hormone    Prolactin    T4, Free    TSH       Needs 8 am fasting lab in the next wk for dexamethasone and cortisol  Return to clinic in 12 months with 8 am fasting lab prior  Need pathology report from pituitary surgery from [facility] from 2014    Paris Young MD

## 2022-11-03 ENCOUNTER — HOSPITAL ENCOUNTER (OUTPATIENT)
Dept: RADIOLOGY | Facility: HOSPITAL | Age: 67
Discharge: HOME | End: 2022-11-03
Attending: NURSE PRACTITIONER
Payer: MEDICARE

## 2022-11-03 DIAGNOSIS — M81.0 AGE RELATED OSTEOPOROSIS, UNSPECIFIED PATHOLOGICAL FRACTURE PRESENCE: ICD-10-CM

## 2022-11-03 PROCEDURE — 77080 DXA BONE DENSITY AXIAL: CPT | Mod: 26,,, | Performed by: RADIOLOGY

## 2022-11-03 PROCEDURE — 77080 DXA BONE DENSITY AXIAL: CPT | Mod: TC

## 2022-11-03 PROCEDURE — 77080 DEXA BONE DENSITY SPINE HIP: ICD-10-PCS | Mod: 26,,, | Performed by: RADIOLOGY

## 2022-11-16 ENCOUNTER — LAB VISIT (OUTPATIENT)
Dept: LAB | Facility: HOSPITAL | Age: 67
End: 2022-11-16
Attending: INTERNAL MEDICINE
Payer: MEDICARE

## 2022-11-16 DIAGNOSIS — D35.2 PITUITARY ADENOMA: ICD-10-CM

## 2022-11-16 LAB
CORTIS SERPL-MCNC: 1 UG/DL (ref 4.3–22.4)
CORTIS SERPL-MCNC: 1 UG/DL (ref 4.3–22.4)
FSH SERPL-ACNC: 27.74 MIU/ML
PROLACTIN SERPL IA-MCNC: 20.7 NG/ML (ref 5.2–26.5)
T4 FREE SERPL-MCNC: 0.89 NG/DL (ref 0.71–1.51)
TSH SERPL DL<=0.005 MIU/L-ACNC: 1.05 UIU/ML (ref 0.4–4)

## 2022-11-16 PROCEDURE — 82542 COL CHROMOTOGRAPHY QUAL/QUAN: CPT | Performed by: INTERNAL MEDICINE

## 2022-11-16 PROCEDURE — 84439 ASSAY OF FREE THYROXINE: CPT | Performed by: INTERNAL MEDICINE

## 2022-11-16 PROCEDURE — 84443 ASSAY THYROID STIM HORMONE: CPT | Performed by: INTERNAL MEDICINE

## 2022-11-16 PROCEDURE — 83001 ASSAY OF GONADOTROPIN (FSH): CPT | Performed by: INTERNAL MEDICINE

## 2022-11-16 PROCEDURE — 84146 ASSAY OF PROLACTIN: CPT | Performed by: INTERNAL MEDICINE

## 2022-11-16 PROCEDURE — 82024 ASSAY OF ACTH: CPT | Performed by: INTERNAL MEDICINE

## 2022-11-16 PROCEDURE — 36415 COLL VENOUS BLD VENIPUNCTURE: CPT | Mod: PO | Performed by: INTERNAL MEDICINE

## 2022-11-16 PROCEDURE — 82533 TOTAL CORTISOL: CPT | Performed by: INTERNAL MEDICINE

## 2022-11-18 LAB — ACTH PLAS-MCNC: 12 PG/ML (ref 0–46)

## 2022-11-24 ENCOUNTER — PATIENT MESSAGE (OUTPATIENT)
Dept: ENDOCRINOLOGY | Facility: CLINIC | Age: 67
End: 2022-11-24
Payer: MEDICARE

## 2022-11-25 LAB — DEXAMETHASONE SERPL-MCNC: 373 NG/DL

## 2022-12-08 ENCOUNTER — INFUSION (OUTPATIENT)
Dept: INFECTIOUS DISEASES | Facility: HOSPITAL | Age: 67
End: 2022-12-08
Attending: INTERNAL MEDICINE
Payer: MEDICARE

## 2022-12-08 VITALS
RESPIRATION RATE: 18 BRPM | SYSTOLIC BLOOD PRESSURE: 118 MMHG | WEIGHT: 187.38 LBS | DIASTOLIC BLOOD PRESSURE: 58 MMHG | HEART RATE: 75 BPM | BODY MASS INDEX: 32.17 KG/M2 | TEMPERATURE: 97 F | OXYGEN SATURATION: 99 %

## 2022-12-08 DIAGNOSIS — M81.0 OSTEOPOROSIS WITHOUT CURRENT PATHOLOGICAL FRACTURE, UNSPECIFIED OSTEOPOROSIS TYPE: Primary | ICD-10-CM

## 2022-12-08 PROCEDURE — 63600175 PHARM REV CODE 636 W HCPCS: Performed by: NURSE PRACTITIONER

## 2022-12-08 PROCEDURE — 96365 THER/PROPH/DIAG IV INF INIT: CPT

## 2022-12-08 PROCEDURE — 25000003 PHARM REV CODE 250: Performed by: NURSE PRACTITIONER

## 2022-12-08 RX ORDER — HEPARIN 100 UNIT/ML
500 SYRINGE INTRAVENOUS
OUTPATIENT
Start: 2022-12-08

## 2022-12-08 RX ORDER — SODIUM CHLORIDE 0.9 % (FLUSH) 0.9 %
10 SYRINGE (ML) INJECTION
OUTPATIENT
Start: 2022-12-08

## 2022-12-08 RX ORDER — ACETAMINOPHEN 500 MG
500 TABLET ORAL
OUTPATIENT
Start: 2022-12-08

## 2022-12-08 RX ORDER — ACETAMINOPHEN 500 MG
500 TABLET ORAL
Status: DISCONTINUED | OUTPATIENT
Start: 2022-12-08 | End: 2022-12-08 | Stop reason: HOSPADM

## 2022-12-08 RX ORDER — SODIUM CHLORIDE 0.9 % (FLUSH) 0.9 %
10 SYRINGE (ML) INJECTION
Status: DISCONTINUED | OUTPATIENT
Start: 2022-12-08 | End: 2022-12-08 | Stop reason: HOSPADM

## 2022-12-08 RX ORDER — ZOLEDRONIC ACID 5 MG/100ML
5 INJECTION, SOLUTION INTRAVENOUS
OUTPATIENT
Start: 2022-12-08

## 2022-12-08 RX ORDER — ZOLEDRONIC ACID 5 MG/100ML
5 INJECTION, SOLUTION INTRAVENOUS
Status: COMPLETED | OUTPATIENT
Start: 2022-12-08 | End: 2022-12-08

## 2022-12-08 RX ADMIN — ZOLEDRONIC ACID 5 MG: 5 INJECTION, SOLUTION INTRAVENOUS at 09:12

## 2022-12-08 RX ADMIN — SODIUM CHLORIDE: 9 INJECTION, SOLUTION INTRAVENOUS at 09:12

## 2022-12-08 NOTE — PROGRESS NOTES
Arrived for Reclast infusion without any difficulty, pt refused tylenol and stated will take at home if needed, pt denies dental surgery < 3 months, pt takes calcium and VIT D, NS @ 25ml/hr, pt instructed to call with any questions/concerns related to infusion, verbalized understanding, no questions/concerns noted at this time

## 2022-12-23 ENCOUNTER — HOSPITAL ENCOUNTER (OUTPATIENT)
Dept: RADIOLOGY | Facility: HOSPITAL | Age: 67
Discharge: HOME | End: 2022-12-23
Attending: NURSE PRACTITIONER
Payer: MEDICARE

## 2022-12-23 DIAGNOSIS — R74.8 ELEVATED LIVER ENZYMES: ICD-10-CM

## 2022-12-23 DIAGNOSIS — K75.81 LIVER CIRRHOSIS SECONDARY TO NASH: ICD-10-CM

## 2022-12-23 DIAGNOSIS — Z08 ENCOUNTER FOR FOLLOW-UP SURVEILLANCE OF LIVER CANCER: ICD-10-CM

## 2022-12-23 DIAGNOSIS — R16.1 SPLENOMEGALY: ICD-10-CM

## 2022-12-23 DIAGNOSIS — K74.60 LIVER CIRRHOSIS SECONDARY TO NASH: ICD-10-CM

## 2022-12-23 DIAGNOSIS — Z85.05 ENCOUNTER FOR FOLLOW-UP SURVEILLANCE OF LIVER CANCER: ICD-10-CM

## 2022-12-23 PROCEDURE — 76700 US EXAM ABDOM COMPLETE: CPT | Mod: TC

## 2022-12-23 PROCEDURE — 76700 US ABDOMEN COMPLETE: ICD-10-PCS | Mod: 26,,, | Performed by: RADIOLOGY

## 2022-12-23 PROCEDURE — 76700 US EXAM ABDOM COMPLETE: CPT | Mod: 26,,, | Performed by: RADIOLOGY

## 2022-12-28 ENCOUNTER — OFFICE VISIT (OUTPATIENT)
Dept: HEPATOLOGY | Facility: CLINIC | Age: 67
End: 2022-12-28
Payer: MEDICARE

## 2022-12-28 VITALS
BODY MASS INDEX: 30.65 KG/M2 | DIASTOLIC BLOOD PRESSURE: 66 MMHG | HEART RATE: 81 BPM | TEMPERATURE: 98 F | OXYGEN SATURATION: 96 % | SYSTOLIC BLOOD PRESSURE: 125 MMHG | WEIGHT: 178.56 LBS

## 2022-12-28 DIAGNOSIS — E11.69 HYPERLIPIDEMIA ASSOCIATED WITH TYPE 2 DIABETES MELLITUS: ICD-10-CM

## 2022-12-28 DIAGNOSIS — K74.60 LIVER CIRRHOSIS SECONDARY TO NASH: Primary | ICD-10-CM

## 2022-12-28 DIAGNOSIS — R16.1 SPLENOMEGALY: ICD-10-CM

## 2022-12-28 DIAGNOSIS — E78.5 HYPERLIPIDEMIA ASSOCIATED WITH TYPE 2 DIABETES MELLITUS: ICD-10-CM

## 2022-12-28 DIAGNOSIS — K75.81 LIVER CIRRHOSIS SECONDARY TO NASH: Primary | ICD-10-CM

## 2022-12-28 DIAGNOSIS — E66.09 CLASS 1 OBESITY DUE TO EXCESS CALORIES WITH SERIOUS COMORBIDITY AND BODY MASS INDEX (BMI) OF 30.0 TO 30.9 IN ADULT: ICD-10-CM

## 2022-12-28 DIAGNOSIS — E11.9 DM TYPE 2 WITHOUT RETINOPATHY: ICD-10-CM

## 2022-12-28 DIAGNOSIS — R74.8 ELEVATED LIVER ENZYMES: ICD-10-CM

## 2022-12-28 PROCEDURE — 3288F PR FALLS RISK ASSESSMENT DOCUMENTED: ICD-10-PCS | Mod: CPTII,S$GLB,, | Performed by: NURSE PRACTITIONER

## 2022-12-28 PROCEDURE — 3060F PR POS MICROALBUMINURIA RESULT DOCUMENTED/REVIEW: ICD-10-PCS | Mod: CPTII,S$GLB,, | Performed by: NURSE PRACTITIONER

## 2022-12-28 PROCEDURE — 99214 PR OFFICE/OUTPT VISIT, EST, LEVL IV, 30-39 MIN: ICD-10-PCS | Mod: S$GLB,,, | Performed by: NURSE PRACTITIONER

## 2022-12-28 PROCEDURE — 1126F PR PAIN SEVERITY QUANTIFIED, NO PAIN PRESENT: ICD-10-PCS | Mod: CPTII,S$GLB,, | Performed by: NURSE PRACTITIONER

## 2022-12-28 PROCEDURE — 1101F PR PT FALLS ASSESS DOC 0-1 FALLS W/OUT INJ PAST YR: ICD-10-PCS | Mod: CPTII,S$GLB,, | Performed by: NURSE PRACTITIONER

## 2022-12-28 PROCEDURE — 3078F PR MOST RECENT DIASTOLIC BLOOD PRESSURE < 80 MM HG: ICD-10-PCS | Mod: CPTII,S$GLB,, | Performed by: NURSE PRACTITIONER

## 2022-12-28 PROCEDURE — 1159F MED LIST DOCD IN RCRD: CPT | Mod: CPTII,S$GLB,, | Performed by: NURSE PRACTITIONER

## 2022-12-28 PROCEDURE — 1126F AMNT PAIN NOTED NONE PRSNT: CPT | Mod: CPTII,S$GLB,, | Performed by: NURSE PRACTITIONER

## 2022-12-28 PROCEDURE — 3044F PR MOST RECENT HEMOGLOBIN A1C LEVEL <7.0%: ICD-10-PCS | Mod: CPTII,S$GLB,, | Performed by: NURSE PRACTITIONER

## 2022-12-28 PROCEDURE — 3074F PR MOST RECENT SYSTOLIC BLOOD PRESSURE < 130 MM HG: ICD-10-PCS | Mod: CPTII,S$GLB,, | Performed by: NURSE PRACTITIONER

## 2022-12-28 PROCEDURE — 1101F PT FALLS ASSESS-DOCD LE1/YR: CPT | Mod: CPTII,S$GLB,, | Performed by: NURSE PRACTITIONER

## 2022-12-28 PROCEDURE — 1160F RVW MEDS BY RX/DR IN RCRD: CPT | Mod: CPTII,S$GLB,, | Performed by: NURSE PRACTITIONER

## 2022-12-28 PROCEDURE — 3078F DIAST BP <80 MM HG: CPT | Mod: CPTII,S$GLB,, | Performed by: NURSE PRACTITIONER

## 2022-12-28 PROCEDURE — 99999 PR PBB SHADOW E&M-EST. PATIENT-LVL V: ICD-10-PCS | Mod: PBBFAC,,, | Performed by: NURSE PRACTITIONER

## 2022-12-28 PROCEDURE — 3074F SYST BP LT 130 MM HG: CPT | Mod: CPTII,S$GLB,, | Performed by: NURSE PRACTITIONER

## 2022-12-28 PROCEDURE — 3288F FALL RISK ASSESSMENT DOCD: CPT | Mod: CPTII,S$GLB,, | Performed by: NURSE PRACTITIONER

## 2022-12-28 PROCEDURE — 1160F PR REVIEW ALL MEDS BY PRESCRIBER/CLIN PHARMACIST DOCUMENTED: ICD-10-PCS | Mod: CPTII,S$GLB,, | Performed by: NURSE PRACTITIONER

## 2022-12-28 PROCEDURE — 3060F POS MICROALBUMINURIA REV: CPT | Mod: CPTII,S$GLB,, | Performed by: NURSE PRACTITIONER

## 2022-12-28 PROCEDURE — 1159F PR MEDICATION LIST DOCUMENTED IN MEDICAL RECORD: ICD-10-PCS | Mod: CPTII,S$GLB,, | Performed by: NURSE PRACTITIONER

## 2022-12-28 PROCEDURE — 99214 OFFICE O/P EST MOD 30 MIN: CPT | Mod: S$GLB,,, | Performed by: NURSE PRACTITIONER

## 2022-12-28 PROCEDURE — 3044F HG A1C LEVEL LT 7.0%: CPT | Mod: CPTII,S$GLB,, | Performed by: NURSE PRACTITIONER

## 2022-12-28 PROCEDURE — 3008F BODY MASS INDEX DOCD: CPT | Mod: CPTII,S$GLB,, | Performed by: NURSE PRACTITIONER

## 2022-12-28 PROCEDURE — 3066F NEPHROPATHY DOC TX: CPT | Mod: CPTII,S$GLB,, | Performed by: NURSE PRACTITIONER

## 2022-12-28 PROCEDURE — 3008F PR BODY MASS INDEX (BMI) DOCUMENTED: ICD-10-PCS | Mod: CPTII,S$GLB,, | Performed by: NURSE PRACTITIONER

## 2022-12-28 PROCEDURE — 3066F PR DOCUMENTATION OF TREATMENT FOR NEPHROPATHY: ICD-10-PCS | Mod: CPTII,S$GLB,, | Performed by: NURSE PRACTITIONER

## 2022-12-28 PROCEDURE — 99999 PR PBB SHADOW E&M-EST. PATIENT-LVL V: CPT | Mod: PBBFAC,,, | Performed by: NURSE PRACTITIONER

## 2022-12-28 RX ORDER — FINASTERIDE 5 MG/1
5 TABLET, FILM COATED ORAL
COMMUNITY
Start: 2022-11-09

## 2022-12-28 NOTE — PATIENT INSTRUCTIONS
Because you have cirrhosis, it is important to attend clinic visits every 6 months with an Ultrasound and blood tests every 6 months to screen for liver cancer (you are at risk of developing liver cancer due to scar tissue in the liver)    Signs and symptoms of worsening liver disease include jaundice, fluid in the belly (ascites), and confusion/disorientation/slowed thought processes due to hepatic encephalopathy (toxins building up because of liver problems).   You should seek medical attention if any of these things occur.    Also, possible bleeding from esophageal varices (blood vessels in the stomach and foodpipe can burst and cause fatal bleeding).  Therefore, if you have symptoms of vomiting blood, blood in your stool, dark or black stools or vomiting coffee ground vomit, YOU SHOULD GO TO THE EMERGENCY ROOM IMMEDIATELY.     Cirrhosis can increase the risk of liver cancer, liver failure, and death. However, we will watch your liver function score (MELD score) closely with each clinic visit. A normal MELD score is 6, highest is 40. Your last one was an 9. We will check this with every clinic visit. A MELD 15 or higher is when we start to consider transplant because MELD 15 or higher indicates that the liver is not functioning as well     Cirrhosis Counseling  - NO alcohol use (includes beer, wine, and/or liquor)  - avoid non-steroidal anti-inflammatory drugs (NSAIDs) such as ibuprofen, Motrin, naprosyn, Alleve due to the risk of kidney damage  - can take acetaminophen (Tylenol), no more than 2000 mg per day  - low sodium (salt) 2 gram per day diet  - high protein diet: 80 grams per day to prevent muscle mass loss. Drink at least 1 protein shake daily (Premier Protein is best option because it is very high protein and low sugar). Ok to use this as nighttime snack to fit it in   - resistance exercises for muscle strength  - avoid raw seafoods due to the risk of fatal Vibrio vulnificus infection  - ultrasound of  the liver every 6 months for liver cancer screening (you are at risk of developing liver cancer due to scar tissue in the liver)  - Upper endoscopy every 1-2 years to screen for varices in the stomach and foodpipe which can burst and cause fatal bleeding. Please call [phone] to schedule the exam.

## 2022-12-28 NOTE — PROGRESS NOTES
HEPATOLOGY CLINIC VISIT NOTE    CHIEF COMPLAINT: Cirrhosis    HISTORY: This is a 67 y.o.  female here to re-establish care for cirrhosis monitoring. She was last seen in clinic by myself in 2022. She was referred for HCV AB (+), but subsequent testing didn't reveal viremia. Work up showed elevated liver enzymes, and was concerning for NAFLD/ZARCO. She has multiple metabolic risk factors, including DMII. Last HgbA1c was improved at 6.9 % (2022). She is followed by Dr. Latif.   Fibroscan in 2019 was suggestive of F4 fibrosis and a high likelihood of cirrhosis. Repeat Fibroscan in 2022 was again suggestive of F4 fibrosis, with severe fatty infiltration of the liver (S3). Most recent US showed fatty liver, with no focal liver lesions or ascites; US also showed mild splenomegaly (12.9 cm). MELD-Na is 9, CTP Class A Cirrhosis. She has lost 4 pounds since last visit through dietary changes. LFT's are improved. She has been exposed to Hepatitis A and is immune through prior exposure. HBV negative. She is not immune to Hepatitis B (received 1st dose of Hep B vaccination in 2022).  Family history is significant for paternal grandmother with ESLD and liver cancer. She endorses chronic fatigue, but denies any other signs or symptoms of hepatic decompensation including: jaundice, dark urine, abdominal distention, hematemesis, melena, or periods of confusion suggestive of hepatic encephalopathy.     Past Medical History:   Diagnosis Date    Cataract     Compressive optic atrophy 2015    Diabetes mellitus, type 2     Fatty liver disease, nonalcoholic     Hyperlipidemia     Prolactinoma     Reflux 08/10/2012    Rosacea     Toe fracture, right 2018    Unspecified cirrhosis of liver      Past Surgical History:   Procedure Laterality Date    BREAST BIOPSY Right     core     SECTION      CHOLECYSTECTOMY      TRANSPHENOIDAL PITUITARY RESECTION       FAMILY HISTORY: Per HPI    SOCIAL HISTORY:    Social History     Tobacco Use   Smoking Status Never   Smokeless Tobacco Never     Social History     Substance and Sexual Activity   Alcohol Use No     Social History     Substance and Sexual Activity   Drug Use No     ROS:   No fever, chills, weight loss + intentional weight loss + chronic fatigue  No chest pain, dyspnea, cough  No abdominal pain,  nausea, vomiting  No headaches, visual changes  No lower extremity edema  No depression or anxiety    PHYSICAL EXAM:  Friendly  female, in no acute distress; alert and oriented to person, place and time.  VITALS: /66 (BP Location: Right arm, Patient Position: Sitting)   Pulse 81   Temp 97.5 °F (36.4 °C) (Oral)   Wt 81 kg (178 lb 9.2 oz)   SpO2 96%   BMI 30.65 kg/m²   HEENT: Sclerae anicteric.   NECK: No obvious masses.  CVS: Regular rate. No peripheral edema.  LUNGS: Normal respiratory effort.   ABDOMEN: Soft obese abdomen.  SKIN: Warm and dry. No jaundice, No obvious rashes.   EXTREMITIES: No lower extremity edema  NEURO/PSYCH: Normal gate. Memory intact. Thought and speech pattern appropriate. Behavior normal. No depression or anxiety noted.    RECENT LABS:  Lab Results   Component Value Date    WBC 4.27 2022    HGB 13.3 2022     2022     Lab Results   Component Value Date    INR 1.1 2022     Lab Results   Component Value Date    AST 68 (H) 2022    ALT 83 (H) 2022    BILITOT 1.6 (H) 2022    ALBUMIN 4.1 2022    ALKPHOS 98 2022    CREATININE 0.7 2022    BUN 5 (L) 2022     2022    K 3.7 2022    AFP 5.8 2022     DIAGNOSTIC STUDIES:    FIBROSCAN 2/15/2019:    Fibroscan readin.1 KPa     Fibrosis:F4      CAP readin dB/m     Steatosis: :S3      FIBROSCAN 2022:    Findings  Median liver stiffness score:  28  CAP Reading: dB/m:  330     IQR/med %:  14  Interpretation  Fibrosis interpretation is based on medial liver stiffness - Kilopascal  (kPa).     Fibrosis Stage:  F4  Steatosis interpretation is based on controlled attenuation parameter - (dB/m).     Steatosis Grade:  S3    US Abdomen Complete  Narrative: EXAMINATION:  US ABDOMEN COMPLETE    CLINICAL HISTORY:  Nonalcoholic steatohepatitis (ZARCO)    TECHNIQUE:  Complete abdominal ultrasound (including pancreas, aorta, liver, gallbladder, common bile duct, IVC, kidneys, and spleen) was performed.    COMPARISON:  Ultrasound 06/23/2022.    FINDINGS:  Pancreas: The visualized portions of pancreas appear normal.    Aorta: No aneurysm.    Liver: 15.8 cm, normal in size. Diffusely increased parenchymal echogenicity with HRI measuring 1.5 suggesting greater than 5% steatosis.  No focal lesions.    Gallbladder: The gallbladder is surgically absent.    Biliary system: 3 mm common bile duct.  No intrahepatic ductal dilatation.    Inferior vena cava: Normal in appearance.    Right kidney: 12.0 cm. No hydronephrosis.    Left kidney: 11.4 cm. No hydronephrosis.    Spleen: 12.9 x 4.3 cm.  Mildly enlarged in size with homogeneous echotexture.  Small splenic calcification or granuloma.    Miscellaneous: No ascites.  Impression: Hepatic steatosis.  No solid mass.    Mild splenomegaly.    Cholecystectomy.    Little significant change since previous examination    Electronically signed by resident: Kevin Mota  Date:    12/23/2022  Time:    09:32    Electronically signed by: Robin Rao MD  Date:    12/23/2022  Time:    09:44    ASSESSMENT  67 y.o.  female with:    1. Liver cirrhosis secondary to ZARCO  Comprehensive Metabolic Panel    AFP Tumor Marker    CBC Auto Differential    Protime-INR    US Abdomen Complete    Ambulatory referral/consult to Endo Procedure       2. Elevated liver enzymes  Comprehensive Metabolic Panel    AFP Tumor Marker    CBC Auto Differential    Protime-INR    US Abdomen Complete      3. Splenomegaly  Comprehensive Metabolic Panel    AFP Tumor Marker    CBC Auto  Differential    Protime-INR    US Abdomen Complete      4. DM type 2 without retinopathy  Comprehensive Metabolic Panel    AFP Tumor Marker    CBC Auto Differential    Protime-INR    US Abdomen Complete      5. Class 1 obesity due to excess calories with serious comorbidity and body mass index (BMI) of 30.0 to 30.9 in adult  Comprehensive Metabolic Panel    AFP Tumor Marker    CBC Auto Differential    Protime-INR    US Abdomen Complete      6. Hyperlipidemia associated with type 2 diabetes mellitus  Comprehensive Metabolic Panel    AFP Tumor Marker    CBC Auto Differential    Protime-INR    US Abdomen Complete        MELD-Na score: 9 at 12/23/2022  9:36 AM  MELD score: 9 at 12/23/2022  9:36 AM  Calculated from:  Serum Creatinine: 0.7 mg/dL (Using min of 1 mg/dL) at 12/23/2022  9:36 AM  Serum Sodium: 145 mmol/L (Using max of 137 mmol/L) at 12/23/2022  9:36 AM  Total Bilirubin: 1.6 mg/dL at 12/23/2022  9:36 AM  INR(ratio): 1.1 at 12/23/2022  9:36 AM  Age: 67 years    - Repeat Ultrasound of the liver every 6 months for HCC surveillance, next due 6/2023.  - Repeat liver function tests every 6 months, next due 6/2023.  - Referral placed to GI for surveillance EGD.  - Continue with vaccination series for Hepatitis B.  - Avoid alcohol and herbal supplements/alternative remedies.  - Recommend additional weight loss of 15 lbs, through diet and exercise.  - Recommend good control of cholesterol, blood pressure, & blood sugar levels.  - Return to clinic in 6 months, sooner if needed.       Hepatology Nurse Practitioner  [facility]  12/28/2022 @ 3255

## 2022-12-29 ENCOUNTER — PES CALL (OUTPATIENT)
Dept: ADMINISTRATIVE | Facility: CLINIC | Age: 67
End: 2022-12-29
Payer: MEDICARE

## 2023-02-01 ENCOUNTER — OFFICE VISIT (OUTPATIENT)
Dept: FAMILY MEDICINE | Facility: CLINIC | Age: 68
End: 2023-02-01
Payer: MEDICARE

## 2023-02-01 VITALS
WEIGHT: 176.56 LBS | SYSTOLIC BLOOD PRESSURE: 104 MMHG | OXYGEN SATURATION: 98 % | DIASTOLIC BLOOD PRESSURE: 70 MMHG | HEART RATE: 94 BPM | HEIGHT: 64 IN | BODY MASS INDEX: 30.14 KG/M2

## 2023-02-01 DIAGNOSIS — E78.5 HYPERLIPIDEMIA ASSOCIATED WITH TYPE 2 DIABETES MELLITUS: ICD-10-CM

## 2023-02-01 DIAGNOSIS — E11.69 HYPERLIPIDEMIA ASSOCIATED WITH TYPE 2 DIABETES MELLITUS: ICD-10-CM

## 2023-02-01 DIAGNOSIS — Z74.09 OTHER REDUCED MOBILITY: ICD-10-CM

## 2023-02-01 DIAGNOSIS — I70.0 AORTIC ATHEROSCLEROSIS: ICD-10-CM

## 2023-02-01 DIAGNOSIS — K74.60 LIVER CIRRHOSIS SECONDARY TO NASH: ICD-10-CM

## 2023-02-01 DIAGNOSIS — Z00.00 ENCOUNTER FOR PREVENTIVE HEALTH EXAMINATION: Primary | ICD-10-CM

## 2023-02-01 DIAGNOSIS — E66.09 CLASS 1 OBESITY DUE TO EXCESS CALORIES WITH SERIOUS COMORBIDITY AND BODY MASS INDEX (BMI) OF 30.0 TO 30.9 IN ADULT: ICD-10-CM

## 2023-02-01 DIAGNOSIS — D35.2 PITUITARY ADENOMA: ICD-10-CM

## 2023-02-01 DIAGNOSIS — K75.81 LIVER CIRRHOSIS SECONDARY TO NASH: ICD-10-CM

## 2023-02-01 DIAGNOSIS — E11.42 TYPE 2 DIABETES MELLITUS WITH DIABETIC POLYNEUROPATHY, WITHOUT LONG-TERM CURRENT USE OF INSULIN: Chronic | ICD-10-CM

## 2023-02-01 PROCEDURE — 99999 PR PBB SHADOW E&M-EST. PATIENT-LVL V: ICD-10-PCS | Mod: PBBFAC,,, | Performed by: NURSE PRACTITIONER

## 2023-02-01 PROCEDURE — 3008F PR BODY MASS INDEX (BMI) DOCUMENTED: ICD-10-PCS | Mod: CPTII,S$GLB,, | Performed by: NURSE PRACTITIONER

## 2023-02-01 PROCEDURE — 3074F SYST BP LT 130 MM HG: CPT | Mod: CPTII,S$GLB,, | Performed by: NURSE PRACTITIONER

## 2023-02-01 PROCEDURE — 1160F PR REVIEW ALL MEDS BY PRESCRIBER/CLIN PHARMACIST DOCUMENTED: ICD-10-PCS | Mod: CPTII,S$GLB,, | Performed by: NURSE PRACTITIONER

## 2023-02-01 PROCEDURE — 1159F MED LIST DOCD IN RCRD: CPT | Mod: CPTII,S$GLB,, | Performed by: NURSE PRACTITIONER

## 2023-02-01 PROCEDURE — 1160F RVW MEDS BY RX/DR IN RCRD: CPT | Mod: CPTII,S$GLB,, | Performed by: NURSE PRACTITIONER

## 2023-02-01 PROCEDURE — 1159F PR MEDICATION LIST DOCUMENTED IN MEDICAL RECORD: ICD-10-PCS | Mod: CPTII,S$GLB,, | Performed by: NURSE PRACTITIONER

## 2023-02-01 PROCEDURE — 1170F FXNL STATUS ASSESSED: CPT | Mod: CPTII,S$GLB,, | Performed by: NURSE PRACTITIONER

## 2023-02-01 PROCEDURE — G0439 PPPS, SUBSEQ VISIT: HCPCS | Mod: S$GLB,,, | Performed by: NURSE PRACTITIONER

## 2023-02-01 PROCEDURE — 1126F AMNT PAIN NOTED NONE PRSNT: CPT | Mod: CPTII,S$GLB,, | Performed by: NURSE PRACTITIONER

## 2023-02-01 PROCEDURE — 1101F PT FALLS ASSESS-DOCD LE1/YR: CPT | Mod: CPTII,S$GLB,, | Performed by: NURSE PRACTITIONER

## 2023-02-01 PROCEDURE — 1170F PR FUNCTIONAL STATUS ASSESSED: ICD-10-PCS | Mod: CPTII,S$GLB,, | Performed by: NURSE PRACTITIONER

## 2023-02-01 PROCEDURE — 3078F PR MOST RECENT DIASTOLIC BLOOD PRESSURE < 80 MM HG: ICD-10-PCS | Mod: CPTII,S$GLB,, | Performed by: NURSE PRACTITIONER

## 2023-02-01 PROCEDURE — 3288F PR FALLS RISK ASSESSMENT DOCUMENTED: ICD-10-PCS | Mod: CPTII,S$GLB,, | Performed by: NURSE PRACTITIONER

## 2023-02-01 PROCEDURE — 3288F FALL RISK ASSESSMENT DOCD: CPT | Mod: CPTII,S$GLB,, | Performed by: NURSE PRACTITIONER

## 2023-02-01 PROCEDURE — 1126F PR PAIN SEVERITY QUANTIFIED, NO PAIN PRESENT: ICD-10-PCS | Mod: CPTII,S$GLB,, | Performed by: NURSE PRACTITIONER

## 2023-02-01 PROCEDURE — 3008F BODY MASS INDEX DOCD: CPT | Mod: CPTII,S$GLB,, | Performed by: NURSE PRACTITIONER

## 2023-02-01 PROCEDURE — 3074F PR MOST RECENT SYSTOLIC BLOOD PRESSURE < 130 MM HG: ICD-10-PCS | Mod: CPTII,S$GLB,, | Performed by: NURSE PRACTITIONER

## 2023-02-01 PROCEDURE — 1101F PR PT FALLS ASSESS DOC 0-1 FALLS W/OUT INJ PAST YR: ICD-10-PCS | Mod: CPTII,S$GLB,, | Performed by: NURSE PRACTITIONER

## 2023-02-01 PROCEDURE — 3078F DIAST BP <80 MM HG: CPT | Mod: CPTII,S$GLB,, | Performed by: NURSE PRACTITIONER

## 2023-02-01 PROCEDURE — G0439 PR MEDICARE ANNUAL WELLNESS SUBSEQUENT VISIT: ICD-10-PCS | Mod: S$GLB,,, | Performed by: NURSE PRACTITIONER

## 2023-02-01 PROCEDURE — 99999 PR PBB SHADOW E&M-EST. PATIENT-LVL V: CPT | Mod: PBBFAC,,, | Performed by: NURSE PRACTITIONER

## 2023-02-01 NOTE — PATIENT INSTRUCTIONS
Counseling and Referral of Other Preventative  (Italic type indicates deductible and co-insurance are waived)    Patient Name: Tona Carlson  Today's Date: 2/1/2023    Health Maintenance       Date Due Completion Date    Diabetes Urine Screening 02/24/2023 2/24/2022    Foot Exam 02/24/2023 2/24/2022    Shingles Vaccine (1 of 2) 06/07/2023 (Originally 10/1/2005) ---    Hemoglobin A1c 02/28/2023 8/29/2022    Eye Exam 05/31/2023 5/31/2022    Mammogram 08/26/2023 8/26/2022    Lipid Panel 08/29/2023 8/29/2022    Pneumococcal Vaccines (Age 65+) (3 - PPSV23 if available, else PCV20) 01/02/2024 12/8/2020    Colorectal Cancer Screening 04/08/2024 4/8/2021    TETANUS VACCINE 08/07/2024 8/7/2014    DEXA Scan 11/03/2024 11/3/2022        No orders of the defined types were placed in this encounter.    The following information is provided to all patients.  This information is to help you find resources for any of the problems found today that may be affecting your health:                Living healthy guide: www.[facility].[place].gov      Understanding Diabetes: www.diabetes.org      Eating healthy: www.cdc.gov/healthyweight      CDC home safety checklist: www.cdc.gov/steadi/patient.html      Agency on Aging: www.goea.[place].[facility]      Alcoholics anonymous (AA): www.aa.org      Physical Activity: www.srinath.nih.gov/pn2cldr      Tobacco use: www.quitwithusla.org

## 2023-02-01 NOTE — PROGRESS NOTES
"  Tona Carlson presented for a  Medicare AWV and comprehensive Health Risk Assessment today. The following components were reviewed and updated:    Medical history  Family History  Social history  Allergies and Current Medications  Health Risk Assessment  Health Maintenance  Care Team         ** See Completed Assessments for Annual Wellness Visit within the encounter summary.**         The following assessments were completed:  Living Situation  CAGE  Depression Screening  Timed Get Up and Go  Whisper Test  Cognitive Function Screening    Nutrition Screening  ADL Screening  PAQ Screening        Vitals:    02/01/23 1357   BP: 104/70   BP Location: Right arm   Patient Position: Sitting   BP Method: Large (Manual)   Pulse: 94   SpO2: 98%   Weight: 80.1 kg (176 lb 9.4 oz)   Height: 5' 4" (1.626 m)     Body mass index is 30.31 kg/m².    Physical Exam  Vitals reviewed.   Constitutional:       General: She is not in acute distress.     Appearance: Normal appearance. She is well-developed and well-groomed.   HENT:      Head: Normocephalic.   Cardiovascular:      Rate and Rhythm: Normal rate.   Pulmonary:      Effort: Pulmonary effort is normal. No respiratory distress.   Skin:     General: Skin is warm and dry.      Coloration: Skin is not pale.   Neurological:      Mental Status: She is alert and oriented to person, place, and time.      Coordination: Coordination normal.      Gait: Gait abnormal (slightly slowed but stable).   Psychiatric:         Attention and Perception: Attention normal.         Mood and Affect: Mood and affect normal.         Speech: Speech normal.         Behavior: Behavior normal. Behavior is cooperative.         Thought Content: Thought content normal.           Diagnoses and health risks identified today and associated recommendations/orders:    1. Encounter for preventive health examination    2. Type 2 diabetes mellitus with diabetic polyneuropathy, without long-term current use of " insulin  Chronic; stable on medication. Followed by Endocrinology.  - Ambulatory referral/consult to Podiatry; Future    3. Pituitary adenoma  Chronic; stable. Followed by Endocrinology.    4. Hyperlipidemia associated with type 2 diabetes mellitus  Chronic; stable on medication. Follow up with PCP.    5. Liver cirrhosis secondary to ZARCO  Chronic; stable on medication. Followed by Hepatology.    6. Aortic atherosclerosis  Chronic; stable on medication. As seen on previous imaging. Follow up with PCP.    7. Other reduced mobility  Ambulates independently; has slightly slowed but stable gait. Follow up with PCP.    8. Class 1 obesity due to excess calories with serious comorbidity and body mass index (BMI) of 30.0 to 30.9 in adult  Continue to eat a ow fat ADA diet and discussed importance of engaging in physical activity at least 5x/week for a minimum of 30 min/day.      Provided Tona with a 5-10 year written screening schedule and personal prevention plan. Recommendations were developed using the USPSTF age appropriate recommendations. Education, counseling, and referrals were provided as needed. After Visit Summary printed and given to patient which includes a list of additional screenings\tests needed.    Follow up for your next annual wellness visit.    Gertrude Gonzalez NP      Advance Care Planning     I offered to discuss advanced care planning, including how to pick a person who would make decisions for you if you were unable to make them for yourself, called a health care power of , and what kind of decisions you might make such as use of life sustaining treatments such as ventilators and tube feeding when faced with a life limiting illness recorded on a living will that they will need to know. (How you want to be cared for as you near the end of your natural life)     X Patient is interested in learning more about how to make advanced directives.  I provided them paperwork and offered to  discuss this with them.

## 2023-02-08 ENCOUNTER — OFFICE VISIT (OUTPATIENT)
Dept: PODIATRY | Facility: CLINIC | Age: 68
End: 2023-02-08
Payer: MEDICARE

## 2023-02-08 VITALS
DIASTOLIC BLOOD PRESSURE: 80 MMHG | HEIGHT: 64 IN | SYSTOLIC BLOOD PRESSURE: 118 MMHG | BODY MASS INDEX: 30.05 KG/M2 | HEART RATE: 92 BPM | WEIGHT: 176 LBS

## 2023-02-08 DIAGNOSIS — M21.40 PES PLANUS, UNSPECIFIED LATERALITY: ICD-10-CM

## 2023-02-08 DIAGNOSIS — E11.42 TYPE 2 DIABETES MELLITUS WITH DIABETIC POLYNEUROPATHY, WITHOUT LONG-TERM CURRENT USE OF INSULIN: Chronic | ICD-10-CM

## 2023-02-08 DIAGNOSIS — E11.9 ENCOUNTER FOR COMPREHENSIVE DIABETIC FOOT EXAMINATION, TYPE 2 DIABETES MELLITUS: Primary | ICD-10-CM

## 2023-02-08 PROCEDURE — 1101F PR PT FALLS ASSESS DOC 0-1 FALLS W/OUT INJ PAST YR: ICD-10-PCS | Mod: CPTII,S$GLB,, | Performed by: STUDENT IN AN ORGANIZED HEALTH CARE EDUCATION/TRAINING PROGRAM

## 2023-02-08 PROCEDURE — 3008F PR BODY MASS INDEX (BMI) DOCUMENTED: ICD-10-PCS | Mod: CPTII,S$GLB,, | Performed by: STUDENT IN AN ORGANIZED HEALTH CARE EDUCATION/TRAINING PROGRAM

## 2023-02-08 PROCEDURE — 1159F PR MEDICATION LIST DOCUMENTED IN MEDICAL RECORD: ICD-10-PCS | Mod: CPTII,S$GLB,, | Performed by: STUDENT IN AN ORGANIZED HEALTH CARE EDUCATION/TRAINING PROGRAM

## 2023-02-08 PROCEDURE — 3008F BODY MASS INDEX DOCD: CPT | Mod: CPTII,S$GLB,, | Performed by: STUDENT IN AN ORGANIZED HEALTH CARE EDUCATION/TRAINING PROGRAM

## 2023-02-08 PROCEDURE — 99214 PR OFFICE/OUTPT VISIT, EST, LEVL IV, 30-39 MIN: ICD-10-PCS | Mod: S$GLB,,, | Performed by: STUDENT IN AN ORGANIZED HEALTH CARE EDUCATION/TRAINING PROGRAM

## 2023-02-08 PROCEDURE — 99999 PR PBB SHADOW E&M-EST. PATIENT-LVL IV: CPT | Mod: PBBFAC,,, | Performed by: STUDENT IN AN ORGANIZED HEALTH CARE EDUCATION/TRAINING PROGRAM

## 2023-02-08 PROCEDURE — 3079F PR MOST RECENT DIASTOLIC BLOOD PRESSURE 80-89 MM HG: ICD-10-PCS | Mod: CPTII,S$GLB,, | Performed by: STUDENT IN AN ORGANIZED HEALTH CARE EDUCATION/TRAINING PROGRAM

## 2023-02-08 PROCEDURE — 3079F DIAST BP 80-89 MM HG: CPT | Mod: CPTII,S$GLB,, | Performed by: STUDENT IN AN ORGANIZED HEALTH CARE EDUCATION/TRAINING PROGRAM

## 2023-02-08 PROCEDURE — 1159F MED LIST DOCD IN RCRD: CPT | Mod: CPTII,S$GLB,, | Performed by: STUDENT IN AN ORGANIZED HEALTH CARE EDUCATION/TRAINING PROGRAM

## 2023-02-08 PROCEDURE — 3288F PR FALLS RISK ASSESSMENT DOCUMENTED: ICD-10-PCS | Mod: CPTII,S$GLB,, | Performed by: STUDENT IN AN ORGANIZED HEALTH CARE EDUCATION/TRAINING PROGRAM

## 2023-02-08 PROCEDURE — 99214 OFFICE O/P EST MOD 30 MIN: CPT | Mod: S$GLB,,, | Performed by: STUDENT IN AN ORGANIZED HEALTH CARE EDUCATION/TRAINING PROGRAM

## 2023-02-08 PROCEDURE — 1101F PT FALLS ASSESS-DOCD LE1/YR: CPT | Mod: CPTII,S$GLB,, | Performed by: STUDENT IN AN ORGANIZED HEALTH CARE EDUCATION/TRAINING PROGRAM

## 2023-02-08 PROCEDURE — 3288F FALL RISK ASSESSMENT DOCD: CPT | Mod: CPTII,S$GLB,, | Performed by: STUDENT IN AN ORGANIZED HEALTH CARE EDUCATION/TRAINING PROGRAM

## 2023-02-08 PROCEDURE — 1126F PR PAIN SEVERITY QUANTIFIED, NO PAIN PRESENT: ICD-10-PCS | Mod: CPTII,S$GLB,, | Performed by: STUDENT IN AN ORGANIZED HEALTH CARE EDUCATION/TRAINING PROGRAM

## 2023-02-08 PROCEDURE — 1126F AMNT PAIN NOTED NONE PRSNT: CPT | Mod: CPTII,S$GLB,, | Performed by: STUDENT IN AN ORGANIZED HEALTH CARE EDUCATION/TRAINING PROGRAM

## 2023-02-08 PROCEDURE — 3074F SYST BP LT 130 MM HG: CPT | Mod: CPTII,S$GLB,, | Performed by: STUDENT IN AN ORGANIZED HEALTH CARE EDUCATION/TRAINING PROGRAM

## 2023-02-08 PROCEDURE — 3074F PR MOST RECENT SYSTOLIC BLOOD PRESSURE < 130 MM HG: ICD-10-PCS | Mod: CPTII,S$GLB,, | Performed by: STUDENT IN AN ORGANIZED HEALTH CARE EDUCATION/TRAINING PROGRAM

## 2023-02-08 PROCEDURE — 99999 PR PBB SHADOW E&M-EST. PATIENT-LVL IV: ICD-10-PCS | Mod: PBBFAC,,, | Performed by: STUDENT IN AN ORGANIZED HEALTH CARE EDUCATION/TRAINING PROGRAM

## 2023-02-08 NOTE — PROGRESS NOTES
Subjective:      Patient ID: Tona Carlson is a 67 y.o. female.    Chief Complaint: Diabetic Foot Exam    Tona is a 67 y.o. female who presents to the clinic upon referral from Dr. Gonzalez  for evaluation and treatment of diabetic feet. 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 Patient relates no major problem with feet. Only complaints today consist of here for diabetic foot exam. States has flat feet with occasional pain to inside of ankles.    PCP: Medina Prakash MD    Date Last Seen by PCP:     Current shoe gear: Casual shoes    Hemoglobin A1C   Date Value Ref Range Status   08/29/2022 6.9 (H) 4.0 - 5.6 % Final     Comment:     ADA Screening Guidelines:  5.7-6.4%  Consistent with prediabetes  >or=6.5%  Consistent with diabetes    High levels of fetal hemoglobin interfere with the HbA1C  assay. Heterozygous hemoglobin variants (HbS, HgC, etc)do  not significantly interfere with this assay.   However, presence of multiple variants may affect accuracy.     05/24/2022 7.0 (H) 4.0 - 5.6 % Final     Comment:     ADA Screening Guidelines:  5.7-6.4%  Consistent with prediabetes  >or=6.5%  Consistent with diabetes    High levels of fetal hemoglobin interfere with the HbA1C  assay. Heterozygous hemoglobin variants (HbS, HgC, etc)do  not significantly interfere with this assay.   However, presence of multiple variants may affect accuracy.     02/24/2022 10.4 (H) 4.0 - 5.6 % Final     Comment:     ADA Screening Guidelines:  5.7-6.4%  Consistent with prediabetes  >or=6.5%  Consistent with diabetes    High levels of fetal hemoglobin interfere with the HbA1C  assay. Heterozygous hemoglobin variants (HbS, HgC, etc)do  not significantly interfere with this assay.   However,  presence of multiple variants may affect accuracy.           Review of Systems   Constitutional: Negative for chills, decreased appetite, diaphoresis and fever.   HENT:  Negative for congestion and hearing loss.    Cardiovascular:  Negative for chest pain, claudication, leg swelling and syncope.   Respiratory:  Negative for cough and shortness of breath.    Skin:  Negative for color change, dry skin, flushing, itching, nail changes, poor wound healing and rash.   Musculoskeletal:  Negative for arthritis, back pain, joint pain and joint swelling.   Gastrointestinal:  Negative for nausea and vomiting.   Neurological:  Positive for paresthesias. Negative for focal weakness, numbness and weakness.   Psychiatric/Behavioral:  Negative for altered mental status. The patient is not nervous/anxious.          Objective:      Physical Exam  Constitutional:       General: She is not in acute distress.     Appearance: She is well-developed. She is not diaphoretic.   Cardiovascular:      Comments: Dorsalis pedis and posterior tibial pulses are within normal limits. Skin temperature is within normal limits. Toes are cool to touch and feet are warm proximally. Hair growth is within normal limits. Skin is normotrophic and without hyperpigmentation. No edema noted. No spider veins or varicosities noted, bilaterally.   .  Musculoskeletal:         General: No tenderness.      Comments: Adequate joint range of motion without pain, limitation, nor crepitation to bilateral feet and ankle joints. Muscle strength is 5/5 in all groups bilaterally.    Pes planus, bilaterally        Lymphadenopathy:      Comments: Negative lymphangitic streaking    Skin:     General: Skin is warm and dry.      Findings: No lesion.      Comments: Skin is warm and dry, no acute signs of infection noted. No open wounds, macerations or hyperkeratotic lesions, bilaterally.     Toenails are well trimmed and of normal morphology, bilaterally.    Neurological:       Mental Status: She is alert and oriented to person, place, and time.      Sensory: Sensory deficit present.      Motor: No abnormal muscle tone.      Comments: Light touch within normal limits. Senath-Noah 5.07 monofilamant testing is within normal limits. Vibratory sensation  is diminished, bilaterally    Psychiatric:         Behavior: Behavior normal.         Thought Content: Thought content normal.         Judgment: Judgment normal.             Assessment:       Encounter Diagnoses   Name Primary?    Type 2 diabetes mellitus with diabetic polyneuropathy, without long-term current use of insulin     Encounter for comprehensive diabetic foot examination, type 2 diabetes mellitus Yes    Pes planus, unspecified laterality          Plan:       Tona was seen today for diabetic foot exam.    Diagnoses and all orders for this visit:    Encounter for comprehensive diabetic foot examination, type 2 diabetes mellitus    Type 2 diabetes mellitus with diabetic polyneuropathy, without long-term current use of insulin  -     Ambulatory referral/consult to Podiatry  -     DIABETIC SHOES FOR HOME USE    Pes planus, unspecified laterality  -     DIABETIC SHOES FOR HOME USE      I counseled the patient on her conditions, their implications and medical management.  Recommend supportive diabetic shoes with arch supports at all times while ambulating  RICE PRN  Consider PT in future if needed  Shoe inspection. Diabetic Foot Education. Patient reminded of the importance of good nutrition and blood sugar control to help prevent podiatric complications of diabetes. Patient instructed on proper foot hygeine. We discussed wearing proper shoe gear, daily foot inspections, never walking without protective shoe gear, never putting sharp instruments to feet, routine podiatric nail visits    Return to clinic in 1 year, sooner PRN

## 2023-02-16 ENCOUNTER — LAB VISIT (OUTPATIENT)
Dept: LAB | Facility: HOSPITAL | Age: 68
End: 2023-02-16
Attending: NURSE PRACTITIONER
Payer: MEDICARE

## 2023-02-16 DIAGNOSIS — E11.42 TYPE 2 DIABETES MELLITUS WITH DIABETIC POLYNEUROPATHY, WITHOUT LONG-TERM CURRENT USE OF INSULIN: Chronic | ICD-10-CM

## 2023-02-16 DIAGNOSIS — E55.9 VITAMIN D DEFICIENCY: ICD-10-CM

## 2023-02-16 LAB
25(OH)D3+25(OH)D2 SERPL-MCNC: 26 NG/ML (ref 30–96)
ALBUMIN SERPL BCP-MCNC: 3.9 G/DL (ref 3.5–5.2)
ALP SERPL-CCNC: 102 U/L (ref 55–135)
ALT SERPL W/O P-5'-P-CCNC: 54 U/L (ref 10–44)
ANION GAP SERPL CALC-SCNC: 6 MMOL/L (ref 8–16)
AST SERPL-CCNC: 48 U/L (ref 10–40)
BILIRUB SERPL-MCNC: 0.9 MG/DL (ref 0.1–1)
BUN SERPL-MCNC: 6 MG/DL (ref 8–23)
CALCIUM SERPL-MCNC: 9.4 MG/DL (ref 8.7–10.5)
CHLORIDE SERPL-SCNC: 110 MMOL/L (ref 95–110)
CO2 SERPL-SCNC: 25 MMOL/L (ref 23–29)
CREAT SERPL-MCNC: 0.7 MG/DL (ref 0.5–1.4)
EST. GFR  (NO RACE VARIABLE): >60 ML/MIN/1.73 M^2
ESTIMATED AVG GLUCOSE: 140 MG/DL (ref 68–131)
GLUCOSE SERPL-MCNC: 126 MG/DL (ref 70–110)
HBA1C MFR BLD: 6.5 % (ref 4–5.6)
POTASSIUM SERPL-SCNC: 3.9 MMOL/L (ref 3.5–5.1)
PROT SERPL-MCNC: 7.1 G/DL (ref 6–8.4)
SODIUM SERPL-SCNC: 141 MMOL/L (ref 136–145)

## 2023-02-16 PROCEDURE — 36415 COLL VENOUS BLD VENIPUNCTURE: CPT | Mod: PO | Performed by: NURSE PRACTITIONER

## 2023-02-16 PROCEDURE — 80053 COMPREHEN METABOLIC PANEL: CPT | Performed by: NURSE PRACTITIONER

## 2023-02-16 PROCEDURE — 82306 VITAMIN D 25 HYDROXY: CPT | Performed by: NURSE PRACTITIONER

## 2023-02-16 PROCEDURE — 83036 HEMOGLOBIN GLYCOSYLATED A1C: CPT | Performed by: NURSE PRACTITIONER

## 2023-02-16 NOTE — PROGRESS NOTES
Subjective:      Patient ID: Tona Carlson is a 67 y.o. female.    Chief Complaint:  No chief complaint on file.    History of Present Illness  Tona Carlson is here for follow up of DM and Osteoporosis.  Previously seen by me 10/2022.    With regards to diabetes:    Diagnosed: in her 50s  DE: 2022  Known complications:  DKA Denies  RN Denies  Eye Exam: 2022  PN Denies  Podiatry: 2023  Nephropathy Denies  CAD Denies  Denies history of pancreatitis & personal/family history of medullary thyroid cancer.     Current regimen:  Metformin 500mg twice daily - self-stopped in January   Lantus 30 units nightly   Novolog 10 units before meals - self-stopped in December  Ozempic 0.5mg weekly - Tuesday   (Patient had a misunderstanding - giving 0.25mg for 4 weeks then 0.5mg for 2 weeks  - then restarts this regimen)       Other medications tried:  Jardiance - cost  Glimepiride  Victoza - GI intolerance  Januvia - cost  Took Trulicity for 2 months then self stopped due to decreased appetite.    Glucose Monitor:   1 times a day testing  Log reviewed: oral recall  Fastin-140    Hypoglycemia:  Denies  Knows how to correct with 15 grams of carbs- juice, coke, or a peppermint.     Diet/Exercise:  Eats 3 meals a day.   Snacks : occasionally   Drinks : diet soft drinks, water  Exercise - tries to stay active.      Diabetes Management Status    Hemoglobin A1C   Date Value Ref Range Status   2023 6.5 (H) 4.0 - 5.6 % Final     Comment:     ADA Screening Guidelines:  5.7-6.4%  Consistent with prediabetes  >or=6.5%  Consistent with diabetes    High levels of fetal hemoglobin interfere with the HbA1C  assay. Heterozygous hemoglobin variants (HbS, HgC, etc)do  not significantly interfere with this assay.   However, presence of multiple variants may affect accuracy.     2022 6.9 (H) 4.0 - 5.6 % Final     Comment:     ADA Screening Guidelines:  5.7-6.4%  Consistent with prediabetes  >or=6.5%  Consistent  with diabetes    High levels of fetal hemoglobin interfere with the HbA1C  assay. Heterozygous hemoglobin variants (HbS, HgC, etc)do  not significantly interfere with this assay.   However, presence of multiple variants may affect accuracy.     05/24/2022 7.0 (H) 4.0 - 5.6 % Final     Comment:     ADA Screening Guidelines:  5.7-6.4%  Consistent with prediabetes  >or=6.5%  Consistent with diabetes    High levels of fetal hemoglobin interfere with the HbA1C  assay. Heterozygous hemoglobin variants (HbS, HgC, etc)do  not significantly interfere with this assay.   However, presence of multiple variants may affect accuracy.         Statin: Taking  ACE/ARB: Not taking  Screening or Prevention Patient's value Goal Complete/Controlled?   HgA1C Testing and Control   Lab Results   Component Value Date    HGBA1C 6.5 (H) 02/16/2023      Annually/Less than 8% Yes     Lipid profile : 08/29/2022 Annually Yes     LDL control Lab Results   Component Value Date    LDLCALC 77.4 08/29/2022    Annually/Less than 100 mg/dl  Yes     Nephropathy screening Lab Results   Component Value Date    LABMICR 58.0 02/24/2022     Lab Results   Component Value Date    PROTEINUA Negative 08/29/2022    Annually Yes     Blood pressure BP Readings from Last 1 Encounters:   02/23/23 116/83    Less than 140/90 Yes     Dilated retinal exam : 05/31/2022 Annually Yes     Foot exam   : 02/24/2022 Annually Yes       With regards to osteoporosis:     BMD:   11/3/2022  The L1 to L4 vertebral bone mineral density is equal to 0.918 g/cm squared with a T score of -2.2.  Previously, -2.5     The left femoral neck bone mineral density is equal to 0.709 g/cm squared with a T score of -2.4.  Previously, -2.8     The right femoral neck bone mineral density is equal to 0.664 g/cm squared with a T score of -2.7.     There is a 13.3% risk of a major osteoporotic fracture and a 3.7% risk of hip fracture in the next 10 years (FRAX).     Impression:     Osteopenia of the  "lumbar spine and left femoral neck.     Osteoporosis of the right femoral neck.    Medications:   Fosamax - unsure of start and stop dates - believes she took it for a year     Reclast  11/16/2021, 12/8/2022    Calcium intake: multivitamin    Vit D intake: over the counter vitamin D3 1000iu daily     Weight bearing exercise: active lifestyle   Falls: Denies  Fractures: Denies    Following with Dr Young and Dr Farmer for Pituitary Incidentaloma     Review of Systems  As above    Objective:   Physical Exam  Vitals reviewed.   Neck:      Thyroid: No thyromegaly.   Cardiovascular:      Rate and Rhythm: Normal rate.      Comments: No edema present  Pulmonary:      Effort: Pulmonary effort is normal.   Abdominal:      Palpations: Abdomen is soft.     10/2016  IMPRESSION:   1.) Thyroid gland is normal in size with homogenous echotexture  2.) No thyroid nodules seen     RECOMMENDATIONS:   No need for repeat neck ultrasound unless there is a clinical change.    Visit Vitals  /83   Pulse 70   Ht 5' 4" (1.626 m)   Wt 80.1 kg (176 lb 9.4 oz)   SpO2 98%   BMI 30.31 kg/m²         Body mass index is 30.31 kg/m².    Lab Review:   Lab Results   Component Value Date    HGBA1C 6.5 (H) 02/16/2023    HGBA1C 6.9 (H) 08/29/2022    HGBA1C 7.0 (H) 05/24/2022       Lab Results   Component Value Date    CHOL 154 08/29/2022    HDL 40 08/29/2022    LDLCALC 77.4 08/29/2022    TRIG 183 (H) 08/29/2022    CHOLHDL 26.0 08/29/2022     Lab Results   Component Value Date     02/16/2023    K 3.9 02/16/2023     02/16/2023    CO2 25 02/16/2023     (H) 02/16/2023    BUN 6 (L) 02/16/2023    CREATININE 0.7 02/16/2023    CALCIUM 9.4 02/16/2023    PROT 7.1 02/16/2023    ALBUMIN 3.9 02/16/2023    BILITOT 0.9 02/16/2023    ALKPHOS 102 02/16/2023    AST 48 (H) 02/16/2023    ALT 54 (H) 02/16/2023    ANIONGAP 6 (L) 02/16/2023    ESTGFRAFRICA >60.0 06/14/2022    EGFRNONAA >60.0 06/14/2022    TSH 1.055 11/16/2022     Vit D, 25-Hydroxy   Date " Value Ref Range Status   02/16/2023 26 (L) 30 - 96 ng/mL Final     Comment:     Vitamin D deficiency.........<10 ng/mL                              Vitamin D insufficiency......10-29 ng/mL       Vitamin D sufficiency........> or equal to 30 ng/mL  Vitamin D toxicity............>100 ng/mL       Assessment and Plan     1. Type 2 diabetes mellitus with diabetic polyneuropathy, without long-term current use of insulin  semaglutide (OZEMPIC) 0.25 mg or 0.5 mg(2 mg/1.5 mL) pen injector    insulin (LANTUS SOLOSTAR U-100 INSULIN) glargine 100 units/mL SubQ pen    blood sugar diagnostic Strp    lancets Misc    metFORMIN (GLUCOPHAGE-XR) 500 MG ER 24hr tablet    Hemoglobin A1C    Comprehensive Metabolic Panel    Vitamin D    Microalbumin/Creatinine Ratio, Urine      2. Age related osteoporosis, unspecified pathological fracture presence        3. Vitamin D deficiency        4. Pituitary adenoma            Type 2 diabetes mellitus with diabetic polyneuropathy, without long-term current use of insulin  -- Labs prior to follow up.  -- A1c goal <7%.  -- Medications discussed:  MFM   GLP1-DPP4   RIVERA   SGLT2 - cost  Insulin   -- Reviewed logs/CGM:  Fasting glucose controlled.  Encouraged to check twice daily.  Instructed to send glucose logs in 14 days.  Reach out to me sooner for any glucose <70 or consistently >180.  -- Medication Changes:   Metformin 500mg twice a daily  Lantus 20 units nightly   Ozempic 0.5mg weekly - Tuesday   -- Reviewed goals of therapy are to get the best control we can without hypoglycemia.  -- Reviewed patient's current insulin regimen. Clarified proper insulin dose and timing in relation to meals, etc. Insulin injection sites and proper rotation instructed.    -- Advised frequent self blood glucose monitoring.  Patient encouraged to document glucose results and bring them to every clinic visit.  -- Hypoglycemia precautions discussed. Instructed on precautions before driving.    -- Call for Bg repeatedly <  90 or > 180.   -- Close adherence to lifestyle changes recommended.   -- Periodic follow ups for eye evaluations, foot care and dental care suggested.    Osteoporosis  -- Repeat BMD 11/2024.  -- Reassuring she is not fracturing.  -- Treatment  Fosamax - unsure of start and stop dates - believes she took it for a year     Reclast  11/16/2021, 12/8/2022    Therapy plan reordered for 12/2023.    Vitamin D deficiency  -- Change OTCV it D3 2000iu daily.    Pituitary adenoma  -- Continue following with Pituitary Clinic.        Follow up in about 4 months (around 6/23/2023).

## 2023-02-23 ENCOUNTER — CLINICAL SUPPORT (OUTPATIENT)
Dept: ENDOSCOPY | Facility: HOSPITAL | Age: 68
End: 2023-02-23
Attending: HOSPITALIST
Payer: MEDICARE

## 2023-02-23 ENCOUNTER — OFFICE VISIT (OUTPATIENT)
Dept: ENDOCRINOLOGY | Facility: CLINIC | Age: 68
End: 2023-02-23
Payer: MEDICARE

## 2023-02-23 VITALS
DIASTOLIC BLOOD PRESSURE: 83 MMHG | HEIGHT: 64 IN | SYSTOLIC BLOOD PRESSURE: 116 MMHG | WEIGHT: 176.56 LBS | HEART RATE: 70 BPM | BODY MASS INDEX: 30.14 KG/M2 | OXYGEN SATURATION: 98 %

## 2023-02-23 VITALS — BODY MASS INDEX: 30.05 KG/M2 | HEIGHT: 64 IN | WEIGHT: 176 LBS

## 2023-02-23 DIAGNOSIS — E55.9 VITAMIN D DEFICIENCY: ICD-10-CM

## 2023-02-23 DIAGNOSIS — K74.60 LIVER CIRRHOSIS SECONDARY TO NASH: ICD-10-CM

## 2023-02-23 DIAGNOSIS — E11.42 TYPE 2 DIABETES MELLITUS WITH DIABETIC POLYNEUROPATHY, WITHOUT LONG-TERM CURRENT USE OF INSULIN: Primary | Chronic | ICD-10-CM

## 2023-02-23 DIAGNOSIS — K75.81 LIVER CIRRHOSIS SECONDARY TO NASH: ICD-10-CM

## 2023-02-23 DIAGNOSIS — M81.0 AGE RELATED OSTEOPOROSIS, UNSPECIFIED PATHOLOGICAL FRACTURE PRESENCE: Chronic | ICD-10-CM

## 2023-02-23 DIAGNOSIS — D35.2 PITUITARY ADENOMA: Chronic | ICD-10-CM

## 2023-02-23 PROCEDURE — 99214 OFFICE O/P EST MOD 30 MIN: CPT | Mod: S$GLB,,, | Performed by: NURSE PRACTITIONER

## 2023-02-23 PROCEDURE — 3074F PR MOST RECENT SYSTOLIC BLOOD PRESSURE < 130 MM HG: ICD-10-PCS | Mod: CPTII,S$GLB,, | Performed by: NURSE PRACTITIONER

## 2023-02-23 PROCEDURE — 1160F RVW MEDS BY RX/DR IN RCRD: CPT | Mod: CPTII,S$GLB,, | Performed by: NURSE PRACTITIONER

## 2023-02-23 PROCEDURE — 3288F FALL RISK ASSESSMENT DOCD: CPT | Mod: CPTII,S$GLB,, | Performed by: NURSE PRACTITIONER

## 2023-02-23 PROCEDURE — 3044F HG A1C LEVEL LT 7.0%: CPT | Mod: CPTII,S$GLB,, | Performed by: NURSE PRACTITIONER

## 2023-02-23 PROCEDURE — 3074F SYST BP LT 130 MM HG: CPT | Mod: CPTII,S$GLB,, | Performed by: NURSE PRACTITIONER

## 2023-02-23 PROCEDURE — 3079F DIAST BP 80-89 MM HG: CPT | Mod: CPTII,S$GLB,, | Performed by: NURSE PRACTITIONER

## 2023-02-23 PROCEDURE — 1101F PR PT FALLS ASSESS DOC 0-1 FALLS W/OUT INJ PAST YR: ICD-10-PCS | Mod: CPTII,S$GLB,, | Performed by: NURSE PRACTITIONER

## 2023-02-23 PROCEDURE — 99214 PR OFFICE/OUTPT VISIT, EST, LEVL IV, 30-39 MIN: ICD-10-PCS | Mod: S$GLB,,, | Performed by: NURSE PRACTITIONER

## 2023-02-23 PROCEDURE — 3008F BODY MASS INDEX DOCD: CPT | Mod: CPTII,S$GLB,, | Performed by: NURSE PRACTITIONER

## 2023-02-23 PROCEDURE — 99999 PR PBB SHADOW E&M-EST. PATIENT-LVL IV: CPT | Mod: PBBFAC,,, | Performed by: NURSE PRACTITIONER

## 2023-02-23 PROCEDURE — 3008F PR BODY MASS INDEX (BMI) DOCUMENTED: ICD-10-PCS | Mod: CPTII,S$GLB,, | Performed by: NURSE PRACTITIONER

## 2023-02-23 PROCEDURE — 1159F PR MEDICATION LIST DOCUMENTED IN MEDICAL RECORD: ICD-10-PCS | Mod: CPTII,S$GLB,, | Performed by: NURSE PRACTITIONER

## 2023-02-23 PROCEDURE — 1101F PT FALLS ASSESS-DOCD LE1/YR: CPT | Mod: CPTII,S$GLB,, | Performed by: NURSE PRACTITIONER

## 2023-02-23 PROCEDURE — 99999 PR PBB SHADOW E&M-EST. PATIENT-LVL IV: ICD-10-PCS | Mod: PBBFAC,,, | Performed by: NURSE PRACTITIONER

## 2023-02-23 PROCEDURE — 3288F PR FALLS RISK ASSESSMENT DOCUMENTED: ICD-10-PCS | Mod: CPTII,S$GLB,, | Performed by: NURSE PRACTITIONER

## 2023-02-23 PROCEDURE — 1159F MED LIST DOCD IN RCRD: CPT | Mod: CPTII,S$GLB,, | Performed by: NURSE PRACTITIONER

## 2023-02-23 PROCEDURE — 1126F AMNT PAIN NOTED NONE PRSNT: CPT | Mod: CPTII,S$GLB,, | Performed by: NURSE PRACTITIONER

## 2023-02-23 PROCEDURE — 1160F PR REVIEW ALL MEDS BY PRESCRIBER/CLIN PHARMACIST DOCUMENTED: ICD-10-PCS | Mod: CPTII,S$GLB,, | Performed by: NURSE PRACTITIONER

## 2023-02-23 PROCEDURE — 3079F PR MOST RECENT DIASTOLIC BLOOD PRESSURE 80-89 MM HG: ICD-10-PCS | Mod: CPTII,S$GLB,, | Performed by: NURSE PRACTITIONER

## 2023-02-23 PROCEDURE — 3044F PR MOST RECENT HEMOGLOBIN A1C LEVEL <7.0%: ICD-10-PCS | Mod: CPTII,S$GLB,, | Performed by: NURSE PRACTITIONER

## 2023-02-23 PROCEDURE — 1126F PR PAIN SEVERITY QUANTIFIED, NO PAIN PRESENT: ICD-10-PCS | Mod: CPTII,S$GLB,, | Performed by: NURSE PRACTITIONER

## 2023-02-23 RX ORDER — LANCETS
EACH MISCELLANEOUS
Qty: 200 EACH | Refills: 11 | Status: SHIPPED | OUTPATIENT
Start: 2023-02-23

## 2023-02-23 RX ORDER — INSULIN GLARGINE 100 [IU]/ML
20 INJECTION, SOLUTION SUBCUTANEOUS NIGHTLY
Qty: 30 ML | Refills: 3 | Status: SHIPPED | OUTPATIENT
Start: 2023-02-23 | End: 2023-06-26

## 2023-02-23 RX ORDER — METFORMIN HYDROCHLORIDE 500 MG/1
500 TABLET, EXTENDED RELEASE ORAL 2 TIMES DAILY WITH MEALS
Qty: 180 TABLET | Refills: 3 | Status: SHIPPED | OUTPATIENT
Start: 2023-02-23 | End: 2023-11-13

## 2023-02-23 NOTE — ASSESSMENT & PLAN NOTE
-- Labs prior to follow up.  -- A1c goal <7%.  -- Medications discussed:  MFM   GLP1-DPP4   RIVERA   SGLT2 - cost  Insulin   -- Reviewed logs/CGM:  Fasting glucose controlled.  Encouraged to check twice daily.  Instructed to send glucose logs in 14 days.  Reach out to me sooner for any glucose <70 or consistently >180.  -- Medication Changes:   Metformin 500mg twice a daily  Lantus 20 units nightly   Ozempic 0.5mg weekly - Tuesday   -- Reviewed goals of therapy are to get the best control we can without hypoglycemia.  -- Reviewed patient's current insulin regimen. Clarified proper insulin dose and timing in relation to meals, etc. Insulin injection sites and proper rotation instructed.    -- Advised frequent self blood glucose monitoring.  Patient encouraged to document glucose results and bring them to every clinic visit.  -- Hypoglycemia precautions discussed. Instructed on precautions before driving.    -- Call for Bg repeatedly < 90 or > 180.   -- Close adherence to lifestyle changes recommended.   -- Periodic follow ups for eye evaluations, foot care and dental care suggested.

## 2023-02-23 NOTE — PATIENT INSTRUCTIONS
Metformin 500mg twice a daily  Lantus 20 units nightly   Ozempic 0.5mg weekly - Tuesday     Instructed to send glucose logs in 7-14 days.  Reach out to me sooner for any glucose <70 or consistently >180.

## 2023-02-23 NOTE — ASSESSMENT & PLAN NOTE
-- Repeat BMD 11/2024.  -- Reassuring she is not fracturing.  -- Treatment  Fosamax - unsure of start and stop dates - believes she took it for a year     Reclast  11/16/2021, 12/8/2022    Therapy plan reordered for 12/2023.

## 2023-03-06 ENCOUNTER — PATIENT MESSAGE (OUTPATIENT)
Dept: INTERNAL MEDICINE | Facility: CLINIC | Age: 68
End: 2023-03-06
Payer: MEDICARE

## 2023-03-06 NOTE — TELEPHONE ENCOUNTER
Please see the patient's [employer] message regarding Ozempic working well but she is feeling tired, low energy.    Requesting Rx.

## 2023-03-08 ENCOUNTER — ANESTHESIA EVENT (OUTPATIENT)
Dept: ENDOSCOPY | Facility: HOSPITAL | Age: 68
End: 2023-03-08
Payer: MEDICARE

## 2023-03-08 ENCOUNTER — HOSPITAL ENCOUNTER (OUTPATIENT)
Facility: HOSPITAL | Age: 68
Discharge: HOME | End: 2023-03-08
Attending: STUDENT IN AN ORGANIZED HEALTH CARE EDUCATION/TRAINING PROGRAM | Admitting: STUDENT IN AN ORGANIZED HEALTH CARE EDUCATION/TRAINING PROGRAM
Payer: MEDICARE

## 2023-03-08 ENCOUNTER — ANESTHESIA (OUTPATIENT)
Dept: ENDOSCOPY | Facility: HOSPITAL | Age: 68
End: 2023-03-08
Payer: MEDICARE

## 2023-03-08 VITALS
TEMPERATURE: 98 F | DIASTOLIC BLOOD PRESSURE: 65 MMHG | HEIGHT: 64 IN | BODY MASS INDEX: 29.19 KG/M2 | SYSTOLIC BLOOD PRESSURE: 115 MMHG | WEIGHT: 171 LBS | RESPIRATION RATE: 15 BRPM | HEART RATE: 80 BPM | OXYGEN SATURATION: 98 %

## 2023-03-08 DIAGNOSIS — K74.60 LIVER CIRRHOSIS SECONDARY TO NASH: Primary | ICD-10-CM

## 2023-03-08 DIAGNOSIS — K75.81 LIVER CIRRHOSIS SECONDARY TO NASH: Primary | ICD-10-CM

## 2023-03-08 PROBLEM — I85.00 ESOPHAGEAL VARICES: Status: ACTIVE | Noted: 2023-03-08

## 2023-03-08 LAB — POCT GLUCOSE: 113 MG/DL (ref 70–110)

## 2023-03-08 PROCEDURE — 37000008 HC ANESTHESIA 1ST 15 MINUTES: Performed by: STUDENT IN AN ORGANIZED HEALTH CARE EDUCATION/TRAINING PROGRAM

## 2023-03-08 PROCEDURE — 37000009 HC ANESTHESIA EA ADD 15 MINS: Performed by: STUDENT IN AN ORGANIZED HEALTH CARE EDUCATION/TRAINING PROGRAM

## 2023-03-08 PROCEDURE — E9220 PRA ENDO ANESTHESIA: HCPCS | Mod: ,,, | Performed by: NURSE ANESTHETIST, CERTIFIED REGISTERED

## 2023-03-08 PROCEDURE — 43235 EGD DIAGNOSTIC BRUSH WASH: CPT | Mod: ,,, | Performed by: STUDENT IN AN ORGANIZED HEALTH CARE EDUCATION/TRAINING PROGRAM

## 2023-03-08 PROCEDURE — 25000003 PHARM REV CODE 250: Performed by: NURSE ANESTHETIST, CERTIFIED REGISTERED

## 2023-03-08 PROCEDURE — 82962 GLUCOSE BLOOD TEST: CPT | Performed by: STUDENT IN AN ORGANIZED HEALTH CARE EDUCATION/TRAINING PROGRAM

## 2023-03-08 PROCEDURE — 63600175 PHARM REV CODE 636 W HCPCS: Performed by: NURSE ANESTHETIST, CERTIFIED REGISTERED

## 2023-03-08 PROCEDURE — 43235 PR EGD, FLEX, DIAGNOSTIC: ICD-10-PCS | Mod: ,,, | Performed by: STUDENT IN AN ORGANIZED HEALTH CARE EDUCATION/TRAINING PROGRAM

## 2023-03-08 PROCEDURE — E9220 PRA ENDO ANESTHESIA: ICD-10-PCS | Mod: ,,, | Performed by: NURSE ANESTHETIST, CERTIFIED REGISTERED

## 2023-03-08 PROCEDURE — 43235 EGD DIAGNOSTIC BRUSH WASH: CPT | Performed by: STUDENT IN AN ORGANIZED HEALTH CARE EDUCATION/TRAINING PROGRAM

## 2023-03-08 RX ORDER — SODIUM CHLORIDE 9 MG/ML
INJECTION, SOLUTION INTRAVENOUS CONTINUOUS
Status: DISCONTINUED | OUTPATIENT
Start: 2023-03-08 | End: 2023-03-08 | Stop reason: HOSPADM

## 2023-03-08 RX ORDER — LIDOCAINE HYDROCHLORIDE 20 MG/ML
INJECTION INTRAVENOUS
Status: DISCONTINUED | OUTPATIENT
Start: 2023-03-08 | End: 2023-03-08

## 2023-03-08 RX ORDER — PROPOFOL 10 MG/ML
VIAL (ML) INTRAVENOUS CONTINUOUS PRN
Status: DISCONTINUED | OUTPATIENT
Start: 2023-03-08 | End: 2023-03-08

## 2023-03-08 RX ORDER — PROPOFOL 10 MG/ML
VIAL (ML) INTRAVENOUS
Status: DISCONTINUED | OUTPATIENT
Start: 2023-03-08 | End: 2023-03-08

## 2023-03-08 RX ADMIN — PROPOFOL 100 MG: 10 INJECTION, EMULSION INTRAVENOUS at 08:03

## 2023-03-08 RX ADMIN — LIDOCAINE HYDROCHLORIDE 100 MG: 20 INJECTION INTRAVENOUS at 08:03

## 2023-03-08 RX ADMIN — PROPOFOL 50 MG: 10 INJECTION, EMULSION INTRAVENOUS at 08:03

## 2023-03-08 RX ADMIN — SODIUM CHLORIDE: 0.9 INJECTION, SOLUTION INTRAVENOUS at 08:03

## 2023-03-08 RX ADMIN — GLYCOPYRROLATE 0.1 MG: 0.2 INJECTION, SOLUTION INTRAMUSCULAR; INTRAVENOUS at 08:03

## 2023-03-08 RX ADMIN — Medication 225 MCG/KG/MIN: at 08:03

## 2023-03-08 NOTE — ANESTHESIA POSTPROCEDURE EVALUATION
Anesthesia Post Evaluation    Patient: Tona Carlson    Procedure(s) Performed: Procedure(s) (LRB):  ESOPHAGOGASTRODUODENOSCOPY (EGD) (N/A)    Final Anesthesia Type: general      Patient location during evaluation: PACU  Patient participation: Yes- Able to Participate  Level of consciousness: awake and alert  Post-procedure vital signs: reviewed and stable  Pain management: adequate  Airway patency: patent    PONV status at discharge: No PONV  Anesthetic complications: no      Cardiovascular status: blood pressure returned to baseline  Respiratory status: unassisted  Hydration status: euvolemic  Follow-up not needed.          Vitals Value Taken Time   /65 03/08/23 0928   Temp 36.7 °C (98 °F) 03/08/23 0900   Pulse 80 03/08/23 0928   Resp 15 03/08/23 0928   SpO2 98 % 03/08/23 0928         Event Time   Out of Recovery 09:40:15         Pain/Lu Score: Lu Score: 9 (3/8/2023  9:01 AM)

## 2023-03-08 NOTE — H&P
Short Stay Endoscopy History and Physical    PCP - Medina Prakash MD     Procedure - EGD  ASA - per anesthesia  Mallampati - per anesthesia  History of Anesthesia problems - no  Family history Anesthesia problems -  no   Plan of anesthesia - per anesthesia    HPI:  This is a 67 y.o. female here for evaluation of: varices      ROS:  Constitutional: No fevers, chills, No weight loss  CV: No chest pain  Pulm: No cough, No shortness of breath  Ophtho: No vision changes  GI: see HPI  Derm: No rash    Medical History:  has a past medical history of Cataract, Compressive optic atrophy (2015), Diabetes mellitus, type 2, Fatty liver disease, nonalcoholic, Hyperlipidemia, Prolactinoma, Reflux (08/10/2012), Rosacea, Toe fracture, right (2018), and Unspecified cirrhosis of liver.    Surgical History:  has a past surgical history that includes  section; Cholecystectomy; Transphenoidal pituitary resection (); and Breast biopsy (Right).    Family History: family history includes Cancer in her paternal grandmother; Diabetes in her mother; Glaucoma in her mother; Heart disease in her paternal grandfather; Hypertension in her mother; Liver disease in her paternal grandmother; No Known Problems in her brother, brother, and father; Thyroid disease in her sister.    Social History:  reports that she has never smoked. She has never used smokeless tobacco. She reports that she does not drink alcohol and does not use drugs.    Review of patient's allergies indicates:  No Known Allergies    Medications:   Medications Prior to Admission   Medication Sig Dispense Refill Last Dose    atorvastatin (LIPITOR) 20 MG tablet Take 1 tablet (20 mg total) by mouth once daily. 90 tablet 3 Past Month    cholecalciferol, vitamin D3, (VITAMIN D3) 50 mcg (2,000 unit) Cap Take 1 capsule (2,000 Units total) by mouth once daily.   3/7/2023    finasteride (PROSCAR) 5 mg tablet Take 5 mg by mouth.   3/7/2023    insulin (LANTUS SOLOSTAR  "U-100 INSULIN) glargine 100 units/mL SubQ pen Inject 20 Units into the skin every evening. 30 mL 3 3/7/2023    metFORMIN (GLUCOPHAGE-XR) 500 MG ER 24hr tablet Take 1 tablet (500 mg total) by mouth 2 (two) times daily with meals. 180 tablet 3 Past Week    semaglutide (OZEMPIC) 0.25 mg or 0.5 mg(2 mg/1.5 mL) pen injector Inject 0.5 mg into the skin every 7 days. 3 pen 3 Past Week    alcohol swabs (BD ALCOHOL SWABS) PadM Use as needed 200 each 3     BD ULTRA-FINE WON PEN NEEDLE 32 gauge x 5/32" Ndle Use with insulin pens 100 each 3     blood glucose control, normal Soln Check glucose 1-2 x day 1 each 1     blood sugar diagnostic Strp Patient testing 4 times daily 200 strip 3     blood sugar diagnostic Strp Test glucose twice daily. Freestyle Lite. 200 each 11     blood-glucose meter kit Use as directed 1 each 0     blood-glucose meter Misc TEST 4 TIMES DAILY 1 each 0     lancets Misc Test glucose twice daily. Freestyle Lite. 200 each 11     melatonin (MELATIN) Take 3 mg by mouth every evening.       metroNIDAZOLE (METROGEL) 0.75 % gel Apply to face BID. 45 g 5        Physical Exam:    Vital Signs:   Vitals:    03/08/23 0815   BP: 127/64   Pulse: 80   Resp: 16   Temp: 98.1 °F (36.7 °C)       General Appearance: Well appearing in no acute distress  Eyes:    No scleral icterus  ENT: Neck supple, Lips, mucosa, and tongue normal; teeth and gums normal  Lungs: CTA anteriorly  Heart:  Regular rate, S1, S2 normal, no murmurs heard.  Abdomen: Soft, non tender, non distended with normal bowel sounds. No hepatosplenomegaly, ascites, or mass.  Extremities: No edema  Skin: No rash    Labs:  Lab Results   Component Value Date    WBC 4.27 12/23/2022    HGB 13.3 12/23/2022    HCT 42.0 12/23/2022     12/23/2022    CHOL 154 08/29/2022    TRIG 183 (H) 08/29/2022    HDL 40 08/29/2022    ALT 54 (H) 02/16/2023    AST 48 (H) 02/16/2023     02/16/2023    K 3.9 02/16/2023     02/16/2023    CREATININE 0.7 02/16/2023    BUN 6 " (L) 02/16/2023    CO2 25 02/16/2023    TSH 1.055 11/16/2022    INR 1.1 12/23/2022    HGBA1C 6.5 (H) 02/16/2023       I have explained the risks and benefits of endoscopy procedures to the patient including but not limited to bleeding, perforation, infection, and death.    She expressed understanding. She was allowed to ask further questions which were answered to her satisfaction.    Raciel Becker MD

## 2023-03-08 NOTE — ANESTHESIA PREPROCEDURE EVALUATION
2023  Tona Carlson is a 67 y.o., female.    Past Medical History:   Diagnosis Date    Cataract     Compressive optic atrophy 2015    Diabetes mellitus, type 2     Fatty liver disease, nonalcoholic     Hyperlipidemia     Prolactinoma     Reflux 08/10/2012    Rosacea     Toe fracture, right 2018    Unspecified cirrhosis of liver      Past Surgical History:   Procedure Laterality Date    BREAST BIOPSY Right     core     SECTION      CHOLECYSTECTOMY      TRANSPHENOIDAL PITUITARY RESECTION         Pre-op Assessment    I have reviewed the Patient Summary Reports.     I have reviewed the Nursing Notes. I have reviewed the NPO Status.   I have reviewed the Medications.     Review of Systems  Anesthesia Hx:  No problems with previous Anesthesia  Denies Family Hx of Anesthesia complications.   Denies Personal Hx of Anesthesia complications.   Hematology/Oncology:  Hematology Normal   Oncology Normal     EENT/Dental:EENT/Dental Normal   Cardiovascular:  Cardiovascular Normal     Pulmonary:  Pulmonary Normal    Renal/:  Renal/ Normal     Hepatic/GI:   Bowel Prep. Liver Disease, Hepatitis    Musculoskeletal:  Musculoskeletal Normal    Neurological:   Neuromuscular Disease,    Endocrine:   Diabetes    Dermatological:  Skin Normal    Psych:  Psychiatric Normal           Physical Exam  General: Well nourished    Airway:  Mallampati: II   Mouth Opening: Normal  TM Distance: Normal  Tongue: Normal  Neck ROM: Normal ROM    Dental:  Intact        Anesthesia Plan  Type of Anesthesia, risks & benefits discussed:    Anesthesia Type: Gen Natural Airway  Intra-op Monitoring Plan: Standard ASA Monitors  Post Op Pain Control Plan: multimodal analgesia and IV/PO Opioids PRN  Induction:  IV  ASA Score: 3  Day of Surgery Review of History & Physical: H&P Update referred to the  surgeon/provider.    Ready For Surgery From Anesthesia Perspective.     .

## 2023-03-08 NOTE — TRANSFER OF CARE
"Anesthesia Transfer of Care Note    Patient: Tona Carlson    Procedure(s) Performed: Procedure(s) (LRB):  ESOPHAGOGASTRODUODENOSCOPY (EGD) (N/A)    Patient location: GI    Anesthesia Type: general    Transport from OR: Transported from OR on room air with adequate spontaneous ventilation    Post pain: adequate analgesia    Post assessment: no apparent anesthetic complications    Post vital signs: stable    Level of consciousness: awake    Nausea/Vomiting: no nausea/vomiting    Complications: none    Transfer of care protocol was followed      Last vitals:   Visit Vitals  /70 (BP Location: Left arm, Patient Position: Lying)   Pulse 80   Temp 36.7 °C (98.1 °F) (Oral)   Resp 16   Ht 5' 4" (1.626 m)   Wt 77.6 kg (171 lb)   SpO2 97%   Breastfeeding No   BMI 29.35 kg/m²     "

## 2023-03-08 NOTE — PROVATION PATIENT INSTRUCTIONS
Discharge Summary/Instructions after an Endoscopic Procedure  Patient Name: Tona Carlson  Patient MRN: [id number]  Patient YOB: 1955  Wednesday, March 8, 2023  Raciel Becker MD  Dear patient,  As a result of recent federal legislation (The Federal Cures Act), you may   receive lab or pathology results from your procedure in your [facility]   account before your physician is able to contact you. Your physician or   their representative will relay the results to you with their   recommendations at their soonest availability.  Thank you,  RESTRICTIONS:  During your procedure today, you received medications for sedation.  These   medications may affect your judgment, balance and coordination.  Therefore,   for 24 hours, you have the following restrictions:   - DO NOT drive a car, operate machinery, make legal/financial decisions,   sign important papers or drink alcohol.    ACTIVITY:  Today: no heavy lifting, straining or running due to procedural   sedation/anesthesia.  The following day: return to full activity including work.  DIET:  Eat and drink normally unless instructed otherwise.     TREATMENT FOR COMMON SIDE EFFECTS:  - Mild abdominal pain, nausea, belching, bloating or excessive gas:  rest,   eat lightly and use a heating pad.  - Sore Throat: treat with throat lozenges and/or gargle with warm salt   water.  - Because air was used during the procedure, expelling large amounts of air   from your rectum or belching is normal.  - If a bowel prep was taken, you may not have a bowel movement for 1-3 days.    This is normal.  SYMPTOMS TO WATCH FOR AND REPORT TO YOUR PHYSICIAN:  1. Abdominal pain or bloating, other than gas cramps.  2. Chest pain.  3. Back pain.  4. Signs of infection such as: chills or fever occurring within 24 hours   after the procedure.  5. Rectal bleeding, which would show as bright red, maroon, or black stools.   (A tablespoon of blood from the rectum is not serious, especially if    hemorrhoids are present.)  6. Vomiting.  7. Weakness or dizziness.  GO DIRECTLY TO THE NEAREST EMERGENCY ROOM IF YOU HAVE ANY OF THE FOLLOWING:      Difficulty breathing              Chills and/or fever over 101 F   Persistent vomiting and/or vomiting blood   Severe abdominal pain   Severe chest pain   Black, tarry stools   Bleeding- more than one tablespoon   Any other symptom or condition that you feel may need urgent attention  Your doctor recommends these additional instructions:  If any biopsies were taken, your doctors clinic will contact you in 1 to 2   weeks with any results.  - The patient will be observed post-procedure, until all discharge criteria   are met.   - Discharge patient to home (via wheelchair).   - Resume previous diet.   - Continue present medications.   - Patient has a contact number available for emergencies.  The signs and   symptoms of potential delayed complications were discussed with the   patient.  Return to normal activities tomorrow.  Written discharge   instructions were provided to the patient.   - Repeat upper endoscopy in 1 year.  For questions, problems or results please call your physician - Raciel Becker MD at Work:  [phone].  [facility] [place], EMERGENCY ROOM PHONE NUMBER: [phone]  IF A COMPLICATION OR EMERGENCY SITUATION ARISES AND YOU ARE UNABLE TO REACH   YOUR PHYSICIAN - GO DIRECTLY TO THE EMERGENCY ROOM.  MD Raciel Evans MD  3/8/2023 8:54:35 AM  This report has been verified and signed electronically.  Dear patient,  As a result of recent federal legislation (The Federal Cures Act), you may   receive lab or pathology results from your procedure in your [facility]   account before your physician is able to contact you. Your physician or   their representative will relay the results to you with their   recommendations at their soonest availability.  Thank you,  [employer]OVATION

## 2023-04-13 ENCOUNTER — TELEPHONE (OUTPATIENT)
Dept: PODIATRY | Facility: CLINIC | Age: 68
End: 2023-04-13
Payer: MEDICARE

## 2023-04-13 NOTE — TELEPHONE ENCOUNTER
Left detail message I have the office note would you like for me to mail or will you stop by the office to pick it up.

## 2023-04-14 ENCOUNTER — PATIENT MESSAGE (OUTPATIENT)
Dept: PODIATRY | Facility: CLINIC | Age: 68
End: 2023-04-14
Payer: MEDICARE

## 2023-05-02 ENCOUNTER — TELEPHONE (OUTPATIENT)
Dept: PODIATRY | Facility: CLINIC | Age: 68
End: 2023-05-02
Payer: MEDICARE

## 2023-05-02 NOTE — TELEPHONE ENCOUNTER
----- Message from Xiao Covarrubias MA sent at 5/2/2023  2:31 PM CDT -----  Contact: [phone]  Pt is calling regarding paperwork she was to receive, but never received it. Pt also states she also thinks she really needs a special shoe. Please reach back out to pt at [phone].

## 2023-05-02 NOTE — TELEPHONE ENCOUNTER
Spoke with pt in regards to her message in the portal. Pt was advise that I spoke with Dr. Sotelo staff and Liz Fatima stated that she sent patient letter in the mail. Pt had further concerns. Pt was advise to contact their office and request to speak with Liz Fatima. Pt stated her understanding. Call ended. ----- Message from Irineo Webb sent at 5/2/2023 10:38 AM CDT -----  Name Of Caller: Tona        Provider Name: Nory Reardon         Does patient feel the need to be seen today? no        Relationship to the Pt?: patient        Contact Preference?: [phone]        What is the nature of the call?: Patient states that she needs to speak with someone in the office in regards to a letter that she was supposed to receive regarding an insert for her shoes.

## 2023-05-04 ENCOUNTER — TELEPHONE (OUTPATIENT)
Dept: PODIATRY | Facility: CLINIC | Age: 68
End: 2023-05-04
Payer: MEDICARE

## 2023-05-04 NOTE — TELEPHONE ENCOUNTER
"Patient walked in to the clinic this afternoon, saying she has an appointment with Dr. Gray.   Patient isn't scheduled and her clinical note is saying to come back in 1year. Patient was last seen by Dr. Olmos in February and received a RX for diabet. Shoes with customized inserts.   Long conversations with patient in the waiting area. She said she don't wan the "implants" from the vendor's we recommend to her. She went to the [employer] store and she want the inserts from them. Try to explain to her that if she goes to the [employer] store she has to pay out of her pocket, because they aren't working with her insurance. She told me she don't mind paying out of pocket and then she told me that she isn't willing to pay $1600 for the inserts.   Conversation is confusing, because patient also contacted Dr. Wen's office regarding a letter. Dr. Wen's office is stating they send the letter out to patient's house and she said she never received it. Checking at patients chart and can't see a letter written by Dr. Wen.  Explained again to the patient if she decided she don't want to go to the orthotic vendor stores unfortunately, she has to pay out of her pocket. Repeated everything out of this conversation several times to her.  After she left, I received a phone message to call her back, what I did and repeated again what we were talking about in the waiting area.  "

## 2023-05-04 NOTE — TELEPHONE ENCOUNTER
----- Message from Jeff Malone sent at 5/4/2023  1:51 PM CDT -----  Regarding: Patient advice  Contact: Pt  Pt called in regards to getting an statement from office       Pt can be reached at [phone]

## 2023-05-10 ENCOUNTER — OFFICE VISIT (OUTPATIENT)
Dept: INTERNAL MEDICINE | Facility: CLINIC | Age: 68
End: 2023-05-10
Payer: MEDICARE

## 2023-05-10 VITALS
DIASTOLIC BLOOD PRESSURE: 60 MMHG | HEART RATE: 88 BPM | HEIGHT: 64 IN | WEIGHT: 172.38 LBS | TEMPERATURE: 98 F | BODY MASS INDEX: 29.43 KG/M2 | OXYGEN SATURATION: 99 % | SYSTOLIC BLOOD PRESSURE: 100 MMHG

## 2023-05-10 DIAGNOSIS — K75.81 LIVER CIRRHOSIS SECONDARY TO NASH: ICD-10-CM

## 2023-05-10 DIAGNOSIS — K74.60 LIVER CIRRHOSIS SECONDARY TO NASH: ICD-10-CM

## 2023-05-10 DIAGNOSIS — E11.42 TYPE 2 DIABETES MELLITUS WITH DIABETIC POLYNEUROPATHY, WITHOUT LONG-TERM CURRENT USE OF INSULIN: Primary | Chronic | ICD-10-CM

## 2023-05-10 DIAGNOSIS — R79.89 ELEVATED LFTS: ICD-10-CM

## 2023-05-10 DIAGNOSIS — Z12.31 ENCOUNTER FOR SCREENING MAMMOGRAM FOR BREAST CANCER: ICD-10-CM

## 2023-05-10 DIAGNOSIS — E11.42 TYPE 2 DIABETES MELLITUS WITH DIABETIC POLYNEUROPATHY, WITHOUT LONG-TERM CURRENT USE OF INSULIN: Chronic | ICD-10-CM

## 2023-05-10 DIAGNOSIS — I70.0 AORTIC ATHEROSCLEROSIS: ICD-10-CM

## 2023-05-10 DIAGNOSIS — Z00.00 ANNUAL PHYSICAL EXAM: Primary | ICD-10-CM

## 2023-05-10 DIAGNOSIS — E78.5 HYPERLIPIDEMIA ASSOCIATED WITH TYPE 2 DIABETES MELLITUS: ICD-10-CM

## 2023-05-10 DIAGNOSIS — E11.69 HYPERLIPIDEMIA ASSOCIATED WITH TYPE 2 DIABETES MELLITUS: ICD-10-CM

## 2023-05-10 PROCEDURE — 1126F PR PAIN SEVERITY QUANTIFIED, NO PAIN PRESENT: ICD-10-PCS | Mod: CPTII,S$GLB,, | Performed by: HOSPITALIST

## 2023-05-10 PROCEDURE — 1159F PR MEDICATION LIST DOCUMENTED IN MEDICAL RECORD: ICD-10-PCS | Mod: CPTII,S$GLB,, | Performed by: HOSPITALIST

## 2023-05-10 PROCEDURE — 3078F PR MOST RECENT DIASTOLIC BLOOD PRESSURE < 80 MM HG: ICD-10-PCS | Mod: CPTII,S$GLB,, | Performed by: HOSPITALIST

## 2023-05-10 PROCEDURE — 3074F PR MOST RECENT SYSTOLIC BLOOD PRESSURE < 130 MM HG: ICD-10-PCS | Mod: CPTII,S$GLB,, | Performed by: HOSPITALIST

## 2023-05-10 PROCEDURE — 1101F PT FALLS ASSESS-DOCD LE1/YR: CPT | Mod: CPTII,S$GLB,, | Performed by: HOSPITALIST

## 2023-05-10 PROCEDURE — 3044F HG A1C LEVEL LT 7.0%: CPT | Mod: CPTII,S$GLB,, | Performed by: HOSPITALIST

## 2023-05-10 PROCEDURE — 3074F SYST BP LT 130 MM HG: CPT | Mod: CPTII,S$GLB,, | Performed by: HOSPITALIST

## 2023-05-10 PROCEDURE — 99215 OFFICE O/P EST HI 40 MIN: CPT | Mod: S$GLB,,, | Performed by: HOSPITALIST

## 2023-05-10 PROCEDURE — 3008F PR BODY MASS INDEX (BMI) DOCUMENTED: ICD-10-PCS | Mod: CPTII,S$GLB,, | Performed by: HOSPITALIST

## 2023-05-10 PROCEDURE — 3044F PR MOST RECENT HEMOGLOBIN A1C LEVEL <7.0%: ICD-10-PCS | Mod: CPTII,S$GLB,, | Performed by: HOSPITALIST

## 2023-05-10 PROCEDURE — 3078F DIAST BP <80 MM HG: CPT | Mod: CPTII,S$GLB,, | Performed by: HOSPITALIST

## 2023-05-10 PROCEDURE — 1101F PR PT FALLS ASSESS DOC 0-1 FALLS W/OUT INJ PAST YR: ICD-10-PCS | Mod: CPTII,S$GLB,, | Performed by: HOSPITALIST

## 2023-05-10 PROCEDURE — 1159F MED LIST DOCD IN RCRD: CPT | Mod: CPTII,S$GLB,, | Performed by: HOSPITALIST

## 2023-05-10 PROCEDURE — 3288F PR FALLS RISK ASSESSMENT DOCUMENTED: ICD-10-PCS | Mod: CPTII,S$GLB,, | Performed by: HOSPITALIST

## 2023-05-10 PROCEDURE — 1160F PR REVIEW ALL MEDS BY PRESCRIBER/CLIN PHARMACIST DOCUMENTED: ICD-10-PCS | Mod: CPTII,S$GLB,, | Performed by: HOSPITALIST

## 2023-05-10 PROCEDURE — 3008F BODY MASS INDEX DOCD: CPT | Mod: CPTII,S$GLB,, | Performed by: HOSPITALIST

## 2023-05-10 PROCEDURE — 99999 PR PBB SHADOW E&M-EST. PATIENT-LVL V: ICD-10-PCS | Mod: PBBFAC,,, | Performed by: HOSPITALIST

## 2023-05-10 PROCEDURE — 99215 PR OFFICE/OUTPT VISIT, EST, LEVL V, 40-54 MIN: ICD-10-PCS | Mod: S$GLB,,, | Performed by: HOSPITALIST

## 2023-05-10 PROCEDURE — 3288F FALL RISK ASSESSMENT DOCD: CPT | Mod: CPTII,S$GLB,, | Performed by: HOSPITALIST

## 2023-05-10 PROCEDURE — 1160F RVW MEDS BY RX/DR IN RCRD: CPT | Mod: CPTII,S$GLB,, | Performed by: HOSPITALIST

## 2023-05-10 PROCEDURE — 1126F AMNT PAIN NOTED NONE PRSNT: CPT | Mod: CPTII,S$GLB,, | Performed by: HOSPITALIST

## 2023-05-10 PROCEDURE — 99999 PR PBB SHADOW E&M-EST. PATIENT-LVL V: CPT | Mod: PBBFAC,,, | Performed by: HOSPITALIST

## 2023-05-10 RX ORDER — DOXYCYCLINE 100 MG/1
100 CAPSULE ORAL
COMMUNITY
Start: 2023-04-11 | End: 2023-06-29 | Stop reason: ALTCHOICE

## 2023-05-10 RX ORDER — COVID-19 MOLECULAR TEST ASSAY
KIT MISCELLANEOUS
COMMUNITY
Start: 2023-03-27 | End: 2023-05-10

## 2023-05-10 RX ORDER — LANCETS 28 GAUGE
EACH MISCELLANEOUS
COMMUNITY
Start: 2023-02-23

## 2023-05-10 RX ORDER — INSULIN GLARGINE-YFGN 100 [IU]/ML
INJECTION, SOLUTION SUBCUTANEOUS
COMMUNITY
Start: 2023-05-08 | End: 2023-06-26

## 2023-05-10 NOTE — PROGRESS NOTES
Subjective:     @Patient ID: Tona Carlson is a 67 y.o. female.    Chief Complaint: Follow-up    HPI    64 y.o. female with DM2, HLD, liver cirrhosis, ZARCO, hepatitis C ab+, pituitary adenoma s/p resection, obesity, vit d deficiency, here for f/u of chronic conditions. Reports she is doing well with ozempic. Has helped with weight loss and appetite. Did endorse fatigue. States improved since she has been giving herself b12 injections     Also endorses neuropathy of feet. Reports traveled to [place] and pain was worse. May consider gabapentin when she travels again    Review of Systems   Constitutional:  Negative for chills and fever.   HENT:  Negative for congestion and sore throat.    Eyes:  Negative for pain and visual disturbance.   Respiratory:  Negative for cough and shortness of breath.    Cardiovascular:  Negative for chest pain and leg swelling.   Gastrointestinal:  Negative for abdominal pain, nausea and vomiting.   Endocrine: Negative for polydipsia and polyuria.   Genitourinary:  Negative for difficulty urinating and dysuria.   Musculoskeletal:  Negative for arthralgias and back pain.   Skin:  Negative for rash and wound.   Neurological:  Negative for dizziness, weakness and headaches.   Psychiatric/Behavioral:  Negative for agitation and confusion.    Past medical history, surgical history, and family medical history reviewed and updated as appropriate.    Medications and allergies reviewed.     Objective:     There were no vitals filed for this visit.  There is no height or weight on file to calculate BMI.  Physical Exam  Constitutional:       Appearance: Normal appearance.   HENT:      Head: Normocephalic and atraumatic.   Eyes:      General:         Right eye: No discharge.         Left eye: No discharge.      Conjunctiva/sclera: Conjunctivae normal.   Cardiovascular:      Rate and Rhythm: Normal rate and regular rhythm.      Heart sounds: No murmur heard.  Pulmonary:      Effort: Pulmonary  effort is normal.      Breath sounds: Normal breath sounds.   Abdominal:      General: Bowel sounds are normal. There is no distension.      Palpations: Abdomen is soft.      Tenderness: There is no abdominal tenderness.   Musculoskeletal:         General: Normal range of motion.      Cervical back: Normal range of motion and neck supple.      Right lower leg: Edema present.      Left lower leg: Edema present.   Skin:     General: Skin is warm and dry.   Neurological:      Mental Status: She is alert and oriented to person, place, and time.   Psychiatric:         Mood and Affect: Mood normal.         Behavior: Behavior normal.     Lab Results   Component Value Date    WBC 4.27 12/23/2022    HGB 13.3 12/23/2022    HCT 42.0 12/23/2022     12/23/2022    CHOL 154 08/29/2022    TRIG 183 (H) 08/29/2022    HDL 40 08/29/2022    ALT 54 (H) 02/16/2023    AST 48 (H) 02/16/2023     02/16/2023    K 3.9 02/16/2023     02/16/2023    CREATININE 0.7 02/16/2023    BUN 6 (L) 02/16/2023    CO2 25 02/16/2023    TSH 1.055 11/16/2022    INR 1.1 12/23/2022    HGBA1C 6.5 (H) 02/16/2023       Assessment:     1. Type 2 diabetes mellitus with diabetic polyneuropathy, without long-term current use of insulin    2. Hyperlipidemia associated with type 2 diabetes mellitus    3. Liver cirrhosis secondary to ZARCO    4. Elevated LFTs    5. Aortic atherosclerosis    6. Encounter for screening mammogram for breast cancer      Plan:   Tona was seen today for follow-up.    Diagnoses and all orders for this visit:    Type 2 diabetes mellitus with diabetic polyneuropathy, without long-term current use of insulin  - Stable. Continue home meds  -     Ambulatory referral/consult to Optometry; Future    Hyperlipidemia associated with type 2 diabetes mellitus  - Stable. Continue home meds     Liver cirrhosis secondary to ZARCO  - Stable. Continue to monitor    Elevated LFTs  - Stable. Continue to monitor    Aortic atherosclerosis  - Stable.  Continue statin    Encounter for screening mammogram for breast cancer  -     Mammo Digital Screening Bilat w/ [facility]ier; Future     Visit time 40 min. Includes pre-charting, face-to-face encounter, medical decision making and documentation.       Medina Prakash MD  Internal Medicine    5/10/2023

## 2023-06-19 ENCOUNTER — LAB VISIT (OUTPATIENT)
Dept: LAB | Facility: HOSPITAL | Age: 68
End: 2023-06-19
Attending: NURSE PRACTITIONER
Payer: MEDICARE

## 2023-06-19 DIAGNOSIS — E11.42 TYPE 2 DIABETES MELLITUS WITH DIABETIC POLYNEUROPATHY, WITHOUT LONG-TERM CURRENT USE OF INSULIN: Chronic | ICD-10-CM

## 2023-06-19 PROCEDURE — 82570 ASSAY OF URINE CREATININE: CPT | Performed by: NURSE PRACTITIONER

## 2023-06-19 PROCEDURE — 82306 VITAMIN D 25 HYDROXY: CPT | Performed by: NURSE PRACTITIONER

## 2023-06-19 PROCEDURE — 83036 HEMOGLOBIN GLYCOSYLATED A1C: CPT | Performed by: NURSE PRACTITIONER

## 2023-06-19 PROCEDURE — 80053 COMPREHEN METABOLIC PANEL: CPT | Performed by: NURSE PRACTITIONER

## 2023-06-19 PROCEDURE — 36415 COLL VENOUS BLD VENIPUNCTURE: CPT | Mod: PO | Performed by: NURSE PRACTITIONER

## 2023-06-19 NOTE — PROGRESS NOTES
Subjective:      Patient ID: Tona Carlson is a 67 y.o. female.    Chief Complaint:  Diabetes    History of Present Illness  Tona Carlson is here for follow up of DM and Osteoporosis.  Previously seen by me 2023.    With regards to diabetes:    Diagnosed: in her 50s  DE: 2022  Known complications:  DKA Denies  RN Denies  Eye Exam: 2022  PN Denies  Podiatry: 2023  Nephropathy Denies  CAD Denies  Denies history of pancreatitis & personal/family history of medullary thyroid cancer.     Current regimen:  Metformin 500mg a day - missing doses a few days a week due to diarrhea   Ozempic 0.5mg weekly on Saturday  Lantus 20 units nightly      Other medications tried:  Jardiance - cost  Glimepiride  Victoza - GI intolerance  Januvia - cost  Took Trulicity for 2 months then self stopped due to decreased appetite.  Novolog    Glucose Monitor:   1 times a day testing  Log reviewed: oral recall  Fastin-120    Hypoglycemia:  Denies  Knows how to correct with 15 grams of carbs- juice, coke, or a peppermint.     Diet/Exercise:  Eats 3 meals a day.   Snacks : occasionally   Drinks : diet soft drinks, water  Exercise - tries to stay active.  Recent illness, injury steroids: Denies       Diabetes Management Status    Hemoglobin A1C   Date Value Ref Range Status   2023 6.0 (H) 4.0 - 5.6 % Final     Comment:     ADA Screening Guidelines:  5.7-6.4%  Consistent with prediabetes  >or=6.5%  Consistent with diabetes    High levels of fetal hemoglobin interfere with the HbA1C  assay. Heterozygous hemoglobin variants (HbS, HgC, etc)do  not significantly interfere with this assay.   However, presence of multiple variants may affect accuracy.     2023 6.5 (H) 4.0 - 5.6 % Final     Comment:     ADA Screening Guidelines:  5.7-6.4%  Consistent with prediabetes  >or=6.5%  Consistent with diabetes    High levels of fetal hemoglobin interfere with the HbA1C  assay. Heterozygous hemoglobin variants (HbS, HgC,  etc)do  not significantly interfere with this assay.   However, presence of multiple variants may affect accuracy.     08/29/2022 6.9 (H) 4.0 - 5.6 % Final     Comment:     ADA Screening Guidelines:  5.7-6.4%  Consistent with prediabetes  >or=6.5%  Consistent with diabetes    High levels of fetal hemoglobin interfere with the HbA1C  assay. Heterozygous hemoglobin variants (HbS, HgC, etc)do  not significantly interfere with this assay.   However, presence of multiple variants may affect accuracy.         Statin: Taking  ACE/ARB: Not taking  Screening or Prevention Patient's value Goal Complete/Controlled?   HgA1C Testing and Control   Lab Results   Component Value Date    HGBA1C 6.0 (H) 06/19/2023      Annually/Less than 8% Yes     Lipid profile : 08/29/2022 Annually Yes     LDL control Lab Results   Component Value Date    LDLCALC 77.4 08/29/2022    Annually/Less than 100 mg/dl  Yes     Nephropathy screening Lab Results   Component Value Date    LABMICR 30.0 06/19/2023     Lab Results   Component Value Date    PROTEINUA Negative 08/29/2022    Annually Yes     Blood pressure BP Readings from Last 1 Encounters:   06/26/23 99/60    Less than 140/90 Yes     Dilated retinal exam : 05/31/2022 Annually Yes     Foot exam   : 02/08/2023 Annually Yes       With regards to osteoporosis:     BMD:   11/3/2022  The L1 to L4 vertebral bone mineral density is equal to 0.918 g/cm squared with a T score of -2.2.  Previously, -2.5  The left femoral neck bone mineral density is equal to 0.709 g/cm squared with a T score of -2.4.  Previously, -2.8  The right femoral neck bone mineral density is equal to 0.664 g/cm squared with a T score of -2.7.  There is a 13.3% risk of a major osteoporotic fracture and a 3.7% risk of hip fracture in the next 10 years (FRAX).  Impression:  Osteopenia of the lumbar spine and left femoral neck.  Osteoporosis of the right femoral neck.    Medications:   Fosamax - unsure of start and stop dates - believes  "she took it for a year     Reclast  11/16/2021, 12/8/2022    Calcium intake: multivitamin    Vit D intake: over the counter vitamin D3 2000iu daily     Weight bearing exercise: active lifestyle   Falls: Denies  Fractures: Denies    Following with Dr Young and Dr Farmer for Pituitary Incidentaloma     Review of Systems  As above    Objective:   Physical Exam  Vitals reviewed.   Neck:      Thyroid: No thyromegaly.   Cardiovascular:      Rate and Rhythm: Normal rate.      Comments: No edema present  Pulmonary:      Effort: Pulmonary effort is normal.   Abdominal:      Palpations: Abdomen is soft.     10/2016  IMPRESSION:   1.) Thyroid gland is normal in size with homogenous echotexture  2.) No thyroid nodules seen  RECOMMENDATIONS:   No need for repeat neck ultrasound unless there is a clinical change.    Visit Vitals  BP 99/60   Pulse 64   Ht 5' 4" (1.626 m)   Wt 76.7 kg (169 lb 1.5 oz)   SpO2 97%   BMI 29.02 kg/m²           Body mass index is 29.02 kg/m².    Lab Review:   Lab Results   Component Value Date    HGBA1C 6.0 (H) 06/19/2023    HGBA1C 6.5 (H) 02/16/2023    HGBA1C 6.9 (H) 08/29/2022       Lab Results   Component Value Date    CHOL 154 08/29/2022    HDL 40 08/29/2022    LDLCALC 77.4 08/29/2022    TRIG 183 (H) 08/29/2022    CHOLHDL 26.0 08/29/2022     Lab Results   Component Value Date     06/19/2023    K 3.7 06/19/2023     06/19/2023    CO2 25 06/19/2023    GLU 84 06/19/2023    BUN 6 (L) 06/19/2023    CREATININE 0.7 06/19/2023    CALCIUM 9.4 06/19/2023    PROT 7.3 06/19/2023    ALBUMIN 3.9 06/19/2023    BILITOT 1.1 (H) 06/19/2023    ALKPHOS 95 06/19/2023    AST 43 (H) 06/19/2023    ALT 55 (H) 06/19/2023    ANIONGAP 9 06/19/2023    ESTGFRAFRICA >60.0 06/14/2022    EGFRNONAA >60.0 06/14/2022    TSH 1.055 11/16/2022     Vit D, 25-Hydroxy   Date Value Ref Range Status   06/19/2023 35 30 - 96 ng/mL Final     Comment:     Vitamin D deficiency.........<10 ng/mL                              Vitamin D " insufficiency......10-29 ng/mL       Vitamin D sufficiency........> or equal to 30 ng/mL  Vitamin D toxicity............>100 ng/mL       Assessment and Plan     1. Type 2 diabetes mellitus with diabetic polyneuropathy, without long-term current use of insulin  insulin (LANTUS SOLOSTAR U-100 INSULIN) glargine 100 units/mL SubQ pen    OZEMPIC 0.25 mg or 0.5 mg (2 mg/3 mL) pen injector      2. Vitamin D deficiency        3. Age related osteoporosis, unspecified pathological fracture presence        4. Pituitary adenoma            Type 2 diabetes mellitus with diabetic polyneuropathy, without long-term current use of insulin  -- Labs prior to follow up.  -- A1c goal <7%.  -- Medications discussed:  MFM   GLP1-DPP4   RIVERA   SGLT2 - cost  Insulin   -- Reviewed logs/CGM:  Fasting glucose controlled.  Encouraged to check twice daily.  Instructed to send glucose logs in 14 days.  Reach out to me sooner for any glucose <70 or consistently >180.  -- Medication Changes:   Metformin 500mg daily  Lantus 14 units nightly   Ozempic 0.5mg weekly   -- Reviewed goals of therapy are to get the best control we can without hypoglycemia.  -- Reviewed patient's current insulin regimen. Clarified proper insulin dose and timing in relation to meals, etc. Insulin injection sites and proper rotation instructed.    -- Advised frequent self blood glucose monitoring.  Patient encouraged to document glucose results and bring them to every clinic visit.  -- Hypoglycemia precautions discussed. Instructed on precautions before driving.    -- Call for Bg repeatedly < 90 or > 180.   -- Close adherence to lifestyle changes recommended.   -- Periodic follow ups for eye evaluations, foot care and dental care suggested.    Vitamin D deficiency  -- CONTINUE OTCV it D3 2000iu daily.    Osteoporosis  -- Repeat BMD 11/2024.  -- Reassuring she is not fracturing.  -- Treatment  Fosamax - unsure of start and stop dates - believes she took it for a year      Reclast  11/16/2021, 12/8/2022    Therapy plan reordered for 12/2023.    Pituitary adenoma  -- Continue following with [facility].          Follow up in about 6 months (around 12/26/2023).

## 2023-06-20 LAB
25(OH)D3+25(OH)D2 SERPL-MCNC: 35 NG/ML (ref 30–96)
ALBUMIN SERPL BCP-MCNC: 3.9 G/DL (ref 3.5–5.2)
ALBUMIN/CREAT UR: 62.5 UG/MG (ref 0–30)
ALP SERPL-CCNC: 95 U/L (ref 55–135)
ALT SERPL W/O P-5'-P-CCNC: 55 U/L (ref 10–44)
ANION GAP SERPL CALC-SCNC: 9 MMOL/L (ref 8–16)
AST SERPL-CCNC: 43 U/L (ref 10–40)
BILIRUB SERPL-MCNC: 1.1 MG/DL (ref 0.1–1)
BUN SERPL-MCNC: 6 MG/DL (ref 8–23)
CALCIUM SERPL-MCNC: 9.4 MG/DL (ref 8.7–10.5)
CHLORIDE SERPL-SCNC: 108 MMOL/L (ref 95–110)
CO2 SERPL-SCNC: 25 MMOL/L (ref 23–29)
CREAT SERPL-MCNC: 0.7 MG/DL (ref 0.5–1.4)
CREAT UR-MCNC: 48 MG/DL (ref 15–325)
EST. GFR  (NO RACE VARIABLE): >60 ML/MIN/1.73 M^2
ESTIMATED AVG GLUCOSE: 126 MG/DL (ref 68–131)
GLUCOSE SERPL-MCNC: 84 MG/DL (ref 70–110)
HBA1C MFR BLD: 6 % (ref 4–5.6)
MICROALBUMIN UR DL<=1MG/L-MCNC: 30 UG/ML
POTASSIUM SERPL-SCNC: 3.7 MMOL/L (ref 3.5–5.1)
PROT SERPL-MCNC: 7.3 G/DL (ref 6–8.4)
SODIUM SERPL-SCNC: 142 MMOL/L (ref 136–145)

## 2023-06-26 ENCOUNTER — OFFICE VISIT (OUTPATIENT)
Dept: ENDOCRINOLOGY | Facility: CLINIC | Age: 68
End: 2023-06-26
Payer: MEDICARE

## 2023-06-26 VITALS
SYSTOLIC BLOOD PRESSURE: 99 MMHG | BODY MASS INDEX: 28.86 KG/M2 | OXYGEN SATURATION: 97 % | HEIGHT: 64 IN | WEIGHT: 169.06 LBS | DIASTOLIC BLOOD PRESSURE: 60 MMHG | HEART RATE: 64 BPM

## 2023-06-26 DIAGNOSIS — D35.2 PITUITARY ADENOMA: Chronic | ICD-10-CM

## 2023-06-26 DIAGNOSIS — E11.42 TYPE 2 DIABETES MELLITUS WITH DIABETIC POLYNEUROPATHY, WITHOUT LONG-TERM CURRENT USE OF INSULIN: Primary | Chronic | ICD-10-CM

## 2023-06-26 DIAGNOSIS — M81.0 AGE RELATED OSTEOPOROSIS, UNSPECIFIED PATHOLOGICAL FRACTURE PRESENCE: Chronic | ICD-10-CM

## 2023-06-26 DIAGNOSIS — E55.9 VITAMIN D DEFICIENCY: ICD-10-CM

## 2023-06-26 PROCEDURE — 3060F POS MICROALBUMINURIA REV: CPT | Mod: CPTII,S$GLB,, | Performed by: NURSE PRACTITIONER

## 2023-06-26 PROCEDURE — 3008F BODY MASS INDEX DOCD: CPT | Mod: CPTII,S$GLB,, | Performed by: NURSE PRACTITIONER

## 2023-06-26 PROCEDURE — 3044F HG A1C LEVEL LT 7.0%: CPT | Mod: CPTII,S$GLB,, | Performed by: NURSE PRACTITIONER

## 2023-06-26 PROCEDURE — 1126F AMNT PAIN NOTED NONE PRSNT: CPT | Mod: CPTII,S$GLB,, | Performed by: NURSE PRACTITIONER

## 2023-06-26 PROCEDURE — 3288F FALL RISK ASSESSMENT DOCD: CPT | Mod: CPTII,S$GLB,, | Performed by: NURSE PRACTITIONER

## 2023-06-26 PROCEDURE — 3074F SYST BP LT 130 MM HG: CPT | Mod: CPTII,S$GLB,, | Performed by: NURSE PRACTITIONER

## 2023-06-26 PROCEDURE — 3078F DIAST BP <80 MM HG: CPT | Mod: CPTII,S$GLB,, | Performed by: NURSE PRACTITIONER

## 2023-06-26 PROCEDURE — 1160F PR REVIEW ALL MEDS BY PRESCRIBER/CLIN PHARMACIST DOCUMENTED: ICD-10-PCS | Mod: CPTII,S$GLB,, | Performed by: NURSE PRACTITIONER

## 2023-06-26 PROCEDURE — 1159F PR MEDICATION LIST DOCUMENTED IN MEDICAL RECORD: ICD-10-PCS | Mod: CPTII,S$GLB,, | Performed by: NURSE PRACTITIONER

## 2023-06-26 PROCEDURE — 3074F PR MOST RECENT SYSTOLIC BLOOD PRESSURE < 130 MM HG: ICD-10-PCS | Mod: CPTII,S$GLB,, | Performed by: NURSE PRACTITIONER

## 2023-06-26 PROCEDURE — 1159F MED LIST DOCD IN RCRD: CPT | Mod: CPTII,S$GLB,, | Performed by: NURSE PRACTITIONER

## 2023-06-26 PROCEDURE — 3060F PR POS MICROALBUMINURIA RESULT DOCUMENTED/REVIEW: ICD-10-PCS | Mod: CPTII,S$GLB,, | Performed by: NURSE PRACTITIONER

## 2023-06-26 PROCEDURE — 3008F PR BODY MASS INDEX (BMI) DOCUMENTED: ICD-10-PCS | Mod: CPTII,S$GLB,, | Performed by: NURSE PRACTITIONER

## 2023-06-26 PROCEDURE — 1101F PR PT FALLS ASSESS DOC 0-1 FALLS W/OUT INJ PAST YR: ICD-10-PCS | Mod: CPTII,S$GLB,, | Performed by: NURSE PRACTITIONER

## 2023-06-26 PROCEDURE — 99999 PR PBB SHADOW E&M-EST. PATIENT-LVL IV: CPT | Mod: PBBFAC,,, | Performed by: NURSE PRACTITIONER

## 2023-06-26 PROCEDURE — 3078F PR MOST RECENT DIASTOLIC BLOOD PRESSURE < 80 MM HG: ICD-10-PCS | Mod: CPTII,S$GLB,, | Performed by: NURSE PRACTITIONER

## 2023-06-26 PROCEDURE — 1160F RVW MEDS BY RX/DR IN RCRD: CPT | Mod: CPTII,S$GLB,, | Performed by: NURSE PRACTITIONER

## 2023-06-26 PROCEDURE — 3044F PR MOST RECENT HEMOGLOBIN A1C LEVEL <7.0%: ICD-10-PCS | Mod: CPTII,S$GLB,, | Performed by: NURSE PRACTITIONER

## 2023-06-26 PROCEDURE — 1126F PR PAIN SEVERITY QUANTIFIED, NO PAIN PRESENT: ICD-10-PCS | Mod: CPTII,S$GLB,, | Performed by: NURSE PRACTITIONER

## 2023-06-26 PROCEDURE — 3066F NEPHROPATHY DOC TX: CPT | Mod: CPTII,S$GLB,, | Performed by: NURSE PRACTITIONER

## 2023-06-26 PROCEDURE — 1101F PT FALLS ASSESS-DOCD LE1/YR: CPT | Mod: CPTII,S$GLB,, | Performed by: NURSE PRACTITIONER

## 2023-06-26 PROCEDURE — 3066F PR DOCUMENTATION OF TREATMENT FOR NEPHROPATHY: ICD-10-PCS | Mod: CPTII,S$GLB,, | Performed by: NURSE PRACTITIONER

## 2023-06-26 PROCEDURE — 99214 OFFICE O/P EST MOD 30 MIN: CPT | Mod: S$GLB,,, | Performed by: NURSE PRACTITIONER

## 2023-06-26 PROCEDURE — 3288F PR FALLS RISK ASSESSMENT DOCUMENTED: ICD-10-PCS | Mod: CPTII,S$GLB,, | Performed by: NURSE PRACTITIONER

## 2023-06-26 PROCEDURE — 99999 PR PBB SHADOW E&M-EST. PATIENT-LVL IV: ICD-10-PCS | Mod: PBBFAC,,, | Performed by: NURSE PRACTITIONER

## 2023-06-26 PROCEDURE — 99214 PR OFFICE/OUTPT VISIT, EST, LEVL IV, 30-39 MIN: ICD-10-PCS | Mod: S$GLB,,, | Performed by: NURSE PRACTITIONER

## 2023-06-26 RX ORDER — INSULIN GLARGINE 100 [IU]/ML
14 INJECTION, SOLUTION SUBCUTANEOUS NIGHTLY
Qty: 30 ML | Refills: 3 | Status: SHIPPED | OUTPATIENT
Start: 2023-06-26 | End: 2024-01-16

## 2023-06-26 RX ORDER — SEMAGLUTIDE 0.68 MG/ML
0.5 INJECTION, SOLUTION SUBCUTANEOUS
Qty: 10 ML | Refills: 3 | Status: SHIPPED | OUTPATIENT
Start: 2023-06-26

## 2023-06-26 RX ORDER — SEMAGLUTIDE 0.68 MG/ML
INJECTION, SOLUTION SUBCUTANEOUS
COMMUNITY
Start: 2023-05-12 | End: 2023-06-26 | Stop reason: SDUPTHER

## 2023-06-26 NOTE — ASSESSMENT & PLAN NOTE
-- Labs prior to follow up.  -- A1c goal <7%.  -- Medications discussed:  MFM   GLP1-DPP4   RIVERA   SGLT2 - cost  Insulin   -- Reviewed logs/CGM:  Fasting glucose controlled.  Encouraged to check twice daily.  Instructed to send glucose logs in 14 days.  Reach out to me sooner for any glucose <70 or consistently >180.  -- Medication Changes:   Metformin 500mg daily  Lantus 14 units nightly   Ozempic 0.5mg weekly   -- Reviewed goals of therapy are to get the best control we can without hypoglycemia.  -- Reviewed patient's current insulin regimen. Clarified proper insulin dose and timing in relation to meals, etc. Insulin injection sites and proper rotation instructed.    -- Advised frequent self blood glucose monitoring.  Patient encouraged to document glucose results and bring them to every clinic visit.  -- Hypoglycemia precautions discussed. Instructed on precautions before driving.    -- Call for Bg repeatedly < 90 or > 180.   -- Close adherence to lifestyle changes recommended.   -- Periodic follow ups for eye evaluations, foot care and dental care suggested.

## 2023-06-28 ENCOUNTER — HOSPITAL ENCOUNTER (OUTPATIENT)
Dept: RADIOLOGY | Facility: HOSPITAL | Age: 68
Discharge: HOME | End: 2023-06-28
Attending: NURSE PRACTITIONER
Payer: MEDICARE

## 2023-06-28 DIAGNOSIS — R74.8 ELEVATED LIVER ENZYMES: ICD-10-CM

## 2023-06-28 DIAGNOSIS — K75.81 LIVER CIRRHOSIS SECONDARY TO NASH: ICD-10-CM

## 2023-06-28 DIAGNOSIS — E78.5 HYPERLIPIDEMIA ASSOCIATED WITH TYPE 2 DIABETES MELLITUS: ICD-10-CM

## 2023-06-28 DIAGNOSIS — K74.60 LIVER CIRRHOSIS SECONDARY TO NASH: ICD-10-CM

## 2023-06-28 DIAGNOSIS — E11.9 DM TYPE 2 WITHOUT RETINOPATHY: ICD-10-CM

## 2023-06-28 DIAGNOSIS — E66.09 CLASS 1 OBESITY DUE TO EXCESS CALORIES WITH SERIOUS COMORBIDITY AND BODY MASS INDEX (BMI) OF 30.0 TO 30.9 IN ADULT: ICD-10-CM

## 2023-06-28 DIAGNOSIS — R16.1 SPLENOMEGALY: ICD-10-CM

## 2023-06-28 DIAGNOSIS — E11.69 HYPERLIPIDEMIA ASSOCIATED WITH TYPE 2 DIABETES MELLITUS: ICD-10-CM

## 2023-06-28 PROCEDURE — 76700 US ABDOMEN COMPLETE: ICD-10-PCS | Mod: 26,,, | Performed by: INTERNAL MEDICINE

## 2023-06-28 PROCEDURE — 76700 US EXAM ABDOM COMPLETE: CPT | Mod: 26,,, | Performed by: INTERNAL MEDICINE

## 2023-06-28 PROCEDURE — 76700 US EXAM ABDOM COMPLETE: CPT | Mod: TC

## 2023-06-29 ENCOUNTER — OFFICE VISIT (OUTPATIENT)
Dept: HEPATOLOGY | Facility: CLINIC | Age: 68
End: 2023-06-29
Payer: MEDICARE

## 2023-06-29 VITALS — BODY MASS INDEX: 28.91 KG/M2 | WEIGHT: 169.31 LBS | HEIGHT: 64 IN

## 2023-06-29 DIAGNOSIS — R76.8 HEPATITIS C ANTIBODY TEST POSITIVE: ICD-10-CM

## 2023-06-29 DIAGNOSIS — E11.69 HYPERLIPIDEMIA ASSOCIATED WITH TYPE 2 DIABETES MELLITUS: ICD-10-CM

## 2023-06-29 DIAGNOSIS — Z85.05 ENCOUNTER FOR FOLLOW-UP SURVEILLANCE OF LIVER CANCER: ICD-10-CM

## 2023-06-29 DIAGNOSIS — Z08 ENCOUNTER FOR FOLLOW-UP SURVEILLANCE OF LIVER CANCER: ICD-10-CM

## 2023-06-29 DIAGNOSIS — E11.42 TYPE 2 DIABETES MELLITUS WITH DIABETIC POLYNEUROPATHY, WITHOUT LONG-TERM CURRENT USE OF INSULIN: Chronic | ICD-10-CM

## 2023-06-29 DIAGNOSIS — K75.81 LIVER CIRRHOSIS SECONDARY TO NASH: Primary | ICD-10-CM

## 2023-06-29 DIAGNOSIS — E78.5 HYPERLIPIDEMIA ASSOCIATED WITH TYPE 2 DIABETES MELLITUS: ICD-10-CM

## 2023-06-29 DIAGNOSIS — K74.60 LIVER CIRRHOSIS SECONDARY TO NASH: Primary | ICD-10-CM

## 2023-06-29 DIAGNOSIS — I85.10 SECONDARY ESOPHAGEAL VARICES WITHOUT BLEEDING: ICD-10-CM

## 2023-06-29 PROCEDURE — 3288F PR FALLS RISK ASSESSMENT DOCUMENTED: ICD-10-PCS | Mod: CPTII,S$GLB,, | Performed by: NURSE PRACTITIONER

## 2023-06-29 PROCEDURE — 3008F BODY MASS INDEX DOCD: CPT | Mod: CPTII,S$GLB,, | Performed by: NURSE PRACTITIONER

## 2023-06-29 PROCEDURE — 1126F PR PAIN SEVERITY QUANTIFIED, NO PAIN PRESENT: ICD-10-PCS | Mod: CPTII,S$GLB,, | Performed by: NURSE PRACTITIONER

## 2023-06-29 PROCEDURE — 1160F RVW MEDS BY RX/DR IN RCRD: CPT | Mod: CPTII,S$GLB,, | Performed by: NURSE PRACTITIONER

## 2023-06-29 PROCEDURE — 3044F PR MOST RECENT HEMOGLOBIN A1C LEVEL <7.0%: ICD-10-PCS | Mod: CPTII,S$GLB,, | Performed by: NURSE PRACTITIONER

## 2023-06-29 PROCEDURE — 1159F PR MEDICATION LIST DOCUMENTED IN MEDICAL RECORD: ICD-10-PCS | Mod: CPTII,S$GLB,, | Performed by: NURSE PRACTITIONER

## 2023-06-29 PROCEDURE — 1101F PR PT FALLS ASSESS DOC 0-1 FALLS W/OUT INJ PAST YR: ICD-10-PCS | Mod: CPTII,S$GLB,, | Performed by: NURSE PRACTITIONER

## 2023-06-29 PROCEDURE — 1126F AMNT PAIN NOTED NONE PRSNT: CPT | Mod: CPTII,S$GLB,, | Performed by: NURSE PRACTITIONER

## 2023-06-29 PROCEDURE — 1101F PT FALLS ASSESS-DOCD LE1/YR: CPT | Mod: CPTII,S$GLB,, | Performed by: NURSE PRACTITIONER

## 2023-06-29 PROCEDURE — 3060F POS MICROALBUMINURIA REV: CPT | Mod: CPTII,S$GLB,, | Performed by: NURSE PRACTITIONER

## 2023-06-29 PROCEDURE — 99999 PR PBB SHADOW E&M-EST. PATIENT-LVL III: CPT | Mod: PBBFAC,,, | Performed by: NURSE PRACTITIONER

## 2023-06-29 PROCEDURE — 3044F HG A1C LEVEL LT 7.0%: CPT | Mod: CPTII,S$GLB,, | Performed by: NURSE PRACTITIONER

## 2023-06-29 PROCEDURE — 99999 PR PBB SHADOW E&M-EST. PATIENT-LVL III: ICD-10-PCS | Mod: PBBFAC,,, | Performed by: NURSE PRACTITIONER

## 2023-06-29 PROCEDURE — 3288F FALL RISK ASSESSMENT DOCD: CPT | Mod: CPTII,S$GLB,, | Performed by: NURSE PRACTITIONER

## 2023-06-29 PROCEDURE — 3008F PR BODY MASS INDEX (BMI) DOCUMENTED: ICD-10-PCS | Mod: CPTII,S$GLB,, | Performed by: NURSE PRACTITIONER

## 2023-06-29 PROCEDURE — 99214 OFFICE O/P EST MOD 30 MIN: CPT | Mod: S$GLB,,, | Performed by: NURSE PRACTITIONER

## 2023-06-29 PROCEDURE — 3060F PR POS MICROALBUMINURIA RESULT DOCUMENTED/REVIEW: ICD-10-PCS | Mod: CPTII,S$GLB,, | Performed by: NURSE PRACTITIONER

## 2023-06-29 PROCEDURE — 1160F PR REVIEW ALL MEDS BY PRESCRIBER/CLIN PHARMACIST DOCUMENTED: ICD-10-PCS | Mod: CPTII,S$GLB,, | Performed by: NURSE PRACTITIONER

## 2023-06-29 PROCEDURE — 3066F PR DOCUMENTATION OF TREATMENT FOR NEPHROPATHY: ICD-10-PCS | Mod: CPTII,S$GLB,, | Performed by: NURSE PRACTITIONER

## 2023-06-29 PROCEDURE — 99214 PR OFFICE/OUTPT VISIT, EST, LEVL IV, 30-39 MIN: ICD-10-PCS | Mod: S$GLB,,, | Performed by: NURSE PRACTITIONER

## 2023-06-29 PROCEDURE — 1159F MED LIST DOCD IN RCRD: CPT | Mod: CPTII,S$GLB,, | Performed by: NURSE PRACTITIONER

## 2023-06-29 PROCEDURE — 3066F NEPHROPATHY DOC TX: CPT | Mod: CPTII,S$GLB,, | Performed by: NURSE PRACTITIONER

## 2023-06-29 NOTE — PROGRESS NOTES
HEPATOLOGY CLINIC VISIT NOTE    CHIEF COMPLAINT: ZARCO Cirrhosis    HISTORY: This is a 67 y.o.  female here for follow up for well compensated cirrhosis, due to ZARCO. She was last seen in clinic by myself in 2022. She was referred for HCV AB (+), but subsequent testing didn't reveal viremia. Work up showed elevated liver enzymes, and was concerning for NAFLD/ZARCO. She has multiple metabolic risk factors, including DMII. Last HgbA1c was improved at 6.0 % (2023). She is followed by Dr. Latif, on Ozempic.     Fibroscan in 2019 was suggestive of F4 fibrosis and a high likelihood of cirrhosis. Repeat Fibroscan in 2022 was again suggestive of F4 fibrosis, with severe fatty infiltration of the liver (S3). Most recent US showed fatty liver, with no focal liver lesions or ascites; US also showed mild splenomegaly (12.8 cm). MELD-Na is 6, CTP Class A Cirrhosis. She has lost 15 lbs since 2022. LFT's have normalized, with weight loss and better glycemic control.    She has been exposed to Hepatitis A and is immune through prior exposure. HBV negative. She is S/P vaccination for Hepatitis B. Family history is significant for paternal grandmother with ESLD and liver cancer. She endorses chronic fatigue, and increased forgetfulness, but denies any other signs or symptoms of hepatic decompensation including: jaundice, dark urine, abdominal distention, lower extremity edema, hematemesis, or melena.    Past Medical History:   Diagnosis Date    Cataract     Compressive optic atrophy 2015    Diabetes mellitus, type 2     Fatty liver disease, nonalcoholic     Hyperlipidemia     Prolactinoma     Reflux 08/10/2012    Rosacea     Toe fracture, right 2018    Unspecified cirrhosis of liver      Past Surgical History:   Procedure Laterality Date    BREAST BIOPSY Right     core     SECTION      CHOLECYSTECTOMY      ESOPHAGOGASTRODUODENOSCOPY N/A 3/8/2023    Procedure: ESOPHAGOGASTRODUODENOSCOPY (EGD);   "Surgeon: Raciel Becker MD;  Location: [facility] ([address] [place] [address]);  Service: Gastroenterology;  Laterality: N/A;  refused to have   cirrhosis-labs done on 22  instr mailed/portal-GT  pre-call no answer 2023    TRANSPHENOIDAL PITUITARY RESECTION       FAMILY HISTORY: Per HPI    SOCIAL HISTORY:   Social History     Tobacco Use   Smoking Status Never    Passive exposure: Never   Smokeless Tobacco Never     Social History     Substance and Sexual Activity   Alcohol Use No     Social History     Substance and Sexual Activity   Drug Use No     ROS:   No fever, chills, weight loss + intentional weight loss + chronic fatigue  No chest pain, dyspnea, cough  No abdominal pain,  nausea, vomiting  No headaches, visual changes  No lower extremity edema  No depression or anxiety    PHYSICAL EXAM:  Friendly  female, in no acute distress; alert and oriented to person, place and time.  VITALS: Ht 5' 4" (1.626 m)   Wt 76.8 kg (169 lb 5 oz)   BMI 29.06 kg/m²   HEENT: Sclerae anicteric.   NECK: No obvious masses.  CVS: Regular rate. No peripheral edema.  LUNGS: Normal respiratory effort.   ABDOMEN: Soft obese abdomen.  SKIN: Warm and dry. No jaundice, No obvious rashes.   EXTREMITIES: No lower extremity edema  NEURO/PSYCH: Normal gate. Memory intact. Thought and speech pattern appropriate. Behavior normal. No depression or anxiety noted.    RECENT LABS:  Lab Results   Component Value Date    WBC 3.61 (L) 2023    HGB 13.2 2023     (L) 2023     Lab Results   Component Value Date    INR 1.0 2023     Lab Results   Component Value Date    AST 36 2023    ALT 42 2023    BILITOT 0.8 2023    ALBUMIN 3.7 2023    ALKPHOS 99 2023    CREATININE 0.7 2023    BUN 7 (L) 2023     2023    K 3.9 2023    AFP 4.9 2023     DIAGNOSTIC STUDIES:    FIBROSCAN 2/15/2019:    Fibroscan readin.1 KPa     Fibrosis:F4      CAP reading: " 379 dB/m     Steatosis: :S3      FIBROSCAN 6/28/2022:    Findings  Median liver stiffness score:  28  CAP Reading: dB/m:  330     IQR/med %:  14  Interpretation  Fibrosis interpretation is based on medial liver stiffness - Kilopascal (kPa).     Fibrosis Stage:  F4  Steatosis interpretation is based on controlled attenuation parameter - (dB/m).     Steatosis Grade:  S3    EGD 3/8/2023:    Findings:        Grade I varices with no bleeding and no stigmata of recent bleeding        were found in the lower third of the esophagus. No red renata signs        were present.        The exam of the esophagus was otherwise normal.        The entire examined stomach was normal.        There is no endoscopic evidence of varices in the stomach.        The duodenal bulb, first portion of the duodenum and second portion        of the duodenum were normal.   Impression:     - Grade I esophageal varices with no bleeding and                          no stigmata of recent bleeding.                          - Normal stomach.                          - Normal duodenal bulb, first portion of the                          duodenum and second portion of the duodenum.                          - No specimens collected.    US Abdomen Complete  Narrative: EXAMINATION:  US ABDOMEN COMPLETE    CLINICAL HISTORY:  Nonalcoholic steatohepatitis (ZARCO)    TECHNIQUE:  Complete abdominal ultrasound (including pancreas, liver, gallbladder, common bile duct, spleen, aorta, IVC, and kidneys) was performed.    COMPARISON:  December 2022    FINDINGS:  The visualized pancreas is within normal limits.    The aorta is not aneurysmal.  The visualized inferior vena cava is unremarkable.    The common duct is 4 mm, not dilated.  Gallbladder has been removed.    The liver measures 16.3 cm, not enlarged.  The liver demonstrates no focal mass.  The liver parenchyma is mildly heterogeneous.  The H RI is 1.6, suggestive of greater than 5% steatosis.    The right kidney  measures 12.6 cm and has a normal sonographic appearance.    The spleen is mildly enlarged, 12.8 cm.    The left kidney measures 11.6 cm and demonstrates no sonographic abnormality.  Impression: Hepatic steatosis.  No hepatic mass.    Mild splenomegaly    Cholecystectomy    Electronically signed by: Felicitas Salgado MD  Date:    06/28/2023  Time:    09:06    ASSESSMENT  67 y.o.  female with:    1. Liver cirrhosis secondary to ZARCO  Comprehensive Metabolic Panel    CBC Auto Differential    Protime-INR    AMMONIA      2. Secondary esophageal varices without bleeding  Comprehensive Metabolic Panel    CBC Auto Differential    Protime-INR    AMMONIA      3. Type 2 diabetes mellitus with diabetic polyneuropathy, without long-term current use of insulin  Comprehensive Metabolic Panel    CBC Auto Differential    Protime-INR    AMMONIA      4. Hyperlipidemia associated with type 2 diabetes mellitus  Comprehensive Metabolic Panel    CBC Auto Differential    Protime-INR    AMMONIA      5. Hepatitis C antibody test positive  Comprehensive Metabolic Panel    CBC Auto Differential    Protime-INR    AMMONIA      6. Encounter for follow-up surveillance of liver cancer  Comprehensive Metabolic Panel    CBC Auto Differential    Protime-INR    AMMONIA        MELD-Na: 6 at 6/28/2023  8:47 AM  MELD: 6 at 6/28/2023  8:47 AM  Calculated from:  Serum Creatinine: 0.7 mg/dL (Using min of 1 mg/dL) at 6/28/2023  8:47 AM  Serum Sodium: 140 mmol/L (Using max of 137 mmol/L) at 6/28/2023  8:47 AM  Total Bilirubin: 0.8 mg/dL (Using min of 1 mg/dL) at 6/28/2023  8:47 AM  INR(ratio): 1.0 at 6/28/2023  8:47 AM    - Repeat Ultrasound of the liver every 6 months for HCC surveillance, next due 12/2023.  - Repeat liver function tests every 3 months, next due 9/2023.  - Recommend EGD for variceal surveillance annually, next due 3/2024.  - Avoid alcohol and herbal supplements/alternative remedies.  - Recommend additional weight loss of 15 lbs,  through diet and exercise.  - Recommend good control of cholesterol, blood pressure, & blood sugar levels.  - Return to clinic in 3 months.       Hepatology Nurse Practitioner  [facility]

## 2023-06-29 NOTE — PATIENT INSTRUCTIONS
Because you have cirrhosis, it is important to attend clinic visits every 6 months with an Ultrasound and blood tests every 6 months to screen for liver cancer (you are at risk of developing liver cancer due to scar tissue in the liver)    Signs and symptoms of worsening liver disease include jaundice, fluid in the belly (ascites), and confusion/disorientation/slowed thought processes due to hepatic encephalopathy (toxins building up because of liver problems).   You should seek medical attention if any of these things occur.    Also, possible bleeding from esophageal varices (blood vessels in the stomach and foodpipe can burst and cause fatal bleeding).  Therefore, if you have symptoms of vomiting blood, blood in your stool, dark or black stools or vomiting coffee ground vomit, YOU SHOULD GO TO THE EMERGENCY ROOM IMMEDIATELY.     Cirrhosis can increase the risk of liver cancer, liver failure, and death. However, we will watch your liver function score (MELD score) closely with each clinic visit. A normal MELD score is 6, highest is 40. Your last one was an 6. We will check this with every clinic visit. A MELD 15 or higher is when we start to consider transplant because MELD 15 or higher indicates that the liver is not functioning as well     Cirrhosis Counseling  - NO alcohol use (includes beer, wine, and/or liquor)  - avoid non-steroidal anti-inflammatory drugs (NSAIDs) such as ibuprofen, Motrin, naprosyn, Alleve due to the risk of kidney damage  - can take acetaminophen (Tylenol), no more than 2000 mg per day  - low sodium (salt) 2 gram per day diet  - high protein diet: 75-80 grams per day to prevent muscle mass loss. Drink at least 1 protein shake daily (Premier Protein is best option because it is very high protein and low sugar). Ok to use this as nighttime snack to fit it in   - resistance exercises for muscle strength  - avoid raw seafoods due to the risk of fatal Vibrio vulnificus infection  - ultrasound  of the liver every 6 months for liver cancer screening (you are at risk of developing liver cancer due to scar tissue in the liver)  - Upper endoscopy every 1-2 years to screen for varices in the stomach and foodpipe which can burst and cause fatal bleeding

## 2023-06-30 DIAGNOSIS — E11.42 TYPE 2 DIABETES MELLITUS WITH DIABETIC POLYNEUROPATHY, WITHOUT LONG-TERM CURRENT USE OF INSULIN: Chronic | ICD-10-CM

## 2023-06-30 RX ORDER — ATORVASTATIN CALCIUM 20 MG/1
TABLET, FILM COATED ORAL
Qty: 90 TABLET | Refills: 0 | Status: SHIPPED | OUTPATIENT
Start: 2023-06-30

## 2023-08-28 ENCOUNTER — HOSPITAL ENCOUNTER (OUTPATIENT)
Dept: RADIOLOGY | Facility: HOSPITAL | Age: 68
Discharge: HOME | End: 2023-08-28
Attending: HOSPITALIST
Payer: MEDICARE

## 2023-08-28 DIAGNOSIS — Z12.31 ENCOUNTER FOR SCREENING MAMMOGRAM FOR BREAST CANCER: ICD-10-CM

## 2023-08-28 PROCEDURE — 77067 SCR MAMMO BI INCL CAD: CPT | Mod: 26,,, | Performed by: RADIOLOGY

## 2023-08-28 PROCEDURE — 77067 SCR MAMMO BI INCL CAD: CPT | Mod: TC

## 2023-08-28 PROCEDURE — 77063 MAMMO DIGITAL SCREENING BILAT WITH TOMO: ICD-10-PCS | Mod: 26,,, | Performed by: RADIOLOGY

## 2023-08-28 PROCEDURE — 77063 BREAST TOMOSYNTHESIS BI: CPT | Mod: 26,,, | Performed by: RADIOLOGY

## 2023-08-28 PROCEDURE — 77067 MAMMO DIGITAL SCREENING BILAT WITH TOMO: ICD-10-PCS | Mod: 26,,, | Performed by: RADIOLOGY

## 2023-09-08 ENCOUNTER — PATIENT MESSAGE (OUTPATIENT)
Dept: OPTOMETRY | Facility: CLINIC | Age: 68
End: 2023-09-08
Payer: MEDICARE

## 2023-09-08 ENCOUNTER — TELEPHONE (OUTPATIENT)
Dept: OPTOMETRY | Facility: CLINIC | Age: 68
End: 2023-09-08
Payer: MEDICARE

## 2023-09-11 ENCOUNTER — OFFICE VISIT (OUTPATIENT)
Dept: OPTOMETRY | Facility: CLINIC | Age: 68
End: 2023-09-11
Payer: MEDICARE

## 2023-09-11 DIAGNOSIS — E11.9 TYPE 2 DIABETES MELLITUS WITHOUT OPHTHALMIC MANIFESTATIONS: Primary | ICD-10-CM

## 2023-09-11 DIAGNOSIS — D35.2 PITUITARY ADENOMA: ICD-10-CM

## 2023-09-11 DIAGNOSIS — H25.13 NS (NUCLEAR SCLEROSIS), BILATERAL: ICD-10-CM

## 2023-09-11 DIAGNOSIS — E11.36 TYPE 2 DIABETES MELLITUS WITH CATARACT: ICD-10-CM

## 2023-09-11 DIAGNOSIS — H40.013 OAG (OPEN ANGLE GLAUCOMA) SUSPECT, LOW RISK, BILATERAL: ICD-10-CM

## 2023-09-11 DIAGNOSIS — H52.4 BILATERAL PRESBYOPIA: ICD-10-CM

## 2023-09-11 PROCEDURE — 1101F PR PT FALLS ASSESS DOC 0-1 FALLS W/OUT INJ PAST YR: ICD-10-PCS | Mod: CPTII,S$GLB,, | Performed by: OPTOMETRIST

## 2023-09-11 PROCEDURE — 3288F FALL RISK ASSESSMENT DOCD: CPT | Mod: CPTII,S$GLB,, | Performed by: OPTOMETRIST

## 2023-09-11 PROCEDURE — 1160F RVW MEDS BY RX/DR IN RCRD: CPT | Mod: CPTII,S$GLB,, | Performed by: OPTOMETRIST

## 2023-09-11 PROCEDURE — 92015 DETERMINE REFRACTIVE STATE: CPT | Mod: S$GLB,,, | Performed by: OPTOMETRIST

## 2023-09-11 PROCEDURE — 99999 PR PBB SHADOW E&M-EST. PATIENT-LVL III: ICD-10-PCS | Mod: PBBFAC,,, | Performed by: OPTOMETRIST

## 2023-09-11 PROCEDURE — 3288F PR FALLS RISK ASSESSMENT DOCUMENTED: ICD-10-PCS | Mod: CPTII,S$GLB,, | Performed by: OPTOMETRIST

## 2023-09-11 PROCEDURE — 1159F MED LIST DOCD IN RCRD: CPT | Mod: CPTII,S$GLB,, | Performed by: OPTOMETRIST

## 2023-09-11 PROCEDURE — 92014 PR EYE EXAM, EST PATIENT,COMPREHESV: ICD-10-PCS | Mod: S$GLB,,, | Performed by: OPTOMETRIST

## 2023-09-11 PROCEDURE — 3044F HG A1C LEVEL LT 7.0%: CPT | Mod: CPTII,S$GLB,, | Performed by: OPTOMETRIST

## 2023-09-11 PROCEDURE — 1126F AMNT PAIN NOTED NONE PRSNT: CPT | Mod: CPTII,S$GLB,, | Performed by: OPTOMETRIST

## 2023-09-11 PROCEDURE — 2023F PR DILATED RETINAL EXAM W/O EVID OF RETINOPATHY: ICD-10-PCS | Mod: CPTII,S$GLB,, | Performed by: OPTOMETRIST

## 2023-09-11 PROCEDURE — 3044F PR MOST RECENT HEMOGLOBIN A1C LEVEL <7.0%: ICD-10-PCS | Mod: CPTII,S$GLB,, | Performed by: OPTOMETRIST

## 2023-09-11 PROCEDURE — 1159F PR MEDICATION LIST DOCUMENTED IN MEDICAL RECORD: ICD-10-PCS | Mod: CPTII,S$GLB,, | Performed by: OPTOMETRIST

## 2023-09-11 PROCEDURE — 99999 PR PBB SHADOW E&M-EST. PATIENT-LVL III: CPT | Mod: PBBFAC,,, | Performed by: OPTOMETRIST

## 2023-09-11 PROCEDURE — 3066F NEPHROPATHY DOC TX: CPT | Mod: CPTII,S$GLB,, | Performed by: OPTOMETRIST

## 2023-09-11 PROCEDURE — 1126F PR PAIN SEVERITY QUANTIFIED, NO PAIN PRESENT: ICD-10-PCS | Mod: CPTII,S$GLB,, | Performed by: OPTOMETRIST

## 2023-09-11 PROCEDURE — 92014 COMPRE OPH EXAM EST PT 1/>: CPT | Mod: S$GLB,,, | Performed by: OPTOMETRIST

## 2023-09-11 PROCEDURE — 3066F PR DOCUMENTATION OF TREATMENT FOR NEPHROPATHY: ICD-10-PCS | Mod: CPTII,S$GLB,, | Performed by: OPTOMETRIST

## 2023-09-11 PROCEDURE — 3060F PR POS MICROALBUMINURIA RESULT DOCUMENTED/REVIEW: ICD-10-PCS | Mod: CPTII,S$GLB,, | Performed by: OPTOMETRIST

## 2023-09-11 PROCEDURE — 2023F DILAT RTA XM W/O RTNOPTHY: CPT | Mod: CPTII,S$GLB,, | Performed by: OPTOMETRIST

## 2023-09-11 PROCEDURE — 1160F PR REVIEW ALL MEDS BY PRESCRIBER/CLIN PHARMACIST DOCUMENTED: ICD-10-PCS | Mod: CPTII,S$GLB,, | Performed by: OPTOMETRIST

## 2023-09-11 PROCEDURE — 92015 PR REFRACTION: ICD-10-PCS | Mod: S$GLB,,, | Performed by: OPTOMETRIST

## 2023-09-11 PROCEDURE — 3060F POS MICROALBUMINURIA REV: CPT | Mod: CPTII,S$GLB,, | Performed by: OPTOMETRIST

## 2023-09-11 PROCEDURE — 1101F PT FALLS ASSESS-DOCD LE1/YR: CPT | Mod: CPTII,S$GLB,, | Performed by: OPTOMETRIST

## 2023-09-11 RX ORDER — DOXYCYCLINE 100 MG/1
100 CAPSULE ORAL
COMMUNITY
Start: 2023-08-19 | End: 2024-01-02 | Stop reason: ALTCHOICE

## 2023-09-11 RX ORDER — INSULIN GLARGINE-YFGN 100 [IU]/ML
INJECTION, SOLUTION SUBCUTANEOUS
COMMUNITY
Start: 2023-08-19 | End: 2024-01-16

## 2023-09-11 RX ORDER — AMOXICILLIN 500 MG/1
500 TABLET, FILM COATED ORAL 3 TIMES DAILY
COMMUNITY
Start: 2023-06-29 | End: 2024-01-02 | Stop reason: ALTCHOICE

## 2023-09-11 NOTE — PROGRESS NOTES
HPI    JONI: 05/[birth year]  Chief complaint (CC): Patient is here for annual eye exam today.  Patient   notices that her distance vision is not as clear as it used to be.    Patient wears OTC readers for near. Patient would like to see if   prescription glasses will help her vision  Glasses? +2.75 or +3.00 OTC  Contacts? -  H/o eye surgery, injections or laser: Lasik OU  H/o eye injury: -  Known eye conditions? See above  Family h/o eye conditions? Mother, brother and sister with glaucoma  Eye gtts? -      (-) Flashes (-)  Floaters (-) Mucous   (-)  Tearing (-) Itching (-) Burning   (-) Headaches (-) Eye Pain/discomfort (-) Irritation   (-)  Redness (-) Double vision (-) Blurry vision    Diabetic? + this morning  A1c? Hemoglobin A1C       Date                     Value               Ref Range             Status                06/19/2023               6.0 (H)             4.0 - 5.6 %           Final                 02/16/2023               6.5 (H)             4.0 - 5.6 %           Final                 08/29/2022               6.9 (H)             4.0 - 5.6 %           Final                  Last edited by Carly Pierre on 9/11/2023 11:11 AM.            Assessment /Plan     For exam results, see Encounter Report.      Type 2 diabetes mellitus without ophthalmic manifestations  BS control. No signs of diabetic retinopathy. Monitor with annual exam.     Type 2 diabetes mellitus with cataract  NS (nuclear sclerosis), bilateral  Nuclear sclerotic cataract - not visually significant. Observe.    Pituitary adenoma  S/p surgery in 2015 per pt.    OAG (open angle glaucoma) suspect, low risk, bilateral  (+) FHx- mom, sister, brother, MGM. IOP 19 OD, 17 OS. C/d 0.65OD, OS. Pallor OU. Educated pt on findings w/understanding. RTC 1-2 mo IOP/pachy/OCT/ 24-2 SS    Bilateral presbyopia  SRx released to patient. Patient educated on lens options. Normal ocular health. RTC 1 year for routine exam.

## 2023-09-14 ENCOUNTER — LAB VISIT (OUTPATIENT)
Dept: LAB | Facility: HOSPITAL | Age: 68
End: 2023-09-14
Attending: NURSE PRACTITIONER
Payer: MEDICARE

## 2023-09-14 DIAGNOSIS — E11.69 HYPERLIPIDEMIA ASSOCIATED WITH TYPE 2 DIABETES MELLITUS: ICD-10-CM

## 2023-09-14 DIAGNOSIS — I85.10 SECONDARY ESOPHAGEAL VARICES WITHOUT BLEEDING: ICD-10-CM

## 2023-09-14 DIAGNOSIS — K74.60 LIVER CIRRHOSIS SECONDARY TO NASH: ICD-10-CM

## 2023-09-14 DIAGNOSIS — E78.5 HYPERLIPIDEMIA ASSOCIATED WITH TYPE 2 DIABETES MELLITUS: ICD-10-CM

## 2023-09-14 DIAGNOSIS — Z85.05 ENCOUNTER FOR FOLLOW-UP SURVEILLANCE OF LIVER CANCER: ICD-10-CM

## 2023-09-14 DIAGNOSIS — K75.81 LIVER CIRRHOSIS SECONDARY TO NASH: ICD-10-CM

## 2023-09-14 DIAGNOSIS — E11.42 TYPE 2 DIABETES MELLITUS WITH DIABETIC POLYNEUROPATHY, WITHOUT LONG-TERM CURRENT USE OF INSULIN: Chronic | ICD-10-CM

## 2023-09-14 DIAGNOSIS — R76.8 HEPATITIS C ANTIBODY TEST POSITIVE: ICD-10-CM

## 2023-09-14 DIAGNOSIS — Z08 ENCOUNTER FOR FOLLOW-UP SURVEILLANCE OF LIVER CANCER: ICD-10-CM

## 2023-09-14 LAB
ALBUMIN SERPL BCP-MCNC: 3.7 G/DL (ref 3.5–5.2)
ALP SERPL-CCNC: 81 U/L (ref 55–135)
ALT SERPL W/O P-5'-P-CCNC: 36 U/L (ref 10–44)
AMMONIA PLAS-SCNC: 26 UMOL/L (ref 10–50)
ANION GAP SERPL CALC-SCNC: 11 MMOL/L (ref 8–16)
AST SERPL-CCNC: 30 U/L (ref 10–40)
BASOPHILS # BLD AUTO: 0.02 K/UL (ref 0–0.2)
BASOPHILS NFR BLD: 0.5 % (ref 0–1.9)
BILIRUB SERPL-MCNC: 1.3 MG/DL (ref 0.1–1)
BUN SERPL-MCNC: 6 MG/DL (ref 8–23)
CALCIUM SERPL-MCNC: 9.6 MG/DL (ref 8.7–10.5)
CHLORIDE SERPL-SCNC: 105 MMOL/L (ref 95–110)
CO2 SERPL-SCNC: 25 MMOL/L (ref 23–29)
CREAT SERPL-MCNC: 0.9 MG/DL (ref 0.5–1.4)
DIFFERENTIAL METHOD: ABNORMAL
EOSINOPHIL # BLD AUTO: 0.1 K/UL (ref 0–0.5)
EOSINOPHIL NFR BLD: 3.2 % (ref 0–8)
ERYTHROCYTE [DISTWIDTH] IN BLOOD BY AUTOMATED COUNT: 13.1 % (ref 11.5–14.5)
EST. GFR  (NO RACE VARIABLE): >60 ML/MIN/1.73 M^2
GLUCOSE SERPL-MCNC: 174 MG/DL (ref 70–110)
HCT VFR BLD AUTO: 39.1 % (ref 37–48.5)
HGB BLD-MCNC: 12.7 G/DL (ref 12–16)
IMM GRANULOCYTES # BLD AUTO: 0 K/UL (ref 0–0.04)
IMM GRANULOCYTES NFR BLD AUTO: 0 % (ref 0–0.5)
INR PPP: 1.1 (ref 0.8–1.2)
LYMPHOCYTES # BLD AUTO: 2 K/UL (ref 1–4.8)
LYMPHOCYTES NFR BLD: 53.6 % (ref 18–48)
MCH RBC QN AUTO: 30.3 PG (ref 27–31)
MCHC RBC AUTO-ENTMCNC: 32.5 G/DL (ref 32–36)
MCV RBC AUTO: 93 FL (ref 82–98)
MONOCYTES # BLD AUTO: 0.2 K/UL (ref 0.3–1)
MONOCYTES NFR BLD: 5.4 % (ref 4–15)
NEUTROPHILS # BLD AUTO: 1.4 K/UL (ref 1.8–7.7)
NEUTROPHILS NFR BLD: 37.3 % (ref 38–73)
NRBC BLD-RTO: 0 /100 WBC
PLATELET # BLD AUTO: 128 K/UL (ref 150–450)
PMV BLD AUTO: 10.7 FL (ref 9.2–12.9)
POTASSIUM SERPL-SCNC: 3.8 MMOL/L (ref 3.5–5.1)
PROT SERPL-MCNC: 6.7 G/DL (ref 6–8.4)
PROTHROMBIN TIME: 11.4 SEC (ref 9–12.5)
RBC # BLD AUTO: 4.19 M/UL (ref 4–5.4)
SODIUM SERPL-SCNC: 141 MMOL/L (ref 136–145)
WBC # BLD AUTO: 3.71 K/UL (ref 3.9–12.7)

## 2023-09-14 PROCEDURE — 85025 COMPLETE CBC W/AUTO DIFF WBC: CPT | Performed by: NURSE PRACTITIONER

## 2023-09-14 PROCEDURE — 80053 COMPREHEN METABOLIC PANEL: CPT | Performed by: NURSE PRACTITIONER

## 2023-09-14 PROCEDURE — 85610 PROTHROMBIN TIME: CPT | Performed by: NURSE PRACTITIONER

## 2023-09-14 PROCEDURE — 36415 COLL VENOUS BLD VENIPUNCTURE: CPT | Mod: PO | Performed by: NURSE PRACTITIONER

## 2023-09-14 PROCEDURE — 82140 ASSAY OF AMMONIA: CPT | Performed by: NURSE PRACTITIONER

## 2023-09-21 ENCOUNTER — OFFICE VISIT (OUTPATIENT)
Dept: HEPATOLOGY | Facility: CLINIC | Age: 68
End: 2023-09-21
Payer: MEDICARE

## 2023-09-21 VITALS — BODY MASS INDEX: 28.27 KG/M2 | WEIGHT: 165.56 LBS | HEIGHT: 64 IN

## 2023-09-21 DIAGNOSIS — E11.69 HYPERLIPIDEMIA ASSOCIATED WITH TYPE 2 DIABETES MELLITUS: ICD-10-CM

## 2023-09-21 DIAGNOSIS — I85.10 SECONDARY ESOPHAGEAL VARICES WITHOUT BLEEDING: ICD-10-CM

## 2023-09-21 DIAGNOSIS — E11.42 TYPE 2 DIABETES MELLITUS WITH DIABETIC POLYNEUROPATHY, WITHOUT LONG-TERM CURRENT USE OF INSULIN: Chronic | ICD-10-CM

## 2023-09-21 DIAGNOSIS — E78.5 HYPERLIPIDEMIA ASSOCIATED WITH TYPE 2 DIABETES MELLITUS: ICD-10-CM

## 2023-09-21 DIAGNOSIS — K75.81 LIVER CIRRHOSIS SECONDARY TO NASH: Primary | ICD-10-CM

## 2023-09-21 DIAGNOSIS — E66.3 OVERWEIGHT (BMI 25.0-29.9): ICD-10-CM

## 2023-09-21 DIAGNOSIS — K74.60 LIVER CIRRHOSIS SECONDARY TO NASH: Primary | ICD-10-CM

## 2023-09-21 PROCEDURE — 1160F PR REVIEW ALL MEDS BY PRESCRIBER/CLIN PHARMACIST DOCUMENTED: ICD-10-PCS | Mod: CPTII,S$GLB,, | Performed by: NURSE PRACTITIONER

## 2023-09-21 PROCEDURE — 3060F PR POS MICROALBUMINURIA RESULT DOCUMENTED/REVIEW: ICD-10-PCS | Mod: CPTII,S$GLB,, | Performed by: NURSE PRACTITIONER

## 2023-09-21 PROCEDURE — 99213 PR OFFICE/OUTPT VISIT, EST, LEVL III, 20-29 MIN: ICD-10-PCS | Mod: S$GLB,,, | Performed by: NURSE PRACTITIONER

## 2023-09-21 PROCEDURE — 3288F PR FALLS RISK ASSESSMENT DOCUMENTED: ICD-10-PCS | Mod: CPTII,S$GLB,, | Performed by: NURSE PRACTITIONER

## 2023-09-21 PROCEDURE — 1101F PR PT FALLS ASSESS DOC 0-1 FALLS W/OUT INJ PAST YR: ICD-10-PCS | Mod: CPTII,S$GLB,, | Performed by: NURSE PRACTITIONER

## 2023-09-21 PROCEDURE — 1126F AMNT PAIN NOTED NONE PRSNT: CPT | Mod: CPTII,S$GLB,, | Performed by: NURSE PRACTITIONER

## 2023-09-21 PROCEDURE — 3008F BODY MASS INDEX DOCD: CPT | Mod: CPTII,S$GLB,, | Performed by: NURSE PRACTITIONER

## 2023-09-21 PROCEDURE — 3044F HG A1C LEVEL LT 7.0%: CPT | Mod: CPTII,S$GLB,, | Performed by: NURSE PRACTITIONER

## 2023-09-21 PROCEDURE — 3060F POS MICROALBUMINURIA REV: CPT | Mod: CPTII,S$GLB,, | Performed by: NURSE PRACTITIONER

## 2023-09-21 PROCEDURE — 3008F PR BODY MASS INDEX (BMI) DOCUMENTED: ICD-10-PCS | Mod: CPTII,S$GLB,, | Performed by: NURSE PRACTITIONER

## 2023-09-21 PROCEDURE — 1159F MED LIST DOCD IN RCRD: CPT | Mod: CPTII,S$GLB,, | Performed by: NURSE PRACTITIONER

## 2023-09-21 PROCEDURE — 3044F PR MOST RECENT HEMOGLOBIN A1C LEVEL <7.0%: ICD-10-PCS | Mod: CPTII,S$GLB,, | Performed by: NURSE PRACTITIONER

## 2023-09-21 PROCEDURE — 3288F FALL RISK ASSESSMENT DOCD: CPT | Mod: CPTII,S$GLB,, | Performed by: NURSE PRACTITIONER

## 2023-09-21 PROCEDURE — 99999 PR PBB SHADOW E&M-EST. PATIENT-LVL IV: CPT | Mod: PBBFAC,,, | Performed by: NURSE PRACTITIONER

## 2023-09-21 PROCEDURE — 3066F PR DOCUMENTATION OF TREATMENT FOR NEPHROPATHY: ICD-10-PCS | Mod: CPTII,S$GLB,, | Performed by: NURSE PRACTITIONER

## 2023-09-21 PROCEDURE — 3066F NEPHROPATHY DOC TX: CPT | Mod: CPTII,S$GLB,, | Performed by: NURSE PRACTITIONER

## 2023-09-21 PROCEDURE — 99999 PR PBB SHADOW E&M-EST. PATIENT-LVL IV: ICD-10-PCS | Mod: PBBFAC,,, | Performed by: NURSE PRACTITIONER

## 2023-09-21 PROCEDURE — 99213 OFFICE O/P EST LOW 20 MIN: CPT | Mod: S$GLB,,, | Performed by: NURSE PRACTITIONER

## 2023-09-21 PROCEDURE — 1126F PR PAIN SEVERITY QUANTIFIED, NO PAIN PRESENT: ICD-10-PCS | Mod: CPTII,S$GLB,, | Performed by: NURSE PRACTITIONER

## 2023-09-21 PROCEDURE — 1101F PT FALLS ASSESS-DOCD LE1/YR: CPT | Mod: CPTII,S$GLB,, | Performed by: NURSE PRACTITIONER

## 2023-09-21 PROCEDURE — 1160F RVW MEDS BY RX/DR IN RCRD: CPT | Mod: CPTII,S$GLB,, | Performed by: NURSE PRACTITIONER

## 2023-09-21 PROCEDURE — 1159F PR MEDICATION LIST DOCUMENTED IN MEDICAL RECORD: ICD-10-PCS | Mod: CPTII,S$GLB,, | Performed by: NURSE PRACTITIONER

## 2023-09-21 NOTE — PROGRESS NOTES
HEPATOLOGY CLINIC VISIT NOTE    CHIEF COMPLAINT: ZARCO Cirrhosis    HISTORY: This is a 67 y.o.  female here for follow up for well compensated cirrhosis, due to ZARCO. She was last seen in clinic by myself in 2023. She was referred for HCV AB (+), but subsequent testing didn't reveal viremia. Work up showed elevated liver enzymes, and was concerning for NAFLD/ZARCO. She has multiple metabolic risk factors, including DMII. Last HgbA1c was improved at 6.0 % (2023). She is followed by Dr. Latif, on Ozempic.     Fibroscan in 2019 was suggestive of F4 fibrosis and a high likelihood of cirrhosis. Repeat Fibroscan in 2022 was again suggestive of F4 fibrosis, with severe fatty infiltration of the liver (S3). Most recent US in 2023 showed fatty liver, with no focal liver lesions or ascites; US also showed mild splenomegaly (12.8 cm). MELD-Na is 8, CTP Class A Cirrhosis. She has lost 22 lbs since 2022. Her liver enzymes have normalized, with weight loss and better glycemic control. Last EGD in 3/2023 showed Grade I esophageal varices.    She has been exposed to Hepatitis A and is immune through prior exposure. HBV negative. She is S/P vaccination for Hepatitis B. Family history is significant for paternal grandmother with ESLD and liver cancer. She endorses chronic fatigue, and increased forgetfulness, but denies any other signs or symptoms of hepatic decompensation including: jaundice, dark urine, abdominal distention, lower extremity edema, hematemesis, or melena.    Past Medical History:   Diagnosis Date    Cataract     Compressive optic atrophy 2015    Diabetes mellitus, type 2     Fatty liver disease, nonalcoholic     Hyperlipidemia     Prolactinoma     Reflux 08/10/2012    Rosacea     Toe fracture, right 2018    Unspecified cirrhosis of liver      Past Surgical History:   Procedure Laterality Date    BREAST BIOPSY Right     core     SECTION      CHOLECYSTECTOMY       "ESOPHAGOGASTRODUODENOSCOPY N/A 3/8/2023    Procedure: ESOPHAGOGASTRODUODENOSCOPY (EGD);  Surgeon: Raciel Becker MD;  Location: [facility] ([address] [place] [address]);  Service: Gastroenterology;  Laterality: N/A;  refused to have   cirrhosis-labs done on 12/23/22  instr mailed/portal-GT  pre-call no answer 03-    TRANSPHENOIDAL PITUITARY RESECTION  2014     FAMILY HISTORY: Per HPI    SOCIAL HISTORY:   Social History     Tobacco Use   Smoking Status Never    Passive exposure: Never   Smokeless Tobacco Never     Social History     Substance and Sexual Activity   Alcohol Use No     Social History     Substance and Sexual Activity   Drug Use No     ROS:   No fever, chills, weight loss + intentional weight loss + chronic fatigue  No chest pain, dyspnea, cough  No abdominal pain,  nausea, vomiting  No headaches, visual changes  No lower extremity edema  No depression or anxiety    PHYSICAL EXAM:  Friendly  female, in no acute distress; alert and oriented to person, place and time.  VITALS: Ht 5' 4" (1.626 m)   Wt 75.1 kg (165 lb 9.1 oz)   BMI 28.42 kg/m²   HEENT: Sclerae anicteric.   NECK: No obvious masses.  CVS: No peripheral edema.  LUNGS: Normal respiratory effort.   ABDOMEN: Soft, non-distended abdomen.  SKIN: Warm and dry. No jaundice, No obvious rashes.   EXTREMITIES: No lower extremity edema  NEURO/PSYCH: Normal gate. Memory intact. Thought and speech pattern appropriate. Behavior normal. No depression or anxiety noted.    RECENT LABS:  Lab Results   Component Value Date    WBC 3.71 (L) 09/14/2023    HGB 12.7 09/14/2023     (L) 09/14/2023     Lab Results   Component Value Date    INR 1.1 09/14/2023     Lab Results   Component Value Date    AST 30 09/14/2023    ALT 36 09/14/2023    BILITOT 1.3 (H) 09/14/2023    ALBUMIN 3.7 09/14/2023    ALKPHOS 81 09/14/2023    CREATININE 0.9 09/14/2023    BUN 6 (L) 09/14/2023     09/14/2023    K 3.8 09/14/2023    AFP 4.9 06/28/2023     DIAGNOSTIC " STUDIES:    FIBROSCAN 2/15/2019:    Fibroscan readin.1 KPa     Fibrosis:F4      CAP readin dB/m     Steatosis: :S3      FIBROSCAN 2022:    Findings  Median liver stiffness score:  28  CAP Reading: dB/m:  330     IQR/med %:  14  Interpretation  Fibrosis interpretation is based on medial liver stiffness - Kilopascal (kPa).     Fibrosis Stage:  F4  Steatosis interpretation is based on controlled attenuation parameter - (dB/m).     Steatosis Grade:  S3    EGD 3/8/2023:    Findings:        Grade I varices with no bleeding and no stigmata of recent bleeding        were found in the lower third of the esophagus. No red renata signs        were present.        The exam of the esophagus was otherwise normal.        The entire examined stomach was normal.        There is no endoscopic evidence of varices in the stomach.        The duodenal bulb, first portion of the duodenum and second portion        of the duodenum were normal.   Impression:     - Grade I esophageal varices with no bleeding and                          no stigmata of recent bleeding.                          - Normal stomach.                          - Normal duodenal bulb, first portion of the                          duodenum and second portion of the duodenum.                          - No specimens collected.    US ABDOMEN COMPLETE 2023:    FINDINGS:    The visualized pancreas is within normal limits.     The aorta is not aneurysmal.  The visualized inferior vena cava is unremarkable.     The common duct is 4 mm, not dilated.  Gallbladder has been removed.     The liver measures 16.3 cm, not enlarged.  The liver demonstrates no focal mass.  The liver parenchyma is mildly heterogeneous.  The H RI is 1.6, suggestive of greater than 5% steatosis.     The right kidney measures 12.6 cm and has a normal sonographic appearance.     The spleen is mildly enlarged, 12.8 cm.     The left kidney measures 11.6 cm and demonstrates no sonographic  abnormality.     Impression:     Hepatic steatosis.  No hepatic mass.     Mild splenomegaly     Cholecystectomy    ASSESSMENT  67 y.o.  female with:    1. Liver cirrhosis secondary to ZARCO  US Abdomen Complete    AFP Tumor Marker      2. Secondary esophageal varices without bleeding  US Abdomen Complete    AFP Tumor Marker      3. Type 2 diabetes mellitus with diabetic polyneuropathy, without long-term current use of insulin  US Abdomen Complete    AFP Tumor Marker      4. Hyperlipidemia associated with type 2 diabetes mellitus  US Abdomen Complete    AFP Tumor Marker      5. Overweight (BMI 25.0-29.9)  US Abdomen Complete    AFP Tumor Marker        MELD 3.0: 9 at 9/14/2023 10:09 AM  MELD-Na: 8 at 9/14/2023 10:09 AM  Calculated from:  Serum Creatinine: 0.9 mg/dL (Using min of 1 mg/dL) at 9/14/2023 10:09 AM  Serum Sodium: 141 mmol/L (Using max of 137 mmol/L) at 9/14/2023 10:09 AM  Total Bilirubin: 1.3 mg/dL at 9/14/2023 10:09 AM  Serum Albumin: 3.7 g/dL (Using max of 3.5 g/dL) at 9/14/2023 10:09 AM  INR(ratio): 1.1 at 9/14/2023 10:09 AM  Age at listing (hypothetical): 67 years  Sex: Female at 9/14/2023 10:09 AM    - Repeat Ultrasound of the liver every 6 months for HCC surveillance, next due 12/2023.  - Repeat liver function tests every 3-6 months to monitor liver function.  - Recommend EGD for variceal surveillance annually, next due 3/2024.  - Avoid alcohol and herbal supplements/alternative remedies.  - Recommend additional weight loss of 15 lbs, through diet and exercise.  - Recommend good control of cholesterol, blood pressure, & blood sugar levels.  - Return to clinic in 3 months.       Hepatology Nurse Practitioner  [facility]

## 2023-09-21 NOTE — PATIENT INSTRUCTIONS
Because you have cirrhosis, it is important to attend clinic visits every 6 months with an Ultrasound and blood tests every 6 months to screen for liver cancer (you are at risk of developing liver cancer due to scar tissue in the liver)    Signs and symptoms of worsening liver disease include jaundice, fluid in the belly (ascites), and confusion/disorientation/slowed thought processes due to hepatic encephalopathy (toxins building up because of liver problems).   You should seek medical attention if any of these things occur.    Also, possible bleeding from esophageal varices (blood vessels in the stomach and foodpipe can burst and cause fatal bleeding).  Therefore, if you have symptoms of vomiting blood, blood in your stool, dark or black stools or vomiting coffee ground vomit, YOU SHOULD GO TO THE EMERGENCY ROOM IMMEDIATELY.     Cirrhosis can increase the risk of liver cancer, liver failure, and death. However, we will watch your liver function score (MELD score) closely with each clinic visit. A normal MELD score is 6, highest is 40. Your last one was an 8. We will check this with every clinic visit. A MELD 15 or higher is when we start to consider transplant because MELD 15 or higher indicates that the liver is not functioning as well     Cirrhosis Counseling  - NO alcohol use (includes beer, wine, and/or liquor)  - avoid non-steroidal anti-inflammatory drugs (NSAIDs) such as ibuprofen, Motrin, naprosyn, Alleve due to the risk of kidney damage  - can take acetaminophen (Tylenol), no more than 2000 mg per day  - low sodium (salt) 2 gram per day diet  - high protein diet: 75 grams per day to prevent muscle mass loss. Drink at least 1 protein shake daily (Premier Protein is best option because it is very high protein and low sugar). Ok to use this as nighttime snack to fit it in   - resistance exercises for muscle strength  - avoid raw seafoods due to the risk of fatal Vibrio vulnificus infection  - ultrasound of  the liver every 6 months for liver cancer screening (you are at risk of developing liver cancer due to scar tissue in the liver)  - Upper endoscopy every 1-2 years to screen for varices in the stomach and foodpipe which can burst and cause fatal bleeding

## 2023-09-29 RX ORDER — INSULIN PUMP SYRINGE, 3 ML
EACH MISCELLANEOUS
Qty: 1 EACH | Refills: 0 | Status: SHIPPED | OUTPATIENT
Start: 2023-09-29

## 2023-10-02 PROBLEM — Z85.05 ENCOUNTER FOR FOLLOW-UP SURVEILLANCE OF LIVER CANCER: Status: RESOLVED | Noted: 2022-06-28 | Resolved: 2023-10-02

## 2023-10-02 PROBLEM — Z08 ENCOUNTER FOR FOLLOW-UP SURVEILLANCE OF LIVER CANCER: Status: RESOLVED | Noted: 2022-06-28 | Resolved: 2023-10-02

## 2023-11-06 ENCOUNTER — LAB VISIT (OUTPATIENT)
Dept: LAB | Facility: HOSPITAL | Age: 68
End: 2023-11-06
Attending: HOSPITALIST
Payer: MEDICARE

## 2023-11-06 DIAGNOSIS — E11.42 TYPE 2 DIABETES MELLITUS WITH DIABETIC POLYNEUROPATHY, WITHOUT LONG-TERM CURRENT USE OF INSULIN: Chronic | ICD-10-CM

## 2023-11-06 DIAGNOSIS — Z00.00 ANNUAL PHYSICAL EXAM: ICD-10-CM

## 2023-11-06 LAB
ALBUMIN/CREAT UR: 21.7 UG/MG (ref 0–30)
BACTERIA #/AREA URNS AUTO: ABNORMAL /HPF
BILIRUB UR QL STRIP: NEGATIVE
CLARITY UR REFRACT.AUTO: ABNORMAL
COLOR UR AUTO: YELLOW
CREAT UR-MCNC: 92 MG/DL (ref 15–325)
GLUCOSE UR QL STRIP: NEGATIVE
HGB UR QL STRIP: NEGATIVE
KETONES UR QL STRIP: NEGATIVE
LEUKOCYTE ESTERASE UR QL STRIP: ABNORMAL
MICROALBUMIN UR DL<=1MG/L-MCNC: 20 UG/ML
MICROSCOPIC COMMENT: ABNORMAL
NITRITE UR QL STRIP: NEGATIVE
PH UR STRIP: 6 [PH] (ref 5–8)
PROT UR QL STRIP: NEGATIVE
RBC #/AREA URNS AUTO: 1 /HPF (ref 0–4)
SP GR UR STRIP: 1.01 (ref 1–1.03)
SQUAMOUS #/AREA URNS AUTO: 2 /HPF
URN SPEC COLLECT METH UR: ABNORMAL
WBC #/AREA URNS AUTO: 9 /HPF (ref 0–5)

## 2023-11-06 PROCEDURE — 81001 URINALYSIS AUTO W/SCOPE: CPT | Performed by: HOSPITALIST

## 2023-11-06 PROCEDURE — 82043 UR ALBUMIN QUANTITATIVE: CPT | Performed by: HOSPITALIST

## 2023-11-13 ENCOUNTER — OFFICE VISIT (OUTPATIENT)
Dept: INTERNAL MEDICINE | Facility: CLINIC | Age: 68
End: 2023-11-13
Payer: MEDICARE

## 2023-11-13 VITALS
HEART RATE: 69 BPM | BODY MASS INDEX: 28.71 KG/M2 | TEMPERATURE: 98 F | DIASTOLIC BLOOD PRESSURE: 70 MMHG | WEIGHT: 168.19 LBS | SYSTOLIC BLOOD PRESSURE: 110 MMHG | OXYGEN SATURATION: 100 % | RESPIRATION RATE: 18 BRPM | HEIGHT: 64 IN

## 2023-11-13 DIAGNOSIS — R76.8 HEPATITIS C ANTIBODY TEST POSITIVE: ICD-10-CM

## 2023-11-13 DIAGNOSIS — K75.81 LIVER CIRRHOSIS SECONDARY TO NASH: ICD-10-CM

## 2023-11-13 DIAGNOSIS — Z00.00 ENCOUNTER FOR PREVENTIVE HEALTH EXAMINATION: Primary | ICD-10-CM

## 2023-11-13 DIAGNOSIS — D35.2 PITUITARY ADENOMA: Chronic | ICD-10-CM

## 2023-11-13 DIAGNOSIS — E11.69 HYPERLIPIDEMIA ASSOCIATED WITH TYPE 2 DIABETES MELLITUS: ICD-10-CM

## 2023-11-13 DIAGNOSIS — R41.3 MEMORY CHANGE: ICD-10-CM

## 2023-11-13 DIAGNOSIS — E78.5 HYPERLIPIDEMIA ASSOCIATED WITH TYPE 2 DIABETES MELLITUS: ICD-10-CM

## 2023-11-13 DIAGNOSIS — Z23 NEED FOR VACCINATION: ICD-10-CM

## 2023-11-13 DIAGNOSIS — K74.60 LIVER CIRRHOSIS SECONDARY TO NASH: ICD-10-CM

## 2023-11-13 DIAGNOSIS — I70.0 AORTIC ATHEROSCLEROSIS: ICD-10-CM

## 2023-11-13 DIAGNOSIS — E11.42 TYPE 2 DIABETES MELLITUS WITH DIABETIC POLYNEUROPATHY, WITHOUT LONG-TERM CURRENT USE OF INSULIN: Chronic | ICD-10-CM

## 2023-11-13 DIAGNOSIS — D69.6 THROMBOCYTOPENIA: ICD-10-CM

## 2023-11-13 PROCEDURE — 90694 FLU VACCINE - QUADRIVALENT - ADJUVANTED: ICD-10-PCS | Mod: S$GLB,,, | Performed by: HOSPITALIST

## 2023-11-13 PROCEDURE — G0008 FLU VACCINE - QUADRIVALENT - ADJUVANTED: ICD-10-PCS | Mod: S$GLB,,, | Performed by: HOSPITALIST

## 2023-11-13 PROCEDURE — 1125F PR PAIN SEVERITY QUANTIFIED, PAIN PRESENT: ICD-10-PCS | Mod: CPTII,S$GLB,, | Performed by: HOSPITALIST

## 2023-11-13 PROCEDURE — 1160F RVW MEDS BY RX/DR IN RCRD: CPT | Mod: CPTII,S$GLB,, | Performed by: HOSPITALIST

## 2023-11-13 PROCEDURE — 3288F PR FALLS RISK ASSESSMENT DOCUMENTED: ICD-10-PCS | Mod: CPTII,S$GLB,, | Performed by: HOSPITALIST

## 2023-11-13 PROCEDURE — 3008F BODY MASS INDEX DOCD: CPT | Mod: CPTII,S$GLB,, | Performed by: HOSPITALIST

## 2023-11-13 PROCEDURE — 3066F NEPHROPATHY DOC TX: CPT | Mod: CPTII,S$GLB,, | Performed by: HOSPITALIST

## 2023-11-13 PROCEDURE — 99999 PR PBB SHADOW E&M-EST. PATIENT-LVL V: ICD-10-PCS | Mod: PBBFAC,,, | Performed by: HOSPITALIST

## 2023-11-13 PROCEDURE — 1160F PR REVIEW ALL MEDS BY PRESCRIBER/CLIN PHARMACIST DOCUMENTED: ICD-10-PCS | Mod: CPTII,S$GLB,, | Performed by: HOSPITALIST

## 2023-11-13 PROCEDURE — 3044F PR MOST RECENT HEMOGLOBIN A1C LEVEL <7.0%: ICD-10-PCS | Mod: CPTII,S$GLB,, | Performed by: HOSPITALIST

## 2023-11-13 PROCEDURE — 1159F PR MEDICATION LIST DOCUMENTED IN MEDICAL RECORD: ICD-10-PCS | Mod: CPTII,S$GLB,, | Performed by: HOSPITALIST

## 2023-11-13 PROCEDURE — 3008F PR BODY MASS INDEX (BMI) DOCUMENTED: ICD-10-PCS | Mod: CPTII,S$GLB,, | Performed by: HOSPITALIST

## 2023-11-13 PROCEDURE — 3074F PR MOST RECENT SYSTOLIC BLOOD PRESSURE < 130 MM HG: ICD-10-PCS | Mod: CPTII,S$GLB,, | Performed by: HOSPITALIST

## 2023-11-13 PROCEDURE — 1101F PT FALLS ASSESS-DOCD LE1/YR: CPT | Mod: CPTII,S$GLB,, | Performed by: HOSPITALIST

## 2023-11-13 PROCEDURE — 3078F DIAST BP <80 MM HG: CPT | Mod: CPTII,S$GLB,, | Performed by: HOSPITALIST

## 2023-11-13 PROCEDURE — 3061F NEG MICROALBUMINURIA REV: CPT | Mod: CPTII,S$GLB,, | Performed by: HOSPITALIST

## 2023-11-13 PROCEDURE — 3066F PR DOCUMENTATION OF TREATMENT FOR NEPHROPATHY: ICD-10-PCS | Mod: CPTII,S$GLB,, | Performed by: HOSPITALIST

## 2023-11-13 PROCEDURE — 3044F HG A1C LEVEL LT 7.0%: CPT | Mod: CPTII,S$GLB,, | Performed by: HOSPITALIST

## 2023-11-13 PROCEDURE — 1125F AMNT PAIN NOTED PAIN PRSNT: CPT | Mod: CPTII,S$GLB,, | Performed by: HOSPITALIST

## 2023-11-13 PROCEDURE — 3078F PR MOST RECENT DIASTOLIC BLOOD PRESSURE < 80 MM HG: ICD-10-PCS | Mod: CPTII,S$GLB,, | Performed by: HOSPITALIST

## 2023-11-13 PROCEDURE — 3074F SYST BP LT 130 MM HG: CPT | Mod: CPTII,S$GLB,, | Performed by: HOSPITALIST

## 2023-11-13 PROCEDURE — 1101F PR PT FALLS ASSESS DOC 0-1 FALLS W/OUT INJ PAST YR: ICD-10-PCS | Mod: CPTII,S$GLB,, | Performed by: HOSPITALIST

## 2023-11-13 PROCEDURE — 1159F MED LIST DOCD IN RCRD: CPT | Mod: CPTII,S$GLB,, | Performed by: HOSPITALIST

## 2023-11-13 PROCEDURE — 3061F PR NEG MICROALBUMINURIA RESULT DOCUMENTED/REVIEW: ICD-10-PCS | Mod: CPTII,S$GLB,, | Performed by: HOSPITALIST

## 2023-11-13 PROCEDURE — 99215 PR OFFICE/OUTPT VISIT, EST, LEVL V, 40-54 MIN: ICD-10-PCS | Mod: S$GLB,,, | Performed by: HOSPITALIST

## 2023-11-13 PROCEDURE — 90694 VACC AIIV4 NO PRSRV 0.5ML IM: CPT | Mod: S$GLB,,, | Performed by: HOSPITALIST

## 2023-11-13 PROCEDURE — 99215 OFFICE O/P EST HI 40 MIN: CPT | Mod: S$GLB,,, | Performed by: HOSPITALIST

## 2023-11-13 PROCEDURE — 3288F FALL RISK ASSESSMENT DOCD: CPT | Mod: CPTII,S$GLB,, | Performed by: HOSPITALIST

## 2023-11-13 PROCEDURE — G0008 ADMIN INFLUENZA VIRUS VAC: HCPCS | Mod: S$GLB,,, | Performed by: HOSPITALIST

## 2023-11-13 PROCEDURE — 99999 PR PBB SHADOW E&M-EST. PATIENT-LVL V: CPT | Mod: PBBFAC,,, | Performed by: HOSPITALIST

## 2023-11-13 NOTE — PROGRESS NOTES
"Subjective:     @Patient ID: Tona Carlson is a 68 y.o. female.    Chief Complaint: Annual Exam    HPI    68 y.o. female with DM2, HLD, liver cirrhosis, ZARCO, hepatitis C ab+, pituitary adenoma s/p resection, obesity, vit d deficiency, aortic atherosclerosis here for annual exam:     Dm2: lantus 14 units qday, ozempic 0.5 mg qweek, metformin  mg bid. Last A1c 5.8; follows with endocrine. Reports metformin has been causing diarrhea   HLD: atorvastatin 20 mg qday   3.  Zarco cirrhosis, hep C+: follows with hepatology  4. Pituitary adenoma s/p resection: followed up with endocrine and neurosurgery 11/2022. Had MRI pituitary that showed  " Residual lobular enhancing tissue along the margins of the sella is unchanged from 07/25/2018." Due for f/u this year with specialists   5. Reports having memory changes and feeling forgetful      Lipid disorders/ASCVD risk (ages >/= 45 or >/= 20 if increased risk ): ordered  DM (>45y yearly or if obese, HTN): A1c ordered  Eye exam:  utd   Breast Cancer (40-50y discretion of pt, 50-74y every 1-2 years): Mammogram utd 8/28/23   Colorectal Cancer (normal risk 50-75yr): Colonoscopy - prefers cologuard; Utd 2021   DEXA (F>66 yo, M >69yo, M&F 50-70 yo with risk factors (smoking, previous fx, wt <70kg; etoh abuse, chronic steroids, RA)): utd 11/2022       Vaccines:   Influenza (yearly):  due   Tetanus (every 10 yrs - 1st tdap) done 2014   PPSV23(>66yo or <65 w/ lung dz, smoking, DM) done 2019.   PCV13 (> 65 or <65 w/ immunocompromised) done 2020  Zoster (>61yo) due. Pt thinks she had done before. No records of this.        Exercise:   Diet: low carb diet     Review of Systems   Constitutional:  Negative for chills and fever.   HENT:  Negative for congestion and sore throat.    Eyes:  Negative for pain and visual disturbance.   Respiratory:  Negative for cough and shortness of breath.    Cardiovascular:  Negative for chest pain and leg swelling.   Gastrointestinal:  Negative for " abdominal pain, nausea and vomiting.   Endocrine: Negative for polydipsia and polyuria.   Genitourinary:  Negative for difficulty urinating and dysuria.   Musculoskeletal:  Positive for arthralgias. Negative for back pain.   Skin:  Negative for rash and wound.   Neurological:  Negative for dizziness, weakness and headaches.   Psychiatric/Behavioral:  Negative for agitation and confusion.      Past medical history, surgical history, and family medical history reviewed and updated as appropriate.    Medications and allergies reviewed.     Objective:     There were no vitals filed for this visit.  There is no height or weight on file to calculate BMI.  Physical Exam  Vitals reviewed.   Constitutional:       General: She is not in acute distress.     Appearance: She is well-developed.   HENT:      Head: Normocephalic and atraumatic.      Right Ear: Tympanic membrane normal.      Left Ear: Tympanic membrane normal.      Mouth/Throat:      Mouth: Mucous membranes are moist.      Pharynx: No oropharyngeal exudate.   Eyes:      General:         Right eye: No discharge.         Left eye: No discharge.      Conjunctiva/sclera: Conjunctivae normal.   Cardiovascular:      Rate and Rhythm: Normal rate and regular rhythm.      Heart sounds: No murmur heard.     No friction rub.   Pulmonary:      Effort: Pulmonary effort is normal.      Breath sounds: Normal breath sounds.   Abdominal:      General: Bowel sounds are normal. There is no distension.      Palpations: Abdomen is soft.      Tenderness: There is no abdominal tenderness. There is no guarding.   Musculoskeletal:      Cervical back: Normal range of motion and neck supple.      Right lower leg: No edema.      Left lower leg: No edema.   Lymphadenopathy:      Cervical: No cervical adenopathy.   Skin:     General: Skin is warm and dry.   Neurological:      Mental Status: She is alert and oriented to person, place, and time.   Psychiatric:         Mood and Affect: Mood normal.          Behavior: Behavior normal.         Lab Results   Component Value Date    WBC 4.00 11/06/2023    HGB 13.1 11/06/2023    HCT 38.6 11/06/2023     (L) 11/06/2023    CHOL 149 11/06/2023    TRIG 111 11/06/2023    HDL 45 11/06/2023    ALT 62 (H) 11/06/2023    AST 45 (H) 11/06/2023     11/06/2023    K 3.9 11/06/2023     11/06/2023    CREATININE 0.7 11/06/2023    BUN 7 (L) 11/06/2023    CO2 24 11/06/2023    TSH 1.804 11/06/2023    INR 1.1 09/14/2023    HGBA1C 5.8 (H) 11/06/2023       Assessment:     1. Encounter for preventive health examination    2. Type 2 diabetes mellitus with diabetic polyneuropathy, without long-term current use of insulin    3. Hyperlipidemia associated with type 2 diabetes mellitus    4. Liver cirrhosis secondary to ZARCO    5. Thrombocytopenia    6. Aortic atherosclerosis    7. Hepatitis C antibody test positive    8. Pituitary adenoma    9. Memory change    10. Need for vaccination      Plan:   Tona was seen today for annual exam.    Diagnoses and all orders for this visit:    Encounter for preventive health examination  - labs reviewed in clinic     Type 2 diabetes mellitus with diabetic polyneuropathy, without long-term current use of insulin  - Stable. Continue home meds. Will d/c metformin as causing diarrhea and A1c is well controlled. Pt will also f/u with endocrine     -     Hemoglobin A1C; Future  -     Comprehensive Metabolic Panel; Future  -     Lipid Panel; Future  -     Microalbumin/Creatinine Ratio, Urine; Future  -     CBC W/ AUTO DIFFERENTIAL; Future    Hyperlipidemia associated with type 2 diabetes mellitus  - Stable. Continue home meds     -     Hemoglobin A1C; Future  -     Comprehensive Metabolic Panel; Future  -     Lipid Panel; Future  -     Microalbumin/Creatinine Ratio, Urine; Future  -     CBC W/ AUTO DIFFERENTIAL; Future    Liver cirrhosis secondary to ZARCO  - Stable. Continue to monitor   -     Hemoglobin A1C; Future  -     Comprehensive  Metabolic Panel; Future  -     Lipid Panel; Future  -     Microalbumin/Creatinine Ratio, Urine; Future  -     CBC W/ AUTO DIFFERENTIAL; Future    Thrombocytopenia  - recurrent. Suspect 2/2 to cirrhosis. Will continue to monitor   -     CBC W/ AUTO DIFFERENTIAL; Future    Aortic atherosclerosis  - Stable. Continue home meds lipitor 20 mg     Hepatitis C antibody test positive  - chronic. Check hep C RNA level   -     HEPATITIS C RNA, QUANTITATIVE, PCR; Future    Pituitary adenoma  - s/p resection. Overdue for yearly f/u with endocrine/neurosurgery  -     Ambulatory referral/consult to Endocrinology; Future    Memory change  -     Ambulatory referral/consult to Neurology; Future    Need for vaccination  -     Influenza - Quadrivalent (Adjuvanted)    RTC 6 months   Visit time 40 min. Includes pre-charting, face-to-face encounter, medical decision making and documentation.       Medina Prakash MD  Internal Medicine    11/13/2023

## 2023-11-29 ENCOUNTER — CLINICAL SUPPORT (OUTPATIENT)
Dept: OPHTHALMOLOGY | Facility: CLINIC | Age: 68
End: 2023-11-29
Payer: MEDICARE

## 2023-11-29 ENCOUNTER — OFFICE VISIT (OUTPATIENT)
Dept: OPTOMETRY | Facility: CLINIC | Age: 68
End: 2023-11-29
Payer: MEDICARE

## 2023-11-29 DIAGNOSIS — D35.2 PITUITARY ADENOMA: ICD-10-CM

## 2023-11-29 DIAGNOSIS — H40.013 OAG (OPEN ANGLE GLAUCOMA) SUSPECT, LOW RISK, BILATERAL: ICD-10-CM

## 2023-11-29 DIAGNOSIS — H40.1134 PRIMARY OPEN ANGLE GLAUCOMA (POAG) OF BOTH EYES, INDETERMINATE STAGE: Primary | ICD-10-CM

## 2023-11-29 PROCEDURE — 3061F NEG MICROALBUMINURIA REV: CPT | Mod: CPTII,S$GLB,, | Performed by: OPTOMETRIST

## 2023-11-29 PROCEDURE — 99999 PR PBB SHADOW E&M-EST. PATIENT-LVL III: CPT | Mod: PBBFAC,,, | Performed by: OPTOMETRIST

## 2023-11-29 PROCEDURE — 1159F PR MEDICATION LIST DOCUMENTED IN MEDICAL RECORD: ICD-10-PCS | Mod: CPTII,S$GLB,, | Performed by: OPTOMETRIST

## 2023-11-29 PROCEDURE — 99214 PR OFFICE/OUTPT VISIT, EST, LEVL IV, 30-39 MIN: ICD-10-PCS | Mod: S$GLB,,, | Performed by: OPTOMETRIST

## 2023-11-29 PROCEDURE — 1126F PR PAIN SEVERITY QUANTIFIED, NO PAIN PRESENT: ICD-10-PCS | Mod: CPTII,S$GLB,, | Performed by: OPTOMETRIST

## 2023-11-29 PROCEDURE — 1160F PR REVIEW ALL MEDS BY PRESCRIBER/CLIN PHARMACIST DOCUMENTED: ICD-10-PCS | Mod: CPTII,S$GLB,, | Performed by: OPTOMETRIST

## 2023-11-29 PROCEDURE — 1101F PR PT FALLS ASSESS DOC 0-1 FALLS W/OUT INJ PAST YR: ICD-10-PCS | Mod: CPTII,S$GLB,, | Performed by: OPTOMETRIST

## 2023-11-29 PROCEDURE — 1101F PT FALLS ASSESS-DOCD LE1/YR: CPT | Mod: CPTII,S$GLB,, | Performed by: OPTOMETRIST

## 2023-11-29 PROCEDURE — 99214 OFFICE O/P EST MOD 30 MIN: CPT | Mod: S$GLB,,, | Performed by: OPTOMETRIST

## 2023-11-29 PROCEDURE — 3066F PR DOCUMENTATION OF TREATMENT FOR NEPHROPATHY: ICD-10-PCS | Mod: CPTII,S$GLB,, | Performed by: OPTOMETRIST

## 2023-11-29 PROCEDURE — 1126F AMNT PAIN NOTED NONE PRSNT: CPT | Mod: CPTII,S$GLB,, | Performed by: OPTOMETRIST

## 2023-11-29 PROCEDURE — 3288F FALL RISK ASSESSMENT DOCD: CPT | Mod: CPTII,S$GLB,, | Performed by: OPTOMETRIST

## 2023-11-29 PROCEDURE — 3061F PR NEG MICROALBUMINURIA RESULT DOCUMENTED/REVIEW: ICD-10-PCS | Mod: CPTII,S$GLB,, | Performed by: OPTOMETRIST

## 2023-11-29 PROCEDURE — 3044F PR MOST RECENT HEMOGLOBIN A1C LEVEL <7.0%: ICD-10-PCS | Mod: CPTII,S$GLB,, | Performed by: OPTOMETRIST

## 2023-11-29 PROCEDURE — 3066F NEPHROPATHY DOC TX: CPT | Mod: CPTII,S$GLB,, | Performed by: OPTOMETRIST

## 2023-11-29 PROCEDURE — 1160F RVW MEDS BY RX/DR IN RCRD: CPT | Mod: CPTII,S$GLB,, | Performed by: OPTOMETRIST

## 2023-11-29 PROCEDURE — 1159F MED LIST DOCD IN RCRD: CPT | Mod: CPTII,S$GLB,, | Performed by: OPTOMETRIST

## 2023-11-29 PROCEDURE — 3288F PR FALLS RISK ASSESSMENT DOCUMENTED: ICD-10-PCS | Mod: CPTII,S$GLB,, | Performed by: OPTOMETRIST

## 2023-11-29 PROCEDURE — 3044F HG A1C LEVEL LT 7.0%: CPT | Mod: CPTII,S$GLB,, | Performed by: OPTOMETRIST

## 2023-11-29 PROCEDURE — 99999 PR PBB SHADOW E&M-EST. PATIENT-LVL III: ICD-10-PCS | Mod: PBBFAC,,, | Performed by: OPTOMETRIST

## 2023-11-29 RX ORDER — IBUPROFEN 800 MG/1
800 TABLET ORAL EVERY 6 HOURS PRN
COMMUNITY
Start: 2023-11-27

## 2023-11-29 RX ORDER — LATANOPROST 50 UG/ML
1 SOLUTION/ DROPS OPHTHALMIC NIGHTLY
Qty: 2.5 ML | Refills: 11 | Status: SHIPPED | OUTPATIENT
Start: 2023-11-29 | End: 2024-11-28

## 2023-11-29 NOTE — PROGRESS NOTES
HPI     Glaucoma Suspect     Additional comments: 3 mon iop chk/hvf rev/oct           Comments    JONI: 9/11/2023  Chief complaint (CC): Patient states that vision seems about the same as   last visit in both eyes.  Glasses? +2.75 or +3.00 OTC  Contacts? -  H/o eye surgery, injections or laser: Lasik OU  H/o eye injury: -  Known eye conditions? See Chart Note  Family h/o eye conditions? Mother, brother and sister with glaucoma  Eye gtts? -      (-) Flashes (-)  Floaters (-) Mucous   (-)  Tearing (-) Itching (-) Burning   (-) Headaches (-) Eye Pain/discomfort (-) Irritation   (-)  Redness (-) Double vision (+) Blurry vision    Diabetic? +  A1c? Hemoglobin A1C       Date                     Value               Ref Range             Status                11/06/2023               5.8 (H)             4.0 - 5.6 %           Final              Comment:    ADA Screening Guidelines:  5.7-6.4%  Consistent with   prediabetes  >or=6.5%  Consistent with diabetes    High levels of fetal   hemoglobin interfere with the HbA1C  assay. Heterozygous hemoglobin   variants (HbS, HgC, etc)do  not significantly interfere with this assay.     However, presence of multiple variants may affect accuracy.         06/19/2023               6.0 (H)             4.0 - 5.6 %           Final              Comment:    ADA Screening Guidelines:  5.7-6.4%  Consistent with   prediabetes  >or=6.5%  Consistent with diabetes    High levels of fetal   hemoglobin interfere with the HbA1C  assay. Heterozygous hemoglobin   variants (HbS, HgC, etc)do  not significantly interfere with this assay.     However, presence of multiple variants may affect accuracy.         02/16/2023               6.5 (H)             4.0 - 5.6 %           Final              Comment:    ADA Screening Guidelines:  5.7-6.4%  Consistent with   prediabetes  >or=6.5%  Consistent with diabetes    High levels of fetal   hemoglobin interfere with the HbA1C  assay. Heterozygous hemoglobin    variants (HbS, HgC, etc)do  not significantly interfere with this assay.     However, presence of multiple variants may affect accuracy.    ----------            Last edited by Rubén Fisher on 11/29/2023  9:44 AM.            Assessment /Plan     For exam results, see Encounter Report.    Primary open angle glaucoma (POAG) of both eyes, indeterminate stage  -     latanoprost 0.005 % ophthalmic solution; Place 1 drop into both eyes every evening.  Dispense: 2.5 mL; Refill: 11    Pituitary adenoma      (+) FHx- mom, sister, brother, MGM. IOP 15 OD, OS. Last 19 OD, 17 OS. C/d 0.65OD, OS. Pallor OU.   11/29/2023 OCT OD borderline NI, significantly thin in all other areas, OS normal NS and NI, significantly thin in all other areas  11/29/2023 HVF OD sup and inf arc (respects midline), OS sup arc  HVF stable when compared to 2016 which was post surgery. Pt had surgery for pituitary adenoma in 2015.   Start Latanoprost QHS OU due to changes on OCT, significant thinning of OCT and strong family history.  Educated pt on findings w/understanding.   RTC 6 weeks IOP/pachy/gonio

## 2023-12-01 NOTE — PROGRESS NOTES
OCT/HVF done ou. /rel/fix/coop. Good ou/ chart checked for latex allergy.-[facility]

## 2023-12-08 ENCOUNTER — OFFICE VISIT (OUTPATIENT)
Dept: URGENT CARE | Facility: CLINIC | Age: 68
End: 2023-12-08
Payer: MEDICARE

## 2023-12-08 VITALS
RESPIRATION RATE: 20 BRPM | DIASTOLIC BLOOD PRESSURE: 74 MMHG | SYSTOLIC BLOOD PRESSURE: 113 MMHG | OXYGEN SATURATION: 98 % | BODY MASS INDEX: 28.6 KG/M2 | HEART RATE: 71 BPM | HEIGHT: 64 IN | TEMPERATURE: 98 F | WEIGHT: 167.56 LBS

## 2023-12-08 DIAGNOSIS — S93.402A SPRAIN OF LEFT ANKLE, UNSPECIFIED LIGAMENT, INITIAL ENCOUNTER: Primary | ICD-10-CM

## 2023-12-08 PROCEDURE — 99213 OFFICE O/P EST LOW 20 MIN: CPT | Mod: S$GLB,,, | Performed by: NURSE PRACTITIONER

## 2023-12-08 PROCEDURE — 99213 PR OFFICE/OUTPT VISIT, EST, LEVL III, 20-29 MIN: ICD-10-PCS | Mod: S$GLB,,, | Performed by: NURSE PRACTITIONER

## 2023-12-08 NOTE — PROGRESS NOTES
"Subjective:      Patient ID: Tona Carlson is a 68 y.o. female.    Vitals:  height is 5' 4" (1.626 m) and weight is 76 kg (167 lb 8.8 oz). Her oral temperature is 98 °F (36.7 °C). Her blood pressure is 113/74 and her pulse is 71. Her respiration is 20 and oxygen saturation is 98%.     Chief Complaint: Ankle Pain    Pt present to clinic with left ankle pain. Started 5 days ago. Treatment include Advil with no relief. Having pain  in walking .     Provider note begins below:    Patient comes to the clinic with 5 days of left ankle pain and swelling.  She states it is not noticeable until she bears weight.  Denies injury or trauma that she is aware of.  She has been wearing an ankle brace for support and states that has helped.    Dorsalis pedis pulses present and strong to the affected foot.  Capillary refill to distal phalanges of left foot less than 3 seconds.  There is notable swelling to the lower left leg, ankle and proximal foot.  Patient is able to plantar and dorsiflex without pain.  No pain to medial or lateral malleolus with firm palpation.    Ankle Pain   The incident occurred 3 to 5 days ago. The incident occurred at home. There was no injury mechanism. The pain is present in the left ankle. The quality of the pain is described as stabbing. The pain is at a severity of 8/10. The pain is severe. The pain has been Constant since onset. She reports no foreign bodies present. The symptoms are aggravated by movement. She has tried NSAIDs for the symptoms. The treatment provided no relief.       Skin:  Negative for erythema.      Objective:     Physical Exam   Constitutional: She is oriented to person, place, and time. She appears well-developed. No distress.   HENT:   Head: Normocephalic and atraumatic. Head is without abrasion, without contusion and without laceration.   Ears:   Right Ear: External ear normal.   Left Ear: External ear normal.   Nose: Nose normal.   Mouth/Throat: Oropharynx is clear and " moist and mucous membranes are normal.   Eyes: Conjunctivae, EOM and lids are normal. Pupils are equal, round, and reactive to light.   Neck: Trachea normal and phonation normal. Neck supple.   Cardiovascular: Normal rate and regular rhythm.   Pulses:       Dorsalis pedis pulses are 2+ on the right side.   Pulmonary/Chest: Effort normal.   Musculoskeletal: Normal range of motion.         General: Normal range of motion.      Left ankle: She exhibits swelling. She exhibits normal range of motion, no ecchymosis and normal pulse. No tenderness. Achilles tendon normal. Achilles tendon exhibits normal Bourne's test results.   Neurological: She is alert and oriented to person, place, and time.   Skin: Skin is warm, dry, intact and no rash. Capillary refill takes less than 2 seconds. No abrasion, No burn, No bruising, No erythema and No ecchymosis   Psychiatric: Her speech is normal and behavior is normal. Judgment and thought content normal.   Nursing note and vitals reviewed.      Assessment:     1. Sprain of left ankle, unspecified ligament, initial encounter        Plan:     Ace wrap applied to left ankle and patient tolerated procedure well and states she had some relief of pain afterward.  Ambulates freely without pain.    Patient was prescribed ibuprofen recently by her dentist after procedure.  She has not taken this medication but I advised that she can take that medication for her ankle pain as needed.  She states she has not felt as if she is needed it but will keep that in mind if the pain becomes worse.  I further recommended that she return to this location, her primary care provider or orthopedist if her pain persists greater than 2 weeks.    Sprain of left ankle, unspecified ligament, initial encounter      Patient Instructions   You may take your ibuprofen for pain as needed.

## 2023-12-18 ENCOUNTER — HOSPITAL ENCOUNTER (OUTPATIENT)
Dept: RADIOLOGY | Facility: HOSPITAL | Age: 68
Discharge: HOME | End: 2023-12-18
Attending: NURSE PRACTITIONER
Payer: MEDICARE

## 2023-12-18 DIAGNOSIS — E11.42 TYPE 2 DIABETES MELLITUS WITH DIABETIC POLYNEUROPATHY, WITHOUT LONG-TERM CURRENT USE OF INSULIN: Chronic | ICD-10-CM

## 2023-12-18 DIAGNOSIS — E66.3 OVERWEIGHT (BMI 25.0-29.9): ICD-10-CM

## 2023-12-18 DIAGNOSIS — I85.10 SECONDARY ESOPHAGEAL VARICES WITHOUT BLEEDING: ICD-10-CM

## 2023-12-18 DIAGNOSIS — K74.60 LIVER CIRRHOSIS SECONDARY TO NASH: ICD-10-CM

## 2023-12-18 DIAGNOSIS — K75.81 LIVER CIRRHOSIS SECONDARY TO NASH: ICD-10-CM

## 2023-12-18 DIAGNOSIS — E11.69 HYPERLIPIDEMIA ASSOCIATED WITH TYPE 2 DIABETES MELLITUS: ICD-10-CM

## 2023-12-18 DIAGNOSIS — E78.5 HYPERLIPIDEMIA ASSOCIATED WITH TYPE 2 DIABETES MELLITUS: ICD-10-CM

## 2023-12-18 PROCEDURE — 76700 US EXAM ABDOM COMPLETE: CPT | Mod: TC

## 2023-12-18 PROCEDURE — 76700 US EXAM ABDOM COMPLETE: CPT | Mod: 26,,, | Performed by: RADIOLOGY

## 2023-12-18 PROCEDURE — 76700 US ABDOMEN COMPLETE: ICD-10-PCS | Mod: 26,,, | Performed by: RADIOLOGY

## 2024-01-02 ENCOUNTER — LAB VISIT (OUTPATIENT)
Dept: LAB | Facility: HOSPITAL | Age: 69
End: 2024-01-02
Payer: MEDICARE

## 2024-01-02 ENCOUNTER — OFFICE VISIT (OUTPATIENT)
Dept: HEPATOLOGY | Facility: CLINIC | Age: 69
End: 2024-01-02
Payer: MEDICARE

## 2024-01-02 VITALS — BODY MASS INDEX: 28.98 KG/M2 | HEIGHT: 64 IN | WEIGHT: 169.75 LBS

## 2024-01-02 DIAGNOSIS — K76.6 PORTAL HYPERTENSION: ICD-10-CM

## 2024-01-02 DIAGNOSIS — K74.60 LIVER CIRRHOSIS SECONDARY TO NASH: Primary | ICD-10-CM

## 2024-01-02 DIAGNOSIS — E66.3 OVERWEIGHT (BMI 25.0-29.9): ICD-10-CM

## 2024-01-02 DIAGNOSIS — I85.10 SECONDARY ESOPHAGEAL VARICES WITHOUT BLEEDING: ICD-10-CM

## 2024-01-02 DIAGNOSIS — E11.69 HYPERLIPIDEMIA ASSOCIATED WITH TYPE 2 DIABETES MELLITUS: ICD-10-CM

## 2024-01-02 DIAGNOSIS — K75.81 LIVER CIRRHOSIS SECONDARY TO NASH: ICD-10-CM

## 2024-01-02 DIAGNOSIS — K75.81 LIVER CIRRHOSIS SECONDARY TO NASH: Primary | ICD-10-CM

## 2024-01-02 DIAGNOSIS — E78.5 HYPERLIPIDEMIA ASSOCIATED WITH TYPE 2 DIABETES MELLITUS: ICD-10-CM

## 2024-01-02 DIAGNOSIS — E11.42 TYPE 2 DIABETES MELLITUS WITH DIABETIC POLYNEUROPATHY, WITHOUT LONG-TERM CURRENT USE OF INSULIN: Chronic | ICD-10-CM

## 2024-01-02 DIAGNOSIS — R53.83 FATIGUE, UNSPECIFIED TYPE: ICD-10-CM

## 2024-01-02 DIAGNOSIS — K74.60 LIVER CIRRHOSIS SECONDARY TO NASH: ICD-10-CM

## 2024-01-02 LAB
ALBUMIN SERPL BCP-MCNC: 3.9 G/DL (ref 3.5–5.2)
ALP SERPL-CCNC: 100 U/L (ref 55–135)
ALT SERPL W/O P-5'-P-CCNC: 41 U/L (ref 10–44)
AMMONIA PLAS-SCNC: 27 UMOL/L (ref 10–50)
ANION GAP SERPL CALC-SCNC: 8 MMOL/L (ref 8–16)
AST SERPL-CCNC: 32 U/L (ref 10–40)
BASOPHILS # BLD AUTO: 0.02 K/UL (ref 0–0.2)
BASOPHILS NFR BLD: 0.5 % (ref 0–1.9)
BILIRUB SERPL-MCNC: 1.1 MG/DL (ref 0.1–1)
BUN SERPL-MCNC: 4 MG/DL (ref 8–23)
CALCIUM SERPL-MCNC: 9.5 MG/DL (ref 8.7–10.5)
CHLORIDE SERPL-SCNC: 109 MMOL/L (ref 95–110)
CO2 SERPL-SCNC: 25 MMOL/L (ref 23–29)
CREAT SERPL-MCNC: 0.7 MG/DL (ref 0.5–1.4)
DIFFERENTIAL METHOD BLD: ABNORMAL
EOSINOPHIL # BLD AUTO: 0.1 K/UL (ref 0–0.5)
EOSINOPHIL NFR BLD: 2.4 % (ref 0–8)
ERYTHROCYTE [DISTWIDTH] IN BLOOD BY AUTOMATED COUNT: 12.8 % (ref 11.5–14.5)
EST. GFR  (NO RACE VARIABLE): >60 ML/MIN/1.73 M^2
GLUCOSE SERPL-MCNC: 144 MG/DL (ref 70–110)
HCT VFR BLD AUTO: 38.5 % (ref 37–48.5)
HGB BLD-MCNC: 14 G/DL (ref 12–16)
IMM GRANULOCYTES # BLD AUTO: 0.01 K/UL (ref 0–0.04)
IMM GRANULOCYTES NFR BLD AUTO: 0.2 % (ref 0–0.5)
INR PPP: 1 (ref 0.8–1.2)
LYMPHOCYTES # BLD AUTO: 1.8 K/UL (ref 1–4.8)
LYMPHOCYTES NFR BLD: 44.2 % (ref 18–48)
MCH RBC QN AUTO: 31.8 PG (ref 27–31)
MCHC RBC AUTO-ENTMCNC: 36.4 G/DL (ref 32–36)
MCV RBC AUTO: 88 FL (ref 82–98)
MONOCYTES # BLD AUTO: 0.3 K/UL (ref 0.3–1)
MONOCYTES NFR BLD: 7.5 % (ref 4–15)
NEUTROPHILS # BLD AUTO: 1.9 K/UL (ref 1.8–7.7)
NEUTROPHILS NFR BLD: 45.2 % (ref 38–73)
NRBC BLD-RTO: 0 /100 WBC
PLATELET # BLD AUTO: 141 K/UL (ref 150–450)
PMV BLD AUTO: 10.2 FL (ref 9.2–12.9)
POTASSIUM SERPL-SCNC: 4.3 MMOL/L (ref 3.5–5.1)
PROT SERPL-MCNC: 7.1 G/DL (ref 6–8.4)
PROTHROMBIN TIME: 11 SEC (ref 9–12.5)
RBC # BLD AUTO: 4.4 M/UL (ref 4–5.4)
SODIUM SERPL-SCNC: 142 MMOL/L (ref 136–145)
WBC # BLD AUTO: 4.16 K/UL (ref 3.9–12.7)

## 2024-01-02 PROCEDURE — 3072F LOW RISK FOR RETINOPATHY: CPT | Mod: CPTII,S$GLB,, | Performed by: NURSE PRACTITIONER

## 2024-01-02 PROCEDURE — 85610 PROTHROMBIN TIME: CPT | Performed by: NURSE PRACTITIONER

## 2024-01-02 PROCEDURE — 85025 COMPLETE CBC W/AUTO DIFF WBC: CPT | Performed by: NURSE PRACTITIONER

## 2024-01-02 PROCEDURE — 3008F BODY MASS INDEX DOCD: CPT | Mod: CPTII,S$GLB,, | Performed by: NURSE PRACTITIONER

## 2024-01-02 PROCEDURE — 82140 ASSAY OF AMMONIA: CPT | Performed by: NURSE PRACTITIONER

## 2024-01-02 PROCEDURE — 1159F MED LIST DOCD IN RCRD: CPT | Mod: CPTII,S$GLB,, | Performed by: NURSE PRACTITIONER

## 2024-01-02 PROCEDURE — 80053 COMPREHEN METABOLIC PANEL: CPT | Performed by: NURSE PRACTITIONER

## 2024-01-02 PROCEDURE — 1160F RVW MEDS BY RX/DR IN RCRD: CPT | Mod: CPTII,S$GLB,, | Performed by: NURSE PRACTITIONER

## 2024-01-02 PROCEDURE — 1101F PT FALLS ASSESS-DOCD LE1/YR: CPT | Mod: CPTII,S$GLB,, | Performed by: NURSE PRACTITIONER

## 2024-01-02 PROCEDURE — 36415 COLL VENOUS BLD VENIPUNCTURE: CPT | Performed by: NURSE PRACTITIONER

## 2024-01-02 PROCEDURE — 3288F FALL RISK ASSESSMENT DOCD: CPT | Mod: CPTII,S$GLB,, | Performed by: NURSE PRACTITIONER

## 2024-01-02 PROCEDURE — 99213 OFFICE O/P EST LOW 20 MIN: CPT | Mod: S$GLB,,, | Performed by: NURSE PRACTITIONER

## 2024-01-02 PROCEDURE — 99999 PR PBB SHADOW E&M-EST. PATIENT-LVL IV: CPT | Mod: PBBFAC,,, | Performed by: NURSE PRACTITIONER

## 2024-01-02 PROCEDURE — 1126F AMNT PAIN NOTED NONE PRSNT: CPT | Mod: CPTII,S$GLB,, | Performed by: NURSE PRACTITIONER

## 2024-01-02 NOTE — PATIENT INSTRUCTIONS
Because you have cirrhosis, it is important to attend clinic visits every 6 months with an Ultrasound and blood tests every 6 months to screen for liver cancer (you are at risk of developing liver cancer due to scar tissue in the liver)    Signs and symptoms of worsening liver disease include jaundice, fluid in the belly (ascites), and confusion/disorientation/slowed thought processes due to hepatic encephalopathy (toxins building up because of liver problems).   You should seek medical attention if any of these things occur.    Also, possible bleeding from esophageal varices (blood vessels in the stomach and foodpipe can burst and cause fatal bleeding).  Therefore, if you have symptoms of vomiting blood, blood in your stool, dark or black stools or vomiting coffee ground vomit, YOU SHOULD GO TO THE EMERGENCY ROOM IMMEDIATELY.     Cirrhosis can increase the risk of liver cancer, liver failure, and death. However, we will watch your liver function score (MELD score) closely with each clinic visit. A normal MELD score is 6, highest is 40. Your last one was an 8. We will check this with every clinic visit. A MELD 15 or higher is when we start to consider transplant because MELD 15 or higher indicates that the liver is not functioning as well     Cirrhosis Counseling  - NO alcohol use (includes beer, wine, and/or liquor)  - avoid non-steroidal anti-inflammatory drugs (NSAIDs) such as ibuprofen, Motrin, naprosyn, Aleve due to the risk of kidney damage  - can take acetaminophen (Tylenol), no more than 2000 mg per day  - low sodium (salt) 2 gram per day diet  - high protein diet: 75-80 grams per day to prevent muscle mass loss. Drink at least 1 protein shake daily (Premier Protein is best option because it is very high protein and low sugar). Ok to use this as nighttime snack to fit it in   - resistance exercises for muscle strength  - avoid raw seafoods due to the risk of fatal Vibrio vulnificus infection  - ultrasound of  the liver every 6 months for liver cancer screening (you are at risk of developing liver cancer due to scar tissue in the liver)  - Upper endoscopy every 1-2 years to screen for varices in the stomach and foodpipe which can burst and cause fatal bleeding. Please call [phone] to schedule the exam.

## 2024-01-02 NOTE — PROGRESS NOTES
HEPATOLOGY CLINIC VISIT NOTE    CHIEF COMPLAINT: ZARCO Cirrhosis    HISTORY: This is a 68 y.o.  female here for follow up for well compensated cirrhosis, due to ZARCO. She was last seen in clinic by myself in 9/2023. She was referred for HCV AB (+), but subsequent testing didn't reveal viremia. Work up showed elevated liver enzymes, and was concerning for NAFLD/ZARCO. She has multiple metabolic risk factors, including DMII. Last HgbA1c was further improved at 5.8 % (11/2023). She is followed by Dr. Latif, on Ozempic. Lipids are well controlled. She has stopped taking Lipitor.     Fibroscan in 2/2019 was suggestive of F4 fibrosis and a high likelihood of cirrhosis. Repeat Fibroscan in 6/2022 was again suggestive of F4 fibrosis, with severe fatty infiltration of the liver (S3). Most recent US in 12/2023 showed:    US Abdomen Complete  Narrative: EXAMINATION:  US ABDOMEN COMPLETE    CLINICAL HISTORY:  Nonalcoholic steatohepatitis (ZARCO)    FINDINGS:  The pancreas aorta and IVC are unremarkable.  Gallbladder has been removed.  The bile duct measures 4 mm.  Liver measures 16 cm, the right kidney 11.7, the left kidney 11.2, the spleen 11.4 cm.  There are a few splenic granulomas.  The liver and spleen are otherwise normal.  The liver demonstrates coarse echotexture.  Impression: Liver demonstrates coarse echotexture suggesting cirrhosis/medical liver disease.    Biliary tree is normal.  No acute process seen    Electronically signed by: Fuentes Osborne MD  Date:    12/18/2023  Time:    08:29    MELD-Na is 8, CTP Class A Cirrhosis. She has lost 22 lbs since 12/2022. Her liver enzymes have normalized, with weight loss and better glycemic control. Last EGD in 3/2023 showed Grade I esophageal varices.    She has been exposed to Hepatitis A and is immune through prior exposure. HBV negative. She is S/P vaccination for Hepatitis B. Family history is significant for paternal grandmother with ESLD and liver cancer. She  "endorses chronic fatigue, and increased forgetfulness, but denies any other signs or symptoms of hepatic decompensation including: jaundice, dark urine, abdominal distention, lower extremity edema, hematemesis, or melena.    Past Medical History:   Diagnosis Date    Cataract     Compressive optic atrophy 2015    Diabetes mellitus, type 2     Fatty liver disease, nonalcoholic     Hyperlipidemia     Prolactinoma     Reflux 08/10/2012    Rosacea     Toe fracture, right 2018    Unspecified cirrhosis of liver      Past Surgical History:   Procedure Laterality Date    BREAST BIOPSY Right     core     SECTION      CHOLECYSTECTOMY      ESOPHAGOGASTRODUODENOSCOPY N/A 3/8/2023    Procedure: ESOPHAGOGASTRODUODENOSCOPY (EGD);  Surgeon: Raciel Becker MD;  Location: [facility] ([address] [place] [address]);  Service: Gastroenterology;  Laterality: N/A;  refused to have   cirrhosis-labs done on 22  instr mailed/portal-GT  pre-call no answer 2023    TRANSPHENOIDAL PITUITARY RESECTION       FAMILY HISTORY: Per HPI    SOCIAL HISTORY:   Social History     Tobacco Use   Smoking Status Never    Passive exposure: Never   Smokeless Tobacco Never     Social History     Substance and Sexual Activity   Alcohol Use No     Social History     Substance and Sexual Activity   Drug Use No     ROS:   No fever, chills, weight loss + intentional weight loss + chronic fatigue  No chest pain, dyspnea, cough  No abdominal pain,  nausea, vomiting  No headaches, visual changes  No lower extremity edema  No depression or anxiety    PHYSICAL EXAM:  Friendly  female, in no acute distress; alert and oriented to person, place and time.  VITALS: Ht 5' 4" (1.626 m)   Wt 77 kg (169 lb 12.1 oz)   BMI 29.14 kg/m²   HEENT: Sclerae anicteric.   NECK: No obvious masses.  CVS: No peripheral edema.  LUNGS: Normal respiratory effort.   ABDOMEN: Soft, non-distended abdomen.  SKIN: Warm and dry. No jaundice, No obvious rashes. "   EXTREMITIES: No lower extremity edema  NEURO/PSYCH: Normal gate. Memory intact. Thought and speech pattern appropriate. Behavior normal. No depression or anxiety noted.    RECENT LABS:  Lab Results   Component Value Date    WBC 4.00 2023    HGB 13.1 2023     (L) 2023     Lab Results   Component Value Date    INR 1.1 2023     Lab Results   Component Value Date    AST 45 (H) 2023    ALT 62 (H) 2023    BILITOT 1.6 (H) 2023    ALBUMIN 3.9 2023    ALKPHOS 84 2023    CREATININE 0.7 2023    BUN 7 (L) 2023     2023    K 3.9 2023    AFP 5.0 2023     DIAGNOSTIC STUDIES:    FIBROSCAN 2/15/2019:    Fibroscan readin.1 KPa     Fibrosis:F4      CAP readin dB/m     Steatosis: :S3      FIBROSCAN 2022:    Findings  Median liver stiffness score:  28  CAP Reading: dB/m:  330     IQR/med %:  14  Interpretation  Fibrosis interpretation is based on medial liver stiffness - Kilopascal (kPa).     Fibrosis Stage:  F4  Steatosis interpretation is based on controlled attenuation parameter - (dB/m).     Steatosis Grade:  S3    EGD 3/8/2023:    Findings:        Grade I varices with no bleeding and no stigmata of recent bleeding        were found in the lower third of the esophagus. No red renata signs        were present.        The exam of the esophagus was otherwise normal.        The entire examined stomach was normal.        There is no endoscopic evidence of varices in the stomach.        The duodenal bulb, first portion of the duodenum and second portion        of the duodenum were normal.   Impression:     - Grade I esophageal varices with no bleeding and                          no stigmata of recent bleeding.                          - Normal stomach.                          - Normal duodenal bulb, first portion of the                          duodenum and second portion of the duodenum.                          - No  specimens collected.    US ABDOMEN COMPLETE 6/28/2023:    FINDINGS:    The visualized pancreas is within normal limits.     The aorta is not aneurysmal.  The visualized inferior vena cava is unremarkable.     The common duct is 4 mm, not dilated.  Gallbladder has been removed.     The liver measures 16.3 cm, not enlarged.  The liver demonstrates no focal mass.  The liver parenchyma is mildly heterogeneous.  The H RI is 1.6, suggestive of greater than 5% steatosis.     The right kidney measures 12.6 cm and has a normal sonographic appearance.     The spleen is mildly enlarged, 12.8 cm.     The left kidney measures 11.6 cm and demonstrates no sonographic abnormality.     Impression:     Hepatic steatosis.  No hepatic mass.     Mild splenomegaly     Cholecystectomy    US Abdomen Complete  Narrative: EXAMINATION:  US ABDOMEN COMPLETE    CLINICAL HISTORY:  Nonalcoholic steatohepatitis (ZARCO)    FINDINGS:  The pancreas aorta and IVC are unremarkable.  Gallbladder has been removed.  The bile duct measures 4 mm.  Liver measures 16 cm, the right kidney 11.7, the left kidney 11.2, the spleen 11.4 cm.  There are a few splenic granulomas.  The liver and spleen are otherwise normal.  The liver demonstrates coarse echotexture.  Impression: Liver demonstrates coarse echotexture suggesting cirrhosis/medical liver disease.    Biliary tree is normal.  No acute process seen    Electronically signed by: Fuentes Osborne MD  Date:    12/18/2023  Time:    08:29    ASSESSMENT  68 y.o.  female with:    1. Liver cirrhosis secondary to ZARCO  Comprehensive Metabolic Panel    CBC Auto Differential    Protime-INR    Ambulatory referral/consult to Endo Procedure     US Abdomen Complete    AMMONIA      2. Portal hypertension  Comprehensive Metabolic Panel    CBC Auto Differential    Protime-INR    Ambulatory referral/consult to Endo Procedure     US Abdomen Complete    AMMONIA      3. Secondary esophageal varices without  bleeding  Comprehensive Metabolic Panel    CBC Auto Differential    Protime-INR    Ambulatory referral/consult to Endo Procedure     US Abdomen Complete    AMMONIA      4. Type 2 diabetes mellitus with diabetic polyneuropathy, without long-term current use of insulin  Comprehensive Metabolic Panel    CBC Auto Differential    Protime-INR    US Abdomen Complete      5. Hyperlipidemia associated with type 2 diabetes mellitus  Comprehensive Metabolic Panel    CBC Auto Differential    Protime-INR    US Abdomen Complete      6. Overweight (BMI 25.0-29.9)  Comprehensive Metabolic Panel    CBC Auto Differential    Protime-INR    US Abdomen Complete      7. Fatigue, unspecified type          MELD 3.0: 9 at 9/14/2023 10:09 AM  MELD-Na: 8 at 9/14/2023 10:09 AM  Calculated from:  Serum Creatinine: 0.9 mg/dL (Using min of 1 mg/dL) at 9/14/2023 10:09 AM  Serum Sodium: 141 mmol/L (Using max of 137 mmol/L) at 9/14/2023 10:09 AM  Total Bilirubin: 1.3 mg/dL at 9/14/2023 10:09 AM  Serum Albumin: 3.7 g/dL (Using max of 3.5 g/dL) at 9/14/2023 10:09 AM  INR(ratio): 1.1 at 9/14/2023 10:09 AM  Age at listing (hypothetical): 67 years  Sex: Female at 9/14/2023 10:09 AM    - Repeat Ultrasound of the liver every 6 months for HCC surveillance, next due 6/2024.  - Repeat liver function tests every 3-6 months to monitor liver function, due now.  - Recommend EGD for variceal surveillance annually, next due 3/2024.  - Avoid alcohol and herbal supplements/alternative remedies.  - Recommend additional weight loss of 15 lbs, through diet and exercise.  - Recommend good control of cholesterol, blood pressure, & blood sugar levels.  - Return to clinic in 6 months.       Hepatology Nurse Practitioner  [facility]

## 2024-01-11 ENCOUNTER — TELEPHONE (OUTPATIENT)
Dept: ENDOSCOPY | Facility: HOSPITAL | Age: 69
End: 2024-01-11

## 2024-01-11 ENCOUNTER — CLINICAL SUPPORT (OUTPATIENT)
Dept: ENDOSCOPY | Facility: HOSPITAL | Age: 69
End: 2024-01-11
Payer: MEDICARE

## 2024-01-11 DIAGNOSIS — K74.60 LIVER CIRRHOSIS SECONDARY TO NASH: ICD-10-CM

## 2024-01-11 DIAGNOSIS — K75.81 LIVER CIRRHOSIS SECONDARY TO NASH: ICD-10-CM

## 2024-01-11 DIAGNOSIS — I85.00 VARICES OF ESOPHAGUS DETERMINED BY ENDOSCOPY: ICD-10-CM

## 2024-01-11 DIAGNOSIS — I85.10 SECONDARY ESOPHAGEAL VARICES WITHOUT BLEEDING: ICD-10-CM

## 2024-01-11 DIAGNOSIS — K76.6 PORTAL HYPERTENSION: ICD-10-CM

## 2024-01-11 DIAGNOSIS — K74.60 HEPATIC CIRRHOSIS, UNSPECIFIED HEPATIC CIRRHOSIS TYPE, UNSPECIFIED WHETHER ASCITES PRESENT: Primary | ICD-10-CM

## 2024-01-11 NOTE — PLAN OF CARE
Spoke to pt to schedule procedure(s) Upper Endoscopy (EGD)       Physician to perform procedure(s) Dr. DENISE Alonso  Date of Procedure (s) 1/19/24  Arrival Time 1:00 PM  Time of Procedure(s) 2:00 PM   Location of Procedure(s) [address]  Type of Rx Prep sent to patient: N/A  Instructions provided to patient via [facility]    Patient was informed on the following information and verbalized understanding. Screening questionnaire reviewed with patient and complete. If procedure requires anesthesia, a responsible adult needs to be present to accompany the patient home, patient cannot drive after receiving anesthesia. Appointment details are tentative, especially check-in time. Patient will receive a prep-op call 7 days prior to confirm check-in time for procedure. If applicable the patient should contact their pharmacy to verify Rx for procedure prep is ready for pick-up. Patient was advised to call the scheduling department at [phone] if pharmacy states no Rx is available. Patient was advised to call the endoscopy scheduling department if any questions or concerns arise.      [facility] Scheduling Department

## 2024-01-11 NOTE — TELEPHONE ENCOUNTER
Spoke to pt to schedule procedure(s) Upper Endoscopy (EGD)       Physician to perform procedure(s) Dr. DENISE Alonso  Date of Procedure (s) 1/19/24  Arrival Time 1:00 PM  Time of Procedure(s) 2:00 PM   Location of Procedure(s) [address]  Type of Rx Prep sent to patient: N/A  Instructions provided to patient via [facility]    Patient was informed on the following information and verbalized understanding. Screening questionnaire reviewed with patient and complete. If procedure requires anesthesia, a responsible adult needs to be present to accompany the patient home, patient cannot drive after receiving anesthesia. Appointment details are tentative, especially check-in time. Patient will receive a prep-op call 7 days prior to confirm check-in time for procedure. If applicable the patient should contact their pharmacy to verify Rx for procedure prep is ready for pick-up. Patient was advised to call the scheduling department at [phone] if pharmacy states no Rx is available. Patient was advised to call the endoscopy scheduling department if any questions or concerns arise.      [facility] Scheduling Department

## 2024-01-12 ENCOUNTER — OFFICE VISIT (OUTPATIENT)
Dept: OPTOMETRY | Facility: CLINIC | Age: 69
End: 2024-01-12
Payer: MEDICARE

## 2024-01-12 DIAGNOSIS — D35.2 PITUITARY ADENOMA: ICD-10-CM

## 2024-01-12 DIAGNOSIS — H40.1134 PRIMARY OPEN ANGLE GLAUCOMA (POAG) OF BOTH EYES, INDETERMINATE STAGE: Primary | ICD-10-CM

## 2024-01-12 PROCEDURE — 99214 OFFICE O/P EST MOD 30 MIN: CPT | Mod: S$GLB,,, | Performed by: OPTOMETRIST

## 2024-01-12 PROCEDURE — 76514 ECHO EXAM OF EYE THICKNESS: CPT | Mod: S$GLB,,, | Performed by: OPTOMETRIST

## 2024-01-12 PROCEDURE — 99999 PR PBB SHADOW E&M-EST. PATIENT-LVL II: CPT | Mod: PBBFAC,,, | Performed by: OPTOMETRIST

## 2024-01-12 PROCEDURE — 1160F RVW MEDS BY RX/DR IN RCRD: CPT | Mod: CPTII,S$GLB,, | Performed by: OPTOMETRIST

## 2024-01-12 PROCEDURE — 92020 GONIOSCOPY: CPT | Mod: S$GLB,,, | Performed by: OPTOMETRIST

## 2024-01-12 PROCEDURE — 1159F MED LIST DOCD IN RCRD: CPT | Mod: CPTII,S$GLB,, | Performed by: OPTOMETRIST

## 2024-01-12 NOTE — PROGRESS NOTES
HPI    JONI: 11/23   Chief complaint (CC): Patient is here for a follow up with IOP check,   pachymetry and gonio today.  Patient hasn't noticed any vision changes   since the last exam.  Glasses? +3.00 OTC  Contacts? -  H/o eye surgery, injections or laser: Lasik OU  H/o eye injury: -  Known eye conditions? See above  Family h/o eye conditions? Mother, brother and sister with glaucoma  Eye gtts? Using Latanoprost OU Q HS-pt. Had forgotten about using the   drops and hasn't started      (-) Flashes (-)  Floaters (-) Mucous   (-)  Tearing (-) Itching (-) Burning   (-) Headaches (-) Eye Pain/discomfort (-) Irritation   (-)  Redness (-) Double vision (-) Blurry vision    Diabetic? -  A1c? -      Last edited by Carly Pierre on 1/12/2024 11:36 AM.            Assessment /Plan     For exam results, see Encounter Report.    Primary open angle glaucoma (POAG) of both eyes, indeterminate stage    Pituitary adenoma      (+) FHx- mom, sister, brother, MGM. IOP 18 OD, OS. Last 15 OD, OS. Pachy 561 OD, 547 OS. Pt reports she never started drops because she forgot about them and just picked them up from pharm yesterday.  C/d 0.65OD, OS. Pallor OU.   01/12/2024 Gonio SS all quadrants OU  11/29/2023 OCT OD borderline NI, significantly thin in all other areas, OS normal NS and NI, significantly thin in all other areas  11/29/2023 HVF OD sup and inf arc (respects midline), OS sup arc  HVF stable when compared to 2016 which was post surgery. Pt had surgery for pituitary adenoma in 2015.   Start Latanoprost QHS OU due to changes on OCT, significant thinning of OCT and strong family history.  Educated pt on findings w/understanding.   RTC 6 weeks IOP

## 2024-01-16 ENCOUNTER — TELEPHONE (OUTPATIENT)
Dept: ENDOSCOPY | Facility: HOSPITAL | Age: 69
End: 2024-01-16
Payer: MEDICARE

## 2024-01-16 ENCOUNTER — TELEPHONE (OUTPATIENT)
Dept: ENDOCRINOLOGY | Facility: CLINIC | Age: 69
End: 2024-01-16
Payer: MEDICARE

## 2024-01-16 ENCOUNTER — OFFICE VISIT (OUTPATIENT)
Dept: ENDOCRINOLOGY | Facility: CLINIC | Age: 69
End: 2024-01-16
Payer: MEDICARE

## 2024-01-16 DIAGNOSIS — E11.42 TYPE 2 DIABETES MELLITUS WITH DIABETIC POLYNEUROPATHY, WITHOUT LONG-TERM CURRENT USE OF INSULIN: Primary | Chronic | ICD-10-CM

## 2024-01-16 DIAGNOSIS — M81.0 AGE RELATED OSTEOPOROSIS, UNSPECIFIED PATHOLOGICAL FRACTURE PRESENCE: Chronic | ICD-10-CM

## 2024-01-16 PROCEDURE — 3072F LOW RISK FOR RETINOPATHY: CPT | Mod: CPTII,95,, | Performed by: NURSE PRACTITIONER

## 2024-01-16 PROCEDURE — 1159F MED LIST DOCD IN RCRD: CPT | Mod: CPTII,95,, | Performed by: NURSE PRACTITIONER

## 2024-01-16 PROCEDURE — 1160F RVW MEDS BY RX/DR IN RCRD: CPT | Mod: CPTII,95,, | Performed by: NURSE PRACTITIONER

## 2024-01-16 PROCEDURE — 99214 OFFICE O/P EST MOD 30 MIN: CPT | Mod: 95,,, | Performed by: NURSE PRACTITIONER

## 2024-01-16 RX ORDER — INSULIN GLARGINE 100 [IU]/ML
10 INJECTION, SOLUTION SUBCUTANEOUS NIGHTLY
Qty: 30 ML | Refills: 3 | Status: SHIPPED | OUTPATIENT
Start: 2024-01-16 | End: 2024-05-14

## 2024-01-16 NOTE — TELEPHONE ENCOUNTER
----- Message from Cheryl Lam MA sent at 1/16/2024  1:12 PM CST -----  Regarding: FW: Appt question  Contact: [phone]    ----- Message -----  From: Jackie Vila  Sent: 1/16/2024  12:33 PM CST  To: Manjit Solomon Staff  Subject: Appt question                                    Type:  Needs Medical Advice    Who Called: Tona  Would the patient rather a call back or a response via [facility]? Call  Best Call Back Number: [phone]  Additional Information: Patient doesn't know how to access the virtual visit and would like to have the office call for her appt. Patient stated she told the office she didn't want a virtual.

## 2024-01-16 NOTE — ASSESSMENT & PLAN NOTE
-- Repeat BMD 11/2024.  -- Reassuring she is not fracturing.  -- Treatment  Fosamax - unsure of start and stop dates - believes she took it for a year     Reclast  11/16/2021, 12/8/2022    Therapy plan reordered LOV - encouraged she follow up on scheduling.

## 2024-01-16 NOTE — ASSESSMENT & PLAN NOTE
-- Labs prior to follow up.  -- A1c goal <7%.  -- Medications discussed:  MFM - GI side effects  GLP1-DPP4   RIVERA   SGLT2 - cost  Insulin   -- Reviewed logs/CGM:  Fasting glucose controlled.  Concern for hypoglycemia unawareness.  Encouraged to check twice daily.  Instructed to send glucose logs in 14 days.  Reach out to me sooner for any glucose <70 or consistently >180.  -- Medication Changes:   Lantus 10 units nightly   Ozempic 0.5mg weekly   -- Reviewed goals of therapy are to get the best control we can without hypoglycemia.  -- Reviewed patient's current insulin regimen. Clarified proper insulin dose and timing in relation to meals, etc. Insulin injection sites and proper rotation instructed.    -- Advised frequent self blood glucose monitoring.  Patient encouraged to document glucose results and bring them to every clinic visit.  -- Hypoglycemia precautions discussed. Instructed on precautions before driving.    -- Call for Bg repeatedly < 90 or > 180.   -- Close adherence to lifestyle changes recommended.   -- Periodic follow ups for eye evaluations, foot care and dental care suggested.

## 2024-01-16 NOTE — TELEPHONE ENCOUNTER
Patient called and left voicemail message wanting to reschedule EGD on 1/19/24. Called patient and informed of limited availability. Patient decided she wants to keep appointment as scheduled and will call back tomorrow if she can't find transportation.

## 2024-01-16 NOTE — PROGRESS NOTES
"Subjective:      Patient ID: Tona Carlson is a 68 y.o. female.    Chief Complaint:  No chief complaint on file.    History of Present Illness  Tona Carlson is here for follow up of DM and Osteoporosis.  Previously seen by me 2023.  This is a MyChart video visit.    The patient location is: [place]  The chief complaint leading to consultation is: DM  Visit type: Virtual visit with synchronous audio and video  Total time spent with patient: see below  Each patient to whom he or she provides medical services by telemedicine is:  (1) informed of the relationship between the physician and patient and the respective role of any other health care provider with respect to management of the patient; and (2) notified that he or she may decline to receive medical services by telemedicine and may withdraw from such care at any time.    With regards to diabetes:    Diagnosed: in her 50s  DE: 2022  Known complications:  DKA Denies  RN Denies  Eye Exam: 2024  PN Denies  Podiatry: 2023  Nephropathy Denies  CAD Denies  Denies history of pancreatitis & personal/family history of medullary thyroid cancer.     Diet/Exercise:  Eats 3 meals a day.   Snacks : occasionally   Drinks : diet soft drinks, water  Exercise - tries to stay active.  Recent illness, injury steroids: Denies     Current regimen:  Metformin 500mg daily  Lantus 14 units nightly   Ozempic 0.5mg weekly      Rarely taking Metformin unless her glucose is "high" and notes it gives her GI side effects.     Other medications tried:  Jardiance - cost  Glimepiride  Victoza - GI intolerance  Januvia - cost  Took Trulicity for 2 months then self stopped due to decreased appetite.  Novolog    Glucose Monitor:   1 times a day testing  Log reviewed: oral recall  Fastin-120    Hypoglycemia:  Denies  Knows how to correct with 15 grams of carbs- juice, coke, or a peppermint.       Diabetes Management Status    Hemoglobin A1C   Date Value Ref Range Status "   11/06/2023 5.8 (H) 4.0 - 5.6 % Final     Comment:     ADA Screening Guidelines:  5.7-6.4%  Consistent with prediabetes  >or=6.5%  Consistent with diabetes    High levels of fetal hemoglobin interfere with the HbA1C  assay. Heterozygous hemoglobin variants (HbS, HgC, etc)do  not significantly interfere with this assay.   However, presence of multiple variants may affect accuracy.     06/19/2023 6.0 (H) 4.0 - 5.6 % Final     Comment:     ADA Screening Guidelines:  5.7-6.4%  Consistent with prediabetes  >or=6.5%  Consistent with diabetes    High levels of fetal hemoglobin interfere with the HbA1C  assay. Heterozygous hemoglobin variants (HbS, HgC, etc)do  not significantly interfere with this assay.   However, presence of multiple variants may affect accuracy.     02/16/2023 6.5 (H) 4.0 - 5.6 % Final     Comment:     ADA Screening Guidelines:  5.7-6.4%  Consistent with prediabetes  >or=6.5%  Consistent with diabetes    High levels of fetal hemoglobin interfere with the HbA1C  assay. Heterozygous hemoglobin variants (HbS, HgC, etc)do  not significantly interfere with this assay.   However, presence of multiple variants may affect accuracy.         Statin: Taking  ACE/ARB: Not taking  Screening or Prevention Patient's value Goal Complete/Controlled?   HgA1C Testing and Control   Lab Results   Component Value Date    HGBA1C 5.8 (H) 11/06/2023      Annually/Less than 8% Yes     Lipid profile : 11/06/2023 Annually Yes     LDL control Lab Results   Component Value Date    LDLCALC 81.8 11/06/2023    Annually/Less than 100 mg/dl  Yes     Nephropathy screening Lab Results   Component Value Date    LABMICR 20.0 11/06/2023     Lab Results   Component Value Date    PROTEINUA Negative 11/06/2023    Annually Yes     Blood pressure BP Readings from Last 1 Encounters:   12/08/23 113/74    Less than 140/90 Yes     Dilated retinal exam : 09/11/2023 Annually Yes     Foot exam   : 02/08/2023 Annually Yes       With regards to  osteoporosis:     BMD:   11/3/2022  The L1 to L4 vertebral bone mineral density is equal to 0.918 g/cm squared with a T score of -2.2.  Previously, -2.5  The left femoral neck bone mineral density is equal to 0.709 g/cm squared with a T score of -2.4.  Previously, -2.8  The right femoral neck bone mineral density is equal to 0.664 g/cm squared with a T score of -2.7.  There is a 13.3% risk of a major osteoporotic fracture and a 3.7% risk of hip fracture in the next 10 years (FRAX).  Impression:  Osteopenia of the lumbar spine and left femoral neck.  Osteoporosis of the right femoral neck.    Medications:   Fosamax - unsure of start and stop dates - believes she took it for a year     Reclast  11/16/2021, 12/8/2022    Calcium intake: multivitamin    Vit D intake: over the counter vitamin D3 2000iu daily     Weight bearing exercise: active lifestyle   Falls: Denies  Fractures: Denies    Following with Dr Young and Dr Farmer for Pituitary Incidentaloma     Review of Systems  As above        There were no vitals taken for this visit.          There is no height or weight on file to calculate BMI.    Lab Review:   Lab Results   Component Value Date    HGBA1C 5.8 (H) 11/06/2023    HGBA1C 6.0 (H) 06/19/2023    HGBA1C 6.5 (H) 02/16/2023       Lab Results   Component Value Date    CHOL 149 11/06/2023    HDL 45 11/06/2023    LDLCALC 81.8 11/06/2023    TRIG 111 11/06/2023    CHOLHDL 30.2 11/06/2023     Lab Results   Component Value Date     01/02/2024    K 4.3 01/02/2024     01/02/2024    CO2 25 01/02/2024     (H) 01/02/2024    BUN 4 (L) 01/02/2024    CREATININE 0.7 01/02/2024    CALCIUM 9.5 01/02/2024    PROT 7.1 01/02/2024    ALBUMIN 3.9 01/02/2024    BILITOT 1.1 (H) 01/02/2024    ALKPHOS 100 01/02/2024    AST 32 01/02/2024    ALT 41 01/02/2024    ANIONGAP 8 01/02/2024    ESTGFRAFRICA >60.0 06/14/2022    EGFRNONAA >60.0 06/14/2022    TSH 1.804 11/06/2023     Vit D, 25-Hydroxy   Date Value Ref Range Status    06/19/2023 35 30 - 96 ng/mL Final     Comment:     Vitamin D deficiency.........<10 ng/mL                              Vitamin D insufficiency......10-29 ng/mL       Vitamin D sufficiency........> or equal to 30 ng/mL  Vitamin D toxicity............>100 ng/mL       Assessment and Plan     1. Type 2 diabetes mellitus with diabetic polyneuropathy, without long-term current use of insulin  insulin (LANTUS SOLOSTAR U-100 INSULIN) glargine 100 units/mL SubQ pen      2. Age related osteoporosis, unspecified pathological fracture presence            Type 2 diabetes mellitus with diabetic polyneuropathy, without long-term current use of insulin  -- Labs prior to follow up.  -- A1c goal <7%.  -- Medications discussed:  MFM - GI side effects  GLP1-DPP4   RIVERA   SGLT2 - cost  Insulin   -- Reviewed logs/CGM:  Fasting glucose controlled.  Concern for hypoglycemia unawareness.  Encouraged to check twice daily.  Instructed to send glucose logs in 14 days.  Reach out to me sooner for any glucose <70 or consistently >180.  -- Medication Changes:   Lantus 10 units nightly   Ozempic 0.5mg weekly   -- Reviewed goals of therapy are to get the best control we can without hypoglycemia.  -- Reviewed patient's current insulin regimen. Clarified proper insulin dose and timing in relation to meals, etc. Insulin injection sites and proper rotation instructed.    -- Advised frequent self blood glucose monitoring.  Patient encouraged to document glucose results and bring them to every clinic visit.  -- Hypoglycemia precautions discussed. Instructed on precautions before driving.    -- Call for Bg repeatedly < 90 or > 180.   -- Close adherence to lifestyle changes recommended.   -- Periodic follow ups for eye evaluations, foot care and dental care suggested.    Osteoporosis  -- Repeat BMD 11/2024.  -- Reassuring she is not fracturing.  -- Treatment  Fosamax - unsure of start and stop dates - believes she took it for a year      Reclast  11/16/2021, 12/8/2022    Therapy plan reordered LOV - encouraged she follow up on scheduling.            Follow up in about 4 months (around 5/16/2024).        I spent 20 minutes face-to-face with the patient, over half of the visit was spent on counseling and/or coordinating the care of the patient.    Counseling includes:  Diagnostic results, impressions, recommendations   Prognosis   Risk and benefits of management/treatment options   Instructions for management treatment and or follow-up   Importance of compliance with management   Risk factor reduction   Patient education

## 2024-01-16 NOTE — TELEPHONE ENCOUNTER
----- Message from Cheryl Lam MA sent at 1/16/2024  1:12 PM CST -----  Regarding: FW: Appt question  Contact: [phone]    ----- Message -----  From: Jackie Vila  Sent: 1/16/2024  12:33 PM CST  To: Manjit Solomon Staff  Subject: Appt question                                    Type:  Needs Medical Advice    Who Called: Tona  Would the patient rather a call back or a response via [facility]? Call  Best Call Back Number: [phone]  Additional Information: Patient doesn't know how to access the virtual visit and would like to have the office call for her appt. Patient stated she told the office she didn't want a virtual.

## 2024-01-17 ENCOUNTER — TELEPHONE (OUTPATIENT)
Dept: ENDOSCOPY | Facility: HOSPITAL | Age: 69
End: 2024-01-17
Payer: MEDICARE

## 2024-01-19 ENCOUNTER — ANESTHESIA (OUTPATIENT)
Dept: ENDOSCOPY | Facility: HOSPITAL | Age: 69
End: 2024-01-19
Payer: MEDICARE

## 2024-01-19 ENCOUNTER — ANESTHESIA EVENT (OUTPATIENT)
Dept: ENDOSCOPY | Facility: HOSPITAL | Age: 69
End: 2024-01-19
Payer: MEDICARE

## 2024-01-19 ENCOUNTER — HOSPITAL ENCOUNTER (OUTPATIENT)
Facility: HOSPITAL | Age: 69
Discharge: HOME | End: 2024-01-19
Attending: INTERNAL MEDICINE | Admitting: INTERNAL MEDICINE
Payer: MEDICARE

## 2024-01-19 VITALS
OXYGEN SATURATION: 100 % | RESPIRATION RATE: 20 BRPM | SYSTOLIC BLOOD PRESSURE: 120 MMHG | HEART RATE: 75 BPM | TEMPERATURE: 98 F | DIASTOLIC BLOOD PRESSURE: 60 MMHG | HEIGHT: 64 IN | BODY MASS INDEX: 28.17 KG/M2 | WEIGHT: 165 LBS

## 2024-01-19 DIAGNOSIS — K74.60 LIVER CIRRHOSIS SECONDARY TO NASH: ICD-10-CM

## 2024-01-19 DIAGNOSIS — I85.00 VARICES OF ESOPHAGUS DETERMINED BY ENDOSCOPY: Primary | ICD-10-CM

## 2024-01-19 DIAGNOSIS — K75.81 LIVER CIRRHOSIS SECONDARY TO NASH: ICD-10-CM

## 2024-01-19 LAB
POCT GLUCOSE: 91 MG/DL (ref 70–110)
POCT GLUCOSE: 97 MG/DL (ref 70–110)

## 2024-01-19 PROCEDURE — 43235 EGD DIAGNOSTIC BRUSH WASH: CPT | Performed by: INTERNAL MEDICINE

## 2024-01-19 PROCEDURE — 43235 EGD DIAGNOSTIC BRUSH WASH: CPT | Mod: GC,,, | Performed by: INTERNAL MEDICINE

## 2024-01-19 PROCEDURE — 25000003 PHARM REV CODE 250: Performed by: NURSE ANESTHETIST, CERTIFIED REGISTERED

## 2024-01-19 PROCEDURE — 63600175 PHARM REV CODE 636 W HCPCS: Performed by: NURSE ANESTHETIST, CERTIFIED REGISTERED

## 2024-01-19 PROCEDURE — D9220A PRA ANESTHESIA: Mod: CRNA,,, | Performed by: NURSE ANESTHETIST, CERTIFIED REGISTERED

## 2024-01-19 PROCEDURE — 37000008 HC ANESTHESIA 1ST 15 MINUTES: Performed by: INTERNAL MEDICINE

## 2024-01-19 PROCEDURE — D9220A PRA ANESTHESIA: Mod: ANES,,, | Performed by: ANESTHESIOLOGY

## 2024-01-19 PROCEDURE — 37000009 HC ANESTHESIA EA ADD 15 MINS: Performed by: INTERNAL MEDICINE

## 2024-01-19 RX ORDER — ONDANSETRON HYDROCHLORIDE 2 MG/ML
4 INJECTION, SOLUTION INTRAVENOUS DAILY PRN
Status: DISCONTINUED | OUTPATIENT
Start: 2024-01-19 | End: 2024-01-19 | Stop reason: HOSPADM

## 2024-01-19 RX ORDER — SODIUM CHLORIDE 0.9 % (FLUSH) 0.9 %
10 SYRINGE (ML) INJECTION
Status: DISCONTINUED | OUTPATIENT
Start: 2024-01-19 | End: 2024-01-19 | Stop reason: HOSPADM

## 2024-01-19 RX ORDER — SODIUM CHLORIDE 9 MG/ML
INJECTION, SOLUTION INTRAVENOUS CONTINUOUS PRN
Status: DISCONTINUED | OUTPATIENT
Start: 2024-01-19 | End: 2024-01-19

## 2024-01-19 RX ORDER — PROPOFOL 10 MG/ML
VIAL (ML) INTRAVENOUS
Status: DISCONTINUED | OUTPATIENT
Start: 2024-01-19 | End: 2024-01-19

## 2024-01-19 RX ORDER — PROPOFOL 10 MG/ML
VIAL (ML) INTRAVENOUS CONTINUOUS PRN
Status: DISCONTINUED | OUTPATIENT
Start: 2024-01-19 | End: 2024-01-19

## 2024-01-19 RX ORDER — LIDOCAINE HYDROCHLORIDE 20 MG/ML
INJECTION INTRAVENOUS
Status: DISCONTINUED | OUTPATIENT
Start: 2024-01-19 | End: 2024-01-19

## 2024-01-19 RX ORDER — HALOPERIDOL 5 MG/ML
0.5 INJECTION INTRAMUSCULAR EVERY 10 MIN PRN
Status: DISCONTINUED | OUTPATIENT
Start: 2024-01-19 | End: 2024-01-19 | Stop reason: HOSPADM

## 2024-01-19 RX ADMIN — PROPOFOL 50 MG: 10 INJECTION, EMULSION INTRAVENOUS at 02:01

## 2024-01-19 RX ADMIN — LIDOCAINE HYDROCHLORIDE 60 MG: 20 INJECTION INTRAVENOUS at 02:01

## 2024-01-19 RX ADMIN — PROPOFOL 200 MCG/KG/MIN: 10 INJECTION, EMULSION INTRAVENOUS at 02:01

## 2024-01-19 RX ADMIN — SODIUM CHLORIDE: 0.9 INJECTION, SOLUTION INTRAVENOUS at 01:01

## 2024-01-19 NOTE — TRANSFER OF CARE
"Anesthesia Transfer of Care Note    Patient: Tona Carlson    Procedure(s) Performed: Procedure(s) (LRB):  EGD (ESOPHAGOGASTRODUODENOSCOPY) (N/A)    Patient location: [facility]    Anesthesia Type: general    Transport from OR: Transported from OR on 2-3 L/min O2 by NC with adequate spontaneous ventilation    Post pain: adequate analgesia    Post assessment: no apparent anesthetic complications and tolerated procedure well    Post vital signs: stable    Level of consciousness: lethargic and responds to stimulation    Nausea/Vomiting: no nausea/vomiting    Complications: none    Transfer of care protocol was followed      Last vitals: Visit Vitals  /69 (BP Location: Left arm, Patient Position: Lying)   Pulse 74   Temp 36.3 °C (97.3 °F) (Temporal)   Resp 16   Ht 5' 4" (1.626 m)   Wt 74.8 kg (165 lb)   SpO2 95%   Breastfeeding No   BMI 28.32 kg/m²     "

## 2024-01-19 NOTE — PROVATION PATIENT INSTRUCTIONS
Discharge Summary/Instructions after an Endoscopic Procedure  Patient Name: Tona Carlson  Patient MRN: [id number]  Patient YOB: 1955  Friday, January 19, 2024  Cresencio Alonso MD  Dear patient,  As a result of recent federal legislation (The Federal Cures Act), you may   receive lab or pathology results from your procedure in your [facility]   account before your physician is able to contact you. Your physician or   their representative will relay the results to you with their   recommendations at their soonest availability.  Thank you,  RESTRICTIONS:  During your procedure today, you received medications for sedation.  These   medications may affect your judgment, balance and coordination.  Therefore,   for 24 hours, you have the following restrictions:   - DO NOT drive a car, operate machinery, make legal/financial decisions,   sign important papers or drink alcohol.    ACTIVITY:  Today: no heavy lifting, straining or running due to procedural   sedation/anesthesia.  The following day: return to full activity including work.  DIET:  Eat and drink normally unless instructed otherwise.     TREATMENT FOR COMMON SIDE EFFECTS:  - Mild abdominal pain, nausea, belching, bloating or excessive gas:  rest,   eat lightly and use a heating pad.  - Sore Throat: treat with throat lozenges and/or gargle with warm salt   water.  - Because air was used during the procedure, expelling large amounts of air   from your rectum or belching is normal.  - If a bowel prep was taken, you may not have a bowel movement for 1-3 days.    This is normal.  SYMPTOMS TO WATCH FOR AND REPORT TO YOUR PHYSICIAN:  1. Abdominal pain or bloating, other than gas cramps.  2. Chest pain.  3. Back pain.  4. Signs of infection such as: chills or fever occurring within 24 hours   after the procedure.  5. Rectal bleeding, which would show as bright red, maroon, or black stools.   (A tablespoon of blood from the rectum is not serious, especially if    hemorrhoids are present.)  6. Vomiting.  7. Weakness or dizziness.  GO DIRECTLY TO THE NEAREST EMERGENCY ROOM IF YOU HAVE ANY OF THE FOLLOWING:      Difficulty breathing              Chills and/or fever over 101 F   Persistent vomiting and/or vomiting blood   Severe abdominal pain   Severe chest pain   Black, tarry stools   Bleeding- more than one tablespoon   Any other symptom or condition that you feel may need urgent attention  Your doctor recommends these additional instructions:  If any biopsies were taken, your doctors clinic will contact you in 1 to 2   weeks with any results.  - Discharge patient to home.   - Patient has a contact number available for emergencies.  The signs and   symptoms of potential delayed complications were discussed with the   patient.  Return to normal activities tomorrow.  Written discharge   instructions were provided to the patient.   - Resume previous diet.   - Continue present medications.   - Return to referring physician.  For questions, problems or results please call your physician - Cresencio Alonso MD at Work:  [phone].  [facility], EMERGENCY ROOM PHONE NUMBER: [phone]  IF A COMPLICATION OR EMERGENCY SITUATION ARISES AND YOU ARE UNABLE TO REACH   YOUR PHYSICIAN - GO DIRECTLY TO THE EMERGENCY ROOM.  Cresencio Alonso MD  1/19/2024 3:09:48 PM  This report has been verified and signed electronically.  Dear patient,  As a result of recent federal legislation (The Federal Cures Act), you may   receive lab or pathology results from your procedure in your [facility]   account before your physician is able to contact you. Your physician or   their representative will relay the results to you with their   recommendations at their soonest availability.  Thank you,  PROVATION

## 2024-01-19 NOTE — H&P
Endoscopy History and Physical    PCP - Medina Prakash MD    Procedure - EGD  ASA - per anesthesia  Mallampati - per anesthesia  Plan of anesthesia - MAC    HPI:  This is a 68 y.o. female here for evaluation of : varices  Small varices on last EGD in 2023    ROS:  Constitutional: No fevers, chills  CV: No chest pain  Pulm: No cough  Ophtho: No vision changes  GI: see HPI  Derm: No rash    Medical History:  has a past medical history of Cataract, Compressive optic atrophy (2015), Diabetes mellitus, type 2, Fatty liver disease, nonalcoholic, Hyperlipidemia, Prolactinoma, Reflux (08/10/2012), Rosacea, Toe fracture, right (2018), and Unspecified cirrhosis of liver.    Surgical History:  has a past surgical history that includes  section; Cholecystectomy; Transphenoidal pituitary resection (); Breast biopsy (Right); and Esophagogastroduodenoscopy (N/A, 3/8/2023).    Family History: family history includes Cancer in her paternal grandmother; Diabetes in her mother; Glaucoma in her mother; Heart disease in her paternal grandfather; Hypertension in her mother; Liver disease in her paternal grandmother; No Known Problems in her brother, brother, and father; Thyroid disease in her sister.. Otherwise no colon cancer, inflammatory bowel disease, or GI malignancies.    Social History:  reports that she has never smoked. She has never been exposed to tobacco smoke. She has never used smokeless tobacco. She reports that she does not drink alcohol and does not use drugs.    Review of patient's allergies indicates:  No Known Allergies    Medications:   Medications Prior to Admission   Medication Sig Dispense Refill Last Dose    insulin (LANTUS SOLOSTAR U-100 INSULIN) glargine 100 units/mL SubQ pen Inject 10 Units into the skin every evening. 30 mL 3 2024    alcohol swabs (BD ALCOHOL SWABS) PadM Use as needed 200 each 3     atorvastatin (LIPITOR) 20 MG tablet Take 1 tablet by mouth once daily  "(Patient not taking: Reported on 12/8/2023) 90 tablet 0     BD ULTRA-FINE WON PEN NEEDLE 32 gauge x 5/32" Ndle Use with insulin pens 100 each 3     blood glucose control, normal Soln Check glucose 1-2 x day 1 each 1     blood sugar diagnostic Strp Patient testing 4 times daily 200 strip 3     blood sugar diagnostic Strp Test glucose twice daily. Freestyle Lite. 200 each 11     blood-glucose meter kit Use as directed 1 each 0     cholecalciferol, vitamin D3, (VITAMIN D3) 50 mcg (2,000 unit) Cap Take 1 capsule (2,000 Units total) by mouth once daily.       finasteride (PROSCAR) 5 mg tablet Take 5 mg by mouth.       FREESTYLE LANCETS 28 gauge lancets Apply topically.       ibuprofen (ADVIL,MOTRIN) 800 MG tablet Take 800 mg by mouth every 6 (six) hours as needed.       lancets Misc Test glucose twice daily. Freestyle Lite. 200 each 11     latanoprost 0.005 % ophthalmic solution Place 1 drop into both eyes every evening. 2.5 mL 11     melatonin (MELATIN) Take 3 mg by mouth every evening.       metroNIDAZOLE (METROGEL) 0.75 % gel Apply to face BID. 45 g 5     OZEMPIC 0.25 mg or 0.5 mg (2 mg/3 mL) pen injector Inject 0.5 mg into the skin every 7 days. 10 mL 3 1/7/2024         Vital Signs:   Vitals:    01/19/24 1325   BP: 127/69   Pulse: 74   Resp: 16   Temp: 97.3 °F (36.3 °C)       General Appearance: Well appearing in no acute distress  Eyes:    No scleral icterus  ENT: atraumatic  Abdomen: Soft, nondistended  Extremities: no tenderness  Skin: normal color    Labs:  Lab Results   Component Value Date    WBC 4.16 01/02/2024    HGB 14.0 01/02/2024    HCT 38.5 01/02/2024     (L) 01/02/2024    CHOL 149 11/06/2023    TRIG 111 11/06/2023    HDL 45 11/06/2023    ALT 41 01/02/2024    AST 32 01/02/2024     01/02/2024    K 4.3 01/02/2024     01/02/2024    CREATININE 0.7 01/02/2024    BUN 4 (L) 01/02/2024    CO2 25 01/02/2024    TSH 1.804 11/06/2023    INR 1.0 01/02/2024    HGBA1C 5.8 (H) 11/06/2023       I have " explained the risks and benefits of endoscopy procedures to the patient/their POA including but not limited to bleeding, perforation, infection, and death.  The patient/POA was asked if they understand and allowed to ask any further questions to their satisfaction.    Proceed with EGD    Michel Kwan MD

## 2024-01-19 NOTE — ANESTHESIA PREPROCEDURE EVALUATION
"                                                                                                             01/19/2024  Tona Carlson is a 68 y.o., female Pre-operative evaluation for Procedure(s) (LRB):  EGD (ESOPHAGOGASTRODUODENOSCOPY) (N/A)    Tona Carlson is a 68 y.o. 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     Current Outpatient Medications on File Prior to Encounter   Medication Sig Dispense Refill    alcohol swabs (BD ALCOHOL SWABS) PadM Use as needed 200 each 3    atorvastatin (LIPITOR) 20 MG tablet Take 1 tablet by mouth once daily (Patient not taking: Reported on 12/8/2023) 90 tablet 0    BD ULTRA-FINE WON PEN NEEDLE 32 gauge x 5/32" Ndle Use with insulin pens 100 each 3    blood glucose control, normal Soln Check glucose 1-2 x day 1 each 1    blood sugar diagnostic Strp Patient testing 4 times daily 200 strip 3    blood sugar diagnostic Strp Test glucose twice daily. Freestyle Lite. 200 each 11    blood-glucose meter kit Use as directed 1 each 0    cholecalciferol, vitamin D3, (VITAMIN D3) 50 mcg (2,000 unit) Cap Take 1 " capsule (2,000 Units total) by mouth once daily.      finasteride (PROSCAR) 5 mg tablet Take 5 mg by mouth.      FREESTYLE LANCETS 28 gauge lancets Apply topically.      ibuprofen (ADVIL,MOTRIN) 800 MG tablet Take 800 mg by mouth every 6 (six) hours as needed.      lancets Misc Test glucose twice daily. Freestyle Lite. 200 each 11    latanoprost 0.005 % ophthalmic solution Place 1 drop into both eyes every evening. 2.5 mL 11    melatonin (MELATIN) Take 3 mg by mouth every evening.      metroNIDAZOLE (METROGEL) 0.75 % gel Apply to face BID. 45 g 5    OZEMPIC 0.25 mg or 0.5 mg (2 mg/3 mL) pen injector Inject 0.5 mg into the skin every 7 days. 10 mL 3    [DISCONTINUED] insulin aspart U-100 (NOVOLOG FLEXPEN U-100 INSULIN) 100 unit/mL (3 mL) InPn pen Inject 10 Units into the skin 3 (three) times daily with meals. 15 mL 11       Past Surgical History:   Procedure Laterality Date    BREAST BIOPSY Right     core     SECTION      CHOLECYSTECTOMY      ESOPHAGOGASTRODUODENOSCOPY N/A 3/8/2023    Procedure: ESOPHAGOGASTRODUODENOSCOPY (EGD);  Surgeon: Raciel Becker MD;  Location: [address]);  Service: Gastroenterology;  Laterality: N/A;  refused to have   cirrhosis-labs done on 22  instr mailed/portal-GT  pre-call no answer 2023    TRANSPHENOIDAL PITUITARY RESECTION         Pre-op Assessment    I have reviewed the Patient Summary Reports.     I have reviewed the Nursing Notes. I have reviewed the NPO Status.   I have reviewed the Medications.     Review of Systems  Anesthesia Hx:  No problems with previous Anesthesia   History of prior surgery of interest to airway management or planning:          Denies Family Hx of Anesthesia complications.    Denies Personal Hx of Anesthesia complications.                    Social:  No Alcohol Use, Non-Smoker       Hematology/Oncology:  Hematology Normal   Oncology Normal                                   EENT/Dental:  EENT/Dental Normal            Cardiovascular:  Cardiovascular Normal Exercise tolerance: good                                           Pulmonary:  Pulmonary Normal                       Renal/:  Renal/ Normal                 Hepatic/GI:      Liver Disease, Hepatitis           Neurological:  Neurology Normal                                      Endocrine:  Diabetes, type 2           Psych:  Psychiatric Normal                    Physical Exam  General: Well nourished, Cooperative, Alert and Oriented    Airway:  Mallampati: III   Mouth Opening: Normal  TM Distance: Normal  Tongue: Normal  Neck ROM: Normal ROM    Dental:  Intact        Anesthesia Plan  Type of Anesthesia, risks & benefits discussed:    Anesthesia Type: Gen ETT, Gen Natural Airway  Intra-op Monitoring Plan: Standard ASA Monitors  Post Op Pain Control Plan: multimodal analgesia and IV/PO Opioids PRN  Induction:  IV  Airway Plan: Direct  Informed Consent: Informed consent signed with the Patient and all parties understand the risks and agree with anesthesia plan.  All questions answered. Patient consented to blood products? No  ASA Score: 3  Day of Surgery Review of History & Physical: H&P Update referred to the surgeon/provider.    Ready For Surgery From Anesthesia Perspective.     .

## 2024-01-21 NOTE — ANESTHESIA POSTPROCEDURE EVALUATION
Anesthesia Post Evaluation    Patient: Tona Carlson    Procedure(s) Performed: Procedure(s) (LRB):  EGD (ESOPHAGOGASTRODUODENOSCOPY) (N/A)    Final Anesthesia Type: general      Patient location during evaluation: [facility]  Patient participation: Yes- Able to Participate  Level of consciousness: awake and alert and oriented  Post-procedure vital signs: reviewed and stable  Pain management: adequate  Airway patency: patent    PONV status at discharge: No PONV  Anesthetic complications: no      Cardiovascular status: blood pressure returned to baseline and hemodynamically stable  Respiratory status: unassisted, spontaneous ventilation and room air  Hydration status: euvolemic  Follow-up not needed.              Vitals Value Taken Time   /60 01/19/24 1545   Temp 36.7 °C (98 °F) 01/19/24 1545   Pulse 75 01/19/24 1545   Resp 20 01/19/24 1545   SpO2 100 % 01/19/24 1545         No case tracking events are documented in the log.      Pain/Lu Score: Lu Score: 10 (1/19/2024  3:45 PM)

## 2024-02-15 ENCOUNTER — OFFICE VISIT (OUTPATIENT)
Dept: URGENT CARE | Facility: CLINIC | Age: 69
End: 2024-02-15
Payer: MEDICARE

## 2024-02-15 VITALS
OXYGEN SATURATION: 98 % | DIASTOLIC BLOOD PRESSURE: 78 MMHG | HEART RATE: 75 BPM | BODY MASS INDEX: 28.17 KG/M2 | TEMPERATURE: 99 F | WEIGHT: 165 LBS | SYSTOLIC BLOOD PRESSURE: 144 MMHG | RESPIRATION RATE: 17 BRPM | HEIGHT: 64 IN

## 2024-02-15 DIAGNOSIS — R09.81 CONGESTION OF NASAL SINUS: ICD-10-CM

## 2024-02-15 DIAGNOSIS — J01.90 ACUTE NON-RECURRENT SINUSITIS, UNSPECIFIED LOCATION: Primary | ICD-10-CM

## 2024-02-15 DIAGNOSIS — R09.89 RUNNY NOSE: ICD-10-CM

## 2024-02-15 DIAGNOSIS — D35.2 PITUITARY ADENOMA: Primary | ICD-10-CM

## 2024-02-15 LAB
CTP QC/QA: YES
SARS-COV-2 AG RESP QL IA.RAPID: NEGATIVE

## 2024-02-15 PROCEDURE — 87811 SARS-COV-2 COVID19 W/OPTIC: CPT | Mod: QW,S$GLB,, | Performed by: NURSE PRACTITIONER

## 2024-02-15 PROCEDURE — 99213 OFFICE O/P EST LOW 20 MIN: CPT | Mod: S$GLB,,, | Performed by: NURSE PRACTITIONER

## 2024-02-15 RX ORDER — IPRATROPIUM BROMIDE 42 UG/1
2 SPRAY, METERED NASAL 4 TIMES DAILY
Qty: 15 ML | Refills: 0 | Status: SHIPPED | OUTPATIENT
Start: 2024-02-15

## 2024-02-15 NOTE — PROGRESS NOTES
"Subjective:      Patient ID: Tona Carlson is a 68 y.o. female.    Vitals:  height is 5' 4" (1.626 m) and weight is 74.8 kg (165 lb). Her oral temperature is 98.7 °F (37.1 °C). Her blood pressure is 144/78 (abnormal) and her pulse is 75. Her respiration is 17 and oxygen saturation is 98%.     Chief Complaint: Headache    This is a 68 y.o. female who presents today with a chief complaint of runny nose, headaches. Patient sx started three days ago, patient states she is taking over the counter cold suppressants but no relief.      Headache   This is a new problem. The current episode started in the past 7 days. The problem occurs constantly. The problem has been unchanged. The pain is located in the Bilateral region. The pain quality is similar to prior headaches. The quality of the pain is described as aching. The pain is at a severity of 6/10. The pain is moderate. Associated symptoms include sinus pressure and weakness. Pertinent negatives include no back pain, blurred vision, coughing, ear pain, eye pain, eye redness, eye watering, fever, hearing loss or sore throat.     Constitution: Negative for fever.   HENT:  Positive for sinus pressure. Negative for ear pain, hearing loss and sore throat.    Eyes:  Negative for eye pain, eye redness and blurred vision.   Respiratory:  Negative for cough.    Musculoskeletal:  Negative for back pain.   Neurological:  Positive for headaches.      Objective:     Physical Exam   Constitutional: She is oriented to person, place, and time. She appears well-developed. She is cooperative.  Non-toxic appearance. She does not appear ill. No distress.   HENT:   Head: Normocephalic and atraumatic.   Ears:   Right Ear: Hearing, tympanic membrane, external ear and ear canal normal.   Left Ear: Hearing, tympanic membrane, external ear and ear canal normal.   Nose: Rhinorrhea present. No mucosal edema, purulent discharge or nasal deformity. No epistaxis. Right sinus exhibits no " maxillary sinus tenderness and no frontal sinus tenderness. Left sinus exhibits no maxillary sinus tenderness and no frontal sinus tenderness.   Mouth/Throat: Uvula is midline, oropharynx is clear and moist and mucous membranes are normal. No trismus in the jaw. Normal dentition. No uvula swelling. No oropharyngeal exudate, posterior oropharyngeal edema or posterior oropharyngeal erythema.   Eyes: Conjunctivae and lids are normal. No scleral icterus.   Neck: Trachea normal and phonation normal. Neck supple. No edema present. No erythema present. No neck rigidity present.   Cardiovascular: Normal rate, regular rhythm, normal heart sounds and normal pulses.   Pulmonary/Chest: Effort normal and breath sounds normal. No respiratory distress. She has no decreased breath sounds. She has no rhonchi.   Abdominal: Normal appearance.   Musculoskeletal: Normal range of motion.         General: No deformity. Normal range of motion.   Neurological: She is alert and oriented to person, place, and time. She exhibits normal muscle tone. Coordination normal.   Skin: Skin is warm, dry, intact, not diaphoretic and not pale.   Psychiatric: Her speech is normal and behavior is normal. Judgment and thought content normal.   Nursing note and vitals reviewed.      Assessment:     1. Acute non-recurrent sinusitis, unspecified location    2. Congestion of nasal sinus    3. Runny nose        Plan:     Runny nose with sinus pressure.  Mainly c/o runny nose.  Taking zyrtec and otc cold medication with no changes.    Results for orders placed or performed in visit on 02/15/24   SARS Coronavirus 2 Antigen, POCT Manual Read   Result Value Ref Range    SARS Coronavirus 2 Antigen Negative Negative     Acceptable Yes        Acute non-recurrent sinusitis, unspecified location  -     ipratropium (ATROVENT) 42 mcg (0.06 %) nasal spray; 2 sprays by Each Nostril route 4 (four) times daily.  Dispense: 15 mL; Refill: 0    Congestion of nasal  sinus  -     SARS Coronavirus 2 Antigen, POCT Manual Read    Runny nose  -     ipratropium (ATROVENT) 42 mcg (0.06 %) nasal spray; 2 sprays by Each Nostril route 4 (four) times daily.  Dispense: 15 mL; Refill: 0      Patient Instructions   Please drink plenty of fluids.  Please get plenty of rest.  Please return here or go to the Emergency Department for any concerns or worsening of condition.  If you do not have Hypertension or any history of palpitations, it is ok to take over the counter Sudafed or Mucinex D or Allegra-D or Claritin-D or Zyrtec-D.  If you do take one of the above, it is ok to combine that with plain over the counter Mucinex or Allegra or Claritin or Zyrtec.  If for example you are taking Zyrtec -D, you can combine that with Mucinex, but not Mucinex-D.  If you are taking Mucinex-D, you can combine that with plain Allegra or Claritin or Zyrtec.   If you do have Hypertension or palpitations, it is safe to take Coricidin HBP for relief of sinus symptoms.  We recommend you take over the counter Flonase (Fluticasone) or another nasally inhaled steroid unless you are already taking one.  Nasal irrigation with a saline spray or Netti Pot like device per their directions is also recommended.  If not allergic, please take over the counter Tylenol (Acetaminophen) and/or Motrin (Ibuprofen) as directed for control of pain and/or fever.  Please follow up with your primary care doctor or specialist as needed.    If you  smoke, please stop smoking.

## 2024-02-16 ENCOUNTER — TELEPHONE (OUTPATIENT)
Dept: NEUROSURGERY | Facility: CLINIC | Age: 69
End: 2024-02-16
Payer: MEDICARE

## 2024-02-16 NOTE — TELEPHONE ENCOUNTER
----- Message from Navin Almaguer MA sent at 2/14/2024  4:06 PM CST -----  Regarding: FW: Appt Access  Contact: [phone]    ----- Message -----  From: Graciela Dickinson  Sent: 2/14/2024   3:48 PM CST  To: Raj[facility] Staff  Subject: Appt Access                                      Pt is calling to get scheduled w provider. Please give pt a call to further discuss and get scheduled.

## 2024-02-16 NOTE — TELEPHONE ENCOUNTER
S/w pt. Pt set up for f/u visit w/ Dr. Farmer. Gave time/date/location. Pt interested in SCS for diabetic neuropathy. Informed pt Dr. Farmer does not do this, will notify appropriate provider to reach out to her. V/u and thanks.

## 2024-02-20 ENCOUNTER — TELEPHONE (OUTPATIENT)
Dept: NEUROSURGERY | Facility: CLINIC | Age: 69
End: 2024-02-20
Payer: MEDICARE

## 2024-02-20 ENCOUNTER — OFFICE VISIT (OUTPATIENT)
Dept: PODIATRY | Facility: CLINIC | Age: 69
End: 2024-02-20
Payer: MEDICARE

## 2024-02-20 VITALS
HEIGHT: 64 IN | HEART RATE: 87 BPM | DIASTOLIC BLOOD PRESSURE: 78 MMHG | BODY MASS INDEX: 28.17 KG/M2 | WEIGHT: 165 LBS | SYSTOLIC BLOOD PRESSURE: 128 MMHG

## 2024-02-20 DIAGNOSIS — E11.42 TYPE 2 DIABETES MELLITUS WITH DIABETIC POLYNEUROPATHY, WITHOUT LONG-TERM CURRENT USE OF INSULIN: Primary | Chronic | ICD-10-CM

## 2024-02-20 DIAGNOSIS — M21.619 BUNION: ICD-10-CM

## 2024-02-20 DIAGNOSIS — E11.9 ENCOUNTER FOR COMPREHENSIVE DIABETIC FOOT EXAMINATION, TYPE 2 DIABETES MELLITUS: Primary | ICD-10-CM

## 2024-02-20 DIAGNOSIS — E11.42 TYPE 2 DIABETES MELLITUS WITH DIABETIC POLYNEUROPATHY, WITHOUT LONG-TERM CURRENT USE OF INSULIN: ICD-10-CM

## 2024-02-20 PROCEDURE — 1101F PT FALLS ASSESS-DOCD LE1/YR: CPT | Mod: CPTII,S$GLB,, | Performed by: STUDENT IN AN ORGANIZED HEALTH CARE EDUCATION/TRAINING PROGRAM

## 2024-02-20 PROCEDURE — 99999 PR PBB SHADOW E&M-EST. PATIENT-LVL IV: CPT | Mod: PBBFAC,,, | Performed by: STUDENT IN AN ORGANIZED HEALTH CARE EDUCATION/TRAINING PROGRAM

## 2024-02-20 PROCEDURE — 3072F LOW RISK FOR RETINOPATHY: CPT | Mod: CPTII,S$GLB,, | Performed by: STUDENT IN AN ORGANIZED HEALTH CARE EDUCATION/TRAINING PROGRAM

## 2024-02-20 PROCEDURE — 3288F FALL RISK ASSESSMENT DOCD: CPT | Mod: CPTII,S$GLB,, | Performed by: STUDENT IN AN ORGANIZED HEALTH CARE EDUCATION/TRAINING PROGRAM

## 2024-02-20 PROCEDURE — 3074F SYST BP LT 130 MM HG: CPT | Mod: CPTII,S$GLB,, | Performed by: STUDENT IN AN ORGANIZED HEALTH CARE EDUCATION/TRAINING PROGRAM

## 2024-02-20 PROCEDURE — 99214 OFFICE O/P EST MOD 30 MIN: CPT | Mod: S$GLB,,, | Performed by: STUDENT IN AN ORGANIZED HEALTH CARE EDUCATION/TRAINING PROGRAM

## 2024-02-20 PROCEDURE — 1126F AMNT PAIN NOTED NONE PRSNT: CPT | Mod: CPTII,S$GLB,, | Performed by: STUDENT IN AN ORGANIZED HEALTH CARE EDUCATION/TRAINING PROGRAM

## 2024-02-20 PROCEDURE — 3078F DIAST BP <80 MM HG: CPT | Mod: CPTII,S$GLB,, | Performed by: STUDENT IN AN ORGANIZED HEALTH CARE EDUCATION/TRAINING PROGRAM

## 2024-02-20 PROCEDURE — 3008F BODY MASS INDEX DOCD: CPT | Mod: CPTII,S$GLB,, | Performed by: STUDENT IN AN ORGANIZED HEALTH CARE EDUCATION/TRAINING PROGRAM

## 2024-02-20 PROCEDURE — 1159F MED LIST DOCD IN RCRD: CPT | Mod: CPTII,S$GLB,, | Performed by: STUDENT IN AN ORGANIZED HEALTH CARE EDUCATION/TRAINING PROGRAM

## 2024-02-20 NOTE — TELEPHONE ENCOUNTER
LVM. Attempt to contact pt regarding her inquiry into SCS for diabetic neuropathy. Advised pt to c/b to discuss.

## 2024-02-20 NOTE — TELEPHONE ENCOUNTER
Spoke w/ pt. Pt stated she is interested in SCS for diabetic neuropathy. Advised pt that we do implant SCS for diabetic neuropathy in some cases, but pt would have to have a positive trial w/ pain management first. Pt stated that she is not currently established with pain management. Advised pt that we could put in a referral for pain management so that she could discuss a trial with them. Pt v/u.     ----- Message from [employer] sent at 2/20/2024  4:31 PM CST -----  Regarding: Missed Call  Contact: pt.  [phone]  Pt is calling in ref to a missed call from Sherry asking to speak with her. Patient Requesting Call Back @  [phone]

## 2024-02-20 NOTE — PROGRESS NOTES
Subjective:      Patient ID: Tona Carlson is a 68 y.o. female.    Chief Complaint: Diabetic Foot Exam (Annual Diabetic Foot Exam)    Tona is a 68 y.o. female who presents to the clinic upon referral from Dr. Cortes  for evaluation and treatment of diabetic feet. 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 Patient relates no major problem with feet. Only complaints today consist of here for diabetic foot exam. States has flat feet with occasional pain to inside of ankles.    2/20/24: Seen today for annual diabetic foot exam. Relates needs new diabetic shoes. Has numbness/tingling sensations, worse to right side, No further pedal complaints.     PCP: Medina Prakash MD    Date Last Seen by PCP:     Current shoe gear: Casual shoes    Hemoglobin A1C   Date Value Ref Range Status   11/06/2023 5.8 (H) 4.0 - 5.6 % Final     Comment:     ADA Screening Guidelines:  5.7-6.4%  Consistent with prediabetes  >or=6.5%  Consistent with diabetes    High levels of fetal hemoglobin interfere with the HbA1C  assay. Heterozygous hemoglobin variants (HbS, HgC, etc)do  not significantly interfere with this assay.   However, presence of multiple variants may affect accuracy.     06/19/2023 6.0 (H) 4.0 - 5.6 % Final     Comment:     ADA Screening Guidelines:  5.7-6.4%  Consistent with prediabetes  >or=6.5%  Consistent with diabetes    High levels of fetal hemoglobin interfere with the HbA1C  assay. Heterozygous hemoglobin variants (HbS, HgC, etc)do  not significantly interfere with this assay.   However, presence of multiple variants may affect accuracy.     02/16/2023 6.5 (H) 4.0 - 5.6 % Final     Comment:     ADA Screening Guidelines:  5.7-6.4%  Consistent with prediabetes  >or=6.5%  Consistent  with diabetes    High levels of fetal hemoglobin interfere with the HbA1C  assay. Heterozygous hemoglobin variants (HbS, HgC, etc)do  not significantly interfere with this assay.   However, presence of multiple variants may affect accuracy.           Review of Systems   Constitutional: Negative for chills, decreased appetite, diaphoresis and fever.   HENT:  Negative for congestion and hearing loss.    Cardiovascular:  Negative for chest pain, claudication, leg swelling and syncope.   Respiratory:  Negative for cough and shortness of breath.    Skin:  Negative for color change, dry skin, flushing, itching, nail changes, poor wound healing and rash.   Musculoskeletal:  Negative for arthritis, back pain, joint pain and joint swelling.   Gastrointestinal:  Negative for nausea and vomiting.   Neurological:  Positive for paresthesias. Negative for focal weakness, numbness and weakness.   Psychiatric/Behavioral:  Negative for altered mental status. The patient is not nervous/anxious.            Objective:      Physical Exam  Constitutional:       General: She is not in acute distress.     Appearance: She is well-developed. She is not diaphoretic.   Cardiovascular:      Pulses:           Dorsalis pedis pulses are 2+ on the right side and 2+ on the left side.        Posterior tibial pulses are 2+ on the right side and 2+ on the left side.      Comments: Dorsalis pedis and posterior tibial pulses are within normal limits. Skin temperature is within normal limits. Toes are cool to touch and feet are warm proximally. Hair growth is within normal limits. Skin is normotrophic and without hyperpigmentation. No edema noted. No spider veins or varicosities noted, bilaterally.   .  Musculoskeletal:         General: No tenderness.      Comments: Adequate joint range of motion without pain, limitation, nor crepitation to bilateral feet and ankle joints. Muscle strength is 5/5 in all groups bilaterally.    Pes planus, bilaterally         Feet:      Right foot:      Protective Sensation: 10 sites tested.  8 sites sensed.      Left foot:      Protective Sensation: 10 sites tested.  10 sites sensed.   Lymphadenopathy:      Comments: Negative lymphangitic streaking    Skin:     General: Skin is warm and dry.      Findings: No lesion.      Comments: Skin is warm and dry, no acute signs of infection noted. No open wounds, macerations or hyperkeratotic lesions, bilaterally.     Toenails are well trimmed and of normal morphology, bilaterally.    Neurological:      Mental Status: She is alert and oriented to person, place, and time.      Sensory: Sensory deficit present.      Motor: No abnormal muscle tone.      Comments: Light touch within normal limits. [place] 5.07 monofilamant testing is within normal limits. Vibratory sensation  is diminished, bilaterally    Psychiatric:         Behavior: Behavior normal.         Thought Content: Thought content normal.         Judgment: Judgment normal.               Assessment:       Encounter Diagnoses   Name Primary?    Bunion     Type 2 diabetes mellitus with diabetic polyneuropathy, without long-term current use of insulin     Encounter for comprehensive diabetic foot examination, type 2 diabetes mellitus Yes         Plan:       Tona was seen today for diabetic foot exam.    Diagnoses and all orders for this visit:    Encounter for comprehensive diabetic foot examination, type 2 diabetes mellitus    Bunion  -     DIABETIC SHOES FOR HOME USE    Type 2 diabetes mellitus with diabetic polyneuropathy, without long-term current use of insulin  -     DIABETIC SHOES FOR HOME USE      I counseled the patient on her conditions, their implications and medical management.  Rx diabetic shoes  Shoe inspection. Diabetic Foot Education. Patient reminded of the importance of good nutrition and blood sugar control to help prevent podiatric complications of diabetes. Patient instructed on proper foot hygeine. We  discussed wearing proper shoe gear, daily foot inspections, never walking without protective shoe gear, never putting sharp instruments to feet, she is considered low/ moderate risk for diabetic foot complication    Return to clinic in 1 year, sooner PRN

## 2024-02-21 ENCOUNTER — TELEPHONE (OUTPATIENT)
Dept: INTERNAL MEDICINE | Facility: CLINIC | Age: 69
End: 2024-02-21
Payer: MEDICARE

## 2024-02-21 NOTE — TELEPHONE ENCOUNTER
----- Message from Dimple Darling sent at 2/21/2024  3:12 PM CST -----  Contact: Self/ [phone]  Type: Forms    What type of form?for shoes     Was the form dropped off or mailed?no     When was the form dropped off or mailed?na    If dropped off, who was the form given to?na    Would you prefer an answer via shoutr?Would like a return call     Comments: pt stated that she has a form for shoes that she needs the doctor to sign , pt also stated that she needs a copy of her recent labs also , she stated that she needs to know how to get the forms to the doctor. Please advise

## 2024-02-22 ENCOUNTER — OFFICE VISIT (OUTPATIENT)
Dept: PAIN MEDICINE | Facility: CLINIC | Age: 69
End: 2024-02-22
Payer: MEDICARE

## 2024-02-22 VITALS
SYSTOLIC BLOOD PRESSURE: 106 MMHG | HEIGHT: 64 IN | HEART RATE: 81 BPM | BODY MASS INDEX: 27.8 KG/M2 | DIASTOLIC BLOOD PRESSURE: 62 MMHG | WEIGHT: 162.81 LBS

## 2024-02-22 DIAGNOSIS — E11.42 TYPE 2 DIABETES MELLITUS WITH DIABETIC POLYNEUROPATHY, WITHOUT LONG-TERM CURRENT USE OF INSULIN: Chronic | ICD-10-CM

## 2024-02-22 DIAGNOSIS — G89.4 CHRONIC PAIN SYNDROME: ICD-10-CM

## 2024-02-22 DIAGNOSIS — M79.672 PAIN IN BOTH FEET: ICD-10-CM

## 2024-02-22 DIAGNOSIS — E11.42 DIABETIC PERIPHERAL NEUROPATHY: Primary | ICD-10-CM

## 2024-02-22 DIAGNOSIS — M79.671 PAIN IN BOTH FEET: ICD-10-CM

## 2024-02-22 PROCEDURE — 99999 PR PBB SHADOW E&M-EST. PATIENT-LVL IV: CPT | Mod: PBBFAC,,, | Performed by: STUDENT IN AN ORGANIZED HEALTH CARE EDUCATION/TRAINING PROGRAM

## 2024-02-22 PROCEDURE — 3074F SYST BP LT 130 MM HG: CPT | Mod: CPTII,S$GLB,, | Performed by: STUDENT IN AN ORGANIZED HEALTH CARE EDUCATION/TRAINING PROGRAM

## 2024-02-22 PROCEDURE — 1159F MED LIST DOCD IN RCRD: CPT | Mod: CPTII,S$GLB,, | Performed by: STUDENT IN AN ORGANIZED HEALTH CARE EDUCATION/TRAINING PROGRAM

## 2024-02-22 PROCEDURE — 3078F DIAST BP <80 MM HG: CPT | Mod: CPTII,S$GLB,, | Performed by: STUDENT IN AN ORGANIZED HEALTH CARE EDUCATION/TRAINING PROGRAM

## 2024-02-22 PROCEDURE — 99204 OFFICE O/P NEW MOD 45 MIN: CPT | Mod: S$GLB,,, | Performed by: STUDENT IN AN ORGANIZED HEALTH CARE EDUCATION/TRAINING PROGRAM

## 2024-02-22 PROCEDURE — 3008F BODY MASS INDEX DOCD: CPT | Mod: CPTII,S$GLB,, | Performed by: STUDENT IN AN ORGANIZED HEALTH CARE EDUCATION/TRAINING PROGRAM

## 2024-02-22 PROCEDURE — 1101F PT FALLS ASSESS-DOCD LE1/YR: CPT | Mod: CPTII,S$GLB,, | Performed by: STUDENT IN AN ORGANIZED HEALTH CARE EDUCATION/TRAINING PROGRAM

## 2024-02-22 PROCEDURE — 1125F AMNT PAIN NOTED PAIN PRSNT: CPT | Mod: CPTII,S$GLB,, | Performed by: STUDENT IN AN ORGANIZED HEALTH CARE EDUCATION/TRAINING PROGRAM

## 2024-02-22 PROCEDURE — 3072F LOW RISK FOR RETINOPATHY: CPT | Mod: CPTII,S$GLB,, | Performed by: STUDENT IN AN ORGANIZED HEALTH CARE EDUCATION/TRAINING PROGRAM

## 2024-02-22 PROCEDURE — 3288F FALL RISK ASSESSMENT DOCD: CPT | Mod: CPTII,S$GLB,, | Performed by: STUDENT IN AN ORGANIZED HEALTH CARE EDUCATION/TRAINING PROGRAM

## 2024-02-22 RX ORDER — PREGABALIN 50 MG/1
50 CAPSULE ORAL 2 TIMES DAILY
Qty: 60 CAPSULE | Refills: 1 | Status: SHIPPED | OUTPATIENT
Start: 2024-02-22 | End: 2024-05-08 | Stop reason: SDUPTHER

## 2024-02-22 NOTE — PROGRESS NOTES
[facility] - New Patient Evaluation    Referred by: Dr. Christy Mead   Reason for referral: Type 2 diabetes mellitus with diabetic polyneuropathy, without long-term current use of insulin     CC:   Chief Complaint   Patient presents with    Foot Pain         2/22/2024     1:33 PM   Last 3 PDI Scores   Pain Disability Index (PDI) 60       Subjective: 02/22/24  Tona Carlson is a 68 y.o. female who presents complaining of two years of b/l burning, numb foot pain 2/2 to diabetic peripheral neuropathy.  Pain is localized to the plantar surface of the feet and over the toes.  Pain is worse at night.  She is tried over-the-counter cream as well as ice with minimal relief.  She does have a history of a fracture of the proximal phalanx of the 4th digit of the right foot in 2018, prior to onset of her neuropathy symptoms.     Location: Feet   Onset: 2 years  Current Pain Score: 6/10  Daily Pain of Range: 7-8/10  Quality: Tingling, Numb, and Sharp  Radiation:  N/A  Worsened by:  Daily activity  Improved by: nothing    Patient denies night fever/night sweats, urinary incontinence, bowel incontinence, significant weight loss, significant motor weakness, and loss of sensations.    Previous Interventions:  - None    Previous Therapies:  PT/OT: no   Chiropractor:   HEP:   Relevant Surgery: no   Previous Medications:   - NSAIDS:   - Muscle Relaxants:    - TCAs:   - SNRIs:   - Topicals:   - Anticonvulsants:    - Opioids:   - Adjuvants: Ice, OTC cream    Current Pain Medications:  none     Review of Systems:  ROS    GENERAL:  No weight loss, malaise or fevers.  HEENT:   No recent changes in vision or hearing  NECK:  No difficulty with swallowing. No stridor.   RESPIRATORY:  Negative for cough, wheezing or shortness of breath, patient denies any recent URI.  CARDIOVASCULAR:  Negative for chest pain, leg swelling or palpitations.  GI:  Negative for abdominal discomfort, blood in stools or black stools  "or change in bowel habits.  MUSCULOSKELETAL:  See HPI.  SKIN:  Negative for lesions, rash, and itching.  PSYCH:  No mood disorder or recent psychosocial stressors.    HEMATOLOGY/LYMPHOLOGY:  Negative for prolonged bleeding, bruising easily or swollen nodes.  Patient is not currently taking any anti-coagulants  NEURO:   No history of headaches, syncope, paralysis, seizures or tremors.  All other reviewed and negative other than HPI.    History:  Current medications, allergies, medical history, surgical history,   family history, and social history were reviewed in the chart as marked.    Full Medication List:    Current Outpatient Medications:     alcohol swabs (BD ALCOHOL SWABS) PadM, Use as needed, Disp: 200 each, Rfl: 3    atorvastatin (LIPITOR) 20 MG tablet, Take 1 tablet by mouth once daily, Disp: 90 tablet, Rfl: 0    BD ULTRA-FINE WON PEN NEEDLE 32 gauge x 5/32" Ndle, Use with insulin pens, Disp: 100 each, Rfl: 3    blood glucose control, normal Soln, Check glucose 1-2 x day, Disp: 1 each, Rfl: 1    blood sugar diagnostic Strp, Patient testing 4 times daily, Disp: 200 strip, Rfl: 3    blood sugar diagnostic Strp, Test glucose twice daily. Freestyle Lite., Disp: 200 each, Rfl: 11    blood-glucose meter kit, Use as directed, Disp: 1 each, Rfl: 0    cholecalciferol, vitamin D3, (VITAMIN D3) 50 mcg (2,000 unit) Cap, Take 1 capsule (2,000 Units total) by mouth once daily., Disp: , Rfl:     finasteride (PROSCAR) 5 mg tablet, Take 5 mg by mouth., Disp: , Rfl:     FREESTYLE LANCETS 28 gauge lancets, Apply topically., Disp: , Rfl:     ibuprofen (ADVIL,MOTRIN) 800 MG tablet, Take 800 mg by mouth every 6 (six) hours as needed., Disp: , Rfl:     insulin (LANTUS SOLOSTAR U-100 INSULIN) glargine 100 units/mL SubQ pen, Inject 10 Units into the skin every evening., Disp: 30 mL, Rfl: 3    ipratropium (ATROVENT) 42 mcg (0.06 %) nasal spray, 2 sprays by Each Nostril route 4 (four) times daily., Disp: 15 mL, Rfl: 0    lancets " "Misc, Test glucose twice daily. Freestyle Lite., Disp: 200 each, Rfl: 11    latanoprost 0.005 % ophthalmic solution, Place 1 drop into both eyes every evening., Disp: 2.5 mL, Rfl: 11    melatonin (MELATIN), Take 3 mg by mouth every evening., Disp: , Rfl:     metroNIDAZOLE (METROGEL) 0.75 % gel, Apply to face BID., Disp: 45 g, Rfl: 5    OZEMPIC 0.25 mg or 0.5 mg (2 mg/3 mL) pen injector, Inject 0.5 mg into the skin every 7 days., Disp: 10 mL, Rfl: 3    pregabalin (LYRICA) 50 MG capsule, Take 1 capsule (50 mg total) by mouth 2 (two) times daily., Disp: 60 capsule, Rfl: 1     Allergies:  Patient has no known allergies.     Medical History:   has a past medical history of Cataract, Compressive optic atrophy (2015), Diabetes mellitus, type 2, Fatty liver disease, nonalcoholic, Hyperlipidemia, Prolactinoma, Reflux (08/10/2012), Rosacea, Toe fracture, right (2018), and Unspecified cirrhosis of liver.    Surgical History:   has a past surgical history that includes  section; Cholecystectomy; Transphenoidal pituitary resection (); Breast biopsy (Right); Esophagogastroduodenoscopy (N/A, 3/8/2023); and Esophagogastroduodenoscopy (N/A, 2024).    Family History:  family history includes Cancer in her paternal grandmother; Diabetes in her mother; Glaucoma in her mother; Heart disease in her paternal grandfather; Hypertension in her mother; Liver disease in her paternal grandmother; No Known Problems in her brother, brother, and father; Thyroid disease in her sister.    Social History:   reports that she has never smoked. She has never been exposed to tobacco smoke. She has never used smokeless tobacco. She reports that she does not drink alcohol and does not use drugs.    Physical Exam:  Vitals:    24 1332   BP: 106/62   Pulse: 81   Weight: 73.9 kg (162 lb 13 oz)   Height: 5' 4" (1.626 m)   PainSc:   6   PainLoc: Foot       GENERAL: Well appearing, in no acute distress, alert and oriented " x3.  PSYCH:  Mood and affect appropriate.  SKIN: Skin color, texture, turgor normal, no rashes or lesions.  HEAD/FACE:  Normocephalic, atraumatic. Cranial nerves grossly intact.  NECK: Normal ROM. Supple.   CV: RRR with palpation of the radial artery. Good capillary refill noted to toes bilaterally.   PULM: No evidence of respiratory difficulty, symmetric chest rise.  GI:  Soft and non-distended.  MSK: No pain with palpation of the feet. No lesions, blisters, or wounds noted to the bilateral feet. No obvious deformities, edema, or skin discoloration.  No atrophy or tone abnormalities are noted.   NEURO: Mild loss of sensation is noted over the distal plantar surface of the b/l feet..  MENTAL STATUS: A x O x 3, good concentration, speech is fluent and goal directed  MOTOR: 5/5 in all muscle groups  GAIT: Normal. Ambulates unassisted.    Imaging:  XR FOOT COMPLETE 3 VIEW RIGHT     CLINICAL HISTORY:  pain;  Contusion of right lesser toe(s) without damage to nail, initial encounter     FINDINGS:  There is a fracture of the proximal phalanx of the 4th digit.  There is good alignment no complication.  There is baseline DJD and spurs on the calcaneus.     Electronically signed by: Fuentes Osborne MD  Date:                                            08/03/2018    Labs:  BMP  Lab Results   Component Value Date     01/02/2024    K 4.3 01/02/2024     01/02/2024    CO2 25 01/02/2024    BUN 4 (L) 01/02/2024    CREATININE 0.7 01/02/2024    CALCIUM 9.5 01/02/2024    ANIONGAP 8 01/02/2024    EGFRNORACEVR >60.0 01/02/2024     Lab Results   Component Value Date    ALT 41 01/02/2024    AST 32 01/02/2024    ALKPHOS 100 01/02/2024    BILITOT 1.1 (H) 01/02/2024     Lab Results   Component Value Date    WBC 4.16 01/02/2024    HGB 14.0 01/02/2024    HCT 38.5 01/02/2024    MCV 88 01/02/2024     (L) 01/02/2024             Assessment:  Problem List Items Addressed This Visit          Endocrine    Type 2 diabetes mellitus  with diabetic polyneuropathy, without long-term current use of insulin (Chronic)     Other Visit Diagnoses       Diabetic peripheral neuropathy    -  Primary    Pain in both feet        Chronic pain syndrome              02/22/2024 - Tona Carlson is a 68 y.o. female who  has a past medical history of Cataract, Compressive optic atrophy (11/04/2015), Diabetes mellitus, type 2, Fatty liver disease, nonalcoholic, Hyperlipidemia, Prolactinoma, Reflux (08/10/2012), Rosacea, Toe fracture, right (07/2018), and Unspecified cirrhosis of liver.  By history and examination this patient has chronic foot pain secondary to diabetic peripheral neuropathy.  We discussed the underlying diagnoses and multiple treatment options including non-opioid medications, interventional procedures, physical therapy, and home exercise.  I will prescribe the patient Lyrica 50 mg q.h.s., increase to b.i.d. as tolerated.  In the future can consider increasing dose, addition of amitriptyline, trial of qutenza patches.  Discussed lifestyle changes including exercise, controlled blood triggers.  The risks and benefits of each treatment option were discussed and all questions were answered.        Treatment Plan:   Procedures:  None at this time.  PT/OT/HEP: I have stressed the importance of physical activity and a home exercise plan to help with pain and improve health.  Medications:    - Lyrica 50 mg q.h.s. increase to b.i.d. as tolerated   - OTC lidocaine cream p.r.n.   -  Reviewed and consistent with medication use as prescribed.  Imaging:  No additional imaging indicated at this time.  Follow Up: RTC 4 weeks    Grecia Matson DO   Interventional Pain Medicine / Anesthesiology    Disclaimer: This note was partly generated using dictation software which may occasionally result in transcription errors.

## 2024-02-23 ENCOUNTER — OFFICE VISIT (OUTPATIENT)
Dept: OPTOMETRY | Facility: CLINIC | Age: 69
End: 2024-02-23
Payer: MEDICARE

## 2024-02-23 DIAGNOSIS — H40.1134 PRIMARY OPEN ANGLE GLAUCOMA (POAG) OF BOTH EYES, INDETERMINATE STAGE: Primary | ICD-10-CM

## 2024-02-23 DIAGNOSIS — D35.2 PITUITARY ADENOMA: ICD-10-CM

## 2024-02-23 PROCEDURE — 1101F PT FALLS ASSESS-DOCD LE1/YR: CPT | Mod: CPTII,S$GLB,, | Performed by: OPTOMETRIST

## 2024-02-23 PROCEDURE — 1159F MED LIST DOCD IN RCRD: CPT | Mod: CPTII,S$GLB,, | Performed by: OPTOMETRIST

## 2024-02-23 PROCEDURE — 99214 OFFICE O/P EST MOD 30 MIN: CPT | Mod: S$GLB,,, | Performed by: OPTOMETRIST

## 2024-02-23 PROCEDURE — 99999 PR PBB SHADOW E&M-EST. PATIENT-LVL III: CPT | Mod: PBBFAC,,, | Performed by: OPTOMETRIST

## 2024-02-23 PROCEDURE — 1126F AMNT PAIN NOTED NONE PRSNT: CPT | Mod: CPTII,S$GLB,, | Performed by: OPTOMETRIST

## 2024-02-23 PROCEDURE — 1160F RVW MEDS BY RX/DR IN RCRD: CPT | Mod: CPTII,S$GLB,, | Performed by: OPTOMETRIST

## 2024-02-23 PROCEDURE — 3288F FALL RISK ASSESSMENT DOCD: CPT | Mod: CPTII,S$GLB,, | Performed by: OPTOMETRIST

## 2024-02-23 NOTE — PROGRESS NOTES
HPI    DLS:  1/12/24    Latanoprost QHS OU  S/p LASIK OU    Pt here for 6 week IOP check.  Pt has been using eyedrops as directed. Pt   denies changes since last visit OU.   Last edited by Selene Oakley MA on 2/23/2024  2:49 PM.            Assessment /Plan     For exam results, see Encounter Report.    Primary open angle glaucoma (POAG) of both eyes, indeterminate stage    Pituitary adenoma         (+) FHx- mom, sister, brother, MGM. IOP 10 OD, OS. Last IOP 18 OD, OS. Last 15 OD, OS. Pachy 561 OD, 547 OS. Pt reports she never started drops because she forgot about them and just picked them up from pharm yesterday.  C/d 0.65OD, OS. Pallor OU.   01/12/2024 Gonio SS all quadrants OU  11/29/2023 OCT OD borderline NI, significantly thin in all other areas, OS normal NS and NI, significantly thin in all other areas  11/29/2023 HVF OD sup and inf arc (respects midline), OS sup arc  HVF stable when compared to 2016 which was post surgery. Pt had surgery for pituitary adenoma in 2015.   Start Latanoprost QHS OU due to changes on OCT, significant thinning of OCT and strong family history.  Educated pt on findings w/understanding.   RTC 4 mo IOP

## 2024-02-27 DIAGNOSIS — Z00.00 ENCOUNTER FOR MEDICARE ANNUAL WELLNESS EXAM: ICD-10-CM

## 2024-03-15 ENCOUNTER — HOSPITAL ENCOUNTER (OUTPATIENT)
Dept: RADIOLOGY | Facility: HOSPITAL | Age: 69
Discharge: HOME | End: 2024-03-15
Attending: NEUROLOGICAL SURGERY
Payer: MEDICARE

## 2024-03-15 DIAGNOSIS — D35.2 PITUITARY ADENOMA: ICD-10-CM

## 2024-03-15 PROCEDURE — A9585 GADOBUTROL INJECTION: HCPCS | Performed by: NEUROLOGICAL SURGERY

## 2024-03-15 PROCEDURE — 70553 MRI BRAIN STEM W/O & W/DYE: CPT | Mod: 26,,, | Performed by: STUDENT IN AN ORGANIZED HEALTH CARE EDUCATION/TRAINING PROGRAM

## 2024-03-15 PROCEDURE — 25500020 PHARM REV CODE 255: Performed by: NEUROLOGICAL SURGERY

## 2024-03-15 PROCEDURE — 70553 MRI BRAIN STEM W/O & W/DYE: CPT | Mod: TC

## 2024-03-15 RX ORDER — GADOBUTROL 604.72 MG/ML
4 INJECTION INTRAVENOUS
Status: COMPLETED | OUTPATIENT
Start: 2024-03-15 | End: 2024-03-15

## 2024-03-15 RX ADMIN — GADOBUTROL 4 ML: 604.72 INJECTION INTRAVENOUS at 01:03

## 2024-03-19 ENCOUNTER — OFFICE VISIT (OUTPATIENT)
Dept: NEUROSURGERY | Facility: CLINIC | Age: 69
End: 2024-03-19
Payer: MEDICARE

## 2024-03-19 VITALS
DIASTOLIC BLOOD PRESSURE: 72 MMHG | BODY MASS INDEX: 27.95 KG/M2 | SYSTOLIC BLOOD PRESSURE: 117 MMHG | HEART RATE: 89 BPM | HEIGHT: 64 IN

## 2024-03-19 DIAGNOSIS — D35.2 PITUITARY ADENOMA: Primary | ICD-10-CM

## 2024-03-19 PROCEDURE — 99214 OFFICE O/P EST MOD 30 MIN: CPT | Mod: S$GLB,,, | Performed by: NEUROLOGICAL SURGERY

## 2024-03-19 PROCEDURE — 1126F AMNT PAIN NOTED NONE PRSNT: CPT | Mod: CPTII,S$GLB,, | Performed by: NEUROLOGICAL SURGERY

## 2024-03-19 PROCEDURE — 1159F MED LIST DOCD IN RCRD: CPT | Mod: CPTII,S$GLB,, | Performed by: NEUROLOGICAL SURGERY

## 2024-03-19 PROCEDURE — 1101F PT FALLS ASSESS-DOCD LE1/YR: CPT | Mod: CPTII,S$GLB,, | Performed by: NEUROLOGICAL SURGERY

## 2024-03-19 PROCEDURE — 99999 PR PBB SHADOW E&M-EST. PATIENT-LVL III: CPT | Mod: PBBFAC,,, | Performed by: NEUROLOGICAL SURGERY

## 2024-03-19 PROCEDURE — 3072F LOW RISK FOR RETINOPATHY: CPT | Mod: CPTII,S$GLB,, | Performed by: NEUROLOGICAL SURGERY

## 2024-03-19 PROCEDURE — 1160F RVW MEDS BY RX/DR IN RCRD: CPT | Mod: CPTII,S$GLB,, | Performed by: NEUROLOGICAL SURGERY

## 2024-03-19 PROCEDURE — 3008F BODY MASS INDEX DOCD: CPT | Mod: CPTII,S$GLB,, | Performed by: NEUROLOGICAL SURGERY

## 2024-03-19 PROCEDURE — 3078F DIAST BP <80 MM HG: CPT | Mod: CPTII,S$GLB,, | Performed by: NEUROLOGICAL SURGERY

## 2024-03-19 PROCEDURE — 3074F SYST BP LT 130 MM HG: CPT | Mod: CPTII,S$GLB,, | Performed by: NEUROLOGICAL SURGERY

## 2024-03-19 PROCEDURE — 3288F FALL RISK ASSESSMENT DOCD: CPT | Mod: CPTII,S$GLB,, | Performed by: NEUROLOGICAL SURGERY

## 2024-03-19 NOTE — PROGRESS NOTES
Subjective:   I, Yola Purdy, attest that this documentation has been prepared under the direction and in the presence of Oskar Farmer MD.     Patient ID: Tona Carlson is a 68 y.o. female     Chief Complaint: No chief complaint on file.      HPI  Ms. Tona Carlson is a 68 y.o. woman with T2DM, pituitary incidentaloma s/p TSR in March 2014 at [facility], who presents today for follow up. Patient was last seen in clinic on 11/1/2022, the pt reports she continues to do well. Today the pt c/o unexplained dysgeusia. She states that foods that typically taste salty will be sweet. She has no other complaints. Denies changes in vision, headaches.    Review of Systems   Constitutional:  Negative for activity change, appetite change, fatigue, fever and unexpected weight change.   HENT:  Negative for facial swelling.         + dysgeusia   Eyes: Negative.    Respiratory: Negative.     Cardiovascular: Negative.    Gastrointestinal:  Negative for diarrhea, nausea and vomiting.   Endocrine: Negative.    Genitourinary: Negative.    Musculoskeletal:  Negative for back pain, joint swelling, myalgias and neck pain.   Neurological:  Negative for dizziness, seizures, weakness, numbness and headaches.   Psychiatric/Behavioral: Negative.          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    Diabetes mellitus, type 2     Fatty liver disease, nonalcoholic     Hyperlipidemia     Prolactinoma     Reflux 08/10/2012    Rosacea     Toe fracture, right 07/2018    Unspecified cirrhosis of liver        Objective:      Vitals:    03/19/24 1019   BP: 117/72   Pulse: 89      Physical Exam  Constitutional:       General: She is not in acute distress.     Appearance: Normal appearance.   HENT:      Head: Normocephalic and atraumatic.   Pulmonary:      Effort: Pulmonary effort is normal.   Musculoskeletal:      Cervical back: Neck supple.   Neurological:      Mental Status: She is alert  and oriented to person, place, and time.      GCS: GCS eye subscore is 4. GCS verbal subscore is 5. GCS motor subscore is 6.      Cranial Nerves: No cranial nerve deficit.            IMAGING:  MRI Pituitary W WO Contrast (3/15/2024):  Postsurgical changes of transsphenoidal pituitary adenoma resection.  Overall stable appearance of residual lobulated enhancing lesion along the sella.      I have personally reviewed the images with the pt.      I, Dr. Oskar Farmer, personally performed the services described in this documentation. All medical record entries made by the scribe, Yola Purdy, were at my direction and in my presence.  I have reviewed the chart and agree that the record reflects my personal performance and is accurate and complete. Oskar Farmer MD. 03/19/2024    Assessment:       Pituitary tumor.     Plan:   I have personally reviewed the MRI pituitary with the pt which shows postsurgical changes of transsphenoidal pituitary adenoma resection.  Overall stable appearance of residual lobulated enhancing lesion along the sella.    I will schedule the patient for 2 year follow up with MRI pituitary.

## 2024-03-19 NOTE — PATIENT INSTRUCTIONS
I have personally reviewed the MRI pituitary with the pt which shows postsurgical changes of transsphenoidal pituitary adenoma resection.  Overall stable appearance of residual lobulated enhancing lesion along the sella.     I will schedule the patient for 2 year follow up with MRI pituitary.

## 2024-03-21 ENCOUNTER — OFFICE VISIT (OUTPATIENT)
Dept: PAIN MEDICINE | Facility: CLINIC | Age: 69
End: 2024-03-21
Payer: MEDICARE

## 2024-03-21 VITALS
WEIGHT: 174.19 LBS | BODY MASS INDEX: 29.74 KG/M2 | DIASTOLIC BLOOD PRESSURE: 75 MMHG | HEIGHT: 64 IN | HEART RATE: 82 BPM | SYSTOLIC BLOOD PRESSURE: 113 MMHG

## 2024-03-21 DIAGNOSIS — G89.4 CHRONIC PAIN SYNDROME: ICD-10-CM

## 2024-03-21 DIAGNOSIS — E11.42 DIABETIC PERIPHERAL NEUROPATHY: ICD-10-CM

## 2024-03-21 DIAGNOSIS — M79.671 PAIN IN BOTH FEET: ICD-10-CM

## 2024-03-21 DIAGNOSIS — M79.672 PAIN IN BOTH FEET: ICD-10-CM

## 2024-03-21 DIAGNOSIS — E11.42 TYPE 2 DIABETES MELLITUS WITH DIABETIC POLYNEUROPATHY, WITHOUT LONG-TERM CURRENT USE OF INSULIN: Primary | ICD-10-CM

## 2024-03-21 PROCEDURE — 3074F SYST BP LT 130 MM HG: CPT | Mod: CPTII,S$GLB,, | Performed by: NURSE PRACTITIONER

## 2024-03-21 PROCEDURE — 1126F AMNT PAIN NOTED NONE PRSNT: CPT | Mod: CPTII,S$GLB,, | Performed by: NURSE PRACTITIONER

## 2024-03-21 PROCEDURE — 1159F MED LIST DOCD IN RCRD: CPT | Mod: CPTII,S$GLB,, | Performed by: NURSE PRACTITIONER

## 2024-03-21 PROCEDURE — 99999 PR PBB SHADOW E&M-EST. PATIENT-LVL IV: CPT | Mod: PBBFAC,,, | Performed by: NURSE PRACTITIONER

## 2024-03-21 PROCEDURE — 3008F BODY MASS INDEX DOCD: CPT | Mod: CPTII,S$GLB,, | Performed by: NURSE PRACTITIONER

## 2024-03-21 PROCEDURE — 99214 OFFICE O/P EST MOD 30 MIN: CPT | Mod: S$GLB,,, | Performed by: NURSE PRACTITIONER

## 2024-03-21 PROCEDURE — 3078F DIAST BP <80 MM HG: CPT | Mod: CPTII,S$GLB,, | Performed by: NURSE PRACTITIONER

## 2024-03-21 PROCEDURE — 1160F RVW MEDS BY RX/DR IN RCRD: CPT | Mod: CPTII,S$GLB,, | Performed by: NURSE PRACTITIONER

## 2024-03-21 PROCEDURE — 3072F LOW RISK FOR RETINOPATHY: CPT | Mod: CPTII,S$GLB,, | Performed by: NURSE PRACTITIONER

## 2024-03-21 NOTE — PROGRESS NOTES
[facility]  Patient Evaluation    Referred by: No ref. provider found   Reason for referral: * No diagnoses found *     CC:   Chief Complaint   Patient presents with    Follow-up    Foot Pain     R>L         3/21/2024     1:25 PM 2/22/2024     1:33 PM   Last 3 PDI Scores   Pain Disability Index (PDI) 28 60     Interval History 3/21/2024:  60-year-old pleasant female that presents today for a four-week follow-up appointment.  History of bilateral burning, numb foot pain 2/2 to diabetic neural foramina neuropathy is localized on the plantar surface of the foot and over the toes.  Worse on the right.  She was started on Lyrica 50 mg b.i.d. however she has only been taking Lyrica 50 mg daily which she states has helped 25-30% of her symptoms.  Her pain score today is 0/10 she denies any adverse side effects with this medication.  Denies any new pain denies any bowel bladder dysfunction denies any saddle anesthesia at this time.        Subjective: 02/22/24  Tona Carlson is a 68 y.o. female who presents complaining of two years of b/l burning, numb foot pain 2/2 to diabetic peripheral neuropathy.  Pain is localized to the plantar surface of the feet and over the toes.  Pain is worse at night.  She is tried over-the-counter cream as well as ice with minimal relief.  She does have a history of a fracture of the proximal phalanx of the 4th digit of the right foot in 2018, prior to onset of her neuropathy symptoms.     Location: Feet   Onset: 2 years  Current Pain Score: 6/10  Daily Pain of Range: 7-8/10  Quality: Tingling, Numb, and Sharp  Radiation:  N/A  Worsened by:  Daily activity  Improved by: nothing    Patient denies night fever/night sweats, urinary incontinence, bowel incontinence, significant weight loss, significant motor weakness, and loss of sensations.    Previous Interventions:  - None    Previous Therapies:  PT/OT: no   Chiropractor:   HEP:   Relevant  "Surgery: no   Previous Medications:   - NSAIDS:   - Muscle Relaxants:    - TCAs:   - SNRIs:   - Topicals:   - Anticonvulsants:    - Opioids:   - Adjuvants: Ice, OTC cream    Current Pain Medications:  Lyrica 50 mg daily ( Rx says BID)     Review of Systems:  ROS    GENERAL:  No weight loss, malaise or fevers.  HEENT:   No recent changes in vision or hearing  NECK:  No difficulty with swallowing. No stridor.   RESPIRATORY:  Negative for cough, wheezing or shortness of breath, patient denies any recent URI.  CARDIOVASCULAR:  Negative for chest pain, leg swelling or palpitations.  GI:  Negative for abdominal discomfort, blood in stools or black stools or change in bowel habits.  MUSCULOSKELETAL:  See HPI.  SKIN:  Negative for lesions, rash, and itching.  PSYCH:  No mood disorder or recent psychosocial stressors.    HEMATOLOGY/LYMPHOLOGY:  Negative for prolonged bleeding, bruising easily or swollen nodes.  Patient is not currently taking any anti-coagulants  NEURO:   No history of headaches, syncope, paralysis, seizures or tremors.  All other reviewed and negative other than HPI.    History:  Current medications, allergies, medical history, surgical history,   family history, and social history were reviewed in the chart as marked.    Full Medication List:    Current Outpatient Medications:     alcohol swabs (BD ALCOHOL SWABS) PadM, Use as needed, Disp: 200 each, Rfl: 3    BD ULTRA-FINE WON PEN NEEDLE 32 gauge x 5/32" Ndle, Use with insulin pens, Disp: 100 each, Rfl: 3    blood glucose control, normal Soln, Check glucose 1-2 x day, Disp: 1 each, Rfl: 1    blood sugar diagnostic Strp, Patient testing 4 times daily, Disp: 200 strip, Rfl: 3    blood sugar diagnostic Strp, Test glucose twice daily. Freestyle Lite., Disp: 200 each, Rfl: 11    blood-glucose meter kit, Use as directed, Disp: 1 each, Rfl: 0    cholecalciferol, vitamin D3, (VITAMIN D3) 50 mcg (2,000 unit) Cap, Take 1 capsule (2,000 Units total) by mouth once " daily., Disp: , Rfl:     finasteride (PROSCAR) 5 mg tablet, Take 5 mg by mouth., Disp: , Rfl:     FREESTYLE LANCETS 28 gauge lancets, Apply topically., Disp: , Rfl:     ibuprofen (ADVIL,MOTRIN) 800 MG tablet, Take 800 mg by mouth every 6 (six) hours as needed., Disp: , Rfl:     insulin (LANTUS SOLOSTAR U-100 INSULIN) glargine 100 units/mL SubQ pen, Inject 10 Units into the skin every evening., Disp: 30 mL, Rfl: 3    ipratropium (ATROVENT) 42 mcg (0.06 %) nasal spray, 2 sprays by Each Nostril route 4 (four) times daily., Disp: 15 mL, Rfl: 0    lancets Misc, Test glucose twice daily. Freestyle Lite., Disp: 200 each, Rfl: 11    latanoprost 0.005 % ophthalmic solution, Place 1 drop into both eyes every evening., Disp: 2.5 mL, Rfl: 11    melatonin (MELATIN), Take 3 mg by mouth every evening., Disp: , Rfl:     metroNIDAZOLE (METROGEL) 0.75 % gel, Apply to face BID., Disp: 45 g, Rfl: 5    OZEMPIC 0.25 mg or 0.5 mg (2 mg/3 mL) pen injector, Inject 0.5 mg into the skin every 7 days., Disp: 10 mL, Rfl: 3    pregabalin (LYRICA) 50 MG capsule, Take 1 capsule (50 mg total) by mouth 2 (two) times daily., Disp: 60 capsule, Rfl: 1    atorvastatin (LIPITOR) 20 MG tablet, Take 1 tablet by mouth once daily (Patient not taking: Reported on 3/21/2024), Disp: 90 tablet, Rfl: 0     Allergies:  Patient has no known allergies.     Medical History:   has a past medical history of Cataract, Compressive optic atrophy (2015), Diabetes mellitus, type 2, Fatty liver disease, nonalcoholic, Hyperlipidemia, Prolactinoma, Reflux (08/10/2012), Rosacea, Toe fracture, right (2018), and Unspecified cirrhosis of liver.    Surgical History:   has a past surgical history that includes  section; Cholecystectomy; Transphenoidal pituitary resection (2014); Breast biopsy (Right); Esophagogastroduodenoscopy (N/A, 3/8/2023); and Esophagogastroduodenoscopy (N/A, 2024).    Family History:  family history includes Cancer in her paternal  "grandmother; Diabetes in her mother; Glaucoma in her mother; Heart disease in her paternal grandfather; Hypertension in her mother; Liver disease in her paternal grandmother; No Known Problems in her brother, brother, and father; Thyroid disease in her sister.    Social History:   reports that she has never smoked. She has never been exposed to tobacco smoke. She has never used smokeless tobacco. She reports that she does not drink alcohol and does not use drugs.    Physical Exam:  Vitals:    03/21/24 1324   BP: 113/75   Pulse: 82   Weight: 79 kg (174 lb 2.6 oz)   Height: 5' 4" (1.626 m)   PainSc: 0-No pain       GENERAL: Well appearing, in no acute distress, alert and oriented x3.  PSYCH:  Mood and affect appropriate.  SKIN: Skin color, texture, turgor normal, no rashes or lesions.  HEAD/FACE:  Normocephalic, atraumatic. Cranial nerves grossly intact.  NECK: Normal ROM. Supple.   CV: RRR with palpation of the radial artery. Good capillary refill noted to toes bilaterally.   PULM: No evidence of respiratory difficulty, symmetric chest rise.  GI:  Soft and non-distended.  MSK: No pain with palpation of the feet. No lesions, blisters, or wounds noted to the bilateral feet. No obvious deformities, edema, or skin discoloration.  No atrophy or tone abnormalities are noted.   NEURO: Mild loss of sensation is noted over the distal plantar surface of the b/l feet..  MENTAL STATUS: A x O x 3, good concentration, speech is fluent and goal directed  MOTOR: 5/5 in all muscle groups  GAIT: Normal. Ambulates unassisted.    Imaging:  XR FOOT COMPLETE 3 VIEW RIGHT     CLINICAL HISTORY:  pain;  Contusion of right lesser toe(s) without damage to nail, initial encounter     FINDINGS:  There is a fracture of the proximal phalanx of the 4th digit.  There is good alignment no complication.  There is baseline DJD and spurs on the calcaneus.     Electronically signed by: Fuentes Osborne MD  Date:                                            " 08/03/2018    Labs:  BMP  Lab Results   Component Value Date     01/02/2024    K 4.3 01/02/2024     01/02/2024    CO2 25 01/02/2024    BUN 4 (L) 01/02/2024    CREATININE 0.7 01/02/2024    CALCIUM 9.5 01/02/2024    ANIONGAP 8 01/02/2024    EGFRNORACEVR >60.0 01/02/2024     Lab Results   Component Value Date    ALT 41 01/02/2024    AST 32 01/02/2024    ALKPHOS 100 01/02/2024    BILITOT 1.1 (H) 01/02/2024     Lab Results   Component Value Date    WBC 4.16 01/02/2024    HGB 14.0 01/02/2024    HCT 38.5 01/02/2024    MCV 88 01/02/2024     (L) 01/02/2024             Assessment:  Problem List Items Addressed This Visit    None      02/22/2024 - Tona Carlson is a 68 y.o. female who  has a past medical history of Cataract, Compressive optic atrophy (11/04/2015), Diabetes mellitus, type 2, Fatty liver disease, nonalcoholic, Hyperlipidemia, Prolactinoma, Reflux (08/10/2012), Rosacea, Toe fracture, right (07/2018), and Unspecified cirrhosis of liver.  By history and examination this patient has chronic foot pain secondary to diabetic peripheral neuropathy.  We discussed the underlying diagnoses and multiple treatment options including non-opioid medications, interventional procedures, physical therapy, and home exercise.  I will prescribe the patient Lyrica 50 mg q.h.s., increase to b.i.d. as tolerated.  In the future can consider increasing dose, addition of amitriptyline, trial of qutenza patches.  Discussed lifestyle changes including exercise, controlled blood triggers.  The risks and benefits of each treatment option were discussed and all questions were answered.        3/21/2024- Tona Carlson is a 68 y.o. female who  has a past medical history of Cataract, Compressive optic atrophy (11/04/2015), Diabetes mellitus, type 2, Fatty liver disease, nonalcoholic, Hyperlipidemia, Prolactinoma, Reflux (08/10/2012), Rosacea, Toe fracture, right (07/2018), and Unspecified cirrhosis of liver.  By  history and examination this patient has chronic foot pain secondary to diabetic peripheral neuropathy.  We discussed the underlying diagnoses and multiple treatment options including non-opioid medications, interventional procedures, physical therapy, and home exercise. She was prescribed  Lyrica 50 mg q.h.s., increase to b.i.d. as tolerated.  In the future can consider increasing dose, addition of amitriptyline, trial of qutenza patches.  Discussed lifestyle changes including exercise, controlled blood triggers.  The risks and benefits of each treatment option were discussed and all questions were answered.      60-year-old female with a history chronic foot pain secondary to diabetic peripheral neuropathy.  She was started on Lyrica 50 mg q.h.s. which she states helped by 25%, based on my exam and history today I am recommending she increase her Lyrica 50 mg daily to b.i.d. dosing.  Her and I discussed the underlying diagnosis of multiple treatment options including nonopioid meds, interventional procedures, physical therapy and home exercise.  Last clinic visit Dr. Matson did discuss a trial of Qutenza, the patient [place] be leaving out of [place] 4 [place] so I have recommended that we increase her Lyrica 50 mg to b.i.d. dosing until she returns.  At that time we will consider her for Qutenza if her relief is not sustainable on the Lyrica.    Treatment Plan:   Procedures:  None at this time.  PT/OT/HEP: I have stressed the importance of physical activity and a home exercise plan to help with pain and improve health.  Medications:    - Increase Lyrica 50 mg q.h.s.increase to b.i.d. as tolerated( patient was only taking daily, she will begin BID dosing) no refills needed   - Continue OTC lidocaine cream p.r.n.   -  Reviewed and consistent with medication use as prescribed.  Imaging:  No additional imaging indicated at this time.  Follow Up:  Notify me on MyChart in 4 weeks, at that time we will probably  need to refill Lyrica prior to her leaving out of the country    Richardson Gonzalez, DIONICIO-C  [facility]      Disclaimer: This note was partly generated using dictation software which may occasionally result in transcription errors.

## 2024-03-28 ENCOUNTER — OFFICE VISIT (OUTPATIENT)
Dept: ENDOCRINOLOGY | Facility: CLINIC | Age: 69
End: 2024-03-28
Payer: MEDICARE

## 2024-03-28 VITALS
WEIGHT: 174.69 LBS | SYSTOLIC BLOOD PRESSURE: 120 MMHG | BODY MASS INDEX: 29.82 KG/M2 | HEIGHT: 64 IN | DIASTOLIC BLOOD PRESSURE: 80 MMHG

## 2024-03-28 DIAGNOSIS — D35.2 PITUITARY ADENOMA: Chronic | ICD-10-CM

## 2024-03-28 DIAGNOSIS — E11.42 TYPE 2 DIABETES MELLITUS WITH DIABETIC POLYNEUROPATHY, WITHOUT LONG-TERM CURRENT USE OF INSULIN: Primary | Chronic | ICD-10-CM

## 2024-03-28 PROCEDURE — 3074F SYST BP LT 130 MM HG: CPT | Mod: CPTII,S$GLB,, | Performed by: INTERNAL MEDICINE

## 2024-03-28 PROCEDURE — 3072F LOW RISK FOR RETINOPATHY: CPT | Mod: CPTII,S$GLB,, | Performed by: INTERNAL MEDICINE

## 2024-03-28 PROCEDURE — 99214 OFFICE O/P EST MOD 30 MIN: CPT | Mod: S$GLB,,, | Performed by: INTERNAL MEDICINE

## 2024-03-28 PROCEDURE — 1101F PT FALLS ASSESS-DOCD LE1/YR: CPT | Mod: CPTII,S$GLB,, | Performed by: INTERNAL MEDICINE

## 2024-03-28 PROCEDURE — 3079F DIAST BP 80-89 MM HG: CPT | Mod: CPTII,S$GLB,, | Performed by: INTERNAL MEDICINE

## 2024-03-28 PROCEDURE — 1126F AMNT PAIN NOTED NONE PRSNT: CPT | Mod: CPTII,S$GLB,, | Performed by: INTERNAL MEDICINE

## 2024-03-28 PROCEDURE — 3008F BODY MASS INDEX DOCD: CPT | Mod: CPTII,S$GLB,, | Performed by: INTERNAL MEDICINE

## 2024-03-28 PROCEDURE — 1159F MED LIST DOCD IN RCRD: CPT | Mod: CPTII,S$GLB,, | Performed by: INTERNAL MEDICINE

## 2024-03-28 PROCEDURE — 99999 PR PBB SHADOW E&M-EST. PATIENT-LVL IV: CPT | Mod: PBBFAC,,, | Performed by: INTERNAL MEDICINE

## 2024-03-28 PROCEDURE — G2211 COMPLEX E/M VISIT ADD ON: HCPCS | Mod: S$GLB,,, | Performed by: INTERNAL MEDICINE

## 2024-03-28 PROCEDURE — 3288F FALL RISK ASSESSMENT DOCD: CPT | Mod: CPTII,S$GLB,, | Performed by: INTERNAL MEDICINE

## 2024-03-28 NOTE — ASSESSMENT & PLAN NOTE
Stable residual lesion in left aspect of the sella with optic chiasm clear.      Not having symptoms of pituitary hormone dysfunction but will check yearly labs.     She does have R temporal deficits on my exam today.  Will need to establish care with neuroophthalmology around 10/2024 when due for next hvf (bean visual fields).     Continue to follow up with [facility] for periodic imaging, next planned from 3/2026

## 2024-03-28 NOTE — PROGRESS NOTES
[facility] INITIAL VISIT  03/28/2024       Subjective:      Reason for referal: referred by Medina Prakash MD for evaluation and management of pituitary lesion    HPI:   Tona Carlson is a 68 y.o. female with hx of T2DM, osteoporosis hyperlipidemia, cirrhosis 2/2 [facility] (followed by endocrine NP)ho presents for evaluation of pituitary function.  She has a hx of a pituitary lesion that was removed via tsr in 2014.      She saw Dr. Farmer 3/19/24 with plans to repeat MRI in 2 years     Initial presentation:   Reports nonfunctioning adenoma touching optic chiasm.  She had c/o left hemianopsia improved after surgery surgery was in [facility] 2014    Imaging:      MRI 3/15/24  Postoperative change of trans-sphenoidal resection of pituitary adenoma. Minimal (less than 1 mm) increase in size of residual heterogeneously enhancing lobulated lesion within the sella turcica which abuts and partially surrounds the bilateral cavernous ICAs. No new mass effect or significant suprasellar extension. Stable appearance of the infundibulum with slight leftward deviation.     Headache:    Denies     Vision change:   Denies peripheral change but some blurring     Formal Visual fields:   [facility]  12/2023  Reliability was good. Findings include superior arcuate defect, inferior   arcuate defect.   Left Eye   Reliability was good. Findings include superior arcuate defect.     Most recent pituitary labs: normal dexamethasone supression test (DST)    Latest Reference Range & Units 11/16/22 08:08   Cortisol, 8 AM 4.30 - 22.40 ug/dL  4.30 - 22.40 ug/dL 1.00 (L)  1.00 (L)   ACTH 0 - 46 pg/mL 12   TSH 0.400 - 4.000 uIU/mL 1.055   Free T4 0.71 - 1.51 ng/dL 0.89   FSH See Text mIU/mL 27.74   Prolactin 5.2 - 26.5 ng/mL 20.7   (L): Data is abnormally low    Current symptoms:  Hyperprolactinemia:  []  breast tenderness []  nipple discharge  [x]  Denies     Lab Results   Component Value Date    PROLACTIN 20.7  "11/16/2022    PROLACTIN 18.5 10/14/2022    PROLACTIN 20.1 06/21/2021    PROLACTIN 23.9 02/15/2019    PROLACTIN 39.5 (H) 07/17/2018    PROLACTIN 59.2 (H) 10/12/2016       Thyroid:  [x]  fatigue [x]  weight loss (ozempic) []  temp intolerance   []  Denies    [x]  Loose stool []  skin/hair change [] palpitations []  tremor [x]  Denies    Lab Results   Component Value Date    TSH 1.804 11/06/2023    FREET4 0.89 11/16/2022          Growth Hormone   Last IGF-1:   Lab Results   Component Value Date    SOMATMDN 94 06/21/2021    SOMATMDN 143 07/17/2018    SOMATMDN 119 10/20/2015        denies symptoms of adrenal insufficiency (no lightheadedness, N/V/abd pain, hypotension).        Gonadotrophs:     []  Irregular menses []  Postmenopausal  []  Decreased libido   []  ED   []  Denies      Repro hx: no difficulty conceiving, had 2 children  LMP: menopause around age 50    DI:   []  polyuria  []  Polydipsia  []  Nocturia    [x]  Denies      Review of patient's allergies indicates:  No Known Allergies      Current Outpatient Medications:     alcohol swabs (BD ALCOHOL SWABS) PadM, Use as needed, Disp: 200 each, Rfl: 3    BD ULTRA-FINE WON PEN NEEDLE 32 gauge x 5/32" Ndle, Use with insulin pens, Disp: 100 each, Rfl: 3    blood glucose control, normal Soln, Check glucose 1-2 x day, Disp: 1 each, Rfl: 1    blood sugar diagnostic Strp, Patient testing 4 times daily, Disp: 200 strip, Rfl: 3    blood sugar diagnostic Strp, Test glucose twice daily. Freestyle Lite., Disp: 200 each, Rfl: 11    blood-glucose meter kit, Use as directed, Disp: 1 each, Rfl: 0    cholecalciferol, vitamin D3, (VITAMIN D3) 50 mcg (2,000 unit) Cap, Take 1 capsule (2,000 Units total) by mouth once daily., Disp: , Rfl:     finasteride (PROSCAR) 5 mg tablet, Take 5 mg by mouth., Disp: , Rfl:     FREESTYLE LANCETS 28 gauge lancets, Apply topically., Disp: , Rfl:     ibuprofen (ADVIL,MOTRIN) 800 MG tablet, Take 800 mg by mouth every 6 (six) hours as needed., Disp: , " "Rfl:     insulin (LANTUS SOLOSTAR U-100 INSULIN) glargine 100 units/mL SubQ pen, Inject 10 Units into the skin every evening., Disp: 30 mL, Rfl: 3    ipratropium (ATROVENT) 42 mcg (0.06 %) nasal spray, 2 sprays by Each Nostril route 4 (four) times daily., Disp: 15 mL, Rfl: 0    lancets Misc, Test glucose twice daily. Freestyle Lite., Disp: 200 each, Rfl: 11    latanoprost 0.005 % ophthalmic solution, Place 1 drop into both eyes every evening., Disp: 2.5 mL, Rfl: 11    melatonin (MELATIN), Take 3 mg by mouth every evening., Disp: , Rfl:     metroNIDAZOLE (METROGEL) 0.75 % gel, Apply to face BID., Disp: 45 g, Rfl: 5    OZEMPIC 0.25 mg or 0.5 mg (2 mg/3 mL) pen injector, Inject 0.5 mg into the skin every 7 days., Disp: 10 mL, Rfl: 3    pregabalin (LYRICA) 50 MG capsule, Take 1 capsule (50 mg total) by mouth 2 (two) times daily., Disp: 60 capsule, Rfl: 1    atorvastatin (LIPITOR) 20 MG tablet, Take 1 tablet by mouth once daily (Patient not taking: Reported on 3/21/2024), Disp: 90 tablet, Rfl: 0      ROS:  see HPI    Objective:   Physical Exam   /80 (BP Location: Right arm, Patient Position: Sitting, BP Method: Medium (Manual))   Ht 5' 4" (1.626 m)   Wt 79.2 kg (174 lb 11.4 oz)   BMI 29.99 kg/m²   Wt Readings from Last 3 Encounters:   03/28/24 79.2 kg (174 lb 11.4 oz)   03/21/24 79 kg (174 lb 2.6 oz)   02/22/24 73.9 kg (162 lb 13 oz)   ]    Constitutional:  Pleasant,  in no acute distress.   HENT:   Eyes:     No scleral icterus. Impaired right temporal vision worse in inferior quadrant  Respiratory:   Effort normal   Neurological:  normal speech  Psych:  Normal mood and affect.          Pituitary MRI 3/15/24        Assessment/Plan:     Problem List Items Addressed This Visit       Pituitary adenoma (Chronic)     Stable residual lesion in left aspect of the sella with optic chiasm clear.      Not having symptoms of pituitary hormone dysfunction but will check yearly labs.     She does have R temporal deficits on " my exam today.  Will need to establish care with neuroophthalmology around 10/2024 when due for next hvf (bean visual fields).     Continue to follow up with [facility] for periodic imaging, next planned from 3/2026         Relevant Orders    Ambulatory referral/consult to Ophthalmology    ACTH    Cortisol, 8AM    Insulin-Like Growth Factor    Follicle Stimulating Hormone    Prolactin    T4, Free    Type 2 diabetes mellitus with diabetic polyneuropathy, without long-term current use of insulin - Primary (Chronic)     Continue management per endocrine NP            Return to clinic as planned with Neha Saravia   Add on 8 am fasting labs to her 5/6 appointment   Hvf (bean visual fields) in 12/2024    Paris Young MD

## 2024-05-07 ENCOUNTER — LAB VISIT (OUTPATIENT)
Dept: LAB | Facility: HOSPITAL | Age: 69
End: 2024-05-07
Attending: HOSPITALIST
Payer: MEDICARE

## 2024-05-07 ENCOUNTER — OFFICE VISIT (OUTPATIENT)
Dept: OTOLARYNGOLOGY | Facility: CLINIC | Age: 69
End: 2024-05-07
Payer: MEDICARE

## 2024-05-07 VITALS
TEMPERATURE: 98 F | HEART RATE: 76 BPM | DIASTOLIC BLOOD PRESSURE: 71 MMHG | BODY MASS INDEX: 29.06 KG/M2 | SYSTOLIC BLOOD PRESSURE: 114 MMHG | WEIGHT: 169.31 LBS

## 2024-05-07 DIAGNOSIS — E78.5 HYPERLIPIDEMIA ASSOCIATED WITH TYPE 2 DIABETES MELLITUS: ICD-10-CM

## 2024-05-07 DIAGNOSIS — R43.9 SENSE OF SMELL ALTERED: Primary | ICD-10-CM

## 2024-05-07 DIAGNOSIS — I85.10 SECONDARY ESOPHAGEAL VARICES WITHOUT BLEEDING: ICD-10-CM

## 2024-05-07 DIAGNOSIS — D69.6 THROMBOCYTOPENIA: ICD-10-CM

## 2024-05-07 DIAGNOSIS — E11.42 TYPE 2 DIABETES MELLITUS WITH DIABETIC POLYNEUROPATHY, WITHOUT LONG-TERM CURRENT USE OF INSULIN: Chronic | ICD-10-CM

## 2024-05-07 DIAGNOSIS — Z86.018 S/P SELECTIVE TRANSSPHENOIDAL PITUITARY ADENOMECTOMY: ICD-10-CM

## 2024-05-07 DIAGNOSIS — K74.60 LIVER CIRRHOSIS SECONDARY TO NASH: ICD-10-CM

## 2024-05-07 DIAGNOSIS — R76.8 HEPATITIS C ANTIBODY TEST POSITIVE: ICD-10-CM

## 2024-05-07 DIAGNOSIS — D35.2 PITUITARY ADENOMA: Chronic | ICD-10-CM

## 2024-05-07 DIAGNOSIS — E11.69 HYPERLIPIDEMIA ASSOCIATED WITH TYPE 2 DIABETES MELLITUS: ICD-10-CM

## 2024-05-07 DIAGNOSIS — Z98.890 S/P SELECTIVE TRANSSPHENOIDAL PITUITARY ADENOMECTOMY: ICD-10-CM

## 2024-05-07 DIAGNOSIS — K75.81 LIVER CIRRHOSIS SECONDARY TO NASH: ICD-10-CM

## 2024-05-07 DIAGNOSIS — J01.80 ACUTE NON-RECURRENT SINUSITIS OF OTHER SINUS: ICD-10-CM

## 2024-05-07 DIAGNOSIS — K76.6 PORTAL HYPERTENSION: ICD-10-CM

## 2024-05-07 DIAGNOSIS — R43.2 DYSGEUSIA: ICD-10-CM

## 2024-05-07 DIAGNOSIS — J31.0 CHRONIC RHINITIS: ICD-10-CM

## 2024-05-07 LAB
AFP SERPL-MCNC: 5.1 NG/ML (ref 0–8.4)
ALBUMIN SERPL BCP-MCNC: 3.7 G/DL (ref 3.5–5.2)
ALP SERPL-CCNC: 100 U/L (ref 55–135)
ALT SERPL W/O P-5'-P-CCNC: 38 U/L (ref 10–44)
ANION GAP SERPL CALC-SCNC: 9 MMOL/L (ref 8–16)
AST SERPL-CCNC: 39 U/L (ref 10–40)
BASOPHILS # BLD AUTO: 0.02 K/UL (ref 0–0.2)
BASOPHILS NFR BLD: 0.6 % (ref 0–1.9)
BILIRUB SERPL-MCNC: 0.8 MG/DL (ref 0.1–1)
BUN SERPL-MCNC: 4 MG/DL (ref 8–23)
CALCIUM SERPL-MCNC: 9 MG/DL (ref 8.7–10.5)
CHLORIDE SERPL-SCNC: 107 MMOL/L (ref 95–110)
CHOLEST SERPL-MCNC: 148 MG/DL (ref 120–199)
CHOLEST/HDLC SERPL: 3.8 {RATIO} (ref 2–5)
CO2 SERPL-SCNC: 22 MMOL/L (ref 23–29)
CORTIS SERPL-MCNC: 13 UG/DL (ref 4.3–22.4)
CREAT SERPL-MCNC: 0.7 MG/DL (ref 0.5–1.4)
DIFFERENTIAL METHOD BLD: ABNORMAL
EOSINOPHIL # BLD AUTO: 0.1 K/UL (ref 0–0.5)
EOSINOPHIL NFR BLD: 3.9 % (ref 0–8)
ERYTHROCYTE [DISTWIDTH] IN BLOOD BY AUTOMATED COUNT: 12.9 % (ref 11.5–14.5)
EST. GFR  (NO RACE VARIABLE): >60 ML/MIN/1.73 M^2
ESTIMATED AVG GLUCOSE: 131 MG/DL (ref 68–131)
FSH SERPL-ACNC: 30.45 MIU/ML
GLUCOSE SERPL-MCNC: 119 MG/DL (ref 70–110)
HBA1C MFR BLD: 6.2 % (ref 4–5.6)
HCT VFR BLD AUTO: 40.5 % (ref 37–48.5)
HDLC SERPL-MCNC: 39 MG/DL (ref 40–75)
HDLC SERPL: 26.4 % (ref 20–50)
HGB BLD-MCNC: 13.8 G/DL (ref 12–16)
IMM GRANULOCYTES # BLD AUTO: 0 K/UL (ref 0–0.04)
IMM GRANULOCYTES NFR BLD AUTO: 0 % (ref 0–0.5)
INR PPP: 1 (ref 0.8–1.2)
LDLC SERPL CALC-MCNC: 83.6 MG/DL (ref 63–159)
LYMPHOCYTES # BLD AUTO: 1.7 K/UL (ref 1–4.8)
LYMPHOCYTES NFR BLD: 54.5 % (ref 18–48)
MCH RBC QN AUTO: 31.3 PG (ref 27–31)
MCHC RBC AUTO-ENTMCNC: 34.1 G/DL (ref 32–36)
MCV RBC AUTO: 92 FL (ref 82–98)
MONOCYTES # BLD AUTO: 0.2 K/UL (ref 0.3–1)
MONOCYTES NFR BLD: 6.5 % (ref 4–15)
NEUTROPHILS # BLD AUTO: 1.1 K/UL (ref 1.8–7.7)
NEUTROPHILS NFR BLD: 34.5 % (ref 38–73)
NONHDLC SERPL-MCNC: 109 MG/DL
NRBC BLD-RTO: 0 /100 WBC
PLATELET # BLD AUTO: 171 K/UL (ref 150–450)
PMV BLD AUTO: 9.5 FL (ref 9.2–12.9)
POTASSIUM SERPL-SCNC: 4.1 MMOL/L (ref 3.5–5.1)
PROLACTIN SERPL IA-MCNC: 10.8 NG/ML (ref 5.2–26.5)
PROT SERPL-MCNC: 7.1 G/DL (ref 6–8.4)
PROTHROMBIN TIME: 11.4 SEC (ref 9–12.5)
RBC # BLD AUTO: 4.41 M/UL (ref 4–5.4)
SODIUM SERPL-SCNC: 138 MMOL/L (ref 136–145)
T4 FREE SERPL-MCNC: 0.89 NG/DL (ref 0.71–1.51)
TRIGL SERPL-MCNC: 127 MG/DL (ref 30–150)
WBC # BLD AUTO: 3.1 K/UL (ref 3.9–12.7)

## 2024-05-07 PROCEDURE — 1126F AMNT PAIN NOTED NONE PRSNT: CPT | Mod: CPTII,S$GLB,, | Performed by: OTOLARYNGOLOGY

## 2024-05-07 PROCEDURE — 3008F BODY MASS INDEX DOCD: CPT | Mod: CPTII,S$GLB,, | Performed by: OTOLARYNGOLOGY

## 2024-05-07 PROCEDURE — 80053 COMPREHEN METABOLIC PANEL: CPT | Performed by: HOSPITALIST

## 2024-05-07 PROCEDURE — 82533 TOTAL CORTISOL: CPT | Performed by: INTERNAL MEDICINE

## 2024-05-07 PROCEDURE — 99999 PR PBB SHADOW E&M-EST. PATIENT-LVL IV: CPT | Mod: PBBFAC,,, | Performed by: OTOLARYNGOLOGY

## 2024-05-07 PROCEDURE — 3044F HG A1C LEVEL LT 7.0%: CPT | Mod: CPTII,S$GLB,, | Performed by: OTOLARYNGOLOGY

## 2024-05-07 PROCEDURE — 83001 ASSAY OF GONADOTROPIN (FSH): CPT | Performed by: INTERNAL MEDICINE

## 2024-05-07 PROCEDURE — 99214 OFFICE O/P EST MOD 30 MIN: CPT | Mod: 25,S$GLB,, | Performed by: OTOLARYNGOLOGY

## 2024-05-07 PROCEDURE — 3074F SYST BP LT 130 MM HG: CPT | Mod: CPTII,S$GLB,, | Performed by: OTOLARYNGOLOGY

## 2024-05-07 PROCEDURE — 1101F PT FALLS ASSESS-DOCD LE1/YR: CPT | Mod: CPTII,S$GLB,, | Performed by: OTOLARYNGOLOGY

## 2024-05-07 PROCEDURE — 83036 HEMOGLOBIN GLYCOSYLATED A1C: CPT | Performed by: HOSPITALIST

## 2024-05-07 PROCEDURE — 80061 LIPID PANEL: CPT | Performed by: HOSPITALIST

## 2024-05-07 PROCEDURE — 3072F LOW RISK FOR RETINOPATHY: CPT | Mod: CPTII,S$GLB,, | Performed by: OTOLARYNGOLOGY

## 2024-05-07 PROCEDURE — 84439 ASSAY OF FREE THYROXINE: CPT | Performed by: INTERNAL MEDICINE

## 2024-05-07 PROCEDURE — 84305 ASSAY OF SOMATOMEDIN: CPT | Performed by: INTERNAL MEDICINE

## 2024-05-07 PROCEDURE — 36415 COLL VENOUS BLD VENIPUNCTURE: CPT | Mod: PO | Performed by: NURSE PRACTITIONER

## 2024-05-07 PROCEDURE — 82024 ASSAY OF ACTH: CPT | Performed by: INTERNAL MEDICINE

## 2024-05-07 PROCEDURE — 1160F RVW MEDS BY RX/DR IN RCRD: CPT | Mod: CPTII,S$GLB,, | Performed by: OTOLARYNGOLOGY

## 2024-05-07 PROCEDURE — 84146 ASSAY OF PROLACTIN: CPT | Performed by: INTERNAL MEDICINE

## 2024-05-07 PROCEDURE — 31231 NASAL ENDOSCOPY DX: CPT | Mod: S$GLB,,, | Performed by: OTOLARYNGOLOGY

## 2024-05-07 PROCEDURE — 3288F FALL RISK ASSESSMENT DOCD: CPT | Mod: CPTII,S$GLB,, | Performed by: OTOLARYNGOLOGY

## 2024-05-07 PROCEDURE — 82105 ALPHA-FETOPROTEIN SERUM: CPT | Performed by: NURSE PRACTITIONER

## 2024-05-07 PROCEDURE — 1159F MED LIST DOCD IN RCRD: CPT | Mod: CPTII,S$GLB,, | Performed by: OTOLARYNGOLOGY

## 2024-05-07 PROCEDURE — 85025 COMPLETE CBC W/AUTO DIFF WBC: CPT | Performed by: HOSPITALIST

## 2024-05-07 PROCEDURE — 3078F DIAST BP <80 MM HG: CPT | Mod: CPTII,S$GLB,, | Performed by: OTOLARYNGOLOGY

## 2024-05-07 PROCEDURE — 85610 PROTHROMBIN TIME: CPT | Performed by: NURSE PRACTITIONER

## 2024-05-07 PROCEDURE — 87522 HEPATITIS C REVRS TRNSCRPJ: CPT | Performed by: HOSPITALIST

## 2024-05-07 RX ORDER — AMOXICILLIN AND CLAVULANATE POTASSIUM 875; 125 MG/1; MG/1
1 TABLET, FILM COATED ORAL 2 TIMES DAILY
Qty: 20 TABLET | Refills: 0 | Status: SHIPPED | OUTPATIENT
Start: 2024-05-07 | End: 2024-05-08

## 2024-05-07 RX ORDER — FLUTICASONE PROPIONATE 50 MCG
2 SPRAY, SUSPENSION (ML) NASAL DAILY
Qty: 9.9 ML | Refills: 11 | Status: SHIPPED | OUTPATIENT
Start: 2024-05-07

## 2024-05-07 NOTE — PATIENT INSTRUCTIONS
Smell Retraining Therapy  Smell retraining therapy is a way to help rebuild the connection between the nose and the brain to recover the sense of smell. Similar to exercising a muscle, practicing smelling can strengthen the olfactory system. This concept is used to help train professionals like sommeliers or perfume creators, but can also be applied to our patients who have lost or experienced a significant decline in their sense of smell.  ?  Research has shown that a regimen of practicing smelling can aid in recovery of the sense of smell. Patients who participated in a smell retraining regimen had significant improvement in their sense of smell, compared to those who did not. The original research was done with 4 essential oils- mckay, eucalyptus, lemon and clove- but these scents are just a few examples and you can also choose to use items from your home or spice cabinet for your training rather than investing in essential oils. When choosing which scents to start with, it is often helpful to think of the main scent families which are: floral, fruity, aromatic, resinous, burnt and foul. Some examples of common household smells a patient may practice with at home are: vanilla or almond extract, fresh lemon, dried clove, fresh mint or menthol and lavender. It can be helpful to start with smells that were familiar and appealing to you prior to your smell loss. If you choose smells with you find or previously found noxious it may be difficult to stick with your program.  ?  Practice as follow:  - choose 4 scents as a starting scent library  - smell each for 10 seconds very close to the nose twice daily  - it can be helpful to keep a journal of your progress, but this is not required  - the original study was performed for 12 weeks but you may find if you recover certain scents more quickly, you may choose to switch other scents into your scent library more rapidly. You can continue the therapy for as long as it is  helpful for you.

## 2024-05-07 NOTE — PROGRESS NOTES
Chief Complaint   Patient presents with    Nose Problem     Change in sense of smell for 1 year   .    HPI:     Tona Carlson is a 68 y.o. female who presents for evaluation of change in sense of smell/taste for about 1 year. She has history of pituitary incidentaloma s/p TSR in March 2014 at [facility]. Today the pt c/o unexplained dysgeusia. She states that foods that typically taste salty will be sweet. She reports that everything smells the same- like her [place]. She reports that she has been extra sensitive to smells. She feels like certain smells are too strong.     She reports intermittent nasal discharge/rhinorrhea from both nostrils. She notes that she has increased nasal discharge when she eats hot foods.  She admits to facial pain/pressure which has been present for 4 days.    She does not believe that she had covid-19 infection in past.   She does not use sinus rinses or nasal sprays. She  denies headaches.  There is no history of sinonasal trauma or head trauma prior to onset.          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    Diabetes mellitus, type 2     Fatty liver disease, nonalcoholic     Hyperlipidemia     Prolactinoma     Reflux 08/10/2012    Rosacea     Toe fracture, right 07/2018    Unspecified cirrhosis of liver      Social History     Socioeconomic History    Marital status:     Number of children: 1   Tobacco Use    Smoking status: Never     Passive exposure: Never    Smokeless tobacco: Never   Substance and Sexual Activity    Alcohol use: No    Drug use: No    Sexual activity: Not Currently     Partners: Male     Social Determinants of Health     Financial Resource Strain: Low Risk  (1/16/2024)    Overall Financial Resource Strain (CARDIA)     Difficulty of Paying Living Expenses: Not hard at all   Food Insecurity: No Food Insecurity (1/16/2024)    Hunger Vital Sign     Worried About Running Out of Food in the Last  Year: Never true     Ran Out of Food in the Last Year: Never true   Transportation Needs: No Transportation Needs (2024)    PRAPARE - Transportation     Lack of Transportation (Medical): No     Lack of Transportation (Non-Medical): No   Physical Activity: Unknown (2024)    Exercise Vital Sign     Days of Exercise per Week: 0 days   Recent Concern: Physical Activity - Inactive (2024)    Exercise Vital Sign     Days of Exercise per Week: 0 days     Minutes of Exercise per Session: 0 min   Stress: No Stress Concern Present (2024)    [facility] - Occupational Stress Questionnaire     Feeling of Stress : Only a little   Housing Stability: Unknown (2024)    Housing Stability Vital Sign     Unable to Pay for Housing in the Last Year: No     Unstable Housing in the Last Year: No     Past Surgical History:   Procedure Laterality Date    BREAST BIOPSY Right     core     SECTION      CHOLECYSTECTOMY      ESOPHAGOGASTRODUODENOSCOPY N/A 3/8/2023    Procedure: ESOPHAGOGASTRODUODENOSCOPY (EGD);  Surgeon: Raciel Becker MD;  Location: [facility] (4TH FLR);  Service: Gastroenterology;  Laterality: N/A;  refused to have   cirrhosis-labs done on 22  instr mailed/portal-GT  pre-call no answer 2023    ESOPHAGOGASTRODUODENOSCOPY N/A 2024    Procedure: EGD (ESOPHAGOGASTRODUODENOSCOPY);  Surgeon: Cresencio Alonso MD;  Location: [facility] (2ND FLR);  Service: Endoscopy;  Laterality: N/A;  24-Ref Danni Calderón NP, h/o cirrhosis-last CBC, PT/INR 24, Ozempic on Sundays-holding 24 dose, instr portal-DS  -pt will call back to confirm appt (not sure if she has a ride), pt notified of change in floor-KPvt  Moved to    TRANSPHENOIDAL PITUITARY RESECTION       Family History   Problem Relation Name Age of Onset    Diabetes Mother      Glaucoma Mother      Hypertension Mother      No Known Problems Father      Thyroid disease  Sister x1     No Known Problems Brother      No Known Problems Brother      Cancer Paternal Grandmother          Liver or Stomach    Liver disease Paternal Grandmother          ESLD with jaundice    Heart disease Paternal Grandfather      Colon cancer Neg Hx      Ovarian cancer Neg Hx      Breast cancer Neg Hx             Review of Systems  General: negative for chills, fever or weight loss  Psychological: negative for mood changes or depression  Ophthalmic: negative for blurry vision, photophobia or eye pain  ENT: see HPI  Respiratory: no cough, shortness of breath, or wheezing  Cardiovascular: no chest pain or dyspnea on exertion  Gastrointestinal: no abdominal pain, change in bowel habits, or black/ bloody stools  Musculoskeletal: negative for gait disturbance or muscular weakness  Neurological: no syncope or seizures; no ataxia  Dermatological: negative for puritis,  rash and jaundice  Hematologic/lymphatic: no easy bruising, no new lumps or bumps      Physical Exam:    Vitals:    05/07/24 1105   BP: 114/71   Pulse: 76   Temp: 98 °F (36.7 °C)       Constitutional: Well appearing / communicating without difficutly.  NAD.  Eyes: EOM I Bilaterally  Head/Face: Normocephalic.  Negative paranasal sinus pressure/tenderness.  Salivary glands WNL.  House Brackmann I Bilaterally.    Right Ear: Auricle normal appearance. External Auditory Canal within normal limits,TM w/o masses/lesions/perforations. TM mobility noted.   Left Ear: Auricle normal appearance. External Auditory Canal WNL,TM w/o masses/lesions/perforations. TM mobility noted.  Nose: nasal septal deviation to the left. Inferior Turbinates 3+ bilaterally. No septal perforation. No masses/lesions. External nasal skin appears normal without masses/lesions.  Oral Cavity: Gingiva/lips within normal limits.  Dentition/gingiva healthy appearing. Mucus membranes moist. Floor of mouth soft, no masses palpated. Oral Tongue mobile. Hard Palate appears normal.     Oropharynx: Base of tongue appears normal. No masses/lesions noted. Tonsillar fossa/pharyngeal wall without lesions. Posterior oropharynx WNL.  Soft palate without masses. Midline uvula.   Neck/Lymphatic: No LAD I-VI bilaterally.  No thyromegaly.  No masses noted on exam.      Diagnostic studies:  MRI pituitary w/wo contrast 3/15/2024:  COMPARISON:  MRI pituitary 10/04/2022     FINDINGS:  Ventricles are stable in size and configuration without hydrocephalus.     Postoperative change of trans-sphenoidal resection of pituitary adenoma.  Minimal (less than 1 mm) increase in size of residual heterogeneously enhancing lobulated lesion within the sella turcica which abuts and partially surrounds the bilateral cavernous ICAs.  No new mass effect or significant suprasellar extension.  Stable appearance of the infundibulum with slight leftward deviation.     The brain appears unchanged from prior exam.  Similar prominent perivascular spaces within the bilateral basal ganglia and centrum semiovale.  No new parenchymal mass, hemorrhage or edema.  No or recent or remote major vascular distribution infarct.  No abnormal postcontrast parenchymal or leptomeningeal enhancement.     No extra-axial blood or fluid collections.     The T2 skull base flow voids are preserved.  Bone marrow signal intensity unremarkable.     Impression:     Postsurgical changes of transsphenoidal pituitary adenoma resection.  Overall stable appearance of residual lobulated     enhancing lesion along the sella.    Assessment:    ICD-10-CM ICD-9-CM    1. Sense of smell altered  R43.9 781.1       2. Dysgeusia  R43.2 781.1       3. Pituitary adenoma  D35.2 227.3       4. S/P selective transsphenoidal pituitary adenomectomy  Z98.890 V45.89     Z86.018        5. Chronic rhinitis  J31.0 472.0       6. Acute non-recurrent sinusitis of other sinus  J01.80 461.8         The primary encounter diagnosis was Sense of smell altered. Diagnoses of Dysgeusia, Pituitary  adenoma, S/P selective transsphenoidal pituitary adenomectomy, Chronic rhinitis, and Acute non-recurrent sinusitis of other sinus were also pertinent to this visit.      Plan:  No orders of the defined types were placed in this encounter.    Start saline rinses BID  Start Flonase 2 sprays per nostril daily  Start atrovent 2 sprays per nostril BID  Start Augmentin 875mg PO BID for 10 days  Etiology of altered sense of smell/taste unclear. She has acute sinusitis today but symptoms preceded this and recent MRI pituitary w/without contrast does not show any pathology along the skull base that would explain this and the paranasal sinuses are essentially clear.  Her neurosurgery visit recently showed stability residual lobulated enhancing lesion along the sella.  She does have appt with neurology on 5/8/2024 for memory changes and I recommend that she discuss these symptoms as well.   I have given her smell retraining therapy to trial.        Paige Da Silva MD

## 2024-05-07 NOTE — PROGRESS NOTES
Subjective:      Patient ID: Tona Carlson is a 68 y.o. female.    Chief Complaint:  Follow-up    History of Present Illness  Tona Carlson is here for follow up of DM and Osteoporosis.  Previously seen by me 2024.    With regards to Diabetes:    Diagnosed: in her 50s  DE: 2022  Known complications:  DKA Denies  RN Denies  Eye Exam: 2024  PN Denies  Podiatry: 2024  Nephropathy Denies  CAD Denies  Denies history of pancreatitis & personal/family history of medullary thyroid cancer.     Diet/Exercise:  Eats 3 meals a day.   Snacks : occasionally   Drinks : diet soft drinks, water  Exercise - tries to stay active.  Recent illness, injury steroids: FLU      Current Regimen:  Lantus 14 units nightly   Ozempic 0.5mg weekly      Reports compliance. Did not reduce insulin as instructed.    Other medications tried:  Jardiance - cost  Glimepiride  Victoza - GI intolerance  Januvia - cost  Took Trulicity for 2 months then self stopped due to decreased appetite.  Novolog  Metformin - GI side effects    Glucose Monitor:   1 times a day testing  Log reviewed: oral recall  Fastin-137    Hypoglycemia:  Denies  Knows how to correct with 15 grams of carbs- juice, coke, or a peppermint.       Diabetes Management Status    Hemoglobin A1C   Date Value Ref Range Status   2024 6.2 (H) 4.0 - 5.6 % Final     Comment:     ADA Screening Guidelines:  5.7-6.4%  Consistent with prediabetes  >or=6.5%  Consistent with diabetes    High levels of fetal hemoglobin interfere with the HbA1C  assay. Heterozygous hemoglobin variants (HbS, HgC, etc)do  not significantly interfere with this assay.   However, presence of multiple variants may affect accuracy.     2023 5.8 (H) 4.0 - 5.6 % Final     Comment:     ADA Screening Guidelines:  5.7-6.4%  Consistent with prediabetes  >or=6.5%  Consistent with diabetes    High levels of fetal hemoglobin interfere with the HbA1C  assay. Heterozygous hemoglobin variants (HbS,  HgC, etc)do  not significantly interfere with this assay.   However, presence of multiple variants may affect accuracy.     06/19/2023 6.0 (H) 4.0 - 5.6 % Final     Comment:     ADA Screening Guidelines:  5.7-6.4%  Consistent with prediabetes  >or=6.5%  Consistent with diabetes    High levels of fetal hemoglobin interfere with the HbA1C  assay. Heterozygous hemoglobin variants (HbS, HgC, etc)do  not significantly interfere with this assay.   However, presence of multiple variants may affect accuracy.         Statin: Taking  ACE/ARB: Not taking  Screening or Prevention Patient's value Goal Complete/Controlled?   HgA1C Testing and Control   Lab Results   Component Value Date    HGBA1C 6.2 (H) 05/07/2024      Annually/Less than 8% Yes     Lipid profile : 05/07/2024 Annually Yes     LDL control Lab Results   Component Value Date    LDLCALC 83.6 05/07/2024    Annually/Less than 100 mg/dl  Yes     Nephropathy screening Lab Results   Component Value Date    LABMICR 5.0 05/07/2024     Lab Results   Component Value Date    PROTEINUA Negative 11/06/2023    Annually Yes     Blood pressure BP Readings from Last 1 Encounters:   05/14/24 126/83    Less than 140/90 Yes     Dilated retinal exam : 09/11/2023 Annually Yes     Foot exam   : 02/08/2023 Annually Yes       With regards to Osteoporosis:     BMD:   11/3/2022  The L1 to L4 vertebral bone mineral density is equal to 0.918 g/cm squared with a T score of -2.2.  Previously, -2.5  The left femoral neck bone mineral density is equal to 0.709 g/cm squared with a T score of -2.4.  Previously, -2.8  The right femoral neck bone mineral density is equal to 0.664 g/cm squared with a T score of -2.7.  There is a 13.3% risk of a major osteoporotic fracture and a 3.7% risk of hip fracture in the next 10 years (FRAX).  Impression:  Osteopenia of the lumbar spine and left femoral neck.  Osteoporosis of the right femoral neck.    Medications:   Fosamax - unsure of start and stop dates -  "believes she took it for a year     Reclast  11/16/2021, 12/8/2022    Calcium intake: multivitamin    Vit D intake: over the counter vitamin D3 2000iu daily     Weight bearing exercise: active lifestyle   Falls: Denies  Fractures: Denies    Following with Dr Young and Dr Farmer for Pituitary Incidentaloma     Review of Systems  As above    Physical Exam  Vitals reviewed.   Cardiovascular:      Rate and Rhythm: Normal rate.      Comments: No edema present  Pulmonary:      Effort: Pulmonary effort is normal.   Abdominal:      Palpations: Abdomen is soft.       Injection sites are without edema or erythema. No lipo hypertropthy or atrophy.    Visit Vitals  /83   Pulse 85   Ht 5' 5" (1.651 m)   Wt 76.9 kg (169 lb 6.8 oz)   SpO2 97%   BMI 28.19 kg/m²             Body mass index is 28.19 kg/m².    Lab Review:   Lab Results   Component Value Date    HGBA1C 6.2 (H) 05/07/2024    HGBA1C 5.8 (H) 11/06/2023    HGBA1C 6.0 (H) 06/19/2023       Lab Results   Component Value Date    CHOL 148 05/07/2024    HDL 39 (L) 05/07/2024    LDLCALC 83.6 05/07/2024    TRIG 127 05/07/2024    CHOLHDL 26.4 05/07/2024     Lab Results   Component Value Date     05/07/2024    K 4.1 05/07/2024     05/07/2024    CO2 22 (L) 05/07/2024     (H) 05/07/2024    BUN 4 (L) 05/07/2024    CREATININE 0.7 05/07/2024    CALCIUM 9.0 05/07/2024    PROT 7.1 05/07/2024    ALBUMIN 3.7 05/07/2024    BILITOT 0.8 05/07/2024    ALKPHOS 100 05/07/2024    AST 39 05/07/2024    ALT 38 05/07/2024    ANIONGAP 9 05/07/2024    ESTGFRAFRICA >60.0 06/14/2022    EGFRNONAA >60.0 06/14/2022    TSH 1.804 11/06/2023     Vit D, 25-Hydroxy   Date Value Ref Range Status   06/19/2023 35 30 - 96 ng/mL Final     Comment:     Vitamin D deficiency.........<10 ng/mL                              Vitamin D insufficiency......10-29 ng/mL       Vitamin D sufficiency........> or equal to 30 ng/mL  Vitamin D toxicity............>100 ng/mL       Assessment and Plan     1. Type 2 " diabetes mellitus with diabetic polyneuropathy, without long-term current use of insulin  insulin glargine U-100, Lantus, (LANTUS SOLOSTAR U-100 INSULIN) 100 unit/mL (3 mL) InPn pen      2. Age related osteoporosis, unspecified pathological fracture presence  DXA Bone Density Axial Skeleton 1 or more sites          Type 2 diabetes mellitus with diabetic polyneuropathy, without long-term current use of insulin  -- Labs prior to follow up.  -- A1c goal <7%.  -- Medications discussed:  MFM - GI side effects  GLP1-DPP4   RIVERA   SGLT2 - cost  Insulin   -- Reviewed logs/CGM:  Fasting glucose controlled.  Concern for hypoglycemia unawareness.  Encouraged to check twice daily.  Instructed to send glucose logs in 14 days.  Reach out to me sooner for any glucose <70 or consistently >180.  -- Of note, patient did not reduce insulin last visit as she reports her glucose is controlled with this.   -- Medication Changes:   Lantus 14 units nightly   Ozempic 0.5mg weekly   -- Reviewed goals of therapy are to get the best control we can without hypoglycemia.  -- Reviewed patient's current insulin regimen. Clarified proper insulin dose and timing in relation to meals, etc. Insulin injection sites and proper rotation instructed.    -- Advised frequent self blood glucose monitoring.  Patient encouraged to document glucose results and bring them to every clinic visit.  -- Hypoglycemia precautions discussed. Instructed on precautions before driving.    -- Call for Bg repeatedly < 90 or > 180.   -- Close adherence to lifestyle changes recommended.   -- Periodic follow ups for eye evaluations, foot care and dental care suggested.    Osteoporosis  -- Repeat BMD 11/2024.  -- Reassuring she is not fracturing.  -- Treatment  Fosamax - unsure of start and stop dates - believes she took it for a year     Reclast  11/16/2021, 12/8/2022    Therapy plan reordered LOV - encouraged she follow up on scheduling - resubmitted today.      Follow up in  about 6 months (around 11/14/2024).      Visit today included increased complexity associated with the care of the problems addressed and managing the longitudinal care of the patient due to the serious and/or complex managed problems.

## 2024-05-07 NOTE — PROCEDURES
Nasal/sinus endoscopy    Date/Time: 5/7/2024 11:00 AM    Performed by: Paige Trujillo MD  Authorized by: Paige Trujillo MD    Consent Done?:  Yes (Verbal)  Anesthesia:     Local anesthetic:  Lidocaine 2% and Chao-Synephrine 1/2%  Nose:     Procedure Performed:  Nasal Endoscopy  External:      No external nasal deformity  Intranasal:      Mucosa no polyps     Mucosa ulcers not present     No mucosa lesions present     Enlarged turbinates     Septum gross deformity (NSD to the left)  Nasopharynx:      Posterior choanae patent     Eustachian tube patent     S/p transsphenoidal approach for pituitary adenoma; + right ethmoidectomy cavity- no crusting/no polyps; + right sphenoidotomy widely patent

## 2024-05-08 ENCOUNTER — OFFICE VISIT (OUTPATIENT)
Dept: NEUROLOGY | Facility: CLINIC | Age: 69
End: 2024-05-08
Payer: MEDICARE

## 2024-05-08 ENCOUNTER — LAB VISIT (OUTPATIENT)
Dept: LAB | Facility: HOSPITAL | Age: 69
End: 2024-05-08
Attending: PSYCHIATRY & NEUROLOGY
Payer: MEDICARE

## 2024-05-08 VITALS
HEIGHT: 64 IN | SYSTOLIC BLOOD PRESSURE: 114 MMHG | DIASTOLIC BLOOD PRESSURE: 76 MMHG | BODY MASS INDEX: 27.48 KG/M2 | WEIGHT: 160.94 LBS | HEART RATE: 89 BPM

## 2024-05-08 DIAGNOSIS — R41.3 MEMORY CHANGE: ICD-10-CM

## 2024-05-08 DIAGNOSIS — R26.9 NEUROLOGIC GAIT DYSFUNCTION: Primary | ICD-10-CM

## 2024-05-08 LAB
HCV RNA SERPL QL NAA+PROBE: NOT DETECTED
HCV RNA SPEC NAA+PROBE-ACNC: NOT DETECTED IU/ML

## 2024-05-08 PROCEDURE — 82607 VITAMIN B-12: CPT | Performed by: PSYCHIATRY & NEUROLOGY

## 2024-05-08 PROCEDURE — 3074F SYST BP LT 130 MM HG: CPT | Mod: CPTII,S$GLB,, | Performed by: PSYCHIATRY & NEUROLOGY

## 2024-05-08 PROCEDURE — 3072F LOW RISK FOR RETINOPATHY: CPT | Mod: CPTII,S$GLB,, | Performed by: PSYCHIATRY & NEUROLOGY

## 2024-05-08 PROCEDURE — 3044F HG A1C LEVEL LT 7.0%: CPT | Mod: CPTII,S$GLB,, | Performed by: PSYCHIATRY & NEUROLOGY

## 2024-05-08 PROCEDURE — 3061F NEG MICROALBUMINURIA REV: CPT | Mod: CPTII,S$GLB,, | Performed by: PSYCHIATRY & NEUROLOGY

## 2024-05-08 PROCEDURE — 1126F AMNT PAIN NOTED NONE PRSNT: CPT | Mod: CPTII,S$GLB,, | Performed by: PSYCHIATRY & NEUROLOGY

## 2024-05-08 PROCEDURE — 83520 IMMUNOASSAY QUANT NOS NONAB: CPT | Performed by: PSYCHIATRY & NEUROLOGY

## 2024-05-08 PROCEDURE — 1159F MED LIST DOCD IN RCRD: CPT | Mod: CPTII,S$GLB,, | Performed by: PSYCHIATRY & NEUROLOGY

## 2024-05-08 PROCEDURE — 3078F DIAST BP <80 MM HG: CPT | Mod: CPTII,S$GLB,, | Performed by: PSYCHIATRY & NEUROLOGY

## 2024-05-08 PROCEDURE — 3288F FALL RISK ASSESSMENT DOCD: CPT | Mod: CPTII,S$GLB,, | Performed by: PSYCHIATRY & NEUROLOGY

## 2024-05-08 PROCEDURE — 99999 PR PBB SHADOW E&M-EST. PATIENT-LVL V: CPT | Mod: PBBFAC,,, | Performed by: PSYCHIATRY & NEUROLOGY

## 2024-05-08 PROCEDURE — 99204 OFFICE O/P NEW MOD 45 MIN: CPT | Mod: S$GLB,,, | Performed by: PSYCHIATRY & NEUROLOGY

## 2024-05-08 PROCEDURE — 82746 ASSAY OF FOLIC ACID SERUM: CPT | Performed by: PSYCHIATRY & NEUROLOGY

## 2024-05-08 PROCEDURE — 3066F NEPHROPATHY DOC TX: CPT | Mod: CPTII,S$GLB,, | Performed by: PSYCHIATRY & NEUROLOGY

## 2024-05-08 PROCEDURE — 3008F BODY MASS INDEX DOCD: CPT | Mod: CPTII,S$GLB,, | Performed by: PSYCHIATRY & NEUROLOGY

## 2024-05-08 PROCEDURE — 1101F PT FALLS ASSESS-DOCD LE1/YR: CPT | Mod: CPTII,S$GLB,, | Performed by: PSYCHIATRY & NEUROLOGY

## 2024-05-08 RX ORDER — LIDOCAINE 50 MG/G
OINTMENT TOPICAL 2 TIMES DAILY PRN
Qty: 50 G | Refills: 5 | Status: SHIPPED | OUTPATIENT
Start: 2024-05-08

## 2024-05-08 RX ORDER — PREGABALIN 50 MG/1
50 CAPSULE ORAL 2 TIMES DAILY
Qty: 60 CAPSULE | Refills: 5 | Status: SHIPPED | OUTPATIENT
Start: 2024-05-08 | End: 2024-11-04

## 2024-05-08 RX ORDER — INSULIN GLARGINE-YFGN 100 [IU]/ML
100 INJECTION, SOLUTION SUBCUTANEOUS DAILY
COMMUNITY
Start: 2024-05-07 | End: 2024-05-14

## 2024-05-08 NOTE — PROGRESS NOTES
"Subjective:       Patient ID: Tona Carlson is a 68 y.o. female.    Chief Complaint: Memory Loss      Ms Carlson is a 68 year-old female with diabetes, nonalcoholic cirrhosis and a pituitary adenoma diagnosed in 2015 who is here with a chief complaint of pain due to "neuropathy".  She was actually referred for memory loss but she states that her memory is fine.  She is retired she lives alone and remains independent in her activities of daily living.    She states she remains active goes to [employer] and to the grocery store.    Admits that she forgets people's names sometimes and sometimes can not think of the best word to express herself.  Her  actually passed away with Alzheimer's 3 years ago.  The patient has no family in state.  She does have some friends who she sees weekly.      She has no history of a stroke no head injury has never been a smoker or drinker.  She has never had COVID states that she has been vaccinated and boosted.    Her mother passed at age 87 and did developed dementia at 85, her father passed at 95 and she states that at the very end he did develop trouble with his thinking.    She takes B12 gummies x2 months.    The chief complaint is pain in her toes and feet.  She states it feels like there is glue on them.  She is prescribed Lyrica 50 mg b.i.d. and this does help her.  She does not see a correlation with the word-finding difficulty and the initiation of the Lyrica.          MRI Pituitary W W/O Contrast  Narrative & Impression  EXAMINATION:  MRI PITUITARY W W/O CONTRAST     CLINICAL HISTORY:  Pituitary/sellar mass, post resection, monitor; Benign neoplasm of pituitary gland     TECHNIQUE:  Multiplanar multisequence MR imaging of the brain and thin section MR imaging of the sella were performed before and after the administration of 4 mL Gadavist intravenous contrast.     COMPARISON:  MRI pituitary 10/04/2022     FINDINGS:  Ventricles are stable in size and configuration " without hydrocephalus.     Postoperative change of trans-sphenoidal resection of pituitary adenoma.  Minimal (less than 1 mm) increase in size of residual heterogeneously enhancing lobulated lesion within the sella turcica which abuts and partially surrounds the bilateral cavernous ICAs.  No new mass effect or significant suprasellar extension.  Stable appearance of the infundibulum with slight leftward deviation.     The brain appears unchanged from prior exam.  Similar prominent perivascular spaces within the bilateral basal ganglia and centrum semiovale.  No new parenchymal mass, hemorrhage or edema.  No or recent or remote major vascular distribution infarct.  No abnormal postcontrast parenchymal or leptomeningeal enhancement.     No extra-axial blood or fluid collections.     The T2 skull base flow voids are preserved.  Bone marrow signal intensity unremarkable.     Impression:     Postsurgical changes of transsphenoidal pituitary adenoma resection.  Overall stable appearance of residual lobulated     enhancing lesion along the sella.     Electronically signed by resident: Marsha Yoo  Date:                                            03/15/2024  Time:                                           13:43     Electronically signed by:Sina Cabrera  Date:                                            03/15/2024  Time:                                           14:30          Past Medical History:   Diagnosis Date    Cataract     Compressive optic atrophy 2015    Diabetes mellitus, type 2     Fatty liver disease, nonalcoholic     Hyperlipidemia     Prolactinoma     Reflux 08/10/2012    Rosacea     Toe fracture, right 2018    Unspecified cirrhosis of liver       Past Surgical History:   Procedure Laterality Date    BREAST BIOPSY Right     core     SECTION      CHOLECYSTECTOMY      ESOPHAGOGASTRODUODENOSCOPY N/A 3/8/2023    Procedure: ESOPHAGOGASTRODUODENOSCOPY (EGD);  Surgeon: Raciel Becker MD;  Location:  "[facility] (4TH FLR);  Service: Gastroenterology;  Laterality: N/A;  refused to have   cirrhosis-labs done on 12/23/22  instr mailed/portal-GT  pre-call no answer 03-    ESOPHAGOGASTRODUODENOSCOPY N/A 1/19/2024    Procedure: EGD (ESOPHAGOGASTRODUODENOSCOPY);  Surgeon: Cresencio Alonso MD;  Location: [facility] (2ND FLR);  Service: Endoscopy;  Laterality: N/A;  1/11/24-Ref Danni Calderón NP, h/o cirrhosis-last CBC, PT/INR 1/2/24, Ozempic on Sundays-holding 1/14/24 dose, instr portal-DS  1/17-pt will call back to confirm appt (not sure if she has a ride), pt notified of change in floor-KPvt  Moved to    TRANSPHENOIDAL PITUITARY RESECTION  2014        Current Outpatient Medications:     alcohol swabs (BD ALCOHOL SWABS) PadM, Use as needed, Disp: 200 each, Rfl: 3    BD ULTRA-FINE WON PEN NEEDLE 32 gauge x 5/32" Ndle, Use with insulin pens, Disp: 100 each, Rfl: 3    blood glucose control, normal Soln, Check glucose 1-2 x day, Disp: 1 each, Rfl: 1    blood sugar diagnostic Strp, Patient testing 4 times daily, Disp: 200 strip, Rfl: 3    blood sugar diagnostic Strp, Test glucose twice daily. Freestyle Lite., Disp: 200 each, Rfl: 11    blood-glucose meter kit, Use as directed, Disp: 1 each, Rfl: 0    cholecalciferol, vitamin D3, (VITAMIN D3) 50 mcg (2,000 unit) Cap, Take 1 capsule (2,000 Units total) by mouth once daily., Disp: , Rfl:     fluticasone propionate (FLONASE) 50 mcg/actuation nasal spray, 2 sprays (100 mcg total) by Each Nostril route once daily., Disp: 9.9 mL, Rfl: 11    FREESTYLE LANCETS 28 gauge lancets, Apply topically., Disp: , Rfl:     insulin (LANTUS SOLOSTAR U-100 INSULIN) glargine 100 units/mL SubQ pen, Inject 10 Units into the skin every evening., Disp: 30 mL, Rfl: 3    lancets Misc, Test glucose twice daily. Freestyle Lite., Disp: 200 each, Rfl: 11    latanoprost 0.005 % ophthalmic solution, Place 1 drop into both eyes every evening., Disp: 2.5 mL, Rfl: 11    melatonin " "(MELATIN), Take 3 mg by mouth every evening., Disp: , Rfl:     OZEMPIC 0.25 mg or 0.5 mg (2 mg/3 mL) pen injector, Inject 0.5 mg into the skin every 7 days., Disp: 10 mL, Rfl: 3    SEMGLEE,INSULIN GLARG-YFGN, unit/mL (3 mL) InPn, Inject 100 Units into the skin once daily., Disp: , Rfl:     atorvastatin (LIPITOR) 20 MG tablet, Take 1 tablet by mouth once daily (Patient not taking: Reported on 3/21/2024), Disp: 90 tablet, Rfl: 0    finasteride (PROSCAR) 5 mg tablet, Take 5 mg by mouth. (Patient not taking: Reported on 5/8/2024), Disp: , Rfl:     ipratropium (ATROVENT) 42 mcg (0.06 %) nasal spray, 2 sprays by Each Nostril route 4 (four) times daily. (Patient not taking: Reported on 5/8/2024), Disp: 15 mL, Rfl: 0    LIDOcaine (XYLOCAINE) 5 % Oint ointment, Apply topically 2 (two) times daily as needed (foot pain)., Disp: 50 g, Rfl: 5    metroNIDAZOLE (METROGEL) 0.75 % gel, Apply to face BID. (Patient not taking: Reported on 5/8/2024), Disp: 45 g, Rfl: 5    pregabalin (LYRICA) 50 MG capsule, Take 1 capsule (50 mg total) by mouth 2 (two) times daily., Disp: 60 capsule, Rfl: 5   Review of patient's allergies indicates:  No Known Allergies     Review of Systems   Genitourinary:  Negative for bladder incontinence.   Musculoskeletal:  Positive for gait problem (states walks slow, " bc I need exercise"). Negative for arthralgias and back pain.   Neurological:  Positive for weakness ('my ankles are weak'), numbness (of toes) and memory loss (minor). Negative for seizures and headaches.   Psychiatric/Behavioral:  Positive for sleep disturbance (not getting enough sleep). Negative for dysphoric mood (sometimes feels overwhlemed, primairly financial and home responsibilities). The patient is not nervous/anxious.            Objective:      Physical Exam  Constitutional:       Appearance: She is not ill-appearing.   Neurological:      Mental Status: She is alert.      Cranial Nerves: No dysarthria.      Motor: No weakness, " tremor, abnormal muscle tone or pronator drift.      Coordination: Coordination normal.      Gait: Gait abnormal (slow and slightly wide based) and tandem walk abnormal (cannot tandem).      Deep Tendon Reflexes:      Reflex Scores:       Tricep reflexes are 1+ on the right side and 1+ on the left side.       Bicep reflexes are 1+ on the right side and 1+ on the left side.       Patellar reflexes are 2+ on the right side and 2+ on the left side.       Achilles reflexes are 0 on the right side and 1+ on the left side.     Comments: Lt ptosis  EOMI    MoCA 27/30 ( 1 for attention, 1 for language/words starting with F, and 1 for delayed recall)   Psychiatric:         Behavior: Behavior normal.         Thought Content: Thought content normal.           Assessment:       1. Neurologic gait dysfunction    2. Memory change        Plan:            Chief complaint is DPN, fairly well controlled with lyrica.  Will add topical lidocaine PRN.   Most recent A1c ( )  was 5.8 and has been under 7 for one yr      Probable vascular MCI  MoCA normal   Plan ptau 181  MRI notable for mild WM changes and prominent perivascular spaces.   Gait is wide based and slow.   Recc PT for gait and balance work.   Discussed importance of control of modifiable risk factors, needs to exercise.               Mallory Langford MD   05/14/2024   2:50 PM

## 2024-05-09 LAB — FOLATE SERPL-MCNC: 7.2 NG/ML (ref 4–24)

## 2024-05-10 LAB
ACTH PLAS-MCNC: 21 PG/ML (ref 0–46)
VIT B12 SERPL-MCNC: 1379 NG/L (ref 180–914)

## 2024-05-13 LAB
IGF-I SERPL-MCNC: 82 NG/ML (ref 34–194)
IGF-I Z-SCORE SERPL: -0.25 SD

## 2024-05-14 ENCOUNTER — OFFICE VISIT (OUTPATIENT)
Dept: ENDOCRINOLOGY | Facility: CLINIC | Age: 69
End: 2024-05-14
Payer: MEDICARE

## 2024-05-14 ENCOUNTER — TELEPHONE (OUTPATIENT)
Dept: NEUROLOGY | Facility: CLINIC | Age: 69
End: 2024-05-14
Payer: MEDICARE

## 2024-05-14 VITALS
BODY MASS INDEX: 28.23 KG/M2 | HEIGHT: 65 IN | SYSTOLIC BLOOD PRESSURE: 126 MMHG | OXYGEN SATURATION: 97 % | HEART RATE: 85 BPM | WEIGHT: 169.44 LBS | DIASTOLIC BLOOD PRESSURE: 83 MMHG

## 2024-05-14 DIAGNOSIS — M81.0 AGE RELATED OSTEOPOROSIS, UNSPECIFIED PATHOLOGICAL FRACTURE PRESENCE: Chronic | ICD-10-CM

## 2024-05-14 DIAGNOSIS — E11.42 TYPE 2 DIABETES MELLITUS WITH DIABETIC POLYNEUROPATHY, WITHOUT LONG-TERM CURRENT USE OF INSULIN: Primary | Chronic | ICD-10-CM

## 2024-05-14 LAB — PHOSPHO-TAU (181P): 0.87 PG/ML (ref 0–0.97)

## 2024-05-14 PROCEDURE — 1160F RVW MEDS BY RX/DR IN RCRD: CPT | Mod: CPTII,S$GLB,, | Performed by: NURSE PRACTITIONER

## 2024-05-14 PROCEDURE — 1126F AMNT PAIN NOTED NONE PRSNT: CPT | Mod: CPTII,S$GLB,, | Performed by: NURSE PRACTITIONER

## 2024-05-14 PROCEDURE — 3061F NEG MICROALBUMINURIA REV: CPT | Mod: CPTII,S$GLB,, | Performed by: NURSE PRACTITIONER

## 2024-05-14 PROCEDURE — 3044F HG A1C LEVEL LT 7.0%: CPT | Mod: CPTII,S$GLB,, | Performed by: NURSE PRACTITIONER

## 2024-05-14 PROCEDURE — 99999 PR PBB SHADOW E&M-EST. PATIENT-LVL V: CPT | Mod: PBBFAC,,, | Performed by: NURSE PRACTITIONER

## 2024-05-14 PROCEDURE — 3072F LOW RISK FOR RETINOPATHY: CPT | Mod: CPTII,S$GLB,, | Performed by: NURSE PRACTITIONER

## 2024-05-14 PROCEDURE — 3074F SYST BP LT 130 MM HG: CPT | Mod: CPTII,S$GLB,, | Performed by: NURSE PRACTITIONER

## 2024-05-14 PROCEDURE — G2211 COMPLEX E/M VISIT ADD ON: HCPCS | Mod: S$GLB,,, | Performed by: NURSE PRACTITIONER

## 2024-05-14 PROCEDURE — 3079F DIAST BP 80-89 MM HG: CPT | Mod: CPTII,S$GLB,, | Performed by: NURSE PRACTITIONER

## 2024-05-14 PROCEDURE — 1159F MED LIST DOCD IN RCRD: CPT | Mod: CPTII,S$GLB,, | Performed by: NURSE PRACTITIONER

## 2024-05-14 PROCEDURE — 3066F NEPHROPATHY DOC TX: CPT | Mod: CPTII,S$GLB,, | Performed by: NURSE PRACTITIONER

## 2024-05-14 PROCEDURE — 3288F FALL RISK ASSESSMENT DOCD: CPT | Mod: CPTII,S$GLB,, | Performed by: NURSE PRACTITIONER

## 2024-05-14 PROCEDURE — 1101F PT FALLS ASSESS-DOCD LE1/YR: CPT | Mod: CPTII,S$GLB,, | Performed by: NURSE PRACTITIONER

## 2024-05-14 PROCEDURE — 3008F BODY MASS INDEX DOCD: CPT | Mod: CPTII,S$GLB,, | Performed by: NURSE PRACTITIONER

## 2024-05-14 PROCEDURE — 99214 OFFICE O/P EST MOD 30 MIN: CPT | Mod: S$GLB,,, | Performed by: NURSE PRACTITIONER

## 2024-05-14 RX ORDER — INSULIN GLARGINE 100 [IU]/ML
14 INJECTION, SOLUTION SUBCUTANEOUS NIGHTLY
Qty: 30 ML | Refills: 3 | Status: SHIPPED | OUTPATIENT
Start: 2024-05-14

## 2024-05-14 RX ORDER — HEPARIN 100 UNIT/ML
500 SYRINGE INTRAVENOUS
OUTPATIENT
Start: 2024-05-14

## 2024-05-14 RX ORDER — ACETAMINOPHEN 500 MG
500 TABLET ORAL
OUTPATIENT
Start: 2024-05-14

## 2024-05-14 RX ORDER — ZOLEDRONIC ACID 5 MG/100ML
5 INJECTION, SOLUTION INTRAVENOUS
Status: CANCELLED | OUTPATIENT
Start: 2024-05-14

## 2024-05-14 RX ORDER — SODIUM CHLORIDE 0.9 % (FLUSH) 0.9 %
10 SYRINGE (ML) INJECTION
Status: CANCELLED | OUTPATIENT
Start: 2024-05-14

## 2024-05-14 RX ORDER — HEPARIN 100 UNIT/ML
500 SYRINGE INTRAVENOUS
Status: CANCELLED | OUTPATIENT
Start: 2024-05-14

## 2024-05-14 RX ORDER — SODIUM CHLORIDE 0.9 % (FLUSH) 0.9 %
10 SYRINGE (ML) INJECTION
OUTPATIENT
Start: 2024-05-14

## 2024-05-14 RX ORDER — ZOLEDRONIC ACID 5 MG/100ML
5 INJECTION, SOLUTION INTRAVENOUS
OUTPATIENT
Start: 2024-05-14

## 2024-05-14 NOTE — TELEPHONE ENCOUNTER
----- Message from Mallory Langford MD sent at 5/14/2024 12:55 PM CDT -----  Please inform patient the screening test for Alzheimer's was negative i.e. not typical of what is seen in the patient with Alzheimer's.  I think her memory problems have more to do with diabetes and other vascular risk factors as discussed at the visit.  ----- Message -----  From: [facility]  Sent: 5/9/2024  12:19 AM CDT  To: Mallory Langford MD

## 2024-05-14 NOTE — ASSESSMENT & PLAN NOTE
-- Repeat BMD 11/2024.  -- Reassuring she is not fracturing.  -- Treatment  Fosamax - unsure of start and stop dates - believes she took it for a year     Reclast  11/16/2021, 12/8/2022    Therapy plan reordered LOV - encouraged she follow up on scheduling - resubmitted today.

## 2024-05-14 NOTE — ASSESSMENT & PLAN NOTE
-- Labs prior to follow up.  -- A1c goal <7%.  -- Medications discussed:  MFM - GI side effects  GLP1-DPP4   RIVERA   SGLT2 - cost  Insulin   -- Reviewed logs/CGM:  Fasting glucose controlled.  Concern for hypoglycemia unawareness.  Encouraged to check twice daily.  Instructed to send glucose logs in 14 days.  Reach out to me sooner for any glucose <70 or consistently >180.  -- Of note, patient did not reduce insulin last visit as she reports her glucose is controlled with this.   -- Medication Changes:   Lantus 14 units nightly   Ozempic 0.5mg weekly   -- Reviewed goals of therapy are to get the best control we can without hypoglycemia.  -- Reviewed patient's current insulin regimen. Clarified proper insulin dose and timing in relation to meals, etc. Insulin injection sites and proper rotation instructed.    -- Advised frequent self blood glucose monitoring.  Patient encouraged to document glucose results and bring them to every clinic visit.  -- Hypoglycemia precautions discussed. Instructed on precautions before driving.    -- Call for Bg repeatedly < 90 or > 180.   -- Close adherence to lifestyle changes recommended.   -- Periodic follow ups for eye evaluations, foot care and dental care suggested.

## 2024-05-15 ENCOUNTER — OFFICE VISIT (OUTPATIENT)
Dept: INTERNAL MEDICINE | Facility: CLINIC | Age: 69
End: 2024-05-15
Payer: MEDICARE

## 2024-05-15 VITALS
SYSTOLIC BLOOD PRESSURE: 108 MMHG | RESPIRATION RATE: 16 BRPM | WEIGHT: 169.56 LBS | HEART RATE: 76 BPM | OXYGEN SATURATION: 99 % | DIASTOLIC BLOOD PRESSURE: 72 MMHG | TEMPERATURE: 98 F | BODY MASS INDEX: 28.25 KG/M2 | HEIGHT: 65 IN

## 2024-05-15 DIAGNOSIS — E55.9 VITAMIN D DEFICIENCY: ICD-10-CM

## 2024-05-15 DIAGNOSIS — E11.69 HYPERLIPIDEMIA ASSOCIATED WITH TYPE 2 DIABETES MELLITUS: Primary | ICD-10-CM

## 2024-05-15 DIAGNOSIS — Z79.4 TYPE 2 DIABETES MELLITUS WITH DIABETIC POLYNEUROPATHY, WITH LONG-TERM CURRENT USE OF INSULIN: ICD-10-CM

## 2024-05-15 DIAGNOSIS — Z12.11 SCREENING FOR COLORECTAL CANCER: ICD-10-CM

## 2024-05-15 DIAGNOSIS — E11.42 TYPE 2 DIABETES MELLITUS WITH DIABETIC POLYNEUROPATHY, WITH LONG-TERM CURRENT USE OF INSULIN: ICD-10-CM

## 2024-05-15 DIAGNOSIS — Z12.12 SCREENING FOR COLORECTAL CANCER: ICD-10-CM

## 2024-05-15 DIAGNOSIS — I70.0 AORTIC ATHEROSCLEROSIS: ICD-10-CM

## 2024-05-15 DIAGNOSIS — E78.5 HYPERLIPIDEMIA ASSOCIATED WITH TYPE 2 DIABETES MELLITUS: Primary | ICD-10-CM

## 2024-05-15 PROCEDURE — 1159F MED LIST DOCD IN RCRD: CPT | Mod: CPTII,S$GLB,, | Performed by: HOSPITALIST

## 2024-05-15 PROCEDURE — 3008F BODY MASS INDEX DOCD: CPT | Mod: CPTII,S$GLB,, | Performed by: HOSPITALIST

## 2024-05-15 PROCEDURE — 1101F PT FALLS ASSESS-DOCD LE1/YR: CPT | Mod: CPTII,S$GLB,, | Performed by: HOSPITALIST

## 2024-05-15 PROCEDURE — 3078F DIAST BP <80 MM HG: CPT | Mod: CPTII,S$GLB,, | Performed by: HOSPITALIST

## 2024-05-15 PROCEDURE — 3072F LOW RISK FOR RETINOPATHY: CPT | Mod: CPTII,S$GLB,, | Performed by: HOSPITALIST

## 2024-05-15 PROCEDURE — G2211 COMPLEX E/M VISIT ADD ON: HCPCS | Mod: S$GLB,,, | Performed by: HOSPITALIST

## 2024-05-15 PROCEDURE — 99999 PR PBB SHADOW E&M-EST. PATIENT-LVL III: CPT | Mod: PBBFAC,,, | Performed by: HOSPITALIST

## 2024-05-15 PROCEDURE — 3066F NEPHROPATHY DOC TX: CPT | Mod: CPTII,S$GLB,, | Performed by: HOSPITALIST

## 2024-05-15 PROCEDURE — 1160F RVW MEDS BY RX/DR IN RCRD: CPT | Mod: CPTII,S$GLB,, | Performed by: HOSPITALIST

## 2024-05-15 PROCEDURE — 99214 OFFICE O/P EST MOD 30 MIN: CPT | Mod: S$GLB,,, | Performed by: HOSPITALIST

## 2024-05-15 PROCEDURE — 3288F FALL RISK ASSESSMENT DOCD: CPT | Mod: CPTII,S$GLB,, | Performed by: HOSPITALIST

## 2024-05-15 PROCEDURE — 3044F HG A1C LEVEL LT 7.0%: CPT | Mod: CPTII,S$GLB,, | Performed by: HOSPITALIST

## 2024-05-15 PROCEDURE — 3074F SYST BP LT 130 MM HG: CPT | Mod: CPTII,S$GLB,, | Performed by: HOSPITALIST

## 2024-05-15 PROCEDURE — 3061F NEG MICROALBUMINURIA REV: CPT | Mod: CPTII,S$GLB,, | Performed by: HOSPITALIST

## 2024-05-15 RX ORDER — ZOSTER VACCINE RECOMBINANT, ADJUVANTED 50 MCG/0.5
0.5 KIT INTRAMUSCULAR ONCE
Qty: 1 EACH | Refills: 0 | Status: SHIPPED | OUTPATIENT
Start: 2024-05-15 | End: 2024-05-15

## 2024-05-15 RX ORDER — AMOXICILLIN 500 MG/1
500 CAPSULE ORAL EVERY 6 HOURS
COMMUNITY
Start: 2024-04-15

## 2024-05-15 RX ORDER — HYDROCODONE BITARTRATE AND ACETAMINOPHEN 10; 325 MG/1; MG/1
1 TABLET ORAL EVERY 6 HOURS PRN
COMMUNITY
Start: 2024-04-15

## 2024-05-15 RX ORDER — PNEUMOCOCCAL 20-VALENT CONJUGATE VACCINE 2.2; 2.2; 2.2; 2.2; 2.2; 2.2; 2.2; 2.2; 2.2; 2.2; 2.2; 2.2; 2.2; 2.2; 2.2; 2.2; 4.4; 2.2; 2.2; 2.2 UG/.5ML; UG/.5ML; UG/.5ML; UG/.5ML; UG/.5ML; UG/.5ML; UG/.5ML; UG/.5ML; UG/.5ML; UG/.5ML; UG/.5ML; UG/.5ML; UG/.5ML; UG/.5ML; UG/.5ML; UG/.5ML; UG/.5ML; UG/.5ML; UG/.5ML; UG/.5ML
0.5 INJECTION, SUSPENSION INTRAMUSCULAR ONCE
Qty: 0.5 ML | Refills: 0 | Status: SHIPPED | OUTPATIENT
Start: 2024-05-15 | End: 2024-05-15

## 2024-05-15 NOTE — PROGRESS NOTES
"Subjective:     @Patient ID: Tona Carlson is a 68 y.o. female.    Chief Complaint: Follow-up (6 mo)    HPI    68 y.o. female with DM2, HLD, liver cirrhosis, ZARCO, hepatitis C ab+, pituitary adenoma s/p resection, obesity, vit d deficiency, aortic atherosclerosis here for annual exam:      Dm2: lantus 14 units qday, ozempic 0.5 mg qweek, metformin  mg bid. Last A1c 6.2; follows with endocrine. Reports metformin has been causing diarrhea   HLD: atorvastatin 20 mg qday   3.  Zarco cirrhosis, hep C+: follows with hepatology  4. Pituitary adenoma s/p resection: followed up with endocrine and neurosurgery 11/2022. Had MRI pituitary that showed  " Residual lobular enhancing tissue along the margins of the sella is unchanged from 07/25/2018." Pt has f/u with neurosurgery and endocrine   5. Reports having memory changes and feeling forgetful . Recently seen by Neurology and concern for vascular etiology.  Also noted to have abnormal gait and referred to physical therapy    Review of Systems   Constitutional:  Negative for chills and fever.   HENT:  Negative for congestion and sore throat.    Eyes:  Negative for pain and visual disturbance.   Respiratory:  Negative for cough and shortness of breath.    Cardiovascular:  Negative for chest pain and leg swelling.   Gastrointestinal:  Negative for abdominal pain, nausea and vomiting.   Endocrine: Negative for polydipsia and polyuria.   Genitourinary:  Negative for difficulty urinating and dysuria.   Musculoskeletal:  Negative for arthralgias and back pain.   Skin:  Negative for rash and wound.   Neurological:  Negative for dizziness, weakness and headaches.   Psychiatric/Behavioral:  Negative for agitation and confusion.      Past medical history, surgical history, and family medical history reviewed and updated as appropriate.    Medications and allergies reviewed.     Objective:     There were no vitals filed for this visit.  There is no height or weight on file to " calculate BMI.  Physical Exam  Constitutional:       Appearance: Normal appearance.   HENT:      Head: Normocephalic and atraumatic.   Eyes:      General:         Right eye: No discharge.         Left eye: No discharge.      Conjunctiva/sclera: Conjunctivae normal.   Cardiovascular:      Rate and Rhythm: Normal rate and regular rhythm.      Heart sounds: No murmur heard.  Pulmonary:      Effort: Pulmonary effort is normal.      Breath sounds: Normal breath sounds.   Musculoskeletal:      Cervical back: Normal range of motion and neck supple.      Right lower leg: No edema.      Left lower leg: No edema.   Skin:     General: Skin is warm and dry.   Neurological:      Mental Status: She is alert and oriented to person, place, and time.   Psychiatric:         Mood and Affect: Mood normal.         Behavior: Behavior normal.         Lab Results   Component Value Date    WBC 3.10 (L) 05/07/2024    HGB 13.8 05/07/2024    HCT 40.5 05/07/2024     05/07/2024    CHOL 148 05/07/2024    TRIG 127 05/07/2024    HDL 39 (L) 05/07/2024    ALT 38 05/07/2024    AST 39 05/07/2024     05/07/2024    K 4.1 05/07/2024     05/07/2024    CREATININE 0.7 05/07/2024    BUN 4 (L) 05/07/2024    CO2 22 (L) 05/07/2024    TSH 1.804 11/06/2023    INR 1.0 05/07/2024    HGBA1C 6.2 (H) 05/07/2024       Assessment:     1. Hyperlipidemia associated with type 2 diabetes mellitus    2. Type 2 diabetes mellitus with diabetic polyneuropathy, with long-term current use of insulin    3. Aortic atherosclerosis    4. Vitamin D deficiency    5. Screening for colorectal cancer      Plan:   Tona was seen today for follow-up.    Diagnoses and all orders for this visit:    Hyperlipidemia associated with type 2 diabetes mellitus  - Stable. Continue home meds     -     Vitamin D; Future  -     Microalbumin/Creatinine Ratio, Urine; Future  -     Comprehensive Metabolic Panel; Future  -     CBC Auto Differential; Future  -     Lipid Panel; Future  -      TSH; Future  -     Urinalysis; Future  -     Hemoglobin A1C; Future    Type 2 diabetes mellitus with diabetic polyneuropathy, with  long-term current use of insulin  - Stable. Continue home meds     -     Vitamin D; Future  -     Microalbumin/Creatinine Ratio, Urine; Future  -     Comprehensive Metabolic Panel; Future  -     CBC Auto Differential; Future  -     Lipid Panel; Future  -     TSH; Future  -     Urinalysis; Future  -     Hemoglobin A1C; Future    Aortic atherosclerosis  - Stable. Continue home med statin    -     Vitamin D; Future  -     Microalbumin/Creatinine Ratio, Urine; Future  -     Comprehensive Metabolic Panel; Future  -     CBC Auto Differential; Future  -     Lipid Panel; Future  -     TSH; Future  -     Urinalysis; Future  -     Hemoglobin A1C; Future    Vitamin D deficiency  - stable. Continue to monitor   -     Vitamin D; Future  -     Microalbumin/Creatinine Ratio, Urine; Future  -     Comprehensive Metabolic Panel; Future  -     CBC Auto Differential; Future  -     Lipid Panel; Future  -     TSH; Future  -     Urinalysis; Future  -     Hemoglobin A1C; Future    Screening for colorectal cancer  -     Cologuard Screening (Multitarget Stool DNA); Future  -     Cologuard Screening (Multitarget Stool DNA)    Other orders  -     varicella-zoster gE-AS01B, PF, (SHINGRIX, PF,) 50 mcg/0.5 mL injection; Inject 0.5 mLs into the muscle once. for 1 dose  -     pneumoc 20-teja conj-dip cr,PF, (PREVNAR 20, PF,) 0.5 mL Syrg injection; Inject 0.5 mLs into the muscle once. for 1 dose        Rtc 6 months     Medina Prakash MD  Internal Medicine    5/15/2024

## 2024-06-17 ENCOUNTER — INFUSION (OUTPATIENT)
Dept: INFECTIOUS DISEASES | Facility: HOSPITAL | Age: 69
End: 2024-06-17
Payer: MEDICARE

## 2024-06-17 VITALS
HEART RATE: 84 BPM | BODY MASS INDEX: 29.03 KG/M2 | WEIGHT: 174.25 LBS | TEMPERATURE: 98 F | SYSTOLIC BLOOD PRESSURE: 116 MMHG | OXYGEN SATURATION: 96 % | DIASTOLIC BLOOD PRESSURE: 56 MMHG | HEIGHT: 65 IN | RESPIRATION RATE: 20 BRPM

## 2024-06-17 DIAGNOSIS — M81.0 OSTEOPOROSIS WITHOUT CURRENT PATHOLOGICAL FRACTURE, UNSPECIFIED OSTEOPOROSIS TYPE: Primary | ICD-10-CM

## 2024-06-17 PROCEDURE — 63600175 PHARM REV CODE 636 W HCPCS: Performed by: NURSE PRACTITIONER

## 2024-06-17 PROCEDURE — 96374 THER/PROPH/DIAG INJ IV PUSH: CPT

## 2024-06-17 RX ORDER — ZOLEDRONIC ACID 5 MG/100ML
5 INJECTION, SOLUTION INTRAVENOUS
OUTPATIENT
Start: 2024-06-17

## 2024-06-17 RX ORDER — SODIUM CHLORIDE 0.9 % (FLUSH) 0.9 %
10 SYRINGE (ML) INJECTION
OUTPATIENT
Start: 2024-06-17

## 2024-06-17 RX ORDER — SODIUM CHLORIDE 0.9 % (FLUSH) 0.9 %
10 SYRINGE (ML) INJECTION
Status: DISCONTINUED | OUTPATIENT
Start: 2024-06-17 | End: 2024-06-17 | Stop reason: HOSPADM

## 2024-06-17 RX ORDER — HEPARIN 100 UNIT/ML
500 SYRINGE INTRAVENOUS
OUTPATIENT
Start: 2024-06-17

## 2024-06-17 RX ORDER — ZOLEDRONIC ACID 5 MG/100ML
5 INJECTION, SOLUTION INTRAVENOUS
Status: COMPLETED | OUTPATIENT
Start: 2024-06-17 | End: 2024-06-17

## 2024-06-17 RX ADMIN — ZOLEDRONIC ACID 5 MG: 5 INJECTION, SOLUTION INTRAVENOUS at 01:06

## 2024-06-17 NOTE — PROGRESS NOTES
Limited head-to-toe assessment due to privacy issues and visit reason though the opportunity was given for patient to express any concerns    Patient arrives for infusion of Reclast as ordered by . All benefits, risks, requirements, and alternatives should have been discussed with patient by ordering provider at the time the order was placed.

## 2024-06-26 ENCOUNTER — OFFICE VISIT (OUTPATIENT)
Dept: OPTOMETRY | Facility: CLINIC | Age: 69
End: 2024-06-26
Payer: COMMERCIAL

## 2024-06-26 ENCOUNTER — TELEPHONE (OUTPATIENT)
Dept: OPTOMETRY | Facility: CLINIC | Age: 69
End: 2024-06-26
Payer: MEDICARE

## 2024-06-26 DIAGNOSIS — D35.2 PITUITARY ADENOMA: ICD-10-CM

## 2024-06-26 DIAGNOSIS — H40.1134 PRIMARY OPEN ANGLE GLAUCOMA (POAG) OF BOTH EYES, INDETERMINATE STAGE: Primary | ICD-10-CM

## 2024-06-26 PROCEDURE — 99999 PR PBB SHADOW E&M-EST. PATIENT-LVL IV: CPT | Mod: PBBFAC,,, | Performed by: OPTOMETRIST

## 2024-06-26 RX ORDER — INSULIN GLARGINE-YFGN 100 [IU]/ML
INJECTION, SOLUTION SUBCUTANEOUS
COMMUNITY
Start: 2024-06-23

## 2024-06-26 RX ORDER — DOXYCYCLINE HYCLATE 20 MG
40 TABLET ORAL
COMMUNITY
Start: 2024-06-12

## 2024-06-26 NOTE — PROGRESS NOTES
HPI    JONI: 02/24  Chief complaint (CC): patient is here for a follow up with an IOP check   today.  Patient hasn't noticed any vision changes since the last exam.  Glasses? + 2-3 months  Contacts? -  H/o eye surgery, injections or laser: Lasik OU  H/o eye injury: -  Known eye conditions? See above  Family h/o eye conditions? Mother, brother and sister with glaucoma  Eye gtts? Using Latanoprost OU Q HS, last used last night      (-) Flashes (-)  Floaters (-) Mucous   (-)  Tearing (-) Itching (-) Burning   (-) Headaches (-) Eye Pain/discomfort (-) Irritation   (-)  Redness (-) Double vision (-) Blurry vision    Diabetic? -  A1c? -      Last edited by Carly Pierre on 6/26/2024 11:09 AM.            Assessment /Plan     For exam results, see Encounter Report.    Primary open angle glaucoma (POAG) of both eyes, indeterminate stage    Pituitary adenoma      (+) FHx- mom, sister, brother, MGM. IOP 12 OD, OS. Last 10 OD, OS.  Pachy 561 OD, 547 OS. Pt reports she never started drops because she forgot about them and just picked them up from pharm yesterday.  C/d 0.65OD, OS. Pallor OU.   01/12/2024 Gonio SS all quadrants OU  11/29/2023 OCT OD borderline NI, significantly thin in all other areas, OS normal NS and NI, significantly thin in all other areas  11/29/2023 HVF OD sup and inf arc (respects midline), OS sup arc  HVF stable when compared to 2016 which was post surgery. Pt had surgery for pituitary adenoma in 2015.   Cont Latanoprost QHS OU due to changes on OCT, significant thinning of OCT and strong family history.  Educated pt on findings w/understanding.   RTC 4 mo Routine

## 2024-06-30 DIAGNOSIS — E11.42 TYPE 2 DIABETES MELLITUS WITH DIABETIC POLYNEUROPATHY, WITHOUT LONG-TERM CURRENT USE OF INSULIN: Chronic | ICD-10-CM

## 2024-07-01 ENCOUNTER — HOSPITAL ENCOUNTER (OUTPATIENT)
Dept: RADIOLOGY | Facility: HOSPITAL | Age: 69
Discharge: HOME | End: 2024-07-01
Attending: NURSE PRACTITIONER
Payer: MEDICARE

## 2024-07-01 DIAGNOSIS — I85.10 SECONDARY ESOPHAGEAL VARICES WITHOUT BLEEDING: ICD-10-CM

## 2024-07-01 DIAGNOSIS — E11.69 HYPERLIPIDEMIA ASSOCIATED WITH TYPE 2 DIABETES MELLITUS: ICD-10-CM

## 2024-07-01 DIAGNOSIS — K76.6 PORTAL HYPERTENSION: ICD-10-CM

## 2024-07-01 DIAGNOSIS — K75.81 LIVER CIRRHOSIS SECONDARY TO NASH: ICD-10-CM

## 2024-07-01 DIAGNOSIS — E11.42 TYPE 2 DIABETES MELLITUS WITH DIABETIC POLYNEUROPATHY, WITHOUT LONG-TERM CURRENT USE OF INSULIN: Chronic | ICD-10-CM

## 2024-07-01 DIAGNOSIS — E66.3 OVERWEIGHT (BMI 25.0-29.9): ICD-10-CM

## 2024-07-01 DIAGNOSIS — E78.5 HYPERLIPIDEMIA ASSOCIATED WITH TYPE 2 DIABETES MELLITUS: ICD-10-CM

## 2024-07-01 DIAGNOSIS — K74.60 LIVER CIRRHOSIS SECONDARY TO NASH: ICD-10-CM

## 2024-07-01 PROCEDURE — 76700 US EXAM ABDOM COMPLETE: CPT | Mod: TC

## 2024-07-01 RX ORDER — SEMAGLUTIDE 0.68 MG/ML
0.5 INJECTION, SOLUTION SUBCUTANEOUS
Qty: 3 ML | Refills: 3 | Status: SHIPPED | OUTPATIENT
Start: 2024-07-01

## 2024-07-03 ENCOUNTER — OFFICE VISIT (OUTPATIENT)
Dept: OTOLARYNGOLOGY | Facility: CLINIC | Age: 69
End: 2024-07-03
Payer: MEDICARE

## 2024-07-03 VITALS
WEIGHT: 174.63 LBS | DIASTOLIC BLOOD PRESSURE: 72 MMHG | SYSTOLIC BLOOD PRESSURE: 112 MMHG | HEART RATE: 79 BPM | BODY MASS INDEX: 29.06 KG/M2

## 2024-07-03 DIAGNOSIS — R43.2 DYSGEUSIA: ICD-10-CM

## 2024-07-03 DIAGNOSIS — R43.9 SENSE OF SMELL ALTERED: Primary | ICD-10-CM

## 2024-07-03 DIAGNOSIS — Z86.018 S/P SELECTIVE TRANSSPHENOIDAL PITUITARY ADENOMECTOMY: Chronic | ICD-10-CM

## 2024-07-03 DIAGNOSIS — J31.0 CHRONIC RHINITIS: Chronic | ICD-10-CM

## 2024-07-03 DIAGNOSIS — Z98.890 S/P SELECTIVE TRANSSPHENOIDAL PITUITARY ADENOMECTOMY: Chronic | ICD-10-CM

## 2024-07-03 DIAGNOSIS — J34.3 HYPERTROPHY OF INFERIOR NASAL TURBINATE: Chronic | ICD-10-CM

## 2024-07-03 PROCEDURE — 99999 PR PBB SHADOW E&M-EST. PATIENT-LVL IV: CPT | Mod: PBBFAC,,, | Performed by: OTOLARYNGOLOGY

## 2024-07-03 RX ORDER — IPRATROPIUM BROMIDE 42 UG/1
2 SPRAY, METERED NASAL 4 TIMES DAILY
Qty: 15 ML | Refills: 0 | Status: SHIPPED | OUTPATIENT
Start: 2024-07-03

## 2024-07-03 RX ORDER — FLUTICASONE PROPIONATE 50 MCG
2 SPRAY, SUSPENSION (ML) NASAL DAILY
Qty: 9.9 ML | Refills: 11 | Status: SHIPPED | OUTPATIENT
Start: 2024-07-03

## 2024-07-03 NOTE — PROGRESS NOTES
Chief Complaint   Patient presents with    Follow-up     Improvement since last visit   .    HPI:     Tona Carlson is a 68 y.o. female who presents for evaluation of change in sense of smell/taste for about 1 year. She has history of pituitary incidentaloma s/p TSR in March 2014 at [facility]. Today the pt c/o unexplained dysgeusia. She states that foods that typically taste salty will be sweet. She reports that everything smells the same- like her [place]. She reports that she has been extra sensitive to smells. She feels like certain smells are too strong.     She reports intermittent nasal discharge/rhinorrhea from both nostrils. She notes that she has increased nasal discharge when she eats hot foods.  She admits to facial pain/pressure which has been present for 4 days.    She does not believe that she had covid-19 infection in past.   She does not use sinus rinses or nasal sprays. She  denies headaches.  There is no history of sinonasal trauma or head trauma prior to onset.    Interval HPI 7/3/2024:  Follow up visit. Reports that her sense of smell seems to be better than at there last visit. She notes that she completed the antibiotics. She used saline rinse, Flonase, and Atrovent  for about 2 weeks. No rhinorrhea, pain/pressure in facial region, no post-nasal drip.      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    Diabetes mellitus, type 2     Fatty liver disease, nonalcoholic     Hyperlipidemia     Prolactinoma     Reflux 08/10/2012    Rosacea     Toe fracture, right 07/2018    Unspecified cirrhosis of liver      Social History     Socioeconomic History    Marital status:     Number of children: 1   Tobacco Use    Smoking status: Never     Passive exposure: Never    Smokeless tobacco: Never   Substance and Sexual Activity    Alcohol use: No    Drug use: No    Sexual activity: Not Currently     Partners: Male     Social Determinants of  Health     Financial Resource Strain: Low Risk  (2024)    Overall Financial Resource Strain (CARDIA)     Difficulty of Paying Living Expenses: Not hard at all   Food Insecurity: No Food Insecurity (2024)    Hunger Vital Sign     Worried About Running Out of Food in the Last Year: Never true     Ran Out of Food in the Last Year: Never true   Transportation Needs: No Transportation Needs (2024)    PRAPARE - Transportation     Lack of Transportation (Medical): No     Lack of Transportation (Non-Medical): No   Physical Activity: Unknown (2024)    Exercise Vital Sign     Days of Exercise per Week: 0 days   Recent Concern: Physical Activity - Inactive (2024)    Exercise Vital Sign     Days of Exercise per Week: 0 days     Minutes of Exercise per Session: 0 min   Stress: No Stress Concern Present (2024)    [employer] - Occupational Stress Questionnaire     Feeling of Stress : Only a little   Housing Stability: Unknown (2024)    Housing Stability Vital Sign     Unable to Pay for Housing in the Last Year: No     Unstable Housing in the Last Year: No     Past Surgical History:   Procedure Laterality Date    BREAST BIOPSY Right     core     SECTION      CHOLECYSTECTOMY      ESOPHAGOGASTRODUODENOSCOPY N/A 3/8/2023    Procedure: ESOPHAGOGASTRODUODENOSCOPY (EGD);  Surgeon: Raciel Becker MD;  Location: [facility] (4TH FLR);  Service: Gastroenterology;  Laterality: N/A;  refused to have   cirrhosis-labs done on 22  instr mailed/portal-GT  pre-call no answer 2023    ESOPHAGOGASTRODUODENOSCOPY N/A 2024    Procedure: EGD (ESOPHAGOGASTRODUODENOSCOPY);  Surgeon: Cresencio Alonso MD;  Location: [facility] (2ND FLR);  Service: Endoscopy;  Laterality: N/A;  24-Ref Danni Calderón NP, h/o cirrhosis-last CBC, PT/INR 24, Ozempic on Sundays-holding 24 dose, instr portal-DS  -pt will call back to confirm appt (not  sure if she has a ride), pt notified of change in floor-KPvt  Moved to    TRANSPHENOIDAL PITUITARY RESECTION  2014     Family History   Problem Relation Name Age of Onset    Diabetes Mother      Glaucoma Mother      Hypertension Mother      No Known Problems Father      Thyroid disease Sister x1     No Known Problems Brother      No Known Problems Brother      Cancer Paternal Grandmother          Liver or Stomach    Liver disease Paternal Grandmother          ESLD with jaundice    Heart disease Paternal Grandfather      Colon cancer Neg Hx      Ovarian cancer Neg Hx      Breast cancer Neg Hx             Review of Systems  General: negative for chills, fever or weight loss  Psychological: negative for mood changes or depression  Ophthalmic: negative for blurry vision, photophobia or eye pain  ENT: see HPI  Respiratory: no cough, shortness of breath, or wheezing  Cardiovascular: no chest pain or dyspnea on exertion  Gastrointestinal: no abdominal pain, change in bowel habits, or black/ bloody stools  Musculoskeletal: negative for gait disturbance or muscular weakness  Neurological: no syncope or seizures; no ataxia  Dermatological: negative for puritis,  rash and jaundice  Hematologic/lymphatic: no easy bruising, no new lumps or bumps      Physical Exam:    Vitals:    07/03/24 1111   BP: 112/72   Pulse: 79       Constitutional: Well appearing / communicating without difficutly.  NAD.  Eyes: EOM I Bilaterally  Head/Face: Normocephalic.  Negative paranasal sinus pressure/tenderness.  Salivary glands WNL.  House Brackmann I Bilaterally.    Right Ear: Auricle normal appearance. External Auditory Canal within normal limits,TM w/o masses/lesions/perforations. TM mobility noted.   Left Ear: Auricle normal appearance. External Auditory Canal WNL,TM w/o masses/lesions/perforations. TM mobility noted.  Nose: nasal septal deviation to the left. Inferior Turbinates 3+ bilaterally. No septal perforation. No masses/lesions.  External nasal skin appears normal without masses/lesions.  Oral Cavity: Gingiva/lips within normal limits.  Dentition/gingiva healthy appearing. Mucus membranes moist. Floor of mouth soft, no masses palpated. Oral Tongue mobile. Hard Palate appears normal.    Oropharynx: Base of tongue appears normal. No masses/lesions noted. Tonsillar fossa/pharyngeal wall without lesions. Posterior oropharynx WNL.  Soft palate without masses. Midline uvula.   Neck/Lymphatic: No LAD I-VI bilaterally.  No thyromegaly.  No masses noted on exam.      Diagnostic studies:  MRI pituitary w/wo contrast 3/15/2024:  COMPARISON:  MRI pituitary 10/04/2022     FINDINGS:  Ventricles are stable in size and configuration without hydrocephalus.     Postoperative change of trans-sphenoidal resection of pituitary adenoma.  Minimal (less than 1 mm) increase in size of residual heterogeneously enhancing lobulated lesion within the sella turcica which abuts and partially surrounds the bilateral cavernous ICAs.  No new mass effect or significant suprasellar extension.  Stable appearance of the infundibulum with slight leftward deviation.     The brain appears unchanged from prior exam.  Similar prominent perivascular spaces within the bilateral basal ganglia and centrum semiovale.  No new parenchymal mass, hemorrhage or edema.  No or recent or remote major vascular distribution infarct.  No abnormal postcontrast parenchymal or leptomeningeal enhancement.     No extra-axial blood or fluid collections.     The T2 skull base flow voids are preserved.  Bone marrow signal intensity unremarkable.     Impression:     Postsurgical changes of transsphenoidal pituitary adenoma resection.  Overall stable appearance of residual lobulated     enhancing lesion along the sella.    Assessment:    ICD-10-CM ICD-9-CM    1. Sense of smell altered  R43.9 781.1       2. Runny nose  R09.89 784.99       3. Dysgeusia  R43.2 781.1       4. Chronic rhinitis  J31.0 472.0  fluticasone propionate (FLONASE) 50 mcg/actuation nasal spray      ipratropium (ATROVENT) 42 mcg (0.06 %) nasal spray      5. S/P selective transsphenoidal pituitary adenomectomy  Z98.890 V45.89     Z86.018        6. Hypertrophy of inferior nasal turbinate  J34.3 478.0           The primary encounter diagnosis was Sense of smell altered. Diagnoses of Runny nose, Dysgeusia, Chronic rhinitis, S/P selective transsphenoidal pituitary adenomectomy, and Hypertrophy of inferior nasal turbinate were also pertinent to this visit.      Plan:  No orders of the defined types were placed in this encounter.    Continue saline rinses BID  Continue Flonase 2 sprays per nostril daily  Continue Atrovent 2 sprays per nostril BID    Paige Da Silva MD

## 2024-07-08 DIAGNOSIS — Z12.12 SCREENING FOR COLORECTAL CANCER: ICD-10-CM

## 2024-07-08 DIAGNOSIS — R19.5 POSITIVE COLORECTAL CANCER SCREENING USING COLOGUARD TEST: Primary | ICD-10-CM

## 2024-07-08 DIAGNOSIS — Z12.11 SCREENING FOR COLORECTAL CANCER: ICD-10-CM

## 2024-08-16 DIAGNOSIS — E11.42 TYPE 2 DIABETES MELLITUS WITH DIABETIC POLYNEUROPATHY, WITHOUT LONG-TERM CURRENT USE OF INSULIN: Chronic | ICD-10-CM

## 2024-08-20 ENCOUNTER — OFFICE VISIT (OUTPATIENT)
Dept: NEUROLOGY | Facility: CLINIC | Age: 69
End: 2024-08-20
Payer: MEDICARE

## 2024-08-20 VITALS
DIASTOLIC BLOOD PRESSURE: 79 MMHG | WEIGHT: 176.38 LBS | HEIGHT: 65 IN | BODY MASS INDEX: 29.38 KG/M2 | HEART RATE: 88 BPM | SYSTOLIC BLOOD PRESSURE: 123 MMHG

## 2024-08-20 DIAGNOSIS — R26.9 NEUROLOGIC GAIT DYSFUNCTION: ICD-10-CM

## 2024-08-20 DIAGNOSIS — D35.2 PITUITARY ADENOMA: Chronic | ICD-10-CM

## 2024-08-20 DIAGNOSIS — R41.3 MEMORY CHANGE: Primary | ICD-10-CM

## 2024-08-20 PROCEDURE — 1159F MED LIST DOCD IN RCRD: CPT | Mod: CPTII,S$GLB,, | Performed by: PSYCHIATRY & NEUROLOGY

## 2024-08-20 PROCEDURE — 3044F HG A1C LEVEL LT 7.0%: CPT | Mod: CPTII,S$GLB,, | Performed by: PSYCHIATRY & NEUROLOGY

## 2024-08-20 PROCEDURE — 3008F BODY MASS INDEX DOCD: CPT | Mod: CPTII,S$GLB,, | Performed by: PSYCHIATRY & NEUROLOGY

## 2024-08-20 PROCEDURE — 3078F DIAST BP <80 MM HG: CPT | Mod: CPTII,S$GLB,, | Performed by: PSYCHIATRY & NEUROLOGY

## 2024-08-20 PROCEDURE — 3072F LOW RISK FOR RETINOPATHY: CPT | Mod: CPTII,S$GLB,, | Performed by: PSYCHIATRY & NEUROLOGY

## 2024-08-20 PROCEDURE — 3061F NEG MICROALBUMINURIA REV: CPT | Mod: CPTII,S$GLB,, | Performed by: PSYCHIATRY & NEUROLOGY

## 2024-08-20 PROCEDURE — 1126F AMNT PAIN NOTED NONE PRSNT: CPT | Mod: CPTII,S$GLB,, | Performed by: PSYCHIATRY & NEUROLOGY

## 2024-08-20 PROCEDURE — 1101F PT FALLS ASSESS-DOCD LE1/YR: CPT | Mod: CPTII,S$GLB,, | Performed by: PSYCHIATRY & NEUROLOGY

## 2024-08-20 PROCEDURE — 3066F NEPHROPATHY DOC TX: CPT | Mod: CPTII,S$GLB,, | Performed by: PSYCHIATRY & NEUROLOGY

## 2024-08-20 PROCEDURE — 99214 OFFICE O/P EST MOD 30 MIN: CPT | Mod: S$GLB,,, | Performed by: PSYCHIATRY & NEUROLOGY

## 2024-08-20 PROCEDURE — 3074F SYST BP LT 130 MM HG: CPT | Mod: CPTII,S$GLB,, | Performed by: PSYCHIATRY & NEUROLOGY

## 2024-08-20 PROCEDURE — 99999 PR PBB SHADOW E&M-EST. PATIENT-LVL IV: CPT | Mod: PBBFAC,,, | Performed by: PSYCHIATRY & NEUROLOGY

## 2024-08-20 PROCEDURE — 3288F FALL RISK ASSESSMENT DOCD: CPT | Mod: CPTII,S$GLB,, | Performed by: PSYCHIATRY & NEUROLOGY

## 2024-08-20 RX ORDER — PREGABALIN 100 MG/1
100 CAPSULE ORAL NIGHTLY
Qty: 90 CAPSULE | Refills: 3 | Status: SHIPPED | OUTPATIENT
Start: 2024-08-20 | End: 2025-08-20

## 2024-08-20 NOTE — PROGRESS NOTES
Subjective:       Patient ID: Tona Carlson is a 68 y.o. female.    Chief Complaint: Neurologic Problem (Neurologic Gait Dysfunction)      MRI Pituitary W W/O Contrast  Order: [id number]  Status: Final result       Visible to patient: Yes (not seen)       Next appt: 08/22/2024 at 10:00 AM in Hepatology (Danni Calderón NP)       Dx: Pituitary adenoma    0 Result Notes  Details      Reading Physician Reading Date Result Priority  Sina Cabrera MD  [phone] 3/15/2024 Routine  Marsha Yoo MD  [phone] 3/15/2024     Narrative & Impression  EXAMINATION:  MRI PITUITARY W W/O CONTRAST     CLINICAL HISTORY:  Pituitary/sellar mass, post resection, monitor; Benign neoplasm of pituitary gland     TECHNIQUE:  Multiplanar multisequence MR imaging of the brain and thin section MR imaging of the sella were performed before and after the administration of 4 mL Gadavist intravenous contrast.     COMPARISON:  MRI pituitary 10/04/2022     FINDINGS:  Ventricles are stable in size and configuration without hydrocephalus.     Postoperative change of trans-sphenoidal resection of pituitary adenoma.  Minimal (less than 1 mm) increase in size of residual heterogeneously enhancing lobulated lesion within the sella turcica which abuts and partially surrounds the bilateral cavernous ICAs.  No new mass effect or significant suprasellar extension.  Stable appearance of the infundibulum with slight leftward deviation.     The brain appears unchanged from prior exam.  Similar prominent perivascular spaces within the bilateral basal ganglia and centrum semiovale.  No new parenchymal mass, hemorrhage or edema.  No or recent or remote major vascular distribution infarct.  No abnormal postcontrast parenchymal or leptomeningeal enhancement.     No extra-axial blood or fluid collections.     The T2 skull base flow voids are preserved.  Bone marrow signal intensity unremarkable.     Impression:     Postsurgical changes of  transsphenoidal pituitary adenoma resection.  Overall stable appearance of residual lobulated     enhancing lesion along the sella.     Electronically signed by resident: Marsha Yoo  Date:                                            03/15/2024  Time:                                           13:43     Electronically signed by:Sina Cabrera  Date:                                            03/15/2024  Time:                                           14:30          Past Medical History:   Diagnosis Date    Cataract     Compressive optic atrophy 2015    Diabetes mellitus, type 2     Fatty liver disease, nonalcoholic     Hyperlipidemia     Prolactinoma     Reflux 08/10/2012    Rosacea     Toe fracture, right 2018    Unspecified cirrhosis of liver       Past Surgical History:   Procedure Laterality Date    BREAST BIOPSY Right     core     SECTION      CHOLECYSTECTOMY      ESOPHAGOGASTRODUODENOSCOPY N/A 3/8/2023    Procedure: ESOPHAGOGASTRODUODENOSCOPY (EGD);  Surgeon: Raciel Becker MD;  Location: [facility] (4TH FLR);  Service: Gastroenterology;  Laterality: N/A;  refused to have   cirrhosis-labs done on 22  instr mailed/portal-GT  pre-call no answer 2023    ESOPHAGOGASTRODUODENOSCOPY N/A 2024    Procedure: EGD (ESOPHAGOGASTRODUODENOSCOPY);  Surgeon: Cresencio Alonso MD;  Location: [facility] (2ND FLR);  Service: Endoscopy;  Laterality: N/A;  24-Ref Danni Calderón NP, h/o cirrhosis-last CBC, PT/INR 24, Ozempic on Sundays-holding 24 dose, instr portal-DS  -pt will call back to confirm appt (not sure if she has a ride), pt notified of change in floor-KPvt  Moved to    TRANSPHENOIDAL PITUITARY RESECTION          Current Outpatient Medications:     alcohol swabs (BD ALCOHOL SWABS) PadM, Use as needed, Disp: 200 each, Rfl: 3    amoxicillin (AMOXIL) 500 MG capsule, Take 500 mg by mouth every 6 (six) hours., Disp: , Rfl:     atorvastatin (LIPITOR) 20 MG  "tablet, Take 1 tablet by mouth once daily, Disp: 90 tablet, Rfl: 0    BD ULTRA-FINE WON PEN NEEDLE 32 gauge x 5/32" Ndle, Use with insulin pens, Disp: 100 each, Rfl: 3    blood glucose control, normal Soln, Check glucose 1-2 x day, Disp: 1 each, Rfl: 1    blood sugar diagnostic (FREESTYLE LITE STRIPS) Strp, USE 1 STRIP TO CHECK GLUCOSE TWICE DAILY, Disp: 100 each, Rfl: 0    blood-glucose meter kit, Use as directed, Disp: 1 each, Rfl: 0    cholecalciferol, vitamin D3, (VITAMIN D3) 50 mcg (2,000 unit) Cap, Take 1 capsule (2,000 Units total) by mouth once daily., Disp: , Rfl:     doxycycline (PERIOSTAT) 20 MG tablet, Take 40 mg by mouth., Disp: , Rfl:     finasteride (PROSCAR) 5 mg tablet, Take 5 mg by mouth., Disp: , Rfl:     fluticasone propionate (FLONASE) 50 mcg/actuation nasal spray, 2 sprays (100 mcg total) by Each Nostril route once daily., Disp: 9.9 mL, Rfl: 11    FREESTYLE LANCETS 28 gauge lancets, Apply topically., Disp: , Rfl:     HYDROcodone-acetaminophen (NORCO)  mg per tablet, Take 1 tablet by mouth every 6 (six) hours as needed., Disp: , Rfl:     insulin glargine U-100, Lantus, (LANTUS SOLOSTAR U-100 INSULIN) 100 unit/mL (3 mL) InPn pen, Inject 14 Units into the skin every evening., Disp: 30 mL, Rfl: 3    ipratropium (ATROVENT) 42 mcg (0.06 %) nasal spray, 2 sprays by Each Nostril route 4 (four) times daily., Disp: 15 mL, Rfl: 0    lancets Misc, Test glucose twice daily. Freestyle Lite., Disp: 200 each, Rfl: 11    latanoprost 0.005 % ophthalmic solution, Place 1 drop into both eyes every evening., Disp: 2.5 mL, Rfl: 11    LIDOcaine (XYLOCAINE) 5 % Oint ointment, Apply topically 2 (two) times daily as needed (foot pain)., Disp: 50 g, Rfl: 5    melatonin (MELATIN), Take 3 mg by mouth every evening., Disp: , Rfl:     metroNIDAZOLE (METROGEL) 0.75 % gel, Apply to face BID., Disp: 45 g, Rfl: 5    OZEMPIC 0.25 mg or 0.5 mg (2 mg/3 mL) pen injector, Inject 0.5 mg into the skin every 7 days., Disp: 3 mL, " Rfl: 3    pregabalin (LYRICA) 50 MG capsule, Take 1 capsule (50 mg total) by mouth 2 (two) times daily., Disp: 60 capsule, Rfl: 5    SEMGLEE,INSULIN GLARG-YFGN, unit/mL (3 mL) InPn, Inject into the skin., Disp: , Rfl:    Review of patient's allergies indicates:  No Known Allergies     Review of Systems   Musculoskeletal:  Negative for neck pain.   Neurological:  Positive for memory loss (reports has improved.). Negative for headaches.   Psychiatric/Behavioral:  Positive for sleep disturbance (controlled with lyrica ( for dpn); melatonin also helps).            Objective:      Physical Exam  Neurological:      Mental Status: She is alert.      Cranial Nerves: No dysarthria.      Gait: Gait abnormal (mildly wide based, slightly stiff, good posture).   Psychiatric:         Behavior: Behavior normal.         Thought Content: Thought content normal.           Assessment:       1. Memory change    2. 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    Remains independent in al ADL's and has no new ex of memory dysfunction.   [place] at new patient visit on May 14th was normal at 27/30     Subsequent P tau 181 normal at .87 on 5-8-24  Subjectively feels that she is better and she feels it might be due to Ozempic.  Note A1c was 10.4 two years ago has been very well-controlled for the past year and her most recent A1c was 6.2.      Mild gait dysfunction noted on exam at new patient visit (mildly wide based gait) and patient reported  history of falling every 2 or 3 years.  I did order PT but she did not proceed but she is ready to proceed now.      DPN--  She is satisfied with control of diabetic nerve pain taking 2 of the 50 mg Lyrica together at bedtime on a p.r.n. basis.      Mallory Langford MD   08/20/2024   3:57 PM

## 2024-08-22 ENCOUNTER — OFFICE VISIT (OUTPATIENT)
Dept: HEPATOLOGY | Facility: CLINIC | Age: 69
End: 2024-08-22
Payer: MEDICARE

## 2024-08-22 VITALS — HEIGHT: 65 IN | BODY MASS INDEX: 29.17 KG/M2 | WEIGHT: 175.06 LBS

## 2024-08-22 DIAGNOSIS — E11.42 TYPE 2 DIABETES MELLITUS WITH DIABETIC POLYNEUROPATHY, WITHOUT LONG-TERM CURRENT USE OF INSULIN: Chronic | ICD-10-CM

## 2024-08-22 DIAGNOSIS — K75.81 LIVER CIRRHOSIS SECONDARY TO NASH: Primary | ICD-10-CM

## 2024-08-22 DIAGNOSIS — K74.60 LIVER CIRRHOSIS SECONDARY TO NASH: Primary | ICD-10-CM

## 2024-08-22 DIAGNOSIS — Z85.05 ENCOUNTER FOR FOLLOW-UP SURVEILLANCE OF LIVER CANCER: ICD-10-CM

## 2024-08-22 DIAGNOSIS — K76.6 PORTAL HYPERTENSION: ICD-10-CM

## 2024-08-22 DIAGNOSIS — I85.10 SECONDARY ESOPHAGEAL VARICES WITHOUT BLEEDING: ICD-10-CM

## 2024-08-22 DIAGNOSIS — R76.8 HEPATITIS C ANTIBODY TEST POSITIVE: ICD-10-CM

## 2024-08-22 DIAGNOSIS — Z08 ENCOUNTER FOR FOLLOW-UP SURVEILLANCE OF LIVER CANCER: ICD-10-CM

## 2024-08-22 DIAGNOSIS — E66.3 OVERWEIGHT (BMI 25.0-29.9): ICD-10-CM

## 2024-08-22 DIAGNOSIS — E78.5 HYPERLIPIDEMIA ASSOCIATED WITH TYPE 2 DIABETES MELLITUS: ICD-10-CM

## 2024-08-22 DIAGNOSIS — E11.69 HYPERLIPIDEMIA ASSOCIATED WITH TYPE 2 DIABETES MELLITUS: ICD-10-CM

## 2024-08-22 PROCEDURE — 99999 PR PBB SHADOW E&M-EST. PATIENT-LVL IV: CPT | Mod: PBBFAC,,, | Performed by: NURSE PRACTITIONER

## 2024-08-22 NOTE — PROGRESS NOTES
HEPATOLOGY CLINIC VISIT NOTE    CHIEF COMPLAINT: ZARCO Cirrhosis    HISTORY: This is a 68 y.o.  female here for follow up for well compensated cirrhosis, due to ZARCO. She was last seen in clinic by myself in 1/2024. She was referred for HCV AB (+), but subsequent testing didn't reveal viremia. Work up showed elevated liver enzymes, and was concerning for NAFLD/ZARCO. She has multiple metabolic risk factors, including DMII. Last HgbA1c was 6.2 % (5/2024). She is followed by Endocrinology, on Ozempic. Lipids are well controlled. She has stopped taking Lipitor.     Fibroscan in 2/2019 was suggestive of F4 fibrosis and a high likelihood of cirrhosis. Repeat Fibroscan in 6/2022 was again suggestive of F4 fibrosis, with severe fatty infiltration of the liver (S3). Most recent US for HCC surveillance in 7/2024 showed:    US Abdomen Complete  Narrative: EXAMINATION:  US ABDOMEN COMPLETE    CLINICAL HISTORY:  Secondary esophageal varices without bleeding    FINDINGS:  Pancreas aorta and IVC are unremarkable.  The gallbladder has been removed.  Bile duct measures 3 mm, the liver measures 15.6 cm, the right kidney 12.9, the left kidney 12.2, and the spleen 11.5 cm.  Liver architecture is course.  Impression: No acute process seen and possible medical liver disease.    Electronically signed by: Fuentes Osborne MD  Date:    07/01/2024  Time:    08:28    MELD-Na is 6, CTP Class A Cirrhosis. She has lost 22 lbs since 12/2022. Unfortunately she has gained 10 lbs since last visit. Her liver enzymes normalized, with weight loss and better glycemic control. EGD in 3/2023 showed Grade I esophageal varices. Repeat EGD in 1/2024 showed:    Findings:       Small (< 5 mm) varices with no bleeding and no stigmata of recent        bleeding were found in the lower third of the esophagus. No red renata        signs were present.        The cardia and gastric fundus were normal on retroflexion.        Multiple diminutive sessile fundic gland  polyps were found in the        gastric body.        The gastric antrum was normal.        The examined duodenum was normal.     She has been exposed to Hepatitis A and is immune through prior exposure. HBV negative. She is S/P vaccination for Hepatitis B. Family history is significant for paternal grandmother with ESLD and liver cancer. She endorses chronic fatigue, and increased forgetfulness, but denies any other signs or symptoms of hepatic decompensation including: jaundice, dark urine, abdominal distention, lower extremity edema, hematemesis, or melena.    Past Medical History:   Diagnosis Date    Cataract     Compressive optic atrophy 2015    Diabetes mellitus, type 2     Fatty liver disease, nonalcoholic     Hyperlipidemia     Prolactinoma     Reflux 08/10/2012    Rosacea     Toe fracture, right 2018    Unspecified cirrhosis of liver      Past Surgical History:   Procedure Laterality Date    BREAST BIOPSY Right     core     SECTION      CHOLECYSTECTOMY      ESOPHAGOGASTRODUODENOSCOPY N/A 3/8/2023    Procedure: ESOPHAGOGASTRODUODENOSCOPY (EGD);  Surgeon: Raciel Becker MD;  Location: [facility] (4TH FLR);  Service: Gastroenterology;  Laterality: N/A;  refused to have   cirrhosis-labs done on 22  instr mailed/portal-GT  pre-call no answer 2023    ESOPHAGOGASTRODUODENOSCOPY N/A 2024    Procedure: EGD (ESOPHAGOGASTRODUODENOSCOPY);  Surgeon: Cresencio Alonso MD;  Location: [facility] (2ND FLR);  Service: Endoscopy;  Laterality: N/A;  24-Ref Danni Calderón NP, h/o cirrhosis-last CBC, PT/INR 24, Ozempic on Sundays-holding 24 dose, instr portal-DS  -pt will call back to confirm appt (not sure if she has a ride), pt notified of change in floor-KPvt  Moved to    TRANSPHENOIDAL PITUITARY RESECTION       FAMILY HISTORY: Per HPI    SOCIAL HISTORY:   Social History     Tobacco Use   Smoking Status Never    Passive exposure: Never   Smokeless  "Tobacco Never     Social History     Substance and Sexual Activity   Alcohol Use No     Social History     Substance and Sexual Activity   Drug Use No     ROS:   No fever, chills, weight loss + chronic fatigue  No chest pain, dyspnea, cough  No abdominal pain,  nausea, vomiting  No headaches, visual changes  No lower extremity edema  No depression or anxiety    PHYSICAL EXAM:  Friendly  female, in no acute distress; alert and oriented to person, place and time.  VITALS: Ht 5' 5" (1.651 m)   Wt 79.4 kg (175 lb 0.7 oz)   BMI 29.13 kg/m²   HEENT: Sclerae anicteric.   NECK: No obvious masses.  CVS: No peripheral edema.  LUNGS: Normal respiratory effort.   ABDOMEN: Soft, non-distended abdomen.  SKIN: Warm and dry. No jaundice, No obvious rashes.   EXTREMITIES: No lower extremity edema  NEURO/PSYCH: Normal gait. Memory intact. Thought and speech pattern appropriate. Behavior normal. No depression or anxiety noted.    RECENT LABS:  Lab Results   Component Value Date    WBC 3.10 (L) 2024    HGB 13.8 2024     2024     Lab Results   Component Value Date    INR 1.0 2024     Lab Results   Component Value Date    AST 39 2024    ALT 38 2024    BILITOT 0.8 2024    ALBUMIN 3.7 2024    ALKPHOS 100 2024    CREATININE 0.7 2024    BUN 4 (L) 2024     2024    K 4.1 2024    AFP 5.1 2024     DIAGNOSTIC STUDIES:    FIBROSCAN 2/15/2019:    Fibroscan readin.1 KPa     Fibrosis:F4      CAP readin dB/m     Steatosis: :S3      FIBROSCAN 2022:    Findings  Median liver stiffness score:  28  CAP Reading: dB/m:  330     IQR/med %:  14  Interpretation  Fibrosis interpretation is based on medial liver stiffness - Kilopascal (kPa).     Fibrosis Stage:  F4  Steatosis interpretation is based on controlled attenuation parameter - (dB/m).     Steatosis Grade:  S3    EGD 3/8/2023:    Findings:        Grade I varices with no bleeding and " no stigmata of recent bleeding        were found in the lower third of the esophagus. No red renata signs        were present.        The exam of the esophagus was otherwise normal.        The entire examined stomach was normal.        There is no endoscopic evidence of varices in the stomach.        The duodenal bulb, first portion of the duodenum and second portion        of the duodenum were normal.   Impression:     - Grade I esophageal varices with no bleeding and                          no stigmata of recent bleeding.                          - Normal stomach.                          - Normal duodenal bulb, first portion of the                          duodenum and second portion of the duodenum.                          - No specimens collected.    US ABDOMEN COMPLETE 6/28/2023:    FINDINGS:    The visualized pancreas is within normal limits.     The aorta is not aneurysmal.  The visualized inferior vena cava is unremarkable.     The common duct is 4 mm, not dilated.  Gallbladder has been removed.     The liver measures 16.3 cm, not enlarged.  The liver demonstrates no focal mass.  The liver parenchyma is mildly heterogeneous.  The H RI is 1.6, suggestive of greater than 5% steatosis.     The right kidney measures 12.6 cm and has a normal sonographic appearance.     The spleen is mildly enlarged, 12.8 cm.     The left kidney measures 11.6 cm and demonstrates no sonographic abnormality.     Impression:     Hepatic steatosis.  No hepatic mass.     Mild splenomegaly     Cholecystectomy    US ABDOMEN COMPLETE 12/18/2023:    FINDINGS:    The pancreas aorta and IVC are unremarkable.  Gallbladder has been removed.  The bile duct measures 4 mm.  Liver measures 16 cm, the right kidney 11.7, the left kidney 11.2, the spleen 11.4 cm.  There are a few splenic granulomas.  The liver and spleen are otherwise normal.  The liver demonstrates coarse echotexture.     Impression:     Liver demonstrates coarse echotexture  suggesting cirrhosis/medical liver disease.     Biliary tree is normal.  No acute process seen    EGD 1/19/2024:      Findings:       Small (< 5 mm) varices with no bleeding and no stigmata of recent        bleeding were found in the lower third of the esophagus. No red renata        signs were present.        The cardia and gastric fundus were normal on retroflexion.        Multiple diminutive sessile fundic gland polyps were found in the        gastric body.        The gastric antrum was normal.        The examined duodenum was normal.   Impression:     - Small (< 5 mm) esophageal varices with no                          bleeding and no stigmata of recent bleeding.                          - Multiple fundic gland polyps.                          - Normal antrum.                          - Normal examined duodenum.                          - No specimens collected.     US Abdomen Complete  Narrative: EXAMINATION:  US ABDOMEN COMPLETE    CLINICAL HISTORY:  Secondary esophageal varices without bleeding    FINDINGS:  Pancreas aorta and IVC are unremarkable.  The gallbladder has been removed.  Bile duct measures 3 mm, the liver measures 15.6 cm, the right kidney 12.9, the left kidney 12.2, and the spleen 11.5 cm.  Liver architecture is course.  Impression: No acute process seen and possible medical liver disease.    Electronically signed by: Fuentes Osborne MD  Date:    07/01/2024  Time:    08:28    ASSESSMENT  68 y.o.  female with:    1. Liver cirrhosis secondary to ZARCO  AFP Tumor Marker    Comprehensive Metabolic Panel    CBC Auto Differential    Protime-INR    US Abdomen Complete    Ambulatory referral/consult to Endo Procedure       2. Portal hypertension  AFP Tumor Marker    Comprehensive Metabolic Panel    CBC Auto Differential    Protime-INR    US Abdomen Complete    Ambulatory referral/consult to Endo Procedure       3. Secondary esophageal varices without bleeding  AFP Tumor Marker     Comprehensive Metabolic Panel    CBC Auto Differential    Protime-INR    US Abdomen Complete    Ambulatory referral/consult to Endo Procedure       4. Overweight (BMI 25.0-29.9)  AFP Tumor Marker    Comprehensive Metabolic Panel    CBC Auto Differential    Protime-INR    US Abdomen Complete      5. Type 2 diabetes mellitus with diabetic polyneuropathy, without long-term current use of insulin  AFP Tumor Marker    Comprehensive Metabolic Panel    CBC Auto Differential    Protime-INR    US Abdomen Complete      6. Hyperlipidemia associated with type 2 diabetes mellitus  AFP Tumor Marker    Comprehensive Metabolic Panel    CBC Auto Differential    Protime-INR    US Abdomen Complete      7. Hepatitis C antibody test positive  AFP Tumor Marker    Comprehensive Metabolic Panel    CBC Auto Differential    Protime-INR    US Abdomen Complete      8. Encounter for follow-up surveillance of liver cancer  AFP Tumor Marker    Comprehensive Metabolic Panel    CBC Auto Differential    Protime-INR    US Abdomen Complete        MELD 3.0: 7 at 5/7/2024  8:19 AM  MELD-Na: 6 at 5/7/2024  8:19 AM  Calculated from:  Serum Creatinine: 0.7 mg/dL (Using min of 1 mg/dL) at 5/7/2024  8:19 AM  Serum Sodium: 138 mmol/L (Using max of 137 mmol/L) at 5/7/2024  8:19 AM  Total Bilirubin: 0.8 mg/dL (Using min of 1 mg/dL) at 5/7/2024  8:19 AM  Serum Albumin: 3.7 g/dL (Using max of 3.5 g/dL) at 5/7/2024  8:19 AM  INR(ratio): 1.0 at 5/7/2024  8:19 AM  Age at listing (hypothetical): 68 years  Sex: Female at 5/7/2024  8:19 AM    - Repeat Ultrasound of the liver every 6 months for HCC surveillance, next due 1/2025.  - Repeat liver function tests every 6 months to monitor liver function (scheduled 11/8/2024)  - Recommend EGD for variceal surveillance annually, next due 1/2025.  - Avoid alcohol and herbal supplements/alternative remedies.  - Recommend additional weight loss of 15 lbs, through diet and exercise.  - Recommend good control of  cholesterol, blood pressure, & blood sugar levels.  - Return to clinic in 6 months.       Hepatology Nurse Practitioner  [facility]

## 2024-09-09 ENCOUNTER — PATIENT MESSAGE (OUTPATIENT)
Dept: ADMINISTRATIVE | Facility: HOSPITAL | Age: 69
End: 2024-09-09
Payer: MEDICARE

## 2024-09-09 ENCOUNTER — PATIENT OUTREACH (OUTPATIENT)
Dept: ADMINISTRATIVE | Facility: HOSPITAL | Age: 69
End: 2024-09-09
Payer: MEDICARE

## 2024-09-09 DIAGNOSIS — Z12.31 SCREENING MAMMOGRAM FOR BREAST CANCER: Primary | ICD-10-CM

## 2024-09-17 ENCOUNTER — HOSPITAL ENCOUNTER (OUTPATIENT)
Dept: RADIOLOGY | Facility: HOSPITAL | Age: 69
Discharge: HOME | End: 2024-09-17
Attending: HOSPITALIST
Payer: MEDICARE

## 2024-09-17 VITALS — HEIGHT: 65 IN | BODY MASS INDEX: 29.16 KG/M2 | WEIGHT: 175 LBS

## 2024-09-17 DIAGNOSIS — Z12.31 SCREENING MAMMOGRAM FOR BREAST CANCER: ICD-10-CM

## 2024-09-17 PROCEDURE — 77063 BREAST TOMOSYNTHESIS BI: CPT | Mod: 26,,, | Performed by: RADIOLOGY

## 2024-09-17 PROCEDURE — 77067 SCR MAMMO BI INCL CAD: CPT | Mod: 26,,, | Performed by: RADIOLOGY

## 2024-09-17 PROCEDURE — 77067 SCR MAMMO BI INCL CAD: CPT | Mod: TC,PO

## 2024-09-18 ENCOUNTER — DOCUMENTATION ONLY (OUTPATIENT)
Dept: REHABILITATION | Facility: HOSPITAL | Age: 69
End: 2024-09-18

## 2024-09-18 ENCOUNTER — CLINICAL SUPPORT (OUTPATIENT)
Dept: REHABILITATION | Facility: HOSPITAL | Age: 69
End: 2024-09-18
Attending: PSYCHIATRY & NEUROLOGY
Payer: MEDICARE

## 2024-09-18 DIAGNOSIS — R26.89 IMBALANCE: Primary | ICD-10-CM

## 2024-09-18 DIAGNOSIS — R26.9 NEUROLOGIC GAIT DYSFUNCTION: ICD-10-CM

## 2024-09-18 PROCEDURE — 97162 PT EVAL MOD COMPLEX 30 MIN: CPT | Mod: PN

## 2024-09-18 NOTE — PLAN OF CARE
[facility]  Physical Therapy Neurological Rehabilitation Initial Evaluation     Name: Tona Carlson  Clinic Number: [id number]    Therapy Diagnosis:   Encounter Diagnoses   Name Primary?    Neurologic gait dysfunction     Imbalance Yes     Physician: Mallory Langford MD    Physician Orders: PT Eval and Treat   Medical Diagnosis from Referral: R26.9 (ICD-10-CM) - Neurologic gait dysfunction   Evaluation Date: 9/18/2024  Authorization Period Expiration: 8/20/2025  Plan of Care Expiration: 11/15/2024  Progress Note Due: 10/26/2024  Date of Surgery: Pituitary adenoma resection 2014   Visit # / Visits authorized: 1/1  FOTO: 0/3    Precautions: Standard and Diabetes    Time In: 9:50 AM  Time Out: 10:30 AM  Total Billable Time: 40 minutes (1 mod eval)    Subjective      Date of onset: chronic; diabetic polyneuropathy     History of current condition - Tona reports: MD recommended her for PT for mild wide gait. She experiences falls every ~2 years from uneven surfaces. She notes that walking isn't too much a problem, but doesn't walk more than 10 minutes. She does state standing tends to be more of a problem and is unable to for 5-10 before pain begins on bottom of both feet and sits down.      MD Note: history of resection of pituitary adenoma. Mild gait dysfunction noted on exam at new patient visit (mildly wide based gait) and patient reported history of falling every 2 or 3 years.     Imaging, MRI studies:     MRI PITUITARY W W/O CONTRAST 10/4/2022     CLINICAL HISTORY:  Pituitary/endocrine dysfunction; monitor pit adenoma; Disorder of pituitary gland, unspecified     COMPARISON:  10/13/2020, 07/25/2018, 05/31/2015     FINDINGS:  Ventricles are normal in size.  No hydrocephalus.     Impression:  Stable postoperative change of transsphenoidal pituitary adenoma resection.  Residual lobular enhancing tissue along the margins of the sella is unchanged from 07/25/2018.      Prior Therapy:  PT ~10 years ago for shoulder due to a fall  Social History:  lives alone  Falls: once every couple of years  Home Environment: single level, no stairs  Exercise Routine / History: Started going to the gym about a month ago  Family Present at time of Eval: No  Occupation: Retired  Prior Level of Function: Independent  Current Level of Function: Independent      Patient's goals: Standing/walking without difficulty    Medical History:   Past Medical History:   Diagnosis Date    Cataract     Compressive optic atrophy 2015    Diabetes mellitus, type 2     Fatty liver disease, nonalcoholic     Hyperlipidemia     Prolactinoma     Reflux 08/10/2012    Rosacea     Toe fracture, right 2018    Unspecified cirrhosis of liver        Surgical History:   Tona Carlson  has a past surgical history that includes  section; Cholecystectomy; Transphenoidal pituitary resection (2014); Breast biopsy (Right); Esophagogastroduodenoscopy (N/A, 3/8/2023); and Esophagogastroduodenoscopy (N/A, 2024).    Medications:   Tona has a current medication list which includes the following prescription(s): alcohol swabs, atorvastatin, bd ultra-fine david pen needle, blood glucose control, normal, blood sugar diagnostic, blood-glucose meter, cholecalciferol (vitamin d3), doxycycline, finasteride, fluticasone propionate, freestyle lancets, hydrocodone-acetaminophen, lantus solostar u-100 insulin, ipratropium, lancets, latanoprost, lidocaine, melatonin, metronidazole, ozempic, pregabalin, semglee(insulin glarg-yfgn)pen, and [DISCONTINUED] novolog flexpen u-100 insulin.    Allergies:   Review of patient's allergies indicates:  No Known Allergies     Objective      Command followin%     Speech: no deficits     Mental status: alert, oriented to person, place, and time  Behavior:  calm and cooperative  Attention Span and Concentration:  Normal    R foot: N/T, notes sensation present     Sensation:  Light Touch: Intact            Proprioception:   Intact      Lower Extremity ROM/Strength    ROM: WNL    RLE MMT LLE MMT   Hip Flexion: 4-/5 3+/5   Hip Abduction: 4-/5 4/5   Knee Extension: 3+/5 4-/5   Knee Flexion: 4-/5 4/5   Ankle Dorsiflexion: 4/5 4/5   Ankle Plantarflexion: 4/5 4/5   Ankle Inversion: 4-/5 4-/5   Ankle Eversion: 4/5 4/5   Key: ! = with pain; NT = not tested    Functional Gait Assessment:     *note: Measure a distance of 20 feet (~6 meters) for this assessment    1. Gait on level surface =  3   (3) Normal: less than 5.5 sec, no A.D., no imbalance, normal gait pattern, deviates <6in   (2) Mild impairment: 7-5.6 sec, uses A.D., mild gait deviations, or deviates 6-10 in   (1) Moderate impairment: > 7 sec, slow speed, imbalance, deviates 10-15 in.   (0) Severe impairment: needs assist, deviates >15 in, reach/touch wall  2. Change in Gait Speed = 3   (3) Normal: smooth change w/o loss of balance or gait deviation, deviates < 6 in, significant difference between speeds   (2) Mild impairment: changes speed, but demonstrates mild gait deviations, deviates 6-10 in, OR no deviations but unable to significantly speed, OR uses A.D.   (1) Moderate impairment: minor changes to speed, OR changes speed w/ significant deviations, deviates 10-15 in, OR  Changes speed , but loses balance & recovers   (0) Severe impairment: cannot change speed, deviates >15 in, or loses balance & needs assist  3. Gait with horizontal head turns  = 3   (3) Normal: no change in gait, deviates <6 in   (2) Mild impairment: slight change in speed, deviates 6-10 in, OR uses A.D.   (1) Moderate impairment: moderate change in speed, deviates 10-15 in   (0) Severe impairment: severe disruption of gait, deviates >15in  4. Gait with vertical head turns = 3   (3) Normal: no change in gait, deviates <6 in   (2) Mild impairment: slight change in speed, deviates 6-10 in OR uses A.D.   (1) Moderate impairment: moderate change in speed, deviates 10-15 in   (0) Severe  impairment: severe disruption of gait, deviates >15 in  5. Gait with pivot turns = 3   (3) Normal: performs safely in 3 sec, no LOB   (2) Mild impairment: performs in >3 sec & no LOB, OR turns safely & requires several steps to regain LOB   (1) Moderate impairment: turns slow, OR requires several small steps for balance following turn & stop   (0) Severe impairment: cannot turn safely, needs assist  6. Step over obstacle = 1   (3) Normal: steps over 2 stacked boxes w/o change in speed or LOB   (2) Mild impairment: able to step over 1 box w/o change in speed or LOB   (1) Moderate impairment: steps over 1 box but must slow down, may require VC   (0) Severe impairment: cannot perform w/o assist  7. Gait with Narrow EVANGELINA = 1   (3) Normal: 10 steps no staggering   (2) Mild impairment: 7-9 steps   (1) Moderate impairment: 4-7 steps   (0) Severe impairment: < 4 steps or cannot perform w/o assist  8. Gait with eyes closed = 2   (3) Normal: < 7 sec, no A.D., no LOB, normal gait pattern, deviates <6 in   (2) Mild impairment: 7.1-9 sec, mild gait deviations, deviates 6-10 in   (1) Moderate impairment: > 9 sec, abnormal pattern, LOB, deviates 10-15 in   (0) Severe impairment: cannot perform w/o assist, LOB, deviates >15in  9. Ambulating Backwards = 2   (3) Normal: no A.D., no LOB, normal gait pattern, deviates <6in   (2) Mild impairment: uses A.D., slower speed, mild gait deviations, deviates 6-10 in   (1) Moderate impairment: slow speed, abnormal gait pattern, LOB, deviates 10-15 in   (0) Severe impairment: severe gait deviations or LOB, deviates >15in  10. Steps = 3   (3) Normal: alternating feet, no rail   (2) Mild Impairment: alternating feet, uses rail   (1) Moderate impairment: step-to, uses rail   (0) Severe impairment: cannot perform safely    Score 24/30     Cutoffs:   <22/30 fall risk in older adults  <18/30 fall risk in Parkinsons    MDC/MCID:  Stroke = 4.2 points (MDC)  Vestibular = 6 points (MDC)  Geriatric = 4  points (MCID)  Parkinson's = 4 points (MDC)     m-CTSIB (each position held 30 seconds for pass)  - Condition 1 (eyes open, solid surface): P  - Condition 2 (eyes closed, solid surface): P  - Condition 3 (eyes open, foam surface): P  - Condition 4 (eyes closed, foam surface): P min sway       Single Leg Balance  R 8 seconds  L 1-2 seconds         FOTO: not taken at this time    Treatment     Total Treatment time separate from Evaluation: 00 minutes      Patient Education and Home Exercises     Education provided:   - Plan of care discussed; she is leaving town for a month, recommended     Written Home Exercises Provided: Yes.  Exercises were reviewed and Tona was able to demonstrate them prior to the end of the session.  Tona demonstrated good  understanding of the education provided.     Assessment     Tona is a 68 y.o. female referred to outpatient Physical Therapy with a medical diagnosis of Neurologic gait dysfunction [R26.9] . Patient presents with mild lower leg weakness, difficulty with clearing obstacles over ground, decreased single leg stance time, and difficulty with dynamic narrow base of support. She can benefit from skilled physical therapy to address these concerns to reduce risk of falls on uneven ground while ambulating in the community.     Patient prognosis is Excellent.   Patient will benefit from skilled outpatient Physical Therapy to address the deficits stated above and in the chart below, provide patient /family education, and to maximize patient's level of independence.     Plan of care discussed with patient: Yes  Patient's spiritual, cultural and educational needs considered and patient is agreeable to the plan of care and goals as stated below:     Anticipated Barriers for therapy: Leaving [facility] for a month    Medical Necessity is demonstrated by the following  History  Co-morbidities and personal factors that may impact the plan of care [] LOW: no personal factors /  co-morbidities  [] MODERATE: 1-2 personal factors / co-morbidities  [x] HIGH: 3+ personal factors / co-morbidities    Moderate / High Support Documentation:   Co-morbidities affecting plan of care: See above    Personal Factors:   no deficits     Examination  Body Structures and Functions, activity limitations and participation restrictions that may impact the plan of care [] LOW: addressing 1-2 elements  [x] MODERATE: 3+ elements  [] HIGH: 4+ elements (please support below)    Moderate / High Support Documentation: see above      Clinical Presentation [] LOW: stable  [x] MODERATE: Evolving  [] HIGH: Unstable     Decision Making/ Complexity Score: moderate       Goals:  Short Term Goals: 4 weeks   Patient will be compliant with HEP in order to maximize PT benefits  Patient will be able to perform right single leg balance in >/= 5 seconds with least restrictive assistive device in order to reduce risk for falls and improve safety with functional mobility  Patient will score >/= 26/30 on FGA with least restrictive assistive device in order to reduce risk for falls and improve postural control    Long Term Goals: 8 weeks   Patient will improve bilateral lower extremity MMT grades by >/=1 grade in order to improve strength for ADL completion  Patient will be able to perform bilateral single leg balance in >/= 10 seconds with least restrictive assistive device in order to reduce risk for falls and improve safety with functional mobility  Patient will score >/= 28/30 on FGA with least restrictive assistive device in order to reduce risk for falls and improve postural control  Patient will report 0 falls from initiation of PT management  Patient will begin some form of home/community fitness in order to sustain progress gained in PT    Plan     Plan of care Certification: 9/18/2024 to 11/13/2024.    Outpatient Physical Therapy 2 times weekly for 8 weeks to include the following interventions: Neuromuscular Re-ed, Patient  Education, Therapeutic Activities, and Therapeutic Exercise.     She will be leaving out of town for a month; give her HEP next visit and then reassess functional goals.     HEP: Heel raises, single leg balance, tandem balance EO/EC, high knee marches, SLR, Lateral walks with band     ADRIANE GASPAR PT        Physician's Signature: _________________________________________ Date: ________________

## 2024-09-18 NOTE — PROGRESS NOTES
Physical therapist and physical therapy assistant(s) met face to face on 09/18/2024 to discuss patient's treatment plan and progress towards established goals. Patient will be seen by a physical therapist minimally every 6th visit or every 30 days.    ADRIANE GASPAR PT

## 2024-09-18 NOTE — PROGRESS NOTES
"Date:  9/19/2024    ?  Referring Provider:   Paris Young MD    Copies of Letters to the Following:   Paris Young MD    Chief Complaint:  I saw Tona Carlson at the [facility] for neuro-ophthalmic evaluation.   She is a 68 y.o. female with a history of HLD, T2DM, HCV, ZARCO, obesity, POAG OU, pituitary adenoma s/p TSR in 2015 who presents for evaluation of formal visual fields.    History:     HPI     Referred: Dr. Young    67 y/o female present to clinic for Neuro-ophthalmic evaluation for   history of pituitary adenoma. Pt reports visual changes since Dr. Pak's   visit. Headaches are stable. No diplopia, no floaters, no flashes of   lights, no migraines, no tinnitus reports. She express no concerns today   only needs glasses for distance.     Eyemeds  Latanoprost QHS OU d  Last edited by Solitario Prajapati on 9/19/2024  1:55 PM.          11/2016 Mellisa:  "Ms. Carlson's visual field defects appear stable. She does not have diabetic retinopathy or CSME. I will repeat her exam and visual field testing in one year. "  ?  Current Outpatient Medications   Medication Sig Dispense Refill    alcohol swabs (BD ALCOHOL SWABS) PadM Use as needed 200 each 3    atorvastatin (LIPITOR) 20 MG tablet Take 1 tablet by mouth once daily 90 tablet 0    BD ULTRA-FINE WON PEN NEEDLE 32 gauge x 5/32" Ndle Use with insulin pens 100 each 3    blood glucose control, normal Soln Check glucose 1-2 x day 1 each 1    blood sugar diagnostic (FREESTYLE LITE STRIPS) Strp USE 1 STRIP TO CHECK GLUCOSE TWICE DAILY 100 each 0    blood-glucose meter kit Use as directed 1 each 0    cholecalciferol, vitamin D3, (VITAMIN D3) 50 mcg (2,000 unit) Cap Take 1 capsule (2,000 Units total) by mouth once daily.      doxycycline (PERIOSTAT) 20 MG tablet Take 40 mg by mouth.      finasteride (PROSCAR) 5 mg tablet Take 5 mg by mouth.      fluticasone propionate (FLONASE) 50 mcg/actuation nasal spray 2 sprays (100 mcg total) by Each " Nostril route once daily. 9.9 mL 11    FREESTYLE LANCETS 28 gauge lancets Apply topically.      HYDROcodone-acetaminophen (NORCO)  mg per tablet Take 1 tablet by mouth every 6 (six) hours as needed.      insulin glargine U-100, Lantus, (LANTUS SOLOSTAR U-100 INSULIN) 100 unit/mL (3 mL) InPn pen Inject 14 Units into the skin every evening. 30 mL 3    ipratropium (ATROVENT) 42 mcg (0.06 %) nasal spray 2 sprays by Each Nostril route 4 (four) times daily. 15 mL 0    lancets Misc Test glucose twice daily. Freestyle Lite. 200 each 11    latanoprost 0.005 % ophthalmic solution Place 1 drop into both eyes every evening. 2.5 mL 11    LIDOcaine (XYLOCAINE) 5 % Oint ointment Apply topically 2 (two) times daily as needed (foot pain). 50 g 5    melatonin (MELATIN) Take 3 mg by mouth every evening.      metroNIDAZOLE (METROGEL) 0.75 % gel Apply to face BID. 45 g 5    OZEMPIC 0.25 mg or 0.5 mg (2 mg/3 mL) pen injector Inject 0.5 mg into the skin every 7 days. 3 mL 3    pregabalin (LYRICA) 100 MG capsule Take 1 capsule (100 mg total) by mouth nightly. 90 capsule 3    SEMGLEE,INSULIN GLARG-YFGN, unit/mL (3 mL) InPn Inject into the skin.       No current facility-administered medications for this visit.     Review of patient's allergies indicates:  No Known Allergies  Past Medical History:   Diagnosis Date    Cataract     Compressive optic atrophy 2015    Diabetes mellitus, type 2     Fatty liver disease, nonalcoholic     Hyperlipidemia     Prolactinoma     Reflux 08/10/2012    Rosacea     Toe fracture, right 2018    Unspecified cirrhosis of liver      Past Surgical History:   Procedure Laterality Date    BREAST BIOPSY Right     core     SECTION      CHOLECYSTECTOMY      ESOPHAGOGASTRODUODENOSCOPY N/A 3/8/2023    Procedure: ESOPHAGOGASTRODUODENOSCOPY (EGD);  Surgeon: Raciel Becker MD;  Location: [address]);  Service: Gastroenterology;  Laterality: N/A;  refused to have    cirrhosis-labs done on 12/23/22  instr mailed/portal-GT  pre-call no answer 03-    ESOPHAGOGASTRODUODENOSCOPY N/A 1/19/2024    Procedure: EGD (ESOPHAGOGASTRODUODENOSCOPY);  Surgeon: Cresencio Alonso MD;  Location: [facility] ([address] [place] [address]);  Service: Endoscopy;  Laterality: N/A;  1/11/24-Ref Danni Calderón NP, h/o cirrhosis-last CBC, PT/INR 1/2/24, Ozempic on Sundays-holding 1/14/24 dose, instr portal-DS  1/17-pt will call back to confirm appt (not sure if she has a ride), pt notified of change in floor-KPvt  Moved to    TRANSPHENOIDAL PITUITARY RESECTION  2014     Family History   Problem Relation Name Age of Onset    Diabetes Mother      Glaucoma Mother      Hypertension Mother      No Known Problems Father      Thyroid disease Sister x1     No Known Problems Brother      No Known Problems Brother      Cancer Paternal Grandmother          Liver or Stomach    Liver disease Paternal Grandmother          ESLD with jaundice    Heart disease Paternal Grandfather      Colon cancer Neg Hx      Ovarian cancer Neg Hx      Breast cancer Neg Hx       Social History     Socioeconomic History    Marital status:     Number of children: 1   Tobacco Use    Smoking status: Never     Passive exposure: Never    Smokeless tobacco: Never   Substance and Sexual Activity    Alcohol use: No    Drug use: No    Sexual activity: Not Currently     Partners: Male     Social Determinants of Health     Financial Resource Strain: Low Risk  (1/16/2024)    Overall Financial Resource Strain (CARDIA)     Difficulty of Paying Living Expenses: Not hard at all   Food Insecurity: No Food Insecurity (1/16/2024)    Hunger Vital Sign     Worried About Running Out of Food in the Last Year: Never true     Ran Out of Food in the Last Year: Never true   Transportation Needs: No Transportation Needs (1/16/2024)    PRAPARE - Transportation     Lack of Transportation (Medical): No     Lack of Transportation (Non-Medical): No    Physical Activity: Inactive (1/16/2024)    Exercise Vital Sign     Days of Exercise per Week: 0 days     Minutes of Exercise per Session: 0 min   Stress: No Stress Concern Present (1/16/2024)    [place] citizen [place] [employer] - Occupational Stress Questionnaire     Feeling of Stress : Only a little   Housing Stability: Unknown (1/16/2024)    Housing Stability Vital Sign     Unable to Pay for Housing in the Last Year: No     Unstable Housing in the Last Year: No       Examination:  She was well-appearing. She was alert and oriented. Attention span and concentration were normal. Speech, language, memory, and general knowledge were intact.      Her distance visual acuity without correction was 20/50  (PH 20/30) in the right eye and 20/25  in the left eye.  Her near visual acuity with correction was J2 in the right eye and J1 in the left eye.      She perceived 6/8 OD and 7/8 OS Ishihara color plates correctly. Pupils were brisk to light without an afferent defect. Ocular ductions were full. Orthophoric in primary, right, and left gaze by cross cover. There was no nystagmus. Saccades and pursuits were normal. Lids were symmetric. Bilateral ptosis and dermatochalasis.     Optic discs appeared cupped and pallorous OD>OS Pupillary dilation was not necessary for visualization of the optic disc today.     On the remainder of the neurologic examination, facial sensation was intact. Face was symmetric.     Laboratories Reviewed:     N/a  ?  Neuroimaging Reviewed:     3/2024 MRI pituitary w/wo contrast  Ventricles are stable in size and configuration without hydrocephalus.     Postoperative change of trans-sphenoidal resection of pituitary adenoma.  Minimal (less than 1 mm) increase in size of residual heterogeneously enhancing lobulated lesion within the sella turcica which abuts and partially surrounds the bilateral cavernous ICAs.  No new mass effect or significant suprasellar extension.  Stable appearance of the  infundibulum with slight leftward deviation.     The brain appears unchanged from prior exam.  Similar prominent perivascular spaces within the bilateral basal ganglia and centrum semiovale.  No new parenchymal mass, hemorrhage or edema.  No or recent or remote major vascular distribution infarct.  No abnormal postcontrast parenchymal or leptomeningeal enhancement.     No extra-axial blood or fluid collections.     The T2 skull base flow voids are preserved.  Bone marrow signal intensity unremarkable.     Impression:     Postsurgical changes of transsphenoidal pituitary adenoma resection.  Overall stable appearance of residual lobulated     enhancing lesion along the sella.    ?  Ocular Imaging, Photos, Records Reviewed:     OCT RNFL Today 9/19/2024:   Right Eye - Average RNFL 40 global thinning   Left Eye - Average RNFL 63 nasal and temporal thinning, borderline superior and inferior thinning     Normal macular architecture OU    Visual Field Test 24-2 OU Today 9/19/2024: Right Eye - fixation losses 9/12, false positives 0%, false negatives 0%, MD -10.37dB, Impression OD: superior>inferior arcuate defects. Left Eye - fixation losses 4/10, false positives 2%, false negatives 0%, MD -3.06dB, Impression OS: superior arcuate defect.  ?  Impression:  Tona Carlson has history of HLD, T2DM, HCV, ZARCO, obesity, POAG OU, pituitary adenoma s/p TSR in 2015 who presents for evaluation of formal visual fields. They report no significant changes since last visit. Neuro-ophthalmologic examination was notable for mildly reduced visual acuities, mildly impaired color vision, normal ocular motility and alignment. OCT with stable peripapillary RNFL thinning. Formal visual fields were notable for roughly stable glaucomatous visual field changes. I will continue to monitor for any changes which would prompt concern for tumor growth.  ?  Plan:  1. Follow up in neurosurgery and endocrinology clinics as planned  2. Glaucoma drops  per Dr. Lu in optometry  3. Follow up with optometry/ophthalmology for yearly routine eye exams and refraction needs    Follow-up:  I will see her in follow-up in 6 months or sooner with any change.  ?OCT and HVF  ?  Visit Checklist (as applicable):  1. Status of new and prior symptoms discussed? yes  2. Neuroimaging reviewed/ ordered as appropriate? yes  3. Ocular imaging and photos reviewed/ ordered as appropriate? yes  4. Plan for work-up and treatment discussed with patient? yes  5. Potential medication side-effects and monitoring plan discussed? N/a  6. Review of outside medical records was performed and pertinent details are summarized in the HPI above? N/a    Time spent on this encounter: 60 minutes. This includes face to face time and non-face to face time preparing to see the patient (eg, review of tests), obtaining and/or reviewing separately obtained history, documenting clinical information in the electronic or other health record, independently interpreting results and communicating results to the patient/family/caregiver, or care coordinator.    Visit today included increased complexity associated with the evaluation and the longitudinal management of the patient due to the serious and complex problem of pituitary mass s/p resecrtion requiring episodic surveillance of optic disc appearance, visual function, and formal visual reyes.    SALVADOR Bailey  Neuro-Ophthalmology Consultant

## 2024-09-19 ENCOUNTER — OFFICE VISIT (OUTPATIENT)
Dept: OPHTHALMOLOGY | Facility: CLINIC | Age: 69
End: 2024-09-19
Payer: MEDICARE

## 2024-09-19 ENCOUNTER — CLINICAL SUPPORT (OUTPATIENT)
Dept: OPHTHALMOLOGY | Facility: CLINIC | Age: 69
End: 2024-09-19
Payer: MEDICARE

## 2024-09-19 DIAGNOSIS — D35.2 PITUITARY ADENOMA: Chronic | ICD-10-CM

## 2024-09-19 DIAGNOSIS — H53.15 VISUAL DISTORTIONS OF SHAPE AND SIZE: Primary | ICD-10-CM

## 2024-09-19 PROCEDURE — 99999 PR PBB SHADOW E&M-EST. PATIENT-LVL III: CPT | Mod: PBBFAC,,, | Performed by: STUDENT IN AN ORGANIZED HEALTH CARE EDUCATION/TRAINING PROGRAM

## 2024-09-19 NOTE — PROGRESS NOTES
VISUAL FIELD TEST 24-2 SFAST-OU-DONE/AB  OD-REL-GOOD-FIX-POOR-COOP-GOOD/AB  OS-REL-GOOD-FIX-FAIR-COOP-GOOD/AB    PT HAS NO KNOWN ALLERGIES TO LATEX OR ADHESIVES./AB    MRX: OD +0.75 +0.50 X 140            OS +0.75 +0.25 X 50

## 2024-09-24 ENCOUNTER — CLINICAL SUPPORT (OUTPATIENT)
Dept: REHABILITATION | Facility: HOSPITAL | Age: 69
End: 2024-09-24
Attending: PSYCHIATRY & NEUROLOGY
Payer: MEDICARE

## 2024-09-24 DIAGNOSIS — R26.89 IMBALANCE: Primary | ICD-10-CM

## 2024-09-24 PROCEDURE — 97530 THERAPEUTIC ACTIVITIES: CPT | Mod: PN

## 2024-09-24 NOTE — PROGRESS NOTES
"[facility]   Physical Therapy Treatment Note      Name: Tona Carlson  Clinic Number: [id number]    Therapy Diagnosis:   Encounter Diagnosis   Name Primary?    Imbalance Yes     Physician: Mallory Langford MD    Visit Date: 9/24/2024    Physician Orders: PT Eval and Treat   Medical Diagnosis from Referral: R26.9 (ICD-10-CM) - Neurologic gait dysfunction   Evaluation Date: 9/18/2024  Authorization Period Expiration: 8/20/2025  Plan of Care Expiration: 11/15/2024  Progress Note Due: 10/26/2024  Date of Surgery: Pituitary adenoma resection 2014   Visit # / Visits authorized: 1/20  FOTO: 0/3     Precautions: Standard and Diabetes     Time In: 10:35 AM  Time Out: 11:13 AM  Total Billable Time: 38 minutes (3 TA)    PTA Visit #: 0/5       Subjective     Patient reports: She's been feeling good, getting ready for her trip.    She was compliant with home exercise program.    Response to previous treatment: IE last visit    Pain: 0/10  Location: N/A    Objective      Objective Measures updated at progress report unless specified.     Treatment     Tona received the treatments listed below:      therapeutic exercises to develop strength, endurance, ROM, flexibility, posture, and core stabilization for 00 minutes including:      neuromuscular re-education activities to improve: Balance, Coordination, Kinesthetic, Sense, Proprioception, and Posture for 00 minutes. The following activities were included:      therapeutic activities to improve functional performance for 38  minutes, including:    HEP:   Single leg balance 2x30" B  Tandem Balance 2x30" B  Heel Raises 2x15  Marches in Place 2x20  Sit to Stand x15  Step up with CL knee drive x15 B  Adductor squeezes w ball x15  Bridges x15  Lateral walks with RTB 6 lengths at 10' at bar   Hip 3-way with RTB x10 each direction B         Patient Education and Home Exercises       Education provided:   - Plan of Care discussed    Written Home " Exercises Provided: Yes. Exercises were reviewed and Tona was able to demonstrate them prior to the end of the session.  Tona demonstrated good  understanding of the education provided. See Electronic Medical Record under Patient Instructions for exercises provided during therapy sessions    Assessment     Tona arrived with no complaints and agreeable to treatment. Session consisted of introducing her to a home exercise program she can perform while she is out of town. She demonstrated each exercise with good form and without adverse response. Verbal cues were given to slow down and demonstrate additional motor control during theraband activities. She can benefit from continued skilled therapy to reassess her functional goals upon returning.     Tona Is progressing well towards her goals.   Patient prognosis is Good.     Patient will continue to benefit from skilled outpatient physical therapy to address the deficits listed in the problem list box on initial evaluation, provide pt/family education and to maximize pt's level of independence in the home and community environment.     Patient's spiritual, cultural and educational needs considered and pt agreeable to plan of care and goals.     Anticipated barriers to physical therapy: Leaving town for a month    Goals:     Short Term Goals: 4 weeks   Patient will be compliant with HEP in order to maximize PT benefits  Patient will be able to perform right single leg balance in >/= 5 seconds with least restrictive assistive device in order to reduce risk for falls and improve safety with functional mobility  Patient will score >/= 26/30 on FGA with least restrictive assistive device in order to reduce risk for falls and improve postural control     Long Term Goals: 8 weeks   Patient will improve bilateral lower extremity MMT grades by >/=1 grade in order to improve strength for ADL completion  Patient will be able to perform bilateral single leg balance in  >/= 10 seconds with least restrictive assistive device in order to reduce risk for falls and improve safety with functional mobility  Patient will score >/= 28/30 on FGA with least restrictive assistive device in order to reduce risk for falls and improve postural control  Patient will report 0 falls from initiation of PT management  Patient will begin some form of home/community fitness in order to sustain progress gained in PT    Plan     Plan of care Certification: 9/18/2024 to 11/13/2024.     Outpatient Physical Therapy 2 times weekly for 8 weeks to include the following interventions: Neuromuscular Re-ed, Patient Education, Therapeutic Activities, and Therapeutic Exercise.      She will be leaving out of town for a month; give her HEP next visit and then reassess functional goals.     ADRIANE GASPAR, PT

## 2024-10-01 ENCOUNTER — PATIENT MESSAGE (OUTPATIENT)
Dept: INTERNAL MEDICINE | Facility: CLINIC | Age: 69
End: 2024-10-01
Payer: MEDICARE

## 2024-10-22 ENCOUNTER — TELEPHONE (OUTPATIENT)
Dept: ENDOSCOPY | Facility: HOSPITAL | Age: 69
End: 2024-10-22

## 2024-10-22 ENCOUNTER — PATIENT MESSAGE (OUTPATIENT)
Dept: ENDOSCOPY | Facility: HOSPITAL | Age: 69
End: 2024-10-22

## 2024-10-22 NOTE — TELEPHONE ENCOUNTER
Attempted to contact patient to schedule colonoscopy. The patient did not answer the call. Left voice message requesting a call back at [phone] to get procedure scheduled.

## 2024-10-25 ENCOUNTER — OFFICE VISIT (OUTPATIENT)
Dept: URGENT CARE | Facility: CLINIC | Age: 69
End: 2024-10-25
Payer: MEDICARE

## 2024-10-25 VITALS
SYSTOLIC BLOOD PRESSURE: 120 MMHG | RESPIRATION RATE: 16 BRPM | DIASTOLIC BLOOD PRESSURE: 81 MMHG | TEMPERATURE: 99 F | OXYGEN SATURATION: 97 % | HEART RATE: 81 BPM | BODY MASS INDEX: 29.02 KG/M2 | HEIGHT: 65 IN | WEIGHT: 174.19 LBS

## 2024-10-25 DIAGNOSIS — H65.193 ACUTE EFFUSION OF BOTH MIDDLE EARS: ICD-10-CM

## 2024-10-25 DIAGNOSIS — J06.9 UPPER RESPIRATORY VIRUS: Primary | ICD-10-CM

## 2024-10-25 LAB
CTP QC/QA: YES
SARS-COV-2 AG RESP QL IA.RAPID: NEGATIVE

## 2024-10-25 NOTE — PROGRESS NOTES
"Subjective:      Patient ID: Tona Carlson is a 69 y.o. female.    Vitals:  height is 5' 5" (1.651 m) and weight is 79 kg (174 lb 2.6 oz). Her oral temperature is 98.7 °F (37.1 °C). Her blood pressure is 120/81 and her pulse is 81. Her respiration is 16 and oxygen saturation is 97%.     Chief Complaint: Sinus Problem    Pt present with congestion,  runny nose. Sx started 3 days ago. Tx include OTC cold and flu with no relief.   Provider note below:  This is a 69 y.o. female who presents today with a chief complaint of nasal congestion and runny nose started 3 days ago, denies fever, body aches or chills, denies cough, wheezing or shortness of breath, denies nausea, vomiting, diarrhea or abdominal pain, denies chest pain or dizziness positional lightheadedness, denies sore throat or trouble swallowing, denies loss of taste or smell, or any other symptoms         Sinus Problem  This is a new problem. The current episode started in the past 7 days. The problem is unchanged. There has been no fever. Associated symptoms include congestion, headaches and sneezing. Pertinent negatives include no coughing or sinus pressure. Past treatments include oral decongestants. The treatment provided no relief.     HENT:  Positive for congestion. Negative for sinus pressure.    Respiratory:  Negative for cough.    Allergic/Immunologic: Positive for sneezing.   Neurological:  Positive for headaches.      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    Diabetes mellitus, type 2     Fatty liver disease, nonalcoholic     Hyperlipidemia     Prolactinoma     Reflux 08/10/2012    Rosacea     Toe fracture, right 07/2018    Unspecified cirrhosis of liver        Objective:     Physical Exam   Constitutional: She is oriented to person, place, and time. She appears well-developed. She is cooperative.  Non-toxic appearance. She does not appear ill. No distress.   HENT:   Head: Normocephalic and " atraumatic.   Ears:   Right Ear: Hearing, external ear and ear canal normal. Tympanic membrane is not erythematous. A middle ear effusion is present.   Left Ear: Hearing, external ear and ear canal normal. Tympanic membrane is not erythematous. A middle ear effusion is present.   Nose: Nose normal. No mucosal edema, rhinorrhea or nasal deformity. No epistaxis. Right sinus exhibits no maxillary sinus tenderness and no frontal sinus tenderness. Left sinus exhibits no maxillary sinus tenderness and no frontal sinus tenderness.   Mouth/Throat: Uvula is midline, oropharynx is clear and moist and mucous membranes are normal. No trismus in the jaw. Normal dentition. No uvula swelling. No oropharyngeal exudate, posterior oropharyngeal edema, posterior oropharyngeal erythema, tonsillar abscesses or cobblestoning.   Eyes: Conjunctivae and lids are normal. No scleral icterus.   Neck: Trachea normal and phonation normal. Neck supple. No edema present. No erythema present. No neck rigidity present.   Cardiovascular: Normal rate, regular rhythm, normal heart sounds and normal pulses.   Pulmonary/Chest: Effort normal and breath sounds normal. No respiratory distress. She has no decreased breath sounds. She has no rhonchi.   Abdominal: Normal appearance.   Musculoskeletal: Normal range of motion.         General: No deformity. Normal range of motion.   Neurological: She is alert and oriented to person, place, and time. She exhibits normal muscle tone. Coordination normal.   Skin: Skin is warm, dry, intact, not diaphoretic and not pale.   Psychiatric: Her speech is normal and behavior is normal. Judgment and thought content normal.   Nursing note and vitals reviewed.    Results for orders placed or performed in visit on 10/25/24   SARS Coronavirus 2 Antigen, POCT Manual Read    Collection Time: 10/25/24  1:58 PM   Result Value Ref Range    SARS Coronavirus 2 Antigen Negative Negative     Acceptable Yes           Patient in no acute distress.  Vitals reassuring.  Discussed results/diagnosis/plan in depth with patient in clinic. Strict precautions given to patient to monitor for worsening signs and symptoms. Advised to follow up with primary.All questions answered. Strict ER precautions given. If your symptoms worsens or fail to improve you should go to the Emergency Room. Discharge and follow-up instructions given verbally/printed. Discharge and follow-up instructions discussed with the patient who expressed understanding and willingness to comply with my recommendations.Patient voiced understanding and in agreement with current treatment plan.     Please be advised this text was dictated with [employer] software and may contain errors due to translation.      Assessment:     1. Upper respiratory virus    2. Acute effusion of both middle ears        Plan:       Upper respiratory virus  -     SARS Coronavirus 2 Antigen, POCT Manual Read    Acute effusion of both middle ears                Patient Instructions   PLEASE READ YOUR DISCHARGE INSTRUCTIONS ENTIRELY AS IT CONTAINS IMPORTANT INFORMATION.    Use over the counter flonase: one spray each nostril twice daily OR two sprays each nostril once daily.   If you find this dries your nose out or your nose bleeds, try using over the counter nasal saline a few minutes prior to using the flonase to moisten the lining of your nose.     Please take an over the counter antihistamine medication (allegra/Claritin/Zyrtec) of your choice as directed.    Over the counter decongestant like sudafed or mucinex D    Please return or see your primary care doctor if you develop new or worsening symptoms.     Please arrange follow up with your primary medical clinic as soon as possible. You must understand that you've received an Urgent Care treatment only and that you may be released before all of your medical problems are known or treated. You, the patient, will arrange for follow up as  instructed. If your symptoms worsen or fail to improve you should go to the Emergency Room.

## 2024-11-05 ENCOUNTER — CLINICAL SUPPORT (OUTPATIENT)
Dept: REHABILITATION | Facility: HOSPITAL | Age: 69
End: 2024-11-05
Payer: MEDICARE

## 2024-11-05 ENCOUNTER — HOSPITAL ENCOUNTER (OUTPATIENT)
Dept: RADIOLOGY | Facility: CLINIC | Age: 69
Discharge: HOME | End: 2024-11-05
Attending: NURSE PRACTITIONER
Payer: MEDICARE

## 2024-11-05 DIAGNOSIS — M81.0 AGE RELATED OSTEOPOROSIS, UNSPECIFIED PATHOLOGICAL FRACTURE PRESENCE: Chronic | ICD-10-CM

## 2024-11-05 DIAGNOSIS — R26.89 IMBALANCE: Primary | ICD-10-CM

## 2024-11-05 PROCEDURE — 77080 DXA BONE DENSITY AXIAL: CPT | Mod: 26,,, | Performed by: INTERNAL MEDICINE

## 2024-11-05 PROCEDURE — 97112 NEUROMUSCULAR REEDUCATION: CPT | Mod: PN

## 2024-11-05 PROCEDURE — 77080 DXA BONE DENSITY AXIAL: CPT | Mod: TC

## 2024-11-05 PROCEDURE — 97530 THERAPEUTIC ACTIVITIES: CPT | Mod: PN

## 2024-11-05 PROCEDURE — 97110 THERAPEUTIC EXERCISES: CPT | Mod: PN

## 2024-11-05 NOTE — PROGRESS NOTES
[facility]   Physical Therapy Treatment Note      Name: Tona Carlson  Clinic Number: [id number]    Therapy Diagnosis:   Encounter Diagnosis   Name Primary?    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 As a result she has not kept up with home exercise   She was not compliant with home exercise program.  Response to previous treatment: >1 month ago  Functional change: None yet    Pain: 0/10  Location: N/A    Objective      Objective Measures updated at progress report unless specified.     Functional Gait Assessment:     1. Gait on level surface =  3   (3) Normal: less than 5.5 sec, no A.D., no imbalance, normal gait pattern, deviates <6in   (2) Mild impairment: 7-5.6 sec, uses A.D., mild gait deviations, or deviates 6-10 in   (1) Moderate impairment: > 7 sec, slow speed, imbalance, deviates 10-15 in.   (0) Severe impairment: needs assist, deviates >15 in, reach/touch wall  2. Change in Gait Speed = 3   (3) Normal: smooth change w/o loss of balance or gait deviation, deviates < 6 in, significant difference between speeds   (2) Mild impairment: changes speed, but demonstrates mild gait deviations, deviates 6-10 in, OR no deviations but unable to significantly speed, OR  uses A.D.   (1) Moderate impairment: minor changes to speed, OR changes speed w/ significant deviations, deviates 10-15 in, OR  Changes speed , but loses balance & recovers   (0) Severe impairment: cannot change speed, deviates >15 in, or loses balance & needs assist  3. Gait with horizontal head turns  = 2   (3) Normal: no change in gait, deviates <6 in   (2) Mild impairment: slight change in speed, deviates 6-10 in, OR uses A.D.   (1) Moderate impairment: moderate change in speed, deviates 10-15 in   (0) Severe impairment: severe disruption of gait, deviates >15in  4. Gait with vertical head turns = 2   (3) Normal: no change in gait, deviates <6 in   (2) Mild impairment: slight change in speed, deviates 6-10 in OR uses A.D.   (1) Moderate impairment: moderate change in speed, deviates 10-15 in   (0) Severe impairment: severe disruption of gait, deviates >15 in  5. Gait with pivot turns = 3   (3) Normal: performs safely in 3 sec, no LOB   (2) Mild impairment: performs in >3 sec & no LOB, OR turns safely & requires several steps to regain LOB   (1) Moderate impairment: turns slow, OR requires several small steps for balance following turn & stop   (0) Severe impairment: cannot turn safely, needs assist  6. Step over obstacle = 2   (3) Normal: steps over 2 stacked boxes w/o change in speed or LOB   (2) Mild impairment: able to step over 1 box w/o change in speed or LOB   (1) Moderate impairment: steps over 1 box but must slow down, may require VC   (0) Severe impairment: cannot perform w/o assist  7. Gait with Narrow EVANGELINA = 1   (3) Normal: 10 steps no staggering   (2) Mild impairment: 7-9 steps   (1) Moderate impairment: 4-7 steps   (0) Severe impairment: < 4 steps or cannot perform w/o assist  8. Gait with eyes closed = 2   (3) Normal: < 7 sec, no A.D., no LOB, normal gait pattern, deviates <6 in   (2) Mild impairment: 7.1-9 sec, mild gait deviations, deviates 6-10 in   (1) Moderate impairment: > 9 sec, abnormal  "pattern, LOB, deviates 10-15 in   (0) Severe impairment: cannot perform w/o assist, LOB, deviates >15in  9. Ambulating Backwards = 2   (3) Normal: no A.D., no LOB, normal gait pattern, deviates <6in   (2) Mild impairment: uses A.D., slower speed, mild gait deviations, deviates 6-10 in   (1) Moderate impairment: slow speed, abnormal gait pattern, LOB, deviates 10-15 in   (0) Severe impairment: severe gait deviations or LOB, deviates >15in  10. Steps = 3   (3) Normal: alternating feet, no rail   (2) Mild Impairment: alternating feet, uses rail   (1) Moderate impairment: step-to, uses rail   (0) Severe impairment: cannot perform safely    Score 23/30     Cutoffs:   <22/30 fall risk in older adults  <18/30 fall risk in Parkinsons    MDC/MCID:  Stroke = 4.2 points (MDC)  Vestibular = 6 points (MDC)  Geriatric = 4 points (MCID)  Parkinson's = 4 points (MDC)    Single Leg Stance: <3 seconds each leg    Treatment     Tona received the treatments listed below:      therapeutic exercises to develop strength, endurance, ROM, flexibility, posture, and core stabilization for 10 minutes including:  - Hip 3-way with red theraband 2x10 each direction, each leg (HEP review)    neuromuscular re-education activities to improve: Balance, Coordination, Kinesthetic, Sense, Proprioception, and Posture for 15 minutes. The following activities were included:  - Stepper bike x 6 minutes total with cuing to maximize movement amplitude  - Tandem stance 2x30" each leg leading  (HEP review)  - Tandem walking x 6 lengths x 10 feet each with touchdown support as needed (HEP review)    therapeutic activities to improve functional performance for 15 minutes, including:  - Goal reassessment (see above)  - High knee march walks without upper extremity support x 6 lengths x 10 feet each  - Lateral step up and overs 4" step 3x45"  no upper extremity support      Patient Education and Home Exercises       Education provided:   - Plan of Care " discussed    Written Home Exercises Provided: Yes. Exercises were reviewed and Tona was able to demonstrate them prior to the end of the session.  Tona demonstrated good  understanding of the education provided. See Electronic Medical Record under Patient Instructions for exercises provided during therapy sessions    Assessment     Tona arrived to PT after >1 month absence due to trip out of the country. She has not kept up with HEP provided last visit and has not shown any improvement in functional balance scores as a result. Encouraged patient to resume selected HEP items as soon as possible to maximize therapeutic benefit. While she is not objectively at risk for falls per FGA, she is per single leg stance time and demonstrates higher level anticipatory postural control impairments. She would benefit from continued PT services to reduce risk for falls and improve balance confidence.    Tona Is progressing well towards her goals.   Patient prognosis is Good.     Patient will continue to benefit from skilled outpatient physical therapy to address the deficits listed in the problem list box on initial evaluation, provide pt/family education and to maximize pt's level of independence in the home and community environment.     Patient's spiritual, cultural and educational needs considered and pt agreeable to plan of care and goals.     Anticipated barriers to physical therapy: Leaving town for a month    Goals:     Short Term Goals: 4 weeks   Patient will be compliant with HEP in order to maximize PT benefits  Patient will be able to perform right single leg balance in >/= 5 seconds with least restrictive assistive device in order to reduce risk for falls and improve safety with functional mobility  Patient will score >/= 26/30 on FGA with least restrictive assistive device in order to reduce risk for falls and improve postural control     Long Term Goals: 8 weeks   Patient will improve bilateral lower  extremity MMT grades by >/=1 grade in order to improve strength for ADL completion  Patient will be able to perform bilateral single leg balance in >/= 10 seconds with least restrictive assistive device in order to reduce risk for falls and improve safety with functional mobility  Patient will score >/= 28/30 on FGA with least restrictive assistive device in order to reduce risk for falls and improve postural control  Patient will report 0 falls from initiation of PT management  Patient will begin some form of home/community fitness in order to sustain progress gained in PT    Plan     Plan of care Certification: 9/18/2024 to 11/13/2024.     Outpatient Physical Therapy 2 times weekly for 8 weeks to include the following interventions: Neuromuscular Re-ed, Patient Education, Therapeutic Activities, and Therapeutic Exercise.      Collect FOTO next and progress as tolerated.    ADRIANE GASPAR, PT

## 2024-11-05 NOTE — PROGRESS NOTES
[facility]   Physical Therapy Treatment Note      Name: Tona Carlson  Clinic Number: [id number]    Therapy Diagnosis:   Encounter Diagnosis   Name Primary?    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    She was compliant with home exercise program.    Response to previous treatment: IE last visit    Pain: 0/10  Location: N/A    Objective      Objective Measures updated at progress report unless specified.     Functional Gait Assessment:     *note: Measure a distance of 20 feet (~6 meters) for this assessment    1. Gait on level surface =  3   (3) Normal: less than 5.5 sec, no A.D., no imbalance, normal gait pattern, deviates <6in   (2) Mild impairment: 7-5.6 sec, uses A.D., mild gait deviations, or deviates 6-10 in   (1) Moderate impairment: > 7 sec, slow speed, imbalance, deviates 10-15 in.   (0) Severe impairment: needs assist, deviates >15 in, reach/touch wall  2. Change in Gait Speed = 3   (3) Normal: smooth change w/o loss of balance or gait deviation, deviates < 6 in, significant difference between speeds   (2) Mild impairment: changes speed, but demonstrates mild gait deviations, deviates 6-10 in, OR no deviations but unable to significantly speed, OR uses A.D.   (1) Moderate impairment: minor changes to speed, OR changes speed w/ significant deviations,  deviates 10-15 in, OR  Changes speed , but loses balance & recovers   (0) Severe impairment: cannot change speed, deviates >15 in, or loses balance & needs assist  3. Gait with horizontal head turns  = 2   (3) Normal: no change in gait, deviates <6 in   (2) Mild impairment: slight change in speed, deviates 6-10 in, OR uses A.D.   (1) Moderate impairment: moderate change in speed, deviates 10-15 in   (0) Severe impairment: severe disruption of gait, deviates >15in  4. Gait with vertical head turns = 2   (3) Normal: no change in gait, deviates <6 in   (2) Mild impairment: slight change in speed, deviates 6-10 in OR uses A.D.   (1) Moderate impairment: moderate change in speed, deviates 10-15 in   (0) Severe impairment: severe disruption of gait, deviates >15 in  5. Gait with pivot turns = 3   (3) Normal: performs safely in 3 sec, no LOB   (2) Mild impairment: performs in >3 sec & no LOB, OR turns safely & requires several steps to regain LOB   (1) Moderate impairment: turns slow, OR requires several small steps for balance following turn & stop   (0) Severe impairment: cannot turn safely, needs assist  6. Step over obstacle = 2   (3) Normal: steps over 2 stacked boxes w/o change in speed or LOB   (2) Mild impairment: able to step over 1 box w/o change in speed or LOB   (1) Moderate impairment: steps over 1 box but must slow down, may require VC   (0) Severe impairment: cannot perform w/o assist  7. Gait with Narrow EVANGELINA = 1   (3) Normal: 10 steps no staggering   (2) Mild impairment: 7-9 steps   (1) Moderate impairment: 4-7 steps   (0) Severe impairment: < 4 steps or cannot perform w/o assist  8. Gait with eyes closed = 2   (3) Normal: < 7 sec, no A.D., no LOB, normal gait pattern, deviates <6 in   (2) Mild impairment: 7.1-9 sec, mild gait deviations, deviates 6-10 in   (1) Moderate impairment: > 9 sec, abnormal pattern, LOB, deviates 10-15 in   (0) Severe impairment: cannot perform w/o assist, LOB, deviates >15in  9.  "Ambulating Backwards = 2   (3) Normal: no A.D., no LOB, normal gait pattern, deviates <6in   (2) Mild impairment: uses A.D., slower speed, mild gait deviations, deviates 6-10 in   (1) Moderate impairment: slow speed, abnormal gait pattern, LOB, deviates 10-15 in   (0) Severe impairment: severe gait deviations or LOB, deviates >15in  10. Steps = 3   (3) Normal: alternating feet, no rail   (2) Mild Impairment: alternating feet, uses rail   (1) Moderate impairment: step-to, uses rail   (0) Severe impairment: cannot perform safely    Score 23/30     Cutoffs:   <22/30 fall risk in older adults  <18/30 fall risk in Parkinsons    MDC/MCID:  Stroke = 4.2 points (MDC)  Vestibular = 6 points (MDC)  Geriatric = 4 points (MCID)  Parkinson's = 4 points (MDC)    Single Leg Stance: <3 seconds each leg      Treatment     Tona received the treatments listed below:      therapeutic exercises to develop strength, endurance, ROM, flexibility, posture, and core stabilization for 00 minutes including:  - Hip 3-way with red theraband 2x10 each direction, each leg (HEP review)    neuromuscular re-education activities to improve: Balance, Coordination, Kinesthetic, Sense, Proprioception, and Posture for 00 minutes. The following activities were included:  - Stepper bike x 6 minutes total with cuing to maximize movement amplitude  - Tandem stance 2x30" each leg leading   - Tandem walking x 6 lengths x 10 feet each with touchdown support as needed    therapeutic activities to improve functional performance for 38  minutes, including:  - Goal reassessment (see above)  - High knee march walks without upper extremity support x 6 lengths x 10 feet each  - Lateral step up and overs 4" step 3x45"     HEP:   Single leg balance 2x30" B  Heel Raises 2x15  Marches in Place 2x20  Sit to Stand x15  Step up with CL knee drive x15 B  Adductor squeezes w ball x15  Bridges x15  Lateral walks with RTB 6 lengths at 10' at bar         Patient Education and " Home Exercises       Education provided:   - Plan of Care discussed    Written Home Exercises Provided: Yes. Exercises were reviewed and Tona was able to demonstrate them prior to the end of the session.  Tona demonstrated good  understanding of the education provided. See Electronic Medical Record under Patient Instructions for exercises provided during therapy sessions    Assessment     Tona arrived with no complaints and agreeable to treatment. Session consisted of introducing her to a home exercise program she can perform while she is out of town. She demonstrated each exercise with good form and without adverse response. Verbal cues were given to slow down and demonstrate additional motor control during theraband activities. She can benefit from continued skilled therapy to reassess her functional goals upon returning.     Tona Is progressing well towards her goals.   Patient prognosis is Good.     Patient will continue to benefit from skilled outpatient physical therapy to address the deficits listed in the problem list box on initial evaluation, provide pt/family education and to maximize pt's level of independence in the home and community environment.     Patient's spiritual, cultural and educational needs considered and pt agreeable to plan of care and goals.     Anticipated barriers to physical therapy: Leaving town for a month    Goals:     Short Term Goals: 4 weeks   Patient will be compliant with HEP in order to maximize PT benefits  Patient will be able to perform right single leg balance in >/= 5 seconds with least restrictive assistive device in order to reduce risk for falls and improve safety with functional mobility  Patient will score >/= 26/30 on FGA with least restrictive assistive device in order to reduce risk for falls and improve postural control     Long Term Goals: 8 weeks   Patient will improve bilateral lower extremity MMT grades by >/=1 grade in order to improve strength  for ADL completion  Patient will be able to perform bilateral single leg balance in >/= 10 seconds with least restrictive assistive device in order to reduce risk for falls and improve safety with functional mobility  Patient will score >/= 28/30 on FGA with least restrictive assistive device in order to reduce risk for falls and improve postural control  Patient will report 0 falls from initiation of PT management  Patient will begin some form of home/community fitness in order to sustain progress gained in PT    Plan     Plan of care Certification: 9/18/2024 to 11/13/2024.     Outpatient Physical Therapy 2 times weekly for 8 weeks to include the following interventions: Neuromuscular Re-ed, Patient Education, Therapeutic Activities, and Therapeutic Exercise.      She will be leaving out of town for a month; give her HEP next visit and then reassess functional goals.     ADRIANE GASPAR, PT

## 2024-11-07 ENCOUNTER — CLINICAL SUPPORT (OUTPATIENT)
Dept: REHABILITATION | Facility: HOSPITAL | Age: 69
End: 2024-11-07
Payer: MEDICARE

## 2024-11-07 DIAGNOSIS — R26.89 IMBALANCE: Primary | ICD-10-CM

## 2024-11-07 PROCEDURE — 97112 NEUROMUSCULAR REEDUCATION: CPT | Mod: PN

## 2024-11-07 PROCEDURE — 97530 THERAPEUTIC ACTIVITIES: CPT | Mod: PN

## 2024-11-07 PROCEDURE — 97110 THERAPEUTIC EXERCISES: CPT | Mod: PN

## 2024-11-07 NOTE — PROGRESS NOTES
"[facility]   Physical Therapy Treatment Note      Name: Tona Carlson  Clinic Number: [id number]    Therapy Diagnosis:   Encounter Diagnosis   Name Primary?    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 No new complaints today.   She was not compliant with home exercise program.  Response to previous treatment: no adverse response  Functional change: none yet    Pain: 0/10  Location: N/A    Objective      Objective Measures updated at progress report unless specified.     Treatment     Tona received the treatments listed below:      therapeutic exercises to develop strength, endurance, ROM, flexibility, posture, and core stabilization for 10 minutes including:  - Recumbent bike x 6 minutes level 3.5 single peak  - Calf raise 2x15 edge of green foam disc     neuromuscular re-education activities to improve: Balance, Coordination, Kinesthetic, Sense, Proprioception, and Posture for 14 minutes. The following activities were included:  - Semi tandem stance on blue foam discs + volleyball chest press x15B  - Tandem walking x 6 lengths x 10 feet each with touchdown support as needed (HEP review)  - Reciprocal toe taps to 6" step from Airex 2x45"    therapeutic activities to improve functional performance for 16 minutes, including:  - High knee march walks without upper " "extremity support x 6 lengths x 10 feet each 2# ankle weights  - Reciprocal step ups to 4" step + contralateral hip  2x45" each leg with 2# ankle weights  - Lateral red theraband steps x 6 lengths x 10 feet each; no upper extremity support  - Lateral siri step overs x 4 lengths x 8 feet each (6" hurdles)  - Forward/reciprocal siri step overs x 6 lengths x 8 feet each (6" hurdles)    Patient Education and Home Exercises       Education provided:   - Plan of Care discussed    Written Home Exercises Provided: Yes. Exercises were reviewed and Tona was able to demonstrate them prior to the end of the session.  Tona demonstrated good  understanding of the education provided. See Electronic Medical Record under Patient Instructions for exercises provided during therapy sessions    Assessment     Tona arrived to session without new complaints and agreeable to PT. She tolerated entire visit without need for seated rest. Appropriate levels of fatigue achieved. Visual demonstration required to optimize form with novel exercises. She was able to minimize upper extremity support appropriately during balance activity. She would benefit from continued PT services to achieve functional goals. Discussed possible plan of care extension soon since attendance in PT was precluded by extended time out of country and recent respiratory illness.    Tona Is progressing well towards her goals.   Patient prognosis is Good.     Patient will continue to benefit from skilled outpatient physical therapy to address the deficits listed in the problem list box on initial evaluation, provide pt/family education and to maximize pt's level of independence in the home and community environment.     Patient's spiritual, cultural and educational needs considered and pt agreeable to plan of care and goals.     Anticipated barriers to physical therapy: Leaving town for a month    Goals:     Short Term Goals: 4 weeks   Patient will be " compliant with HEP in order to maximize PT benefits (progressing, not met)  Patient will be able to perform right single leg balance in >/= 5 seconds with least restrictive assistive device in order to reduce risk for falls and improve safety with functional mobility  (progressing, not met)  Patient will score >/= 26/30 on FGA with least restrictive assistive device in order to reduce risk for falls and improve postural control  (progressing, not met)     Long Term Goals: 8 weeks   Patient will improve bilateral lower extremity MMT grades by >/=1 grade in order to improve strength for ADL completion  (progressing, not met)  Patient will be able to perform bilateral single leg balance in >/= 10 seconds with least restrictive assistive device in order to reduce risk for falls and improve safety with functional mobility  (progressing, not met)  Patient will score >/= 28/30 on FGA with least restrictive assistive device in order to reduce risk for falls and improve postural control  Patient will report 0 falls from initiation of PT management  (progressing, not met)  Patient will begin some form of home/community fitness in order to sustain progress gained in PT  (progressing, not met)    Plan     Plan of care Certification: 9/18/2024 to 11/13/2024.     Outpatient Physical Therapy 2 times weekly for 8 weeks to include the following interventions: Neuromuscular Re-ed, Patient Education, Therapeutic Activities, and Therapeutic Exercise.      Progress as tolerated    ADRIANE GASPAR, PT

## 2024-11-08 ENCOUNTER — LAB VISIT (OUTPATIENT)
Dept: LAB | Facility: HOSPITAL | Age: 69
End: 2024-11-08
Attending: HOSPITALIST
Payer: MEDICARE

## 2024-11-08 DIAGNOSIS — Z85.05 ENCOUNTER FOR FOLLOW-UP SURVEILLANCE OF LIVER CANCER: ICD-10-CM

## 2024-11-08 DIAGNOSIS — Z08 ENCOUNTER FOR FOLLOW-UP SURVEILLANCE OF LIVER CANCER: ICD-10-CM

## 2024-11-08 DIAGNOSIS — Z79.4 TYPE 2 DIABETES MELLITUS WITH DIABETIC POLYNEUROPATHY, WITH LONG-TERM CURRENT USE OF INSULIN: ICD-10-CM

## 2024-11-08 DIAGNOSIS — E78.5 HYPERLIPIDEMIA ASSOCIATED WITH TYPE 2 DIABETES MELLITUS: ICD-10-CM

## 2024-11-08 DIAGNOSIS — R76.8 HEPATITIS C ANTIBODY TEST POSITIVE: ICD-10-CM

## 2024-11-08 DIAGNOSIS — E55.9 VITAMIN D DEFICIENCY: ICD-10-CM

## 2024-11-08 DIAGNOSIS — E11.42 TYPE 2 DIABETES MELLITUS WITH DIABETIC POLYNEUROPATHY, WITH LONG-TERM CURRENT USE OF INSULIN: ICD-10-CM

## 2024-11-08 DIAGNOSIS — E66.3 OVERWEIGHT (BMI 25.0-29.9): ICD-10-CM

## 2024-11-08 DIAGNOSIS — K74.60 LIVER CIRRHOSIS SECONDARY TO NASH: ICD-10-CM

## 2024-11-08 DIAGNOSIS — K75.81 LIVER CIRRHOSIS SECONDARY TO NASH: ICD-10-CM

## 2024-11-08 DIAGNOSIS — K76.6 PORTAL HYPERTENSION: ICD-10-CM

## 2024-11-08 DIAGNOSIS — I70.0 AORTIC ATHEROSCLEROSIS: ICD-10-CM

## 2024-11-08 DIAGNOSIS — I85.10 SECONDARY ESOPHAGEAL VARICES WITHOUT BLEEDING: ICD-10-CM

## 2024-11-08 DIAGNOSIS — E11.69 HYPERLIPIDEMIA ASSOCIATED WITH TYPE 2 DIABETES MELLITUS: ICD-10-CM

## 2024-11-08 DIAGNOSIS — E11.42 TYPE 2 DIABETES MELLITUS WITH DIABETIC POLYNEUROPATHY, WITHOUT LONG-TERM CURRENT USE OF INSULIN: Chronic | ICD-10-CM

## 2024-11-08 LAB
25(OH)D3+25(OH)D2 SERPL-MCNC: 21 NG/ML (ref 30–96)
AFP SERPL-MCNC: 6 NG/ML (ref 0–8.4)
ALBUMIN SERPL BCP-MCNC: 4 G/DL (ref 3.5–5.2)
ALBUMIN/CREAT UR: 54.1 UG/MG (ref 0–30)
ALP SERPL-CCNC: 94 U/L (ref 40–150)
ALT SERPL W/O P-5'-P-CCNC: 55 U/L (ref 10–44)
ANION GAP SERPL CALC-SCNC: 15 MMOL/L (ref 8–16)
AST SERPL-CCNC: 50 U/L (ref 10–40)
BACTERIA #/AREA URNS AUTO: NORMAL /HPF
BASOPHILS # BLD AUTO: 0.03 K/UL (ref 0–0.2)
BASOPHILS NFR BLD: 0.7 % (ref 0–1.9)
BILIRUB SERPL-MCNC: 1.2 MG/DL (ref 0.1–1)
BILIRUB UR QL STRIP: NEGATIVE
BUN SERPL-MCNC: 7 MG/DL (ref 8–23)
CALCIUM SERPL-MCNC: 9.6 MG/DL (ref 8.7–10.5)
CHLORIDE SERPL-SCNC: 109 MMOL/L (ref 95–110)
CHOLEST SERPL-MCNC: 160 MG/DL (ref 120–199)
CHOLEST/HDLC SERPL: 3.8 {RATIO} (ref 2–5)
CLARITY UR REFRACT.AUTO: CLEAR
CO2 SERPL-SCNC: 19 MMOL/L (ref 23–29)
COLOR UR AUTO: YELLOW
CREAT SERPL-MCNC: 0.7 MG/DL (ref 0.5–1.4)
CREAT UR-MCNC: 218 MG/DL (ref 15–325)
DIFFERENTIAL METHOD BLD: ABNORMAL
EOSINOPHIL # BLD AUTO: 0.1 K/UL (ref 0–0.5)
EOSINOPHIL NFR BLD: 3.3 % (ref 0–8)
ERYTHROCYTE [DISTWIDTH] IN BLOOD BY AUTOMATED COUNT: 13.4 % (ref 11.5–14.5)
EST. GFR  (NO RACE VARIABLE): >60 ML/MIN/1.73 M^2
ESTIMATED AVG GLUCOSE: 134 MG/DL (ref 68–131)
GLUCOSE SERPL-MCNC: 132 MG/DL (ref 70–110)
GLUCOSE UR QL STRIP: NEGATIVE
HBA1C MFR BLD: 6.3 % (ref 4–5.6)
HCT VFR BLD AUTO: 40.5 % (ref 37–48.5)
HDLC SERPL-MCNC: 42 MG/DL (ref 40–75)
HDLC SERPL: 26.3 % (ref 20–50)
HGB BLD-MCNC: 13.6 G/DL (ref 12–16)
HGB UR QL STRIP: NEGATIVE
HYALINE CASTS UR QL AUTO: 0 /LPF
IMM GRANULOCYTES # BLD AUTO: 0.01 K/UL (ref 0–0.04)
IMM GRANULOCYTES NFR BLD AUTO: 0.2 % (ref 0–0.5)
INR PPP: 1 (ref 0.8–1.2)
KETONES UR QL STRIP: NEGATIVE
LDLC SERPL CALC-MCNC: 91.4 MG/DL (ref 63–159)
LEUKOCYTE ESTERASE UR QL STRIP: ABNORMAL
LYMPHOCYTES # BLD AUTO: 1.8 K/UL (ref 1–4.8)
LYMPHOCYTES NFR BLD: 42.1 % (ref 18–48)
MCH RBC QN AUTO: 31.1 PG (ref 27–31)
MCHC RBC AUTO-ENTMCNC: 33.6 G/DL (ref 32–36)
MCV RBC AUTO: 93 FL (ref 82–98)
MICROALBUMIN UR DL<=1MG/L-MCNC: 118 UG/ML
MICROSCOPIC COMMENT: NORMAL
MONOCYTES # BLD AUTO: 0.3 K/UL (ref 0.3–1)
MONOCYTES NFR BLD: 7.2 % (ref 4–15)
NEUTROPHILS # BLD AUTO: 2 K/UL (ref 1.8–7.7)
NEUTROPHILS NFR BLD: 46.5 % (ref 38–73)
NITRITE UR QL STRIP: NEGATIVE
NONHDLC SERPL-MCNC: 118 MG/DL
NRBC BLD-RTO: 0 /100 WBC
PH UR STRIP: 6 [PH] (ref 5–8)
PLATELET # BLD AUTO: 151 K/UL (ref 150–450)
PMV BLD AUTO: 11 FL (ref 9.2–12.9)
POTASSIUM SERPL-SCNC: 4.1 MMOL/L (ref 3.5–5.1)
PROT SERPL-MCNC: 7.1 G/DL (ref 6–8.4)
PROT UR QL STRIP: ABNORMAL
PROTHROMBIN TIME: 11.1 SEC (ref 9–12.5)
RBC # BLD AUTO: 4.37 M/UL (ref 4–5.4)
RBC #/AREA URNS AUTO: 1 /HPF (ref 0–4)
SODIUM SERPL-SCNC: 143 MMOL/L (ref 136–145)
SP GR UR STRIP: 1.02 (ref 1–1.03)
SQUAMOUS #/AREA URNS AUTO: 1 /HPF
TRIGL SERPL-MCNC: 133 MG/DL (ref 30–150)
TSH SERPL DL<=0.005 MIU/L-ACNC: 1.62 UIU/ML (ref 0.4–4)
URN SPEC COLLECT METH UR: ABNORMAL
WBC # BLD AUTO: 4.28 K/UL (ref 3.9–12.7)
WBC #/AREA URNS AUTO: 5 /HPF (ref 0–5)

## 2024-11-08 PROCEDURE — 36415 COLL VENOUS BLD VENIPUNCTURE: CPT | Mod: PO | Performed by: NURSE PRACTITIONER

## 2024-11-08 PROCEDURE — 85610 PROTHROMBIN TIME: CPT | Performed by: NURSE PRACTITIONER

## 2024-11-08 PROCEDURE — 84443 ASSAY THYROID STIM HORMONE: CPT | Performed by: HOSPITALIST

## 2024-11-08 PROCEDURE — 80061 LIPID PANEL: CPT | Performed by: HOSPITALIST

## 2024-11-08 PROCEDURE — 82105 ALPHA-FETOPROTEIN SERUM: CPT | Performed by: NURSE PRACTITIONER

## 2024-11-08 PROCEDURE — 85025 COMPLETE CBC W/AUTO DIFF WBC: CPT | Performed by: HOSPITALIST

## 2024-11-08 PROCEDURE — 83036 HEMOGLOBIN GLYCOSYLATED A1C: CPT | Performed by: HOSPITALIST

## 2024-11-08 PROCEDURE — 82306 VITAMIN D 25 HYDROXY: CPT | Performed by: HOSPITALIST

## 2024-11-08 PROCEDURE — 82570 ASSAY OF URINE CREATININE: CPT | Performed by: HOSPITALIST

## 2024-11-08 PROCEDURE — 81001 URINALYSIS AUTO W/SCOPE: CPT | Performed by: HOSPITALIST

## 2024-11-08 PROCEDURE — 80053 COMPREHEN METABOLIC PANEL: CPT | Performed by: HOSPITALIST

## 2024-11-11 NOTE — TELEPHONE ENCOUNTER
----- Message from Medina Prakash MD sent at 2/13/2020  2:00 PM CST -----  Please notify pt:      - Electrolytes, kidney and liver function are normal  - CBC is normal/acceptable range; no signs of anemia  - TSH (thyroid) level is normal  - Vitamin D level is low; Vitamin D is important for healthy bones and calcium absorption. I recommend a supplement of vitamin D3 1000 I.u daily. You can find this over the counter at your local pharmacy  - Cholesterol panel is normal  - Urine tests are normal/acceptable  - Hemoglobin A1c shows diabetes is well controlled at 5.8    
Patient informed of her lab results and verbalized understanding.  
Strong peripheral pulses/Capillary refill less/equal to 2 seconds

## 2024-11-12 ENCOUNTER — CLINICAL SUPPORT (OUTPATIENT)
Dept: REHABILITATION | Facility: HOSPITAL | Age: 69
End: 2024-11-12
Payer: MEDICARE

## 2024-11-12 DIAGNOSIS — R26.89 IMBALANCE: Primary | ICD-10-CM

## 2024-11-12 PROCEDURE — 97112 NEUROMUSCULAR REEDUCATION: CPT | Mod: PN,CQ

## 2024-11-12 PROCEDURE — 97110 THERAPEUTIC EXERCISES: CPT | Mod: PN,CQ

## 2024-11-12 PROCEDURE — 97530 THERAPEUTIC ACTIVITIES: CPT | Mod: PN,CQ

## 2024-11-12 NOTE — PROGRESS NOTES
"[facility]   Physical Therapy Treatment Note      Name: Tona Carlson  Clinic Number: [id number]    Therapy Diagnosis:   Encounter Diagnosis   Name Primary?    Imbalance Yes     Physician: Mallory Langford MD    Visit Date: 11/12/2024    Physician Orders: PT Eval and Treat   Medical Diagnosis from Referral: R26.9 (ICD-10-CM) - Neurologic gait dysfunction   Evaluation Date: 9/18/2024  Authorization Period Expiration: 8/20/2025  Plan of Care Expiration: 11/15/2024  Progress Note Due: last assessed 11/5/2024  Date of Surgery: Pituitary adenoma resection 2014   Visit # / Visits authorized: 4/20 + eval  FOTO: 0/3     Precautions: Standard and Diabetes     Time In: 10:35 AM  Time Out: 11:15 AM  Total Billable Time: 40 minutes (1TE + 1NMR + 1TA)    PTA Visit #: 1/5     Subjective     Patient reports: Felt fine after last visit. No new complaints today.   She was not compliant with home exercise program.  Response to previous treatment: no adverse response  Functional change: none yet    Pain: 0/10  Location: N/A    Objective      Objective Measures updated at progress report unless specified.     Treatment     Tona received the treatments listed below:      therapeutic exercises to develop strength, endurance, ROM, flexibility, posture, and core stabilization for 10 minutes including:  - Recumbent bike x 6 minutes level 3.5 single peak  - Calf raise 2x15 edge of green foam disc     neuromuscular re-education activities to improve: Balance, Coordination, Kinesthetic, Sense, Proprioception, and Posture for 15 minutes. The following activities were included:  - Semi tandem stance on blue foam discs + volleyball chest press x15B  - Tandem walking x 6 lengths x 10 feet each with touchdown support as needed (HEP review)  - Reciprocal toe taps to 6" step from Airex 2x45"    therapeutic activities to improve functional performance for 15 minutes, including:  - High knee march walks without upper " "extremity support x 6 lengths x 10 feet each 2# ankle weights  - Reciprocal step ups to 4" step + contralateral hip  2x45" each leg with 2# ankle weights  - Lateral red theraband steps x 6 lengths x 10 feet each; no upper extremity support  - Lateral siri step overs x 4 lengths x 8 feet each (6" hurdles)  - Forward/reciprocal siri step overs x 6 lengths x 8 feet each (6" hurdles)    Patient Education and Home Exercises       Education provided:   - Plan of Care discussed    Written Home Exercises Provided: Yes. Exercises were reviewed and Tona was able to demonstrate them prior to the end of the session.  Tona demonstrated good  understanding of the education provided. See Electronic Medical Record under Patient Instructions for exercises provided during therapy sessions    Assessment     Tona arrived to session without complaints and agreeable to treatment.  Session focused on hip strengthening, dynamic balance, and endurance based activity.  Good tolerance of treatment with only one brief seated rest breaks required for fatigue recovery.  Patient was receptive to cuing to limit UE support to maximize benefit of balance training.  She would benefit from continued PT services to achieve functional goals.     Tona Is progressing well towards her goals.   Patient prognosis is Good.     Patient will continue to benefit from skilled outpatient physical therapy to address the deficits listed in the problem list box on initial evaluation, provide pt/family education and to maximize pt's level of independence in the home and community environment.     Patient's spiritual, cultural and educational needs considered and pt agreeable to plan of care and goals.     Anticipated barriers to physical therapy: Leaving town for a month    Goals:     Short Term Goals: 4 weeks   Patient will be compliant with HEP in order to maximize PT benefits (progressing, not met)  Patient will be able to perform right single " leg balance in >/= 5 seconds with least restrictive assistive device in order to reduce risk for falls and improve safety with functional mobility  (progressing, not met)  Patient will score >/= 26/30 on FGA with least restrictive assistive device in order to reduce risk for falls and improve postural control  (progressing, not met)     Long Term Goals: 8 weeks   Patient will improve bilateral lower extremity MMT grades by >/=1 grade in order to improve strength for ADL completion  (progressing, not met)  Patient will be able to perform bilateral single leg balance in >/= 10 seconds with least restrictive assistive device in order to reduce risk for falls and improve safety with functional mobility  (progressing, not met)  Patient will score >/= 28/30 on FGA with least restrictive assistive device in order to reduce risk for falls and improve postural control  Patient will report 0 falls from initiation of PT management  (progressing, not met)  Patient will begin some form of home/community fitness in order to sustain progress gained in PT  (progressing, not met)    Plan     Plan of care Certification: 9/18/2024 to 11/13/2024.     Outpatient Physical Therapy 2 times weekly for 8 weeks to include the following interventions: Neuromuscular Re-ed, Patient Education, Therapeutic Activities, and Therapeutic Exercise.      Progress as tolerated    Renee Pierre, PTA

## 2024-11-13 ENCOUNTER — TELEPHONE (OUTPATIENT)
Dept: INTERNAL MEDICINE | Facility: CLINIC | Age: 69
End: 2024-11-13

## 2024-11-13 ENCOUNTER — OFFICE VISIT (OUTPATIENT)
Dept: INTERNAL MEDICINE | Facility: CLINIC | Age: 69
End: 2024-11-13
Payer: MEDICARE

## 2024-11-13 ENCOUNTER — TELEPHONE (OUTPATIENT)
Dept: ENDOCRINOLOGY | Facility: CLINIC | Age: 69
End: 2024-11-13
Payer: MEDICARE

## 2024-11-13 DIAGNOSIS — K75.81 LIVER CIRRHOSIS SECONDARY TO NASH: ICD-10-CM

## 2024-11-13 DIAGNOSIS — K74.60 LIVER CIRRHOSIS SECONDARY TO NASH: ICD-10-CM

## 2024-11-13 DIAGNOSIS — Z00.00 ENCOUNTER FOR PREVENTIVE HEALTH EXAMINATION: Primary | ICD-10-CM

## 2024-11-13 DIAGNOSIS — E78.5 HYPERLIPIDEMIA ASSOCIATED WITH TYPE 2 DIABETES MELLITUS: ICD-10-CM

## 2024-11-13 DIAGNOSIS — E11.42 TYPE 2 DIABETES MELLITUS WITH DIABETIC POLYNEUROPATHY, WITHOUT LONG-TERM CURRENT USE OF INSULIN: Chronic | ICD-10-CM

## 2024-11-13 DIAGNOSIS — E55.9 VITAMIN D DEFICIENCY: ICD-10-CM

## 2024-11-13 DIAGNOSIS — E11.69 HYPERLIPIDEMIA ASSOCIATED WITH TYPE 2 DIABETES MELLITUS: ICD-10-CM

## 2024-11-13 DIAGNOSIS — I70.0 AORTIC ATHEROSCLEROSIS: ICD-10-CM

## 2024-11-13 PROCEDURE — 99999 PR PBB SHADOW E&M-EST. PATIENT-LVL III: CPT | Mod: PBBFAC,,, | Performed by: HOSPITALIST

## 2024-11-13 PROCEDURE — 1160F RVW MEDS BY RX/DR IN RCRD: CPT | Mod: CPTII,S$GLB,, | Performed by: HOSPITALIST

## 2024-11-13 PROCEDURE — 3072F LOW RISK FOR RETINOPATHY: CPT | Mod: CPTII,S$GLB,, | Performed by: HOSPITALIST

## 2024-11-13 PROCEDURE — 3060F POS MICROALBUMINURIA REV: CPT | Mod: CPTII,S$GLB,, | Performed by: HOSPITALIST

## 2024-11-13 PROCEDURE — 3066F NEPHROPATHY DOC TX: CPT | Mod: CPTII,S$GLB,, | Performed by: HOSPITALIST

## 2024-11-13 PROCEDURE — 3044F HG A1C LEVEL LT 7.0%: CPT | Mod: CPTII,S$GLB,, | Performed by: HOSPITALIST

## 2024-11-13 PROCEDURE — 1159F MED LIST DOCD IN RCRD: CPT | Mod: CPTII,S$GLB,, | Performed by: HOSPITALIST

## 2024-11-13 PROCEDURE — 99397 PER PM REEVAL EST PAT 65+ YR: CPT | Mod: S$GLB,,, | Performed by: HOSPITALIST

## 2024-11-13 RX ORDER — PNEUMOCOCCAL 20-VALENT CONJUGATE VACCINE 2.2; 2.2; 2.2; 2.2; 2.2; 2.2; 2.2; 2.2; 2.2; 2.2; 2.2; 2.2; 2.2; 2.2; 2.2; 2.2; 4.4; 2.2; 2.2; 2.2 UG/.5ML; UG/.5ML; UG/.5ML; UG/.5ML; UG/.5ML; UG/.5ML; UG/.5ML; UG/.5ML; UG/.5ML; UG/.5ML; UG/.5ML; UG/.5ML; UG/.5ML; UG/.5ML; UG/.5ML; UG/.5ML; UG/.5ML; UG/.5ML; UG/.5ML; UG/.5ML
0.5 INJECTION, SUSPENSION INTRAMUSCULAR ONCE
Qty: 0.5 ML | Refills: 0 | Status: SHIPPED | OUTPATIENT
Start: 2024-11-13 | End: 2024-11-13

## 2024-11-13 NOTE — TELEPHONE ENCOUNTER
----- Message from Medina Prakash MD sent at 11/13/2024  3:10 PM CST -----  Regarding: Please call pt that she needs to schedule her Colonoscopy since her cologuard was positive.  Please call pt that she needs to schedule her Colonoscopy since her cologuard was positive.  [facility] tried to reach her last month. She needs to call [phone] to schedule it

## 2024-11-13 NOTE — TELEPHONE ENCOUNTER
Lvm and left message on portal for pt to contact GI clinic for scheduling Colonoscopy since cologuard was positive.

## 2024-11-13 NOTE — PROGRESS NOTES
Subjective:     @Patient ID: Tona Carlson is a 69 y.o. female.    Chief Complaint: Annual Exam    HPI       History of Present Illness    CHIEF COMPLAINT:  Ms. Carlson presents for a follow-up wellness visit and to discuss recent labs results.    HPI:  Ms. Carlson reports memory issues, particularly with name and word recall, which began at the start of the year. She discontinued Ozempic for 1-2 months but resumed it 2 months ago, noting improved memory function since restarting. Ms. Carlson recently had a significant illness, likely a respiratory infection, lasting about 2 weeks, with heavy secretions and weakness. While most symptoms have resolved, fatigue persists. Ms. Carlson is unsure if she had COVID-19 during this illness. She reports discomfort when taking multiple vitamins, affecting her consistency in taking vitamin D supplements. For insulin administration, she switched from a 4mm to a 6mm? needle for her insulin pen, noting reduced discomfort with the longer needle. Ms. Carlson sees several specialists:  Neha Saravia (endocrinologist) for diabetes management, Danni Gonzalez (liver specialist) for cirrhosis, and Dr. Neal (neuro eye doctor) in September, who recommended a follow up in 6 months. Ms. Carlson denies any changes to her current medications.    MEDICATIONS:  Ms. Carlson restarted Ozempic 2 months ago for diabetes. She is on a Vitamin D supplement, taken inconsistently due to GI discomfort. Ms. Carlson administers insulin via pen injection in her abdomen.    MEDICAL HISTORY:  Ms. Carlson has a history of diabetes, cirrhosis, and Vitamin D deficiency. She received a flu vaccine this season. Ms. Carlson also received the old version of the pneumonia vaccine 2 years ago.    TEST RESULTS:  Recent urinalysis shows mild proteinuria, attributed to diabetes. Her recent Vitamin D level is slightly low. Ms. Carlson's recent A1c is 6.3. Her recent kidney function and electrolytes are within normal  "limits. Recent liver tests are slightly elevated. Ms. Carlson's recent cholesterol panel, blood counts, and thyroid tests are within normal limits. Ms. Carlson had an eye exam in June performed by Dr. Lu. She had another eye exam in September performed by Dr. Neal, with a recommendation for a follow up in 6 months.      ROS:  Constitutional: +fatigue  Psychiatric: +memory problems                 Utd MMG.   Pt has not yet scheduled her cscope.       Review of Systems  Past medical history, surgical history, and family medical history reviewed and updated as appropriate.    Medications and allergies reviewed.     Objective:     Vitals:    11/13/24 1348   Pulse: (P) 73   Temp: (P) 97.6 °F (36.4 °C)   TempSrc: (P) Temporal   Weight: (P) 78.6 kg (173 lb 4.5 oz)   Height: (P) 5' 5" (1.651 m)     Body mass index is 28.84 kg/m² (pended).  Physical Exam  Vitals reviewed.   Constitutional:       General: She is not in acute distress.     Appearance: She is well-developed.   HENT:      Head: Normocephalic and atraumatic.      Right Ear: Tympanic membrane normal.      Left Ear: Tympanic membrane normal.      Mouth/Throat:      Mouth: Mucous membranes are moist.      Pharynx: No oropharyngeal exudate.   Eyes:      General:         Right eye: No discharge.         Left eye: No discharge.      Conjunctiva/sclera: Conjunctivae normal.   Cardiovascular:      Rate and Rhythm: Normal rate and regular rhythm.      Heart sounds: No murmur heard.     No friction rub.   Pulmonary:      Effort: Pulmonary effort is normal.      Breath sounds: Normal breath sounds.   Abdominal:      General: Bowel sounds are normal. There is no distension.      Palpations: Abdomen is soft.      Tenderness: There is no abdominal tenderness. There is no guarding.   Musculoskeletal:         General: Normal range of motion.      Cervical back: Normal range of motion and neck supple.      Right lower leg: No edema.      Left lower leg: No edema. "   Lymphadenopathy:      Cervical: No cervical adenopathy.   Skin:     General: Skin is warm and dry.   Neurological:      Mental Status: She is alert and oriented to person, place, and time.   Psychiatric:         Mood and Affect: Mood normal.         Behavior: Behavior normal.         Lab Results   Component Value Date    WBC 4.28 11/08/2024    HGB 13.6 11/08/2024    HCT 40.5 11/08/2024     11/08/2024    CHOL 160 11/08/2024    TRIG 133 11/08/2024    HDL 42 11/08/2024    ALT 55 (H) 11/08/2024    AST 50 (H) 11/08/2024     11/08/2024    K 4.1 11/08/2024     11/08/2024    CREATININE 0.7 11/08/2024    BUN 7 (L) 11/08/2024    CO2 19 (L) 11/08/2024    TSH 1.617 11/08/2024    INR 1.0 11/08/2024    HGBA1C 6.3 (H) 11/08/2024       Assessment:     1. Encounter for preventive health examination    2. Type 2 diabetes mellitus with diabetic polyneuropathy, without long-term current use of insulin    3. Hyperlipidemia associated with type 2 diabetes mellitus    4. Aortic atherosclerosis    5. Vitamin D deficiency    6. Liver cirrhosis secondary to ZARCO      Plan:   Tona was seen today for annual exam.    Diagnoses and all orders for this visit:    Encounter for preventive health examination    Type 2 diabetes mellitus with diabetic polyneuropathy, without long-term current use of insulin  -     Ambulatory referral/consult to Endocrinology; Future  -     Hemoglobin A1C; Future  -     Comprehensive Metabolic Panel; Future  -     Lipid Panel; Future  -     Microalbumin/Creatinine Ratio, Urine; Future    Hyperlipidemia associated with type 2 diabetes mellitus  -     Hemoglobin A1C; Future  -     Comprehensive Metabolic Panel; Future  -     Lipid Panel; Future  -     Microalbumin/Creatinine Ratio, Urine; Future    Aortic atherosclerosis  -     Hemoglobin A1C; Future  -     Comprehensive Metabolic Panel; Future  -     Lipid Panel; Future  -     Microalbumin/Creatinine Ratio, Urine; Future    Vitamin D  deficiency  -     Hemoglobin A1C; Future  -     Comprehensive Metabolic Panel; Future  -     Lipid Panel; Future  -     Microalbumin/Creatinine Ratio, Urine; Future    Liver cirrhosis secondary to ZARCO    Other orders  -     pneumoc 20-teja conj-dip cr,PF, (PREVNAR 20, PF,) 0.5 mL Syrg injection; Inject 0.5 mLs into the muscle once. for 1 dose         VITAMIN D DEFICIENCY:   Emphasized the importance of daily vitamin D supplementation for addressing low levels.   Ms. Carlson to take vitamin D supplement daily.   Continued vitamin D supplementation, emphasizing daily use.    DM2/OZEMPIC USE:   Continued Ozempic as currently prescribed.  - pt overdue for f/u with endocrine    PNEUMONIA VACCINATION:   Provided information about the difference between bacterial pneumonia vaccines (Prevnar) and viral pneumonia vaccines (RSV).   Started Prevnar 20 (new pneumonia vaccine) to be obtained from [facility] pharmacy.    OPHTHALMOLOGY FOLLOW-UP:   Follow up with Dr. Neal (eye doctor) in March (6 months from September visit).   Continue regular follow-ups with optometrist Dr. Lu.    HEPATOLOGY FOLLOW-UP:   Follow up with liver doctor in January.             Medina Prakash MD  Internal Medicine    11/13/2024

## 2024-11-14 ENCOUNTER — CLINICAL SUPPORT (OUTPATIENT)
Dept: REHABILITATION | Facility: HOSPITAL | Age: 69
End: 2024-11-14
Payer: MEDICARE

## 2024-11-14 DIAGNOSIS — R26.89 IMBALANCE: Primary | ICD-10-CM

## 2024-11-14 PROCEDURE — 97110 THERAPEUTIC EXERCISES: CPT | Mod: PN

## 2024-11-14 PROCEDURE — 97112 NEUROMUSCULAR REEDUCATION: CPT | Mod: PN

## 2024-11-14 PROCEDURE — 97530 THERAPEUTIC ACTIVITIES: CPT | Mod: PN

## 2024-11-14 NOTE — PROGRESS NOTES
[facility]   Physical Therapy Treatment Note      Name: Tona Carlson  [facility] Number: [id number]    Therapy Diagnosis:   Encounter Diagnosis   Name Primary?    Imbalance Yes     Physician: Mallory Langford MD    Visit Date: 11/14/2024    Physician Orders: PT Eval and Treat   Medical Diagnosis from Referral: R26.9 (ICD-10-CM) - Neurologic gait dysfunction   Evaluation Date: 9/18/2024  Authorization Period Expiration: 8/20/2025  Plan of Care Expiration: 11/15/2024  Progress Note Due: last assessed 11/5/2024  Date of Surgery: Pituitary adenoma resection 2014   Visit # / Visits authorized: 5/20 + eval  FOTO: 0/3     Precautions: Standard and Diabetes     Time In: 10:38 AM  Time Out: 11:16 AM  Total Billable Time: 38 minutes (1TE + 1NMR + 1TA)    PTA Visit #: 0/5     Subjective     Patient reports: Wanting to discharge from PT as she has a busy month coming up. Likely going to have to get significant dental work performed. Understands benefits of keeping up with HEP  She was not compliant with home exercise program.  Response to previous treatment: no adverse response  Functional change: see reassessment 11/14    Pain: 0/10  Location: N/A    Objective      Objective Measures updated at progress report unless specified.     Single Leg Stance: ~3 seconds bilaterally    Functional Gait Assessment:     1. Gait on level surface =  3   (3) Normal: less than 5.5 sec, no A.D., no imbalance, normal gait pattern, deviates <6in   (2) Mild impairment: 7-5.6 sec, uses A.D., mild gait deviations, or deviates 6-10 in   (1) Moderate impairment: > 7 sec, slow speed, imbalance, deviates 10-15 in.   (0) Severe impairment: needs assist, deviates >15 in, reach/touch wall  2. Change in Gait Speed = 3   (3) Normal: smooth change w/o loss of balance or gait deviation, deviates < 6 in, significant difference between speeds   (2) Mild impairment: changes speed, but demonstrates mild gait deviations, deviates 6-10  in, OR no deviations but unable to significantly speed, OR uses A.D.   (1) Moderate impairment: minor changes to speed, OR changes speed w/ significant deviations, deviates 10-15 in, OR  Changes speed , but loses balance & recovers   (0) Severe impairment: cannot change speed, deviates >15 in, or loses balance & needs assist  3. Gait with horizontal head turns  = 2   (3) Normal: no change in gait, deviates <6 in   (2) Mild impairment: slight change in speed, deviates 6-10 in, OR uses A.D.   (1) Moderate impairment: moderate change in speed, deviates 10-15 in   (0) Severe impairment: severe disruption of gait, deviates >15in  4. Gait with vertical head turns = 2   (3) Normal: no change in gait, deviates <6 in   (2) Mild impairment: slight change in speed, deviates 6-10 in OR uses A.D.   (1) Moderate impairment: moderate change in speed, deviates 10-15 in   (0) Severe impairment: severe disruption of gait, deviates >15 in  5. Gait with pivot turns = 3   (3) Normal: performs safely in 3 sec, no LOB   (2) Mild impairment: performs in >3 sec & no LOB, OR turns safely & requires several steps to regain LOB   (1) Moderate impairment: turns slow, OR requires several small steps for balance following turn & stop   (0) Severe impairment: cannot turn safely, needs assist  6. Step over obstacle = 3   (3) Normal: steps over 2 stacked boxes w/o change in speed or LOB   (2) Mild impairment: able to step over 1 box w/o change in speed or LOB   (1) Moderate impairment: steps over 1 box but must slow down, may require VC   (0) Severe impairment: cannot perform w/o assist  7. Gait with Narrow EVANGELINA = 1   (3) Normal: 10 steps no staggering   (2) Mild impairment: 7-9 steps   (1) Moderate impairment: 4-7 steps   (0) Severe impairment: < 4 steps or cannot perform w/o assist  8. Gait with eyes closed = 2   (3) Normal: < 7 sec, no A.D., no LOB, normal gait pattern, deviates <6 in   (2) Mild impairment: 7.1-9 sec, mild gait deviations,  "deviates 6-10 in   (1) Moderate impairment: > 9 sec, abnormal pattern, LOB, deviates 10-15 in   (0) Severe impairment: cannot perform w/o assist, LOB, deviates >15in  9. Ambulating Backwards = 2   (3) Normal: no A.D., no LOB, normal gait pattern, deviates <6in   (2) Mild impairment: uses A.D., slower speed, mild gait deviations, deviates 6-10 in   (1) Moderate impairment: slow speed, abnormal gait pattern, LOB, deviates 10-15 in   (0) Severe impairment: severe gait deviations or LOB, deviates >15in  10. Steps = 3   (3) Normal: alternating feet, no rail   (2) Mild Impairment: alternating feet, uses rail   (1) Moderate impairment: step-to, uses rail   (0) Severe impairment: cannot perform safely    Score 24/30     Cutoffs:   <22/30 fall risk in older adults  <18/30 fall risk in Parkinsons    MDC/MCID:  Stroke = 4.2 points (MDC)  Vestibular = 6 points (MDC)  Geriatric = 4 points (MCID)  Parkinson's = 4 points (MDC)    (Updated values in parentheses)    RLE MMT LLE MMT   Hip Flexion: 4-/5 (4/5) 3+/5 (4/5)   Hip Abduction: 4-/5 (4+/5) 4/5 (4/5)   Knee Extension: 3+/5 (4+/5) 4-/5 (4+/5)   Knee Flexion: 4-/5 (4+/5) 4/5 (4+/5)   Ankle Dorsiflexion: 4/5 (4+/5) 4/5 (4+/5)   Ankle Plantarflexion: 4/5 (4+/5) 4/5 (4+/5)   Ankle Inversion: 4-/5 (4+/5) 4-/5 (4+/5)   Ankle Eversion: 4/5 (4+/5) 4/5 (4+/5)     Treatment     Tona received the treatments listed below:      therapeutic exercises to develop strength, endurance, ROM, flexibility, posture, and core stabilization for 14 minutes including:  - Stepper bike x 6 minutes level 3.5 single peak  - Hip 3-way with red theraband 2x10 each direction B     neuromuscular re-education activities to improve: Balance, Coordination, Kinesthetic, Sense, Proprioception, and Posture for 9 minutes. The following activities were included:  - Tandem walking x 4 lengths x 10 feet each with touchdown support as needed  - Tandem Balance 2x30" B  - Heel Raises 2x15    therapeutic activities to " improve functional performance for 15 minutes, including:  - Sit to Stand 2x15  - Discharge reassessment (see above)    Patient Education and Home Exercises       Education provided:   - Plan of Care discussed    Written Home Exercises Provided: Yes. Exercises were reviewed and Tona was able to demonstrate them prior to the end of the session.  Tona demonstrated good  understanding of the education provided. See Electronic Medical Record under Patient Instructions for exercises provided during therapy sessions    Assessment     Tona arrived to session electing to discharge due to other commitments outside of PT. She has only attended 6 visits over this plan of care due to trip taken out of the country and subsequent illness. Progress has been somewhat limited by low HEP compliance, but patient verbalizes awareness of benefits of increased activity level. Bilateral lower extremity has improved to some extent though. Reviewed majority of previously administered HEPs today with sufficient conceptualization of all balance/strengthening activity. She is to be discharged at this time.    Tona Is progressing well towards her goals.   Patient prognosis is Good.     Patient's spiritual, cultural and educational needs considered and pt agreeable to plan of care and goals.     Anticipated barriers to physical therapy: Leaving town for a month    Goals:     Short Term Goals: 4 weeks   Patient will be compliant with HEP in order to maximize PT benefits (not met)  Patient will be able to perform right single leg balance in >/= 5 seconds with least restrictive assistive device in order to reduce risk for falls and improve safety with functional mobility  (not met)  Patient will score >/= 26/30 on FGA with least restrictive assistive device in order to reduce risk for falls and improve postural control  (not met)     Long Term Goals: 8 weeks   Patient will improve bilateral lower extremity MMT grades by >/=1 grade in  order to improve strength for ADL completion  (progressing, not met)  Patient will be able to perform bilateral single leg balance in >/= 10 seconds with least restrictive assistive device in order to reduce risk for falls and improve safety with functional mobility  (not met)  Patient will score >/= 28/30 on FGA with least restrictive assistive device in order to reduce risk for falls and improve postural control  Patient will report 0 falls from initiation of PT management  (met)  Patient will begin some form of home/community fitness in order to sustain progress gained in PT  (progressing, not met)    Plan     Patient electing to discharge from PT. HEP compliance emphasized.    ADRIANE GASPAR, PT

## 2024-11-15 ENCOUNTER — TELEPHONE (OUTPATIENT)
Dept: INTERNAL MEDICINE | Facility: CLINIC | Age: 69
End: 2024-11-15
Payer: MEDICARE

## 2024-11-15 NOTE — TELEPHONE ENCOUNTER
Spoke to patient and she was informed that  colonoscopy was already enter and she would need to call the department to reschedule he virtual consult .  Phone number was given

## 2024-11-15 NOTE — TELEPHONE ENCOUNTER
----- Message from Colleen sent at 11/15/2024 12:33 PM CST -----  Contact: [employer]  [phone]  Patient would like to put in an order for a Colonoscopy.

## 2024-11-15 NOTE — TELEPHONE ENCOUNTER
----- Message from Colleen sent at 11/15/2024 12:38 PM CST -----  Contact: Mobile [phone]  Patient is returning a phone call.    Who left a message for the patient:     Does patient know what this is regarding:      Would you like a call back, or a response through your [facility] portal?:   Call    Comments: Patient said that she received a call on Nov 14th.

## 2024-11-19 ENCOUNTER — OFFICE VISIT (OUTPATIENT)
Dept: ENDOCRINOLOGY | Facility: CLINIC | Age: 69
End: 2024-11-19
Payer: MEDICARE

## 2024-11-19 VITALS
OXYGEN SATURATION: 99 % | BODY MASS INDEX: 28.8 KG/M2 | WEIGHT: 173.06 LBS | SYSTOLIC BLOOD PRESSURE: 114 MMHG | DIASTOLIC BLOOD PRESSURE: 72 MMHG

## 2024-11-19 DIAGNOSIS — M81.8 OTHER OSTEOPOROSIS WITHOUT CURRENT PATHOLOGICAL FRACTURE: Chronic | ICD-10-CM

## 2024-11-19 DIAGNOSIS — E55.9 VITAMIN D DEFICIENCY: ICD-10-CM

## 2024-11-19 DIAGNOSIS — E11.42 TYPE 2 DIABETES MELLITUS WITH DIABETIC POLYNEUROPATHY, WITHOUT LONG-TERM CURRENT USE OF INSULIN: Primary | Chronic | ICD-10-CM

## 2024-11-19 DIAGNOSIS — D35.2 PITUITARY ADENOMA: Chronic | ICD-10-CM

## 2024-11-19 PROCEDURE — 99999 PR PBB SHADOW E&M-EST. PATIENT-LVL IV: CPT | Mod: PBBFAC,,, | Performed by: INTERNAL MEDICINE

## 2024-11-19 PROCEDURE — 3288F FALL RISK ASSESSMENT DOCD: CPT | Mod: CPTII,S$GLB,, | Performed by: INTERNAL MEDICINE

## 2024-11-19 PROCEDURE — 1160F RVW MEDS BY RX/DR IN RCRD: CPT | Mod: CPTII,S$GLB,, | Performed by: INTERNAL MEDICINE

## 2024-11-19 PROCEDURE — 1159F MED LIST DOCD IN RCRD: CPT | Mod: CPTII,S$GLB,, | Performed by: INTERNAL MEDICINE

## 2024-11-19 PROCEDURE — 1101F PT FALLS ASSESS-DOCD LE1/YR: CPT | Mod: CPTII,S$GLB,, | Performed by: INTERNAL MEDICINE

## 2024-11-19 PROCEDURE — 3074F SYST BP LT 130 MM HG: CPT | Mod: CPTII,S$GLB,, | Performed by: INTERNAL MEDICINE

## 2024-11-19 PROCEDURE — 3044F HG A1C LEVEL LT 7.0%: CPT | Mod: CPTII,S$GLB,, | Performed by: INTERNAL MEDICINE

## 2024-11-19 PROCEDURE — 1126F AMNT PAIN NOTED NONE PRSNT: CPT | Mod: CPTII,S$GLB,, | Performed by: INTERNAL MEDICINE

## 2024-11-19 PROCEDURE — 99214 OFFICE O/P EST MOD 30 MIN: CPT | Mod: S$GLB,,, | Performed by: INTERNAL MEDICINE

## 2024-11-19 PROCEDURE — 3066F NEPHROPATHY DOC TX: CPT | Mod: CPTII,S$GLB,, | Performed by: INTERNAL MEDICINE

## 2024-11-19 PROCEDURE — 3060F POS MICROALBUMINURIA REV: CPT | Mod: CPTII,S$GLB,, | Performed by: INTERNAL MEDICINE

## 2024-11-19 PROCEDURE — 3078F DIAST BP <80 MM HG: CPT | Mod: CPTII,S$GLB,, | Performed by: INTERNAL MEDICINE

## 2024-11-19 PROCEDURE — 3072F LOW RISK FOR RETINOPATHY: CPT | Mod: CPTII,S$GLB,, | Performed by: INTERNAL MEDICINE

## 2024-11-19 PROCEDURE — 3008F BODY MASS INDEX DOCD: CPT | Mod: CPTII,S$GLB,, | Performed by: INTERNAL MEDICINE

## 2024-11-19 NOTE — PROGRESS NOTES
Subjective:      Patient ID: Tona Carlson is a 69 y.o. female.    Chief Complaint:  Type 2 diabetes, pituitary adenoma and osteoporosis    History of Present Illness    Tona Carlson is here for follow up of DM, pituitary adenoma and Osteoporosis.  Previously seen by Neha Saravia NP on 05/14/2024.    With regards to Diabetes:    Diagnosed: in her 50s    Known complications:  DKA Denies  RN Denies  Eye Exam: 2/2024  PN Yes, on pregabalin.   Podiatry: 2/2024  Nephropathy Denies  CAD Denies  Denies history of pancreatitis & personal/family history of medullary thyroid cancer.     Diet/Exercise:  Eats 3 meals a day.   Snacks : occasionally   Drinks : diet soft drinks, water  Exercise - tries to stay active.  Recent illness, injury steroids: FLU      Current Regimen:  Lantus 14 units nightly   Ozempic 0.5 mg weekly      Reports compliance.     Other medications tried:  Jardiance - cost  Glimepiride  Victoza - GI intolerance  Januvia - cost  Took Trulicity for 2 months then self stopped due to decreased appetite.  Novolog  Metformin - GI side effects    Glucose Monitor:   1 times a day testing  Log reviewed:  No  Fasting:  On recall around 120s    Hypoglycemia:  Denies  Knows how to correct with 15 grams of carbs - juice, coke, or a peppermint.     H/o ZARCO cirrhosis/Hep C      Diabetes Management Status    Hemoglobin A1C   Date Value Ref Range Status   11/08/2024 6.3 (H) 4.0 - 5.6 % Final     Comment:     ADA Screening Guidelines:  5.7-6.4%  Consistent with prediabetes  >or=6.5%  Consistent with diabetes    High levels of fetal hemoglobin interfere with the HbA1C  assay. Heterozygous hemoglobin variants (HbS, HgC, etc)do  not significantly interfere with this assay.   However, presence of multiple variants may affect accuracy.     05/07/2024 6.2 (H) 4.0 - 5.6 % Final     Comment:     ADA Screening Guidelines:  5.7-6.4%  Consistent with prediabetes  >or=6.5%  Consistent with diabetes    High levels of  fetal hemoglobin interfere with the HbA1C  assay. Heterozygous hemoglobin variants (HbS, HgC, etc)do  not significantly interfere with this assay.   However, presence of multiple variants may affect accuracy.     11/06/2023 5.8 (H) 4.0 - 5.6 % Final     Comment:     ADA Screening Guidelines:  5.7-6.4%  Consistent with prediabetes  >or=6.5%  Consistent with diabetes    High levels of fetal hemoglobin interfere with the HbA1C  assay. Heterozygous hemoglobin variants (HbS, HgC, etc)do  not significantly interfere with this assay.   However, presence of multiple variants may affect accuracy.         Statin: Taking  ACE/ARB: Not taking  Screening or Prevention Patient's value Goal Complete/Controlled?   HgA1C Testing and Control   Lab Results   Component Value Date    HGBA1C 6.3 (H) 11/08/2024      Annually/Less than 8% Yes     Lipid profile : 11/08/2024 Annually Yes     LDL control Lab Results   Component Value Date    LDLCALC 91.4 11/08/2024    Annually/Less than 100 mg/dl  Yes     Nephropathy screening Lab Results   Component Value Date    LABMICR 118.0 11/08/2024    EGFRNORACEVR >60.0 11/08/2024    CREATININE 0.7 11/08/2024    Annually Yes     Blood pressure BP Readings from Last 1 Encounters:   11/19/24 114/72    Less than 140/90 Yes     Dilated retinal exam : 09/11/2023 Annually Yes     Foot exam   : 02/20/2024 Annually Yes         No ASA, does not want to start statins - atorvastatin had been prescribed previously.   No HTN      With regards to Osteoporosis:     BMD: 11/7/2024    COMPARISON:  Cannot directly compare to the prior study in 11/3/2022/[facility] due to different densitometer and/or location.     FINDINGS:  Lumbar spine (L1-L4):             T-score is -2.5, and Z-score is -0.5.  Femoral neck:                          T-score is -2.9, and Z-score is -1.3.  Total hip:                                  T-score is -2.7, and Z-score is -1.2.  Distal 1/3 radius:                      Not applicable      Trabecular Bone Score  The trabecular bone score (TBS) indicates degraded bone quality.        Fracture Risk (FRAX adjusted for Trabecular Bone Score)  17% risk of a major osteoporotic fracture in the next 10 years.  5% risk of hip fracture in the next 10 years.     Impression:     *Osteoporosis based on T-score below -2.5.  *Fracture risk is very high due to calculated 10 year risk of hip fracture >4.5% (FRAX).    Medications:   Fosamax - unsure of start and stop dates - believes she took it for a year .    Reclast  11/16/2021, 12/8/2022, 6/17/2024 [did not get in 2023]    Calcium intake: multivitamin    Vit D intake: over the counter vitamin D3 2000iu daily, forgot to take it recently.     Lab Results   Component Value Date    AFRWRKAN06PK 21 (L) 11/08/2024    CALCIUM 9.6 11/08/2024    TSH 1.617 11/08/2024       Weight bearing exercise: active lifestyle   Falls: Denies  Fractures: History of fractured right wrist at 58 y/o No new fractures.    Dental visit: Every 6 months, Recent dental implants.        Pituitary adenoma    Previously following with Dr Young and Dr Farmer for Pituitary Incidentaloma     She has a hx of a pituitary lesion that was removed via tsr in 2014.    She saw Dr. Farmer 3/19/24 with plans to repeat MRI in 2 years     Initial presentation: Reports nonfunctioning adenoma touching optic chiasm. She had c/o left hemianopsia improved after surgery surgery was in [facility]/ [facility] 2014    Imaging:      MRI 3/15/24  Postoperative change of trans-sphenoidal resection of pituitary adenoma. Minimal (less than 1 mm) increase in size of residual heterogeneously enhancing lobulated lesion within the sella turcica which abuts and partially surrounds the bilateral cavernous ICAs. No new mass effect or significant suprasellar extension. Stable appearance of the infundibulum with slight leftward deviation.     Headache:    Denies     Vision change:   Denies peripheral change but some blurring  "    Formal Visual fields:   F  9/19/2024    Right Eye - fixation losses 9/12, false positives 0%, false negatives 0%, MD -10.37dB, Impression OD: superior>inferior arcuate defects.   Left Eye - fixation losses 4/10, false positives 2%, false negatives 0%, MD -3.06dB,   Impression OS: superior arcuate defect.       "Neuro-ophthalmologic examination was notable for mildly reduced visual acuities, mildly impaired color vision, normal ocular motility and alignment. OCT with stable peripapillary RNFL thinning. Formal visual fields were notable for roughly stable glaucomatous visual field changes. I will continue to monitor for any changes which would prompt concern for tumor growth".       Most recent pituitary labs: normal dexamethasone supression test (DST)      Latest Reference Range & Units 11/16/22 08:08   Cortisol, 8 AM 4.30 - 22.40 ug/dL  4.30 - 22.40 ug/dL 1.00 (L)  1.00 (L)   ACTH 0 - 46 pg/mL 12   TSH 0.400 - 4.000 uIU/mL 1.055   Free T4 0.71 - 1.51 ng/dL 0.89   FSH See Text mIU/mL 27.74   Prolactin 5.2 - 26.5 ng/mL 20.7   (L): Data is abnormally low    Lab Results   Component Value Date    LABCORT 13.00 05/07/2024    ACTH 21 05/07/2024       Current symptoms:  Hyperprolactinemia:  []  breast tenderness []  nipple discharge  [x]  Denies     Lab Results   Component Value Date    PROLACTIN 10.8 05/07/2024    PROLACTIN 20.7 11/16/2022    PROLACTIN 18.5 10/14/2022    PROLACTIN 20.1 06/21/2021    PROLACTIN 23.9 02/15/2019    PROLACTIN 39.5 (H) 07/17/2018       Thyroid:  [x]  fatigue [x]  weight loss (ozempic) []  temp intolerance   []  Denies    [x]  Loose stool []  skin/hair change [] palpitations []  tremor [x]  Denies    Lab Results   Component Value Date    TSH 1.617 11/08/2024    FREET4 0.89 05/07/2024          Growth Hormone   Last IGF-1:   Lab Results   Component Value Date    SOMATMDN 82 05/07/2024    SOMATMDN 94 06/21/2021    SOMATMDN 143 07/17/2018    SOMATMDN 119 10/20/2015        denies symptoms of " adrenal insufficiency (no lightheadedness, N/V/abd pain, hypotension).        Gonadotrophs:     []  Irregular menses [x]  Postmenopausal  []  Decreased libido   []  ED   []  Denies    Lab Results   Component Value Date    FSH 30.45 05/07/2024       Repro hx: no difficulty conceiving, had 2 children  LMP: menopause around age 50    DI:   []  polyuria  []  Polydipsia  []  Nocturia    [x]  Denies        ROS:  see HPI    Objective:   Physical Exam     /72 (BP Location: Right arm, Patient Position: Sitting)   Wt 78.5 kg (173 lb 1 oz)   SpO2 99%   BMI (P) 28.80 kg/m²     Wt Readings from Last 3 Encounters:   11/13/24 (P) 78.6 kg (173 lb 4.5 oz)   10/25/24 79 kg (174 lb 2.6 oz)   09/17/24 79.4 kg (175 lb)     Constitutional:  Pleasant,  in no acute distress.   HENT:   Eyes:     No scleral icterus. Impaired right temporal vision worse in inferior quadrant  Respiratory:   Effort normal   Neurological:  normal speech  Psych:  Normal mood and affect.          Chemistry        Component Value Date/Time     11/08/2024 1002    K 4.1 11/08/2024 1002     11/08/2024 1002    CO2 19 (L) 11/08/2024 1002    BUN 7 (L) 11/08/2024 1002    CREATININE 0.7 11/08/2024 1002     (H) 11/08/2024 1002        Component Value Date/Time    CALCIUM 9.6 11/08/2024 1002    ALKPHOS 94 11/08/2024 1002    AST 50 (H) 11/08/2024 1002    ALT 55 (H) 11/08/2024 1002    BILITOT 1.2 (H) 11/08/2024 1002    ESTGFRAFRICA >60.0 06/14/2022 1036    EGFRNONAA >60.0 06/14/2022 1036             Assessment/Plan:     1. Type 2 diabetes mellitus with diabetic polyneuropathy, without long-term current use of insulin  Assessment & Plan:      Last A1c was 6.3.   Continue current diabetes regimen.    Call if any side effects from the medications.    Check blood sugars before meals and bedtime.  Call if blood sugars are persistently less than 70 or greater than 200.     Discussed importance of diet and lifestyle modifications for diabetes  management  Hypoglycemia management discussed. Carry glucose tablets or snacks at all times.     Discussed risk of complications with uncontrolled diabetes.    Complications:  Follow up for regular diabetes eye exam  Daily self examination of feet.  Continue follow-up with Podiatry.  Microalbumin: Monitor.       Last A1c:   Lab Results   Component Value Date    HGBA1C 6.3 (H) 11/08/2024          Orders:  -     Ambulatory referral/consult to Endocrinology  -     Hemoglobin A1C; Future  -     Comprehensive Metabolic Panel; Future    2. Pituitary adenoma  Assessment & Plan:  Stable residual lesion in left aspect of the sella with no mass effect or significant suprasellar extension.    Not having symptoms of pituitary hormone dysfunction but will check yearly labs.     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  Last visit was in 09/2024.    Continue to follow up with NS for periodic imaging, next planned from 3/2026        Orders:  -     TSH; Future  -     T4, Free; Future  -     Insulin-Like Growth Factor; Future  -     Prolactin; Future  -     Cortisol, 8AM; Future  -     ACTH; Future    3. Vitamin D deficiency  -     Vitamin D; Future    4. Other osteoporosis without current pathological fracture  Assessment & Plan:    H/o right wrist fracture.  Patient is on Reclast, had her 3rd dose in 06/2024.  DXA scan not comparable as it was done on a different densitometer/location.  Fracture risk is lower due to her use of bisphosphonate.    Discussed her risk of fractures and benefits of osteoporosis treatment  Side effects including osteonecrosis of the jaw and atypical femoral fractures discussed with patient  Consider drug holiday if clinically stable.    Encouraged safe movements and fall precautions  Continue routine dental visits  Instructed on Calcium and vitamin D               Follow-up in 6 months with labs.    Kristina GREENFIELD Rai, MD

## 2024-12-02 NOTE — ASSESSMENT & PLAN NOTE
Stable residual lesion in left aspect of the sella with no mass effect or significant suprasellar extension.    Not having symptoms of pituitary hormone dysfunction but will check yearly labs.     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  Last visit was in 09/2024.    Continue to follow up with NS for periodic imaging, next planned from 3/2026

## 2024-12-02 NOTE — ASSESSMENT & PLAN NOTE
H/o right wrist fracture.  Patient is on Reclast, had her 3rd dose in 06/2024.  DXA scan not comparable as it was done on a different densitometer/location.  Fracture risk is lower due to her use of bisphosphonate.    Discussed her risk of fractures and benefits of osteoporosis treatment  Side effects including osteonecrosis of the jaw and atypical femoral fractures discussed with patient  Consider drug holiday if clinically stable.    Encouraged safe movements and fall precautions  Continue routine dental visits  Instructed on Calcium and vitamin D

## 2024-12-02 NOTE — ASSESSMENT & PLAN NOTE
Last A1c was 6.3.   Continue current diabetes regimen.    Call if any side effects from the medications.    Check blood sugars before meals and bedtime.  Call if blood sugars are persistently less than 70 or greater than 200.     Discussed importance of diet and lifestyle modifications for diabetes management  Hypoglycemia management discussed. Carry glucose tablets or snacks at all times.     Discussed risk of complications with uncontrolled diabetes.    Complications:  Follow up for regular diabetes eye exam  Daily self examination of feet.  Continue follow-up with Podiatry.  Microalbumin: Monitor.       Last A1c:   Lab Results   Component Value Date    HGBA1C 6.3 (H) 11/08/2024

## 2024-12-04 ENCOUNTER — PATIENT MESSAGE (OUTPATIENT)
Dept: ENDOCRINOLOGY | Facility: CLINIC | Age: 69
End: 2024-12-04
Payer: MEDICARE

## 2024-12-04 DIAGNOSIS — E11.42 TYPE 2 DIABETES MELLITUS WITH DIABETIC POLYNEUROPATHY, WITHOUT LONG-TERM CURRENT USE OF INSULIN: Chronic | ICD-10-CM

## 2024-12-06 ENCOUNTER — TELEPHONE (OUTPATIENT)
Dept: DERMATOLOGY | Facility: CLINIC | Age: 69
End: 2024-12-06
Payer: MEDICARE

## 2024-12-06 NOTE — TELEPHONE ENCOUNTER
Appointment has been made per pt request   ----- Message from Ramya sent at 12/6/2024 11:47 AM CST -----  Type:  Sooner Apoointment Request    Caller is requesting a sooner appointment.  Caller declined first available appointment listed below.  Caller will not accept being placed on the waitlist and is requesting a message be sent to doctor.  Name of Caller:pt   When is the first available appointment? None   Symptoms:rash on face   Would the patient rather a call back or a response via [facility]? Call   Best Call Back Number:  [phone]  Additional Information:

## 2024-12-12 DIAGNOSIS — E11.42 TYPE 2 DIABETES MELLITUS WITH DIABETIC POLYNEUROPATHY, WITHOUT LONG-TERM CURRENT USE OF INSULIN: Chronic | ICD-10-CM

## 2024-12-12 RX ORDER — SEMAGLUTIDE 0.68 MG/ML
0.5 INJECTION, SOLUTION SUBCUTANEOUS
Qty: 3 ML | Refills: 2 | Status: SHIPPED | OUTPATIENT
Start: 2024-12-12

## 2025-01-02 DIAGNOSIS — H40.1134 PRIMARY OPEN ANGLE GLAUCOMA (POAG) OF BOTH EYES, INDETERMINATE STAGE: ICD-10-CM

## 2025-01-03 DIAGNOSIS — H40.1134 PRIMARY OPEN ANGLE GLAUCOMA (POAG) OF BOTH EYES, INDETERMINATE STAGE: ICD-10-CM

## 2025-01-03 DIAGNOSIS — H40.1134 PRIMARY OPEN ANGLE GLAUCOMA (POAG) OF BOTH EYES, INDETERMINATE STAGE: Primary | ICD-10-CM

## 2025-01-03 DIAGNOSIS — D35.2 PITUITARY ADENOMA: ICD-10-CM

## 2025-01-03 DIAGNOSIS — E11.42 TYPE 2 DIABETES MELLITUS WITH DIABETIC POLYNEUROPATHY, WITHOUT LONG-TERM CURRENT USE OF INSULIN: Chronic | ICD-10-CM

## 2025-01-03 RX ORDER — SEMAGLUTIDE 0.68 MG/ML
0.5 INJECTION, SOLUTION SUBCUTANEOUS
Qty: 3 ML | Refills: 5 | Status: SHIPPED | OUTPATIENT
Start: 2025-01-03

## 2025-01-03 RX ORDER — LATANOPROST 50 UG/ML
1 SOLUTION/ DROPS OPHTHALMIC NIGHTLY
Qty: 2.5 ML | Refills: 3 | Status: SHIPPED | OUTPATIENT
Start: 2025-01-03 | End: 2026-01-03

## 2025-01-03 RX ORDER — LATANOPROST 50 UG/ML
1 SOLUTION/ DROPS OPHTHALMIC
Qty: 3 ML | Refills: 0 | OUTPATIENT
Start: 2025-01-03

## 2025-01-13 ENCOUNTER — OFFICE VISIT (OUTPATIENT)
Dept: FAMILY MEDICINE | Facility: CLINIC | Age: 70
End: 2025-01-13
Payer: MEDICARE

## 2025-01-13 VITALS
DIASTOLIC BLOOD PRESSURE: 60 MMHG | WEIGHT: 175.13 LBS | SYSTOLIC BLOOD PRESSURE: 110 MMHG | BODY MASS INDEX: 29.18 KG/M2 | HEART RATE: 87 BPM | OXYGEN SATURATION: 98 % | HEIGHT: 65 IN

## 2025-01-13 DIAGNOSIS — E11.42 TYPE 2 DIABETES MELLITUS WITH DIABETIC POLYNEUROPATHY, WITH LONG-TERM CURRENT USE OF INSULIN: ICD-10-CM

## 2025-01-13 DIAGNOSIS — I85.10 SECONDARY ESOPHAGEAL VARICES WITHOUT BLEEDING: ICD-10-CM

## 2025-01-13 DIAGNOSIS — Z00.00 ENCOUNTER FOR PREVENTIVE HEALTH EXAMINATION: Primary | ICD-10-CM

## 2025-01-13 DIAGNOSIS — D35.2 PITUITARY ADENOMA: ICD-10-CM

## 2025-01-13 DIAGNOSIS — K75.81 LIVER CIRRHOSIS SECONDARY TO NASH: ICD-10-CM

## 2025-01-13 DIAGNOSIS — E11.69 HYPERLIPIDEMIA ASSOCIATED WITH TYPE 2 DIABETES MELLITUS: ICD-10-CM

## 2025-01-13 DIAGNOSIS — I70.0 AORTIC ATHEROSCLEROSIS: ICD-10-CM

## 2025-01-13 DIAGNOSIS — K74.60 LIVER CIRRHOSIS SECONDARY TO NASH: ICD-10-CM

## 2025-01-13 DIAGNOSIS — Z23 NEED FOR COVID-19 VACCINE: ICD-10-CM

## 2025-01-13 DIAGNOSIS — Z79.4 TYPE 2 DIABETES MELLITUS WITH DIABETIC POLYNEUROPATHY, WITH LONG-TERM CURRENT USE OF INSULIN: ICD-10-CM

## 2025-01-13 DIAGNOSIS — E66.3 OVERWEIGHT (BMI 25.0-29.9): ICD-10-CM

## 2025-01-13 DIAGNOSIS — Z74.09 OTHER REDUCED MOBILITY: ICD-10-CM

## 2025-01-13 DIAGNOSIS — K76.6 PORTAL HYPERTENSION: ICD-10-CM

## 2025-01-13 DIAGNOSIS — E78.5 HYPERLIPIDEMIA ASSOCIATED WITH TYPE 2 DIABETES MELLITUS: ICD-10-CM

## 2025-01-13 PROCEDURE — G0439 PPPS, SUBSEQ VISIT: HCPCS | Mod: S$GLB,,, | Performed by: NURSE PRACTITIONER

## 2025-01-13 PROCEDURE — 3078F DIAST BP <80 MM HG: CPT | Mod: CPTII,S$GLB,, | Performed by: NURSE PRACTITIONER

## 2025-01-13 PROCEDURE — 3288F FALL RISK ASSESSMENT DOCD: CPT | Mod: CPTII,S$GLB,, | Performed by: NURSE PRACTITIONER

## 2025-01-13 PROCEDURE — 1158F ADVNC CARE PLAN TLK DOCD: CPT | Mod: CPTII,S$GLB,, | Performed by: NURSE PRACTITIONER

## 2025-01-13 PROCEDURE — 1101F PT FALLS ASSESS-DOCD LE1/YR: CPT | Mod: CPTII,S$GLB,, | Performed by: NURSE PRACTITIONER

## 2025-01-13 PROCEDURE — 99999 PR PBB SHADOW E&M-EST. PATIENT-LVL V: CPT | Mod: PBBFAC,,, | Performed by: NURSE PRACTITIONER

## 2025-01-13 PROCEDURE — 1126F AMNT PAIN NOTED NONE PRSNT: CPT | Mod: CPTII,S$GLB,, | Performed by: NURSE PRACTITIONER

## 2025-01-13 PROCEDURE — 3074F SYST BP LT 130 MM HG: CPT | Mod: CPTII,S$GLB,, | Performed by: NURSE PRACTITIONER

## 2025-01-13 NOTE — PROGRESS NOTES
"  Tona Carlson presented for a  Medicare AWV and comprehensive Health Risk Assessment today. The following components were reviewed and updated:    Medical history  Family History  Social history  Allergies and Current Medications  Health Risk Assessment  Health Maintenance  Care Team         ** See Completed Assessments for Annual Wellness Visit within the encounter summary.**         The following assessments were completed:  Living Situation  CAGE  Depression Screening  Timed Get Up and Go  Whisper Test  Cognitive Function Screening      Nutrition Screening  ADL Screening  PAQ Screening      Opioid documentation:      Patient does not have a current opioid prescription.        Vitals:    01/13/25 1432   BP: 110/60   BP Location: Left arm   Patient Position: Sitting   Pulse: 87   SpO2: 98%   Weight: 79.4 kg (175 lb 1.6 oz)   Height: 5' 5" (1.651 m)     Body mass index is 29.14 kg/m².    Physical Exam  Vitals reviewed.   Constitutional:       General: She is awake. She is not in acute distress.     Appearance: Normal appearance. She is well-developed, well-groomed and overweight.   HENT:      Head: Normocephalic.   Eyes:      General:         Right eye: No discharge.         Left eye: No discharge.   Cardiovascular:      Rate and Rhythm: Normal rate.   Pulmonary:      Effort: Pulmonary effort is normal. No respiratory distress.   Skin:     Coloration: Skin is not pale.   Neurological:      Mental Status: She is alert and oriented to person, place, and time.      Coordination: Coordination normal.   Psychiatric:         Attention and Perception: Attention normal.         Mood and Affect: Mood and affect normal.         Speech: Speech normal.         Behavior: Behavior normal. Behavior is cooperative.             Diagnoses and health risks identified today and associated recommendations/orders:    1. Encounter for preventive health examination    2. Portal hypertension  Chronic; stable. Followed by " Hepatology.    3. Secondary esophageal varices without bleeding  Chronic; stable. Followed by Hepatology.    4. Liver cirrhosis secondary to ZARCO  Chronic; stable. Followed by Hepatology.    5. Type 2 diabetes mellitus with diabetic polyneuropathy, with long-term current use of insulin  Chronic; stable on nightly insulin and weekly Ozempic medication. Followed by Endocrinology.  - Diabetes Digital Medicine (DDMP) Enrollment Order    6. Hyperlipidemia associated with type 2 diabetes mellitus  Chronic; stable on atorvastatin medication. Follow up with PCP.    7. Aortic atherosclerosis  Chronic; stable on atorvastatin medication. Follow up with PCP.    8. Pituitary adenoma  As noted on previous imaging; stable. Followed by Endocrinology.    9. Other reduced mobility  Ambulates independently; slightly slowed but stable gait. Follow up with PCP.    10. Need for COVID-19 vaccine  - COVID-19 (Moderna) 50 mcg/0.5 mL IM vaccine (>/= 11 yo) 0.5 mL    11. Overweight (BMI 25.0-29.9)  Eat a low salt/low fat ADA diet and discussed importance of engaging in physical activity at least 5x/week for a minimum of 30 min/day.      Provided Tona with a 5-10 year written screening schedule and personal prevention plan. Recommendations were developed using the USPSTF age appropriate recommendations. Education, counseling, and referrals were provided as needed. After Visit Summary given to patient which includes a list of additional screenings/tests needed.    Follow up for your next annual wellness visit.    Gertrude Gonzalez NP      Advance Care Planning     I offered to discuss advanced care planning, including how to pick a person who would make decisions for you if you were unable to make them for yourself, called a health care power of , and what kind of decisions you might make such as use of life sustaining treatments such as ventilators and tube feeding when faced with a life limiting illness recorded on a living will  that they will need to know. (How you want to be cared for as you near the end of your natural life)     X Patient is interested in learning more about how to make advanced directives.  I provided them paperwork and offered to discuss this with them.

## 2025-01-13 NOTE — PATIENT INSTRUCTIONS
Counseling and Referral of Other Preventative  (Italic type indicates deductible and co-insurance are waived)    Patient Name: Tona Carlson  Today's Date: 1/13/2025    Health Maintenance       Date Due Completion Date    RSV Vaccine (Age 60+ and Pregnant patients) (1 - Risk 60-74 years 1-dose series) Never done ---    COVID-19 Vaccine (10 - 2024-25 season) 09/01/2024 10/29/2022    Diabetic Eye Exam 09/11/2024 9/11/2023    Foot Exam 02/20/2025 2/20/2024 (Done)    Override on 2/20/2024: Done    Override on 2/8/2023: Done    Hemoglobin A1c 05/08/2025 11/8/2024    Mammogram 09/17/2025 9/17/2024    Diabetes Urine Screening 11/08/2025 11/8/2024    Lipid Panel 11/08/2025 11/8/2024    Pneumococcal Vaccines (Age 50+) (3 of 3 - PCV20 or PCV21) 12/08/2025 12/8/2020    DEXA Scan 11/05/2026 11/5/2024    Colorectal Cancer Screening 06/21/2027 6/21/2024    TETANUS VACCINE 03/27/2033 3/27/2023        Orders Placed This Encounter   Procedures    Diabetes Digital Medicine (DDMP) Enrollment Order     The following information is provided to all patients.  This information is to help you find resources for any of the problems found today that may be affecting your health:                  Living healthy guide: www.[facility].[place].gov      Understanding Diabetes: www.diabetes.org      Eating healthy: www.cdc.gov/healthyweight      CDC home safety checklist: www.cdc.gov/steadi/patient.html      Agency on Aging: www.goea.[place].[facility]      Alcoholics anonymous (AA): www.aa.org      Physical Activity: www.srinath.nih.gov/jh7wqid      Tobacco use: www.quitwithusla.org

## 2025-01-14 ENCOUNTER — OFFICE VISIT (OUTPATIENT)
Dept: DERMATOLOGY | Facility: CLINIC | Age: 70
End: 2025-01-14
Payer: MEDICARE

## 2025-01-14 DIAGNOSIS — L71.9 ROSACEA: Primary | ICD-10-CM

## 2025-01-14 PROCEDURE — 1160F RVW MEDS BY RX/DR IN RCRD: CPT | Mod: CPTII,S$GLB,, | Performed by: DERMATOLOGY

## 2025-01-14 PROCEDURE — 1101F PT FALLS ASSESS-DOCD LE1/YR: CPT | Mod: CPTII,S$GLB,, | Performed by: DERMATOLOGY

## 2025-01-14 PROCEDURE — 99214 OFFICE O/P EST MOD 30 MIN: CPT | Mod: S$GLB,,, | Performed by: DERMATOLOGY

## 2025-01-14 PROCEDURE — 3288F FALL RISK ASSESSMENT DOCD: CPT | Mod: CPTII,S$GLB,, | Performed by: DERMATOLOGY

## 2025-01-14 PROCEDURE — G2211 COMPLEX E/M VISIT ADD ON: HCPCS | Mod: S$GLB,,, | Performed by: DERMATOLOGY

## 2025-01-14 PROCEDURE — 1126F AMNT PAIN NOTED NONE PRSNT: CPT | Mod: CPTII,S$GLB,, | Performed by: DERMATOLOGY

## 2025-01-14 PROCEDURE — 1159F MED LIST DOCD IN RCRD: CPT | Mod: CPTII,S$GLB,, | Performed by: DERMATOLOGY

## 2025-01-14 RX ORDER — DOXYCYCLINE 100 MG/1
TABLET ORAL
Qty: 30 TABLET | Refills: 1 | Status: SHIPPED | OUTPATIENT
Start: 2025-01-14

## 2025-01-14 NOTE — PROGRESS NOTES
Patient Information  Name: Tona Carlson  : 1955  MRN: [id number]     Referring Physician:  No ref. provider found   Primary Care Physician:  Medina Prakash MD   Date of Visit: 25      Subjective:     History of Present lllness:    Tona Carlson is a 69 y.o. female who presents with a chief complaint of rosacea.  Location: face  Duration: years, much worse over past month  Signs/Symptoms: red bumps, inflamed, redness  Exacerbating factors: none  Relieving factors/Prior treatments: previously on Metrogel-she did not like this because it was sticky, doxycyline 20mg- worked very well, tolerated with no issues    Patient was last seen: 5/3/2022.  Prior notes by myself reviewed.   Clinical documentation obtained by nursing staff reviewed.    Review of Systems    Objective:   Physical Exam   Constitutional: She appears well-developed and well-nourished. No distress.   Neurological: She is alert and oriented to person, place, and time. She is not disoriented.   Psychiatric: She has a normal mood and affect.   Skin:   Areas Examined (abnormalities noted in diagram):   Head / Face Inspection Performed            Diagram Legend     Erythematous scaling macule/papule c/w actinic keratosis       Vascular papule c/w angioma      Pigmented verrucoid papule/plaque c/w seborrheic keratosis      Yellow umbilicated papule c/w sebaceous hyperplasia      Irregularly shaped tan macule c/w lentigo     1-2 mm smooth white papules consistent with Milia      Movable subcutaneous cyst with punctum c/w epidermal inclusion cyst      Subcutaneous movable cyst c/w pilar cyst      Firm pink to brown papule c/w dermatofibroma      Pedunculated fleshy papule(s) c/w skin tag(s)      Evenly pigmented macule c/w junctional nevus     Mildly variegated pigmented, slightly irregular-bordered macule c/w mildly atypical nevus      Flesh colored to evenly pigmented papule c/w intradermal nevus       Pink pearly papule/plaque c/w  basal cell carcinoma      Erythematous hyperkeratotic cursted plaque c/w SCC      Surgical scar with no sign of skin cancer recurrence      Open and closed comedones      Inflammatory papules and pustules      Verrucoid papule consistent consistent with wart     Erythematous eczematous patches and plaques     Dystrophic onycholytic nail with subungual debris c/w onychomycosis     Umbilicated papule    Erythematous-base heme-crusted tan verrucoid plaque consistent with inflamed seborrheic keratosis     Erythematous Silvery Scaling Plaque c/w Psoriasis     See annotation    No images are attached to the encounter or orders placed in the encounter.      [] Data reviewed  [] Prior external notes reviewed  [] Independent review of test  [] Management discussed with another provider  [] Independent historian    Assessment / Plan:        Rosacea  - chronic problem, with exacerbation/progression  Rosacea is a chronic condition without a definitive cure.  There are several well-known triggers, such as exercise, temperature extremes, alcohol, and spicy foods, that should be avoided to prevent a rosacea flare.  Use gentle, ceramide-containing products (such as CeraVe Moisturizing Cream or La Roche-Posay Toleriane Double Repair Face Moisturizer) to repair the skin barrier and to minimize irritation. Avoid harsh soaps, exfoliants, and scrubs.    -     doxycycline monohydrate 100 mg Tab; Take 1 po qday pc. Take with plenty of water.  Dispense: 30 tablet; Refill: 1  Side effects of doxycyline include but are not limited to GI discomfort, esophageal irritation/ulceration, and increased sun sensitivity. Take medicine with meals (but no dairy), plenty of water, and at least 1 hour before lying down.    -     metroNIDAZOLE (NORITATE) 1 % cream; Compound azelaic acid 15% + ivermectin 1% + metronidazole 1% cream. Apply to face once or twice daily.  Dispense: 30 g; Refill: 5       Follow up in about 3 months (around  4/14/2025).      Ofelia Bradford MD, FAAD  [facility]

## 2025-01-14 NOTE — PATIENT INSTRUCTIONS
Your prescription has been sent to the compounding pharmacy [facility].  They are located in [place] at [address] just before the [address].  If you have any questions, please call the pharmacy at [phone].  Website: [email or web address]       Moisturizer options:  For oily to combo skin: La Roche Posay Toleriane Double Repair Matte Face Moisturizer, CeraVe Oil Control Moisturizing Gel-Cream, CeraVe Ultra-Light Mositurizing Gel, CeraVe PM lotion  For normal to dry skin: Vanicream Daily Facial Moisturizer, CeraVe moisturizing cream, La Roche Posay Toleriane Double Repair Face Moisturizer

## 2025-01-15 PROBLEM — E66.811 CLASS 1 OBESITY DUE TO EXCESS CALORIES WITH SERIOUS COMORBIDITY AND BODY MASS INDEX (BMI) OF 30.0 TO 30.9 IN ADULT: Status: RESOLVED | Noted: 2022-05-30 | Resolved: 2025-01-15

## 2025-01-15 PROBLEM — E66.09 CLASS 1 OBESITY DUE TO EXCESS CALORIES WITH SERIOUS COMORBIDITY AND BODY MASS INDEX (BMI) OF 30.0 TO 30.9 IN ADULT: Status: RESOLVED | Noted: 2022-05-30 | Resolved: 2025-01-15

## 2025-01-29 ENCOUNTER — TELEPHONE (OUTPATIENT)
Dept: ENDOSCOPY | Facility: HOSPITAL | Age: 70
End: 2025-01-29
Payer: MEDICARE

## 2025-01-29 DIAGNOSIS — K75.81 LIVER CIRRHOSIS SECONDARY TO NASH: Primary | ICD-10-CM

## 2025-01-29 DIAGNOSIS — K74.60 LIVER CIRRHOSIS SECONDARY TO NASH: Primary | ICD-10-CM

## 2025-01-29 NOTE — TELEPHONE ENCOUNTER
Referral for procedure from Case request      Spoke to patient to schedule procedure(s) Upper Endoscopy (EGD)       Physician to perform procedure(s) Dr. MARIO Collins  Date of Procedure (s) 02/19/25  Arrival Time 9:30 AM  Time of Procedure(s) 10:30 AM   Location of Procedure(s) [place] 2nd Floor  Type of Rx Prep sent to patient: N/A  Instructions provided to patient via [facility]    Patient was informed on the following information and verbalized understanding. Screening questionnaire reviewed with patient and complete. If procedure requires anesthesia, a responsible adult needs to be present to accompany the patient home, patient cannot drive after receiving anesthesia. Appointment details are tentative, especially check-in time. Patient will receive a prep-op call 7 days prior to confirm check-in time for procedure. If applicable the patient should contact their pharmacy to verify Rx for procedure prep is ready for pick-up. Patient was advised to call the scheduling department at [phone] if pharmacy states no Rx is available. Patient was advised to call the endoscopy scheduling department if any questions or concerns arise.      [facility] Endoscopy Scheduling Department

## 2025-02-05 ENCOUNTER — HOSPITAL ENCOUNTER (OUTPATIENT)
Dept: RADIOLOGY | Facility: HOSPITAL | Age: 70
Discharge: HOME | End: 2025-02-05
Attending: NURSE PRACTITIONER
Payer: MEDICARE

## 2025-02-05 DIAGNOSIS — Z85.05 ENCOUNTER FOR FOLLOW-UP SURVEILLANCE OF LIVER CANCER: ICD-10-CM

## 2025-02-05 DIAGNOSIS — E66.3 OVERWEIGHT (BMI 25.0-29.9): ICD-10-CM

## 2025-02-05 DIAGNOSIS — I85.10 SECONDARY ESOPHAGEAL VARICES WITHOUT BLEEDING: ICD-10-CM

## 2025-02-05 DIAGNOSIS — E11.42 TYPE 2 DIABETES MELLITUS WITH DIABETIC POLYNEUROPATHY, WITHOUT LONG-TERM CURRENT USE OF INSULIN: Chronic | ICD-10-CM

## 2025-02-05 DIAGNOSIS — K74.60 LIVER CIRRHOSIS SECONDARY TO NASH: ICD-10-CM

## 2025-02-05 DIAGNOSIS — K75.81 LIVER CIRRHOSIS SECONDARY TO NASH: ICD-10-CM

## 2025-02-05 DIAGNOSIS — E11.69 HYPERLIPIDEMIA ASSOCIATED WITH TYPE 2 DIABETES MELLITUS: ICD-10-CM

## 2025-02-05 DIAGNOSIS — K76.6 PORTAL HYPERTENSION: ICD-10-CM

## 2025-02-05 DIAGNOSIS — Z08 ENCOUNTER FOR FOLLOW-UP SURVEILLANCE OF LIVER CANCER: ICD-10-CM

## 2025-02-05 DIAGNOSIS — E78.5 HYPERLIPIDEMIA ASSOCIATED WITH TYPE 2 DIABETES MELLITUS: ICD-10-CM

## 2025-02-05 DIAGNOSIS — R76.8 HEPATITIS C ANTIBODY TEST POSITIVE: ICD-10-CM

## 2025-02-05 PROCEDURE — 76700 US EXAM ABDOM COMPLETE: CPT | Mod: 26,,, | Performed by: RADIOLOGY

## 2025-02-05 PROCEDURE — 76700 US EXAM ABDOM COMPLETE: CPT | Mod: TC

## 2025-02-06 ENCOUNTER — TELEPHONE (OUTPATIENT)
Dept: ENDOSCOPY | Facility: HOSPITAL | Age: 70
End: 2025-02-06
Payer: MEDICARE

## 2025-02-06 DIAGNOSIS — Z12.11 SCREEN FOR COLON CANCER: Primary | ICD-10-CM

## 2025-02-06 RX ORDER — SOD SULF/POT CHLORIDE/MAG SULF 1.479 G
12 TABLET ORAL DAILY
Qty: 24 TABLET | Refills: 0 | Status: SHIPPED | OUTPATIENT
Start: 2025-02-06

## 2025-02-06 NOTE — TELEPHONE ENCOUNTER
Referral for procedure from [facility]    Spoke to Tona Carlson to schedule Colonoscopy/EGD       Physician to perform procedure(s) Dr. MARIO Collins  Date of Procedure (s) 2/19/25  Arrival Time 9:00 AM - labs 1st at 8:30 AM  Time of Procedure(s) 10:00 AM   Location of Procedure(s) [place] 2nd Floor  Type of Rx Prep sent to patient's pharmacy: [facility]  Instructions provided to patient via [facility]  Patient denies use of blood thinners.  Patient is taking Ozempic (Semaglutide), instructed to take last dose on/before 2/11/25.   The following information was discussed with patient, and patient verbalized understanding:  Screening questionnaire reviewed with patient and complete. If procedure requires anesthesia, a responsible adult needs to be present to accompany the patient home. Appointment details are tentative, especially check-in time. Patient will receive a pre-op call 7 days prior to appointment to confirm check-in time for procedure. If applicable the patient should contact their pharmacy to verify Rx for procedure prep is ready for pick-up. Patient was instructed to call the scheduling department at [phone] if pharmacy states no Rx is available. Patient was also advised to call the endoscopy scheduling department if any questions or concerns arise.       Endoscopy Scheduling Department

## 2025-02-06 NOTE — TELEPHONE ENCOUNTER
----- Message from Nahid Collins MD sent at 2/6/2025  8:33 AM CST -----  Regarding: RE: Ext colonoscopy referral  Ok to schedule as egd / colon.  Ok to put in whatever slot works best.    Thank you for reaching out  PARDEEP  ----- Message -----  From: Linda Covarrubias RN  Sent: 2/5/2025   4:58 PM CST  To: Nahid Collins MD  Subject: FW: Ext colonoscopy referral                     Agustina Collins,    Patient is already scheduled with you for EGD on 2/19 and her PCP is requesting for her to have a screening colonoscopy at the same time. Is it acceptable to schedule a double with you for this patient?  If so, is it ok to move the case to your AM block reserved for clinic patients where there is room for a double, or would you like me to move her to a different day?    Thank you,  Linda  ----- Message -----  From: Kathleen Flores  Sent: 2/5/2025   8:36 AM CST  To: Linda Covarrubias RN  Subject: FW: Ext colonoscopy referral                       ----- Message -----  From: Brianna Bender  Sent: 2/4/2025   4:42 PM CST  To: [facility] Endoscopy Schedulers  Subject: Ext colonoscopy referral                         Good afternoon,    Current pt is being referred for colonoscopy, she has an EGD coming up and her PCP is requesting them both at the same time. I have scanned the referral and records in to media mgr. Please contact pt to schedule and let me know if I can help any further.    Thank you,  Brianna Bender  [facility]  Ext [phone]

## 2025-02-10 ENCOUNTER — OFFICE VISIT (OUTPATIENT)
Dept: OPTOMETRY | Facility: CLINIC | Age: 70
End: 2025-02-10
Payer: COMMERCIAL

## 2025-02-10 ENCOUNTER — CLINICAL SUPPORT (OUTPATIENT)
Dept: OPHTHALMOLOGY | Facility: CLINIC | Age: 70
End: 2025-02-10
Payer: MEDICARE

## 2025-02-10 DIAGNOSIS — D35.2 PITUITARY ADENOMA: ICD-10-CM

## 2025-02-10 DIAGNOSIS — E11.36 TYPE 2 DIABETES MELLITUS WITH CATARACT: ICD-10-CM

## 2025-02-10 DIAGNOSIS — H40.1134 PRIMARY OPEN ANGLE GLAUCOMA (POAG) OF BOTH EYES, INDETERMINATE STAGE: Primary | ICD-10-CM

## 2025-02-10 DIAGNOSIS — E11.9 TYPE 2 DIABETES MELLITUS WITHOUT OPHTHALMIC MANIFESTATIONS: ICD-10-CM

## 2025-02-10 DIAGNOSIS — H25.13 NS (NUCLEAR SCLEROSIS), BILATERAL: ICD-10-CM

## 2025-02-10 DIAGNOSIS — H52.4 BILATERAL PRESBYOPIA: ICD-10-CM

## 2025-02-10 PROCEDURE — 99214 OFFICE O/P EST MOD 30 MIN: CPT | Mod: S$GLB,,, | Performed by: OPTOMETRIST

## 2025-02-10 PROCEDURE — 92133 CPTRZD OPH DX IMG PST SGM ON: CPT | Mod: S$GLB,,, | Performed by: OPTOMETRIST

## 2025-02-10 PROCEDURE — 99999 PR PBB SHADOW E&M-EST. PATIENT-LVL II: CPT | Mod: PBBFAC,,, | Performed by: OPTOMETRIST

## 2025-02-10 PROCEDURE — 92083 EXTENDED VISUAL FIELD XM: CPT | Mod: S$GLB,,, | Performed by: OPTOMETRIST

## 2025-02-10 RX ORDER — TIMOLOL MALEATE 5 MG/ML
1 SOLUTION/ DROPS OPHTHALMIC 2 TIMES DAILY
Qty: 10 ML | Refills: 11 | Status: SHIPPED | OUTPATIENT
Start: 2025-02-10 | End: 2026-02-10

## 2025-02-10 RX ORDER — LATANOPROST 50 UG/ML
1 SOLUTION/ DROPS OPHTHALMIC NIGHTLY
Qty: 7.5 ML | Refills: 3 | Status: SHIPPED | OUTPATIENT
Start: 2025-02-10 | End: 2026-02-10

## 2025-02-10 NOTE — PROGRESS NOTES
RASHMI    JONI: [birth year]  Chief complaint (CC): Patient is here for her annual eye exam today with   HVF and OCT. Patient hasn't noticed any vision changes since the last   exam. Glasses still seem fine.  Patient has noticed more trouble when   driving at night but she doesn't wear her glasses.  Glasses? + 1 yr. old  Contacts? -  H/o eye surgery, injections or laser: Lasik OU  H/o eye injury: -  Known eye conditions? See above  Family h/o eye conditions? Mother,brother and sister with glaucoma  Eye gtts? Using Latanoprost OU Q HS      (-) Flashes (-)  Floaters (-) Mucous   (-)  Tearing (-) Itching (-) Burning   (-) Headaches (-) Eye Pain/discomfort (-) Irritation   (-)  Redness (-) Double vision (-) Blurry vision    Diabetic? +  A1c? Hemoglobin A1C       Date                     Value               Ref Range             Status                11/08/2024               6.3 (H)             4.0 - 5.6 %           Final                 05/07/2024               6.2 (H)             4.0 - 5.6 %           Final                 11/06/2023               5.8 (H)             4.0 - 5.6 %           Final                  Last edited by Carly Pierre on 2/10/2025  2:23 PM.            Assessment /Plan     For exam results, see Encounter Report.      Primary open angle glaucoma (POAG) of both eyes, indeterminate stage  -     timolol maleate 0.5% (TIMOPTIC) 0.5 % Drop; Place 1 drop into both eyes 2 (two) times daily.  Dispense: 10 mL; Refill: 11  -     Rodas Visual Field - OU - Extended - Both Eyes  -     Posterior Segment OCT Optic Nerve- Both eyes  Pituitary adenoma  -     Rodas Visual Field - OU - Extended - Both Eyes  -     Posterior Segment OCT Optic Nerve- Both eyes  (+) FHx- mom, sister, brother, MGM. IOP 14 OD, OS. Last 12 OD, OS. Pachy 561 OD, 547 OS. Pt reports drop compliance.  C/d 0.65OD, OS. Pallor OU.   01/12/2024 Gonio SS all quadrants OU  2/10/2025 OCT OD borderline NI, significantly thin in all other areas, OS  normal NS and NI, significantly thin in all other areas  2/10/2025 HVF OD sup and inf arc (denser on temporal side), OS low reliability, sup and inf arc (denser on temp side)-- Pt reports that she may have been having trouble staying awake for testing today.  HVF stable when compared to 2016 which was post surgery. Pt had surgery for pituitary adenoma in 2015.   Cont Latanoprost QHS OU due to changes on OCT, significant thinning of OCT and strong family history.  Add Timolol BID OU due to changes on HVF OU.  Educated pt on findings w/understanding.   RTC 6 weeks IOP/HVF 24-2 ryan faster. Pt last saw neurosurgery 3/2024 and is supposed to go back in 2 yrs for MRI of brain. Last MRI of brain in 2/2024. Pt saw Dr Neal 9/19/2024    Type 2 diabetes mellitus without ophthalmic manifestations  BS control. No signs of diabetic retinopathy. Monitor with annual exam.    Type 2 diabetes mellitus with cataract  NS (nuclear sclerosis), bilateral  Nuclear sclerotic cataract - not visually significant. Observe.    Bilateral presbyopia  SRx released to patient. Patient educated on lens options. Normal ocular health. RTC 1 year for routine exam.

## 2025-02-10 NOTE — PROGRESS NOTES
Oct/hvf done ou./ rel/fix good od poor os coop. Good ou./ chart checked for latex allergy. Lid taped ou./ .75 + .50 x 140/od .75 + .25 x 50/os-[facility]

## 2025-02-17 ENCOUNTER — TELEPHONE (OUTPATIENT)
Dept: ENDOSCOPY | Facility: HOSPITAL | Age: 70
End: 2025-02-17
Payer: MEDICARE

## 2025-02-18 ENCOUNTER — TELEPHONE (OUTPATIENT)
Dept: ENDOSCOPY | Facility: HOSPITAL | Age: 70
End: 2025-02-18
Payer: MEDICARE

## 2025-02-18 NOTE — TELEPHONE ENCOUNTER
Pt telephoned.  She is scheduled for EGD/Colonoscopy tomorrow 2/19/25, and states she forgot she could not eat the day before the procedure.  Pt ate a full breakfast and lunch.  Spoke with pt and reschedule as below:    Referral for procedure from  Workqueue referral (see Appts tab)      Spoke to pt to reschedule procedure(s) Colonoscopy/EGD       Physician to perform procedure(s) Dr. MARIO Collins  Date of Procedure (s) 2/20/25  Arrival Time 7:30 AM  Time of Procedure(s) 8:30 AM   Location of Procedure(s) [address]  Type of Rx Prep sent to patient: Sutab (pt already has)  Instructions provided to patient via [facility]    Patient was informed on the following information and verbalized understanding. Screening questionnaire reviewed with patient and complete. If procedure requires anesthesia, a responsible adult needs to be present to accompany the patient home, patient cannot drive after receiving anesthesia. Appointment details are tentative, especially check-in time. Patient will receive a prep-op call 7 days prior to confirm check-in time for procedure. If applicable the patient should contact their pharmacy to verify Rx for procedure prep is ready for pick-up. Patient was advised to call the scheduling department at [phone] if pharmacy states no Rx is available. Patient was advised to call the endoscopy scheduling department if any questions or concerns arise.      [facility] Endoscopy Scheduling Department

## 2025-02-19 ENCOUNTER — TELEPHONE (OUTPATIENT)
Dept: ENDOSCOPY | Facility: HOSPITAL | Age: 70
End: 2025-02-19
Payer: MEDICARE

## 2025-02-19 NOTE — TELEPHONE ENCOUNTER
Contacted the patient to offer assistance with questions regarding EGD/Colonoscopy scheduled on 2/20/25.  The patient did not answer the call. Voice message left requesting a call back.

## 2025-02-19 NOTE — TELEPHONE ENCOUNTER
Spoke with patient about arrival time @. 730  Covid test =     Prep instructions reviewed: the day before the procedure, follow a clear liquid diet all day, then start the first 1/2 of prep at 5pm and take 2nd 1/2 of prep @. 2-3 Pt must be completely NPO when prep completed @.     500         Medications: Do not take Insulin or oral diabetic medications the day of the procedure.  Take as prescribed: heart, seizure and blood pressure medication in the morning with a sip of water (less than an ounce).  Take any breathing medications and bring inhalers to hospital with you Leave all valuables and jewelry at home.     Wear comfortable clothes to procedure to change into hospital gown You cannot drive for 24 hours after your procedure because you will receive sedation for your procedure to make you comfortable.  A ride must be provided at discharge.

## 2025-02-19 NOTE — TELEPHONE ENCOUNTER
----- Message from Kathleen sent at 2/19/2025  8:43 AM CST -----    ----- Message -----  From: Irineo Webb  Sent: 2/18/2025   2:21 PM CST  To: [facility] Endoscopy Schedulers    Name Of Caller: TonaGerry Name:Does patient feel the need to be seen today? noRelationship to the Pt?: patientContact Preference?: [phone] is the nature of the call?:   Patient has an EGD scheduled for tomorrow Wednesday 2- at [facility], patient states that she would like to speak with someone in the office in regards to some questions she has about her prep medications.

## 2025-02-20 ENCOUNTER — HOSPITAL ENCOUNTER (OUTPATIENT)
Facility: HOSPITAL | Age: 70
Discharge: HOME | End: 2025-02-20
Attending: INTERNAL MEDICINE | Admitting: INTERNAL MEDICINE
Payer: MEDICARE

## 2025-02-20 ENCOUNTER — ANESTHESIA (OUTPATIENT)
Dept: ENDOSCOPY | Facility: HOSPITAL | Age: 70
End: 2025-02-20
Payer: MEDICARE

## 2025-02-20 ENCOUNTER — ANESTHESIA EVENT (OUTPATIENT)
Dept: ENDOSCOPY | Facility: HOSPITAL | Age: 70
End: 2025-02-20
Payer: MEDICARE

## 2025-02-20 ENCOUNTER — TELEPHONE (OUTPATIENT)
Dept: HEPATOLOGY | Facility: CLINIC | Age: 70
End: 2025-02-20
Payer: MEDICARE

## 2025-02-20 ENCOUNTER — RESULTS FOLLOW-UP (OUTPATIENT)
Dept: HEPATOLOGY | Facility: CLINIC | Age: 70
End: 2025-02-20

## 2025-02-20 VITALS
WEIGHT: 178 LBS | RESPIRATION RATE: 15 BRPM | OXYGEN SATURATION: 99 % | DIASTOLIC BLOOD PRESSURE: 53 MMHG | HEIGHT: 65 IN | BODY MASS INDEX: 29.66 KG/M2 | HEART RATE: 85 BPM | TEMPERATURE: 98 F | SYSTOLIC BLOOD PRESSURE: 107 MMHG

## 2025-02-20 DIAGNOSIS — K29.70 GASTRITIS DETERMINED BY ENDOSCOPY: Primary | ICD-10-CM

## 2025-02-20 LAB — POCT GLUCOSE: 162 MG/DL (ref 70–110)

## 2025-02-20 PROCEDURE — 37000008 HC ANESTHESIA 1ST 15 MINUTES: Performed by: INTERNAL MEDICINE

## 2025-02-20 PROCEDURE — 27201089 HC SNARE, DISP (ANY): Performed by: INTERNAL MEDICINE

## 2025-02-20 PROCEDURE — 63600175 PHARM REV CODE 636 W HCPCS: Performed by: NURSE ANESTHETIST, CERTIFIED REGISTERED

## 2025-02-20 PROCEDURE — 37000009 HC ANESTHESIA EA ADD 15 MINS: Performed by: INTERNAL MEDICINE

## 2025-02-20 PROCEDURE — 43235 EGD DIAGNOSTIC BRUSH WASH: CPT | Mod: 51,,, | Performed by: INTERNAL MEDICINE

## 2025-02-20 PROCEDURE — 25000003 PHARM REV CODE 250: Performed by: INTERNAL MEDICINE

## 2025-02-20 PROCEDURE — 45385 COLONOSCOPY W/LESION REMOVAL: CPT | Mod: PT,,, | Performed by: INTERNAL MEDICINE

## 2025-02-20 PROCEDURE — 88305 TISSUE EXAM BY PATHOLOGIST: CPT | Performed by: PATHOLOGY

## 2025-02-20 PROCEDURE — 45385 COLONOSCOPY W/LESION REMOVAL: CPT | Mod: PT | Performed by: INTERNAL MEDICINE

## 2025-02-20 PROCEDURE — 88305 TISSUE EXAM BY PATHOLOGIST: CPT | Mod: 26,,, | Performed by: PATHOLOGY

## 2025-02-20 PROCEDURE — 43235 EGD DIAGNOSTIC BRUSH WASH: CPT | Performed by: INTERNAL MEDICINE

## 2025-02-20 RX ORDER — PROPOFOL 10 MG/ML
VIAL (ML) INTRAVENOUS
Status: DISCONTINUED | OUTPATIENT
Start: 2025-02-20 | End: 2025-02-20

## 2025-02-20 RX ORDER — PANTOPRAZOLE SODIUM 40 MG/1
40 TABLET, DELAYED RELEASE ORAL DAILY
Qty: 90 TABLET | Refills: 3 | Status: SHIPPED | OUTPATIENT
Start: 2025-02-20 | End: 2026-02-20

## 2025-02-20 RX ORDER — LIDOCAINE HYDROCHLORIDE 20 MG/ML
INJECTION INTRAVENOUS
Status: DISCONTINUED | OUTPATIENT
Start: 2025-02-20 | End: 2025-02-20

## 2025-02-20 RX ORDER — PROPOFOL 10 MG/ML
VIAL (ML) INTRAVENOUS CONTINUOUS PRN
Status: DISCONTINUED | OUTPATIENT
Start: 2025-02-20 | End: 2025-02-20

## 2025-02-20 RX ORDER — DEXTROMETHORPHAN/PSEUDOEPHED 2.5-7.5/.8
DROPS ORAL
Status: COMPLETED | OUTPATIENT
Start: 2025-02-20 | End: 2025-02-20

## 2025-02-20 RX ADMIN — PROPOFOL 150 MCG/KG/MIN: 10 INJECTION, EMULSION INTRAVENOUS at 08:02

## 2025-02-20 RX ADMIN — GLYCOPYRROLATE 0.2 MG: 0.2 INJECTION, SOLUTION INTRAMUSCULAR; INTRAVITREAL at 08:02

## 2025-02-20 RX ADMIN — PROPOFOL 20 MG: 10 INJECTION, EMULSION INTRAVENOUS at 08:02

## 2025-02-20 RX ADMIN — PROPOFOL 70 MG: 10 INJECTION, EMULSION INTRAVENOUS at 08:02

## 2025-02-20 RX ADMIN — LIDOCAINE HYDROCHLORIDE 75 MG: 20 INJECTION, SOLUTION INTRAVENOUS at 08:02

## 2025-02-20 NOTE — TELEPHONE ENCOUNTER
Received message from Danni Calderón NP to reach out to the Patient about the EGD results and Rx sent to Pharmacy to help with the healing.    Call placed to the Patient at [phone]. No Answer. Left detailed VM message from DIONICIO Razo. The Rx will be at the [facility], [address] [place] [address]. This is Dona calling from the Office of Danni Calderón, [facility] at [phone].     Call placed to emergency contact, daughter Jyoti at [phone]. No Answer. Left same detailed message. We are trying to reach your Mother with a message from Danni Calderón NP, [facility].    Will notify Provider.

## 2025-02-20 NOTE — TRANSFER OF CARE
"Anesthesia Transfer of Care Note    Patient: Tona Carlson    Procedure(s) Performed: Procedure(s) (LRB):  EGD (ESOPHAGOGASTRODUODENOSCOPY) (N/A)  COLONOSCOPY, SCREENING, LOW RISK PATIENT (N/A)    Patient location: GI    Anesthesia Type: general    Transport from OR: Transported from OR on room air with adequate spontaneous ventilation    Post pain: adequate analgesia    Post assessment: no apparent anesthetic complications and tolerated procedure well    Post vital signs: stable    Level of consciousness: awake    Nausea/Vomiting: no nausea/vomiting    Complications: none    Transfer of care protocol was followed      Last vitals: Visit Vitals  /61 (BP Location: Left arm, Patient Position: Lying)   Pulse 81   Temp 36.2 °C (97.2 °F) (Skin)   Resp 18   Ht 5' 5" (1.651 m)   Wt 80.7 kg (178 lb)   SpO2 98%   Breastfeeding No   BMI 29.62 kg/m²     "

## 2025-02-20 NOTE — ANESTHESIA PREPROCEDURE EVALUATION
[facility]  Anesthesia Pre-Operative Evaluation         Patient Name: Tona Carlson  YOB: 1955  MRN: [id number]    SUBJECTIVE:     2025    Procedure(s) (LRB):  EGD (ESOPHAGOGASTRODUODENOSCOPY) (N/A)  COLONOSCOPY, SCREENING, LOW RISK PATIENT (N/A)    Tona Carlson is a 69 y.o. female here for Procedure(s) (LRB):  EGD (ESOPHAGOGASTRODUODENOSCOPY) (N/A)  COLONOSCOPY, SCREENING, LOW RISK PATIENT (N/A)    Drips:     Problem List[1]    Review of patient's allergies indicates:  No Known Allergies    Medications Ordered Prior to Encounter[2]    Past Surgical History:   Procedure Laterality Date    BREAST BIOPSY Right     core     SECTION      CHOLECYSTECTOMY      ESOPHAGOGASTRODUODENOSCOPY N/A 3/8/2023    Procedure: ESOPHAGOGASTRODUODENOSCOPY (EGD);  Surgeon: Raciel Becker MD;  Location: [facility] (4TH FLR);  Service: Gastroenterology;  Laterality: N/A;  refused to have   cirrhosis-labs done on 22  instr mailed/portal-GT  pre-call no answer 2023    ESOPHAGOGASTRODUODENOSCOPY N/A 2024    Procedure: EGD (ESOPHAGOGASTRODUODENOSCOPY);  Surgeon: Cresencio Alonso MD;  Location: [facility] (2ND FLR);  Service: Endoscopy;  Laterality: N/A;  24-Ref Danni Calderón NP, h/o cirrhosis-last CBC, PT/INR 24, Ozempic on Sundays-holding 24 dose, instr portal-DS  -pt will call back to confirm appt (not sure if she has a ride), pt notified of change in floor-KPvt  Moved to    [facility]         Social History[3]      OBJECTIVE:     Vital Signs Range (Last 24H):       Significant Labs:  Lab Results   Component Value Date    WBC 3.87 (L) 2025    HGB 12.7 2025    HCT 37.7 2025     (L) 2025    CHOL 160 2024    TRIG 133 2024    HDL 42 2024    ALT 55 (H) 2024    AST 50 (H) 2024     2024    K 4.1 2024     2024    CREATININE  0.7 11/08/2024    BUN 7 (L) 11/08/2024    CO2 19 (L) 11/08/2024    TSH 1.617 11/08/2024    INR 1.0 02/19/2025    HGBA1C 6.3 (H) 11/08/2024       Diagnostic Studies:    EKG:   Results for orders placed or performed in visit on 08/03/22   EKG 12-lead    Collection Time: 08/03/22  9:33 AM    Narrative    Test Reason : E11.42,    Vent. Rate : 079 BPM     Atrial Rate : 079 BPM     P-R Int : 162 ms          QRS Dur : 072 ms      QT Int : 402 ms       P-R-T Axes : 054 085 047 degrees     QTc Int : 460 ms    Normal sinus rhythm  Normal ECG  No previous ECGs available  Confirmed by MELONIE GIRALDO MD ([id number]) on 8/3/2022 3:32:13 PM    Referred By: RACHANA IBARRA           Confirmed By:MELONIE GIRALDO MD           Pre-op Assessment    I have reviewed the Patient Summary Reports.     I have reviewed the Nursing Notes. I have reviewed the NPO Status.   I have reviewed the Medications.     Review of Systems  Anesthesia Hx:   History of prior surgery of interest to airway management or planning:            Denies Personal Hx of Anesthesia complications.                    Social:  Non-Smoker, No Alcohol Use       Cardiovascular:  Exercise tolerance: good                                             Pulmonary:  Pulmonary Normal    Denies Asthma.     Denies Sleep Apnea.                Hepatic/GI:  Bowel Prep.    Liver Disease, Hepatitis (Pierre)  Taking GLP-1 Agonists Instructed to Hold for 7 Days No Reported GI Symptoms       Liver Disease, Hepatitis        Neurological:    Neuromuscular Disease,       Pituitary adenoma with residual left and under surveillance.                           Neuromuscular Disease   Endocrine:  Diabetes Denies Hypothyroidism.  Denies Hyperthyroidism.  Diabetes                          Physical Exam  General: Well nourished, Cooperative, Alert and Oriented    Airway:  Mallampati: III / II  Mouth Opening: Normal  TM Distance: Normal  Tongue: Normal    Dental:  Intact        Anesthesia Plan  Type of  "Anesthesia, risks & benefits discussed:    Anesthesia Type: Gen Natural Airway  Intra-op Monitoring Plan: Standard ASA Monitors  Post Op Pain Control Plan: multimodal analgesia  Induction:  IV  Informed Consent: Informed consent signed with the Patient and all parties understand the risks and agree with anesthesia plan.  All questions answered.   ASA Score: 3  Day of Surgery Review of History & Physical: H&P Update referred to the surgeon/provider.    Ready For Surgery From Anesthesia Perspective.     .           [1]   Patient Active Problem List  Diagnosis    Osteoporosis    Hyperlipidemia associated with type 2 diabetes mellitus    Pituitary adenoma    Type 2 diabetes mellitus with diabetic polyneuropathy, with long-term current use of insulin    Heteronymous bilateral visual field defects    Compressive optic atrophy    Hepatitis C antibody test positive    Fatty liver disease, nonalcoholic    Liver cirrhosis secondary to ZARCO    Vitamin D deficiency    Elevated LFTs    Splenomegaly    Aortic atherosclerosis    S/P selective transsphenoidal pituitary adenomectomy    Esophageal varices    Overweight (BMI 25.0-29.9)    Portal hypertension    Fatigue    Imbalance   [2]   No current facility-administered medications on file prior to encounter.     Current Outpatient Medications on File Prior to Encounter   Medication Sig Dispense Refill    alcohol swabs (BD ALCOHOL SWABS) PadM Use as needed 200 each 3    atorvastatin (LIPITOR) 20 MG tablet Take 1 tablet by mouth once daily 90 tablet 0    BD ULTRA-FINE WON PEN NEEDLE 32 gauge x 5/32" Ndle Use with insulin pens 100 each 3    blood glucose control, normal Soln Check glucose 1-2 x day 1 each 1    blood sugar diagnostic (FREESTYLE LITE STRIPS) Strp USE 1 STRIP TO CHECK GLUCOSE TWICE DAILY 100 each 0    blood-glucose meter kit Use as directed 1 each 0    cholecalciferol, vitamin D3, (VITAMIN D3) 50 mcg (2,000 unit) Cap Take 1 capsule (2,000 Units total) by mouth once " daily.      doxycycline monohydrate 100 mg Tab Take 1 po qday pc. Take with plenty of water. 30 tablet 1    finasteride (PROSCAR) 5 mg tablet Take 5 mg by mouth.      fluticasone propionate (FLONASE) 50 mcg/actuation nasal spray 2 sprays (100 mcg total) by Each Nostril route once daily. 9.9 mL 11    FREESTYLE LANCETS 28 gauge lancets Apply topically.      insulin glargine U-100, Lantus, (LANTUS SOLOSTAR U-100 INSULIN) 100 unit/mL (3 mL) InPn pen Inject 14 Units into the skin every evening. 30 mL 3    ipratropium (ATROVENT) 42 mcg (0.06 %) nasal spray 2 sprays by Each Nostril route 4 (four) times daily. 15 mL 0    lancets Misc Test glucose twice daily. Freestyle Lite. 200 each 11    melatonin (MELATIN) Take 3 mg by mouth every evening.      metroNIDAZOLE (NORITATE) 1 % cream Compound azelaic acid 15% + ivermectin 1% + metronidazole 1% cream. Apply to face once or twice daily. 30 g 5    OZEMPIC 0.25 mg or 0.5 mg (2 mg/3 mL) pen injector Inject 0.5 mg into the skin every 7 days. 3 mL 5    pregabalin (LYRICA) 100 MG capsule Take 1 capsule (100 mg total) by mouth nightly. 90 capsule 3    [DISCONTINUED] insulin aspart U-100 (NOVOLOG FLEXPEN U-100 INSULIN) 100 unit/mL (3 mL) InPn pen Inject 10 Units into the skin 3 (three) times daily with meals. 15 mL 11   [3]   Social History  Socioeconomic History    Marital status:     Number of children: 1   Tobacco Use    Smoking status: Never     Passive exposure: Never    Smokeless tobacco: Never   Substance and Sexual Activity    Alcohol use: No    Drug use: No    Sexual activity: Not Currently     Partners: Male     Social Drivers of Health     Financial Resource Strain: Low Risk  (1/16/2024)    Overall Financial Resource Strain (CARDIA)     Difficulty of Paying Living Expenses: Not hard at all   Food Insecurity: No Food Insecurity (1/16/2024)    Hunger Vital Sign     Worried About Running Out of Food in the Last Year: Never true     Ran Out of Food in the Last Year: Never  true   Transportation Needs: No Transportation Needs (1/16/2024)    PRAPARE - Transportation     Lack of Transportation (Medical): No     Lack of Transportation (Non-Medical): No   Physical Activity: Unknown (1/16/2024)    Exercise Vital Sign     Days of Exercise per Week: 0 days   Recent Concern: Physical Activity - Inactive (1/16/2024)    Exercise Vital Sign     Days of Exercise per Week: 0 days     Minutes of Exercise per Session: 0 min   Stress: No Stress Concern Present (1/16/2024)    [employer] - Occupational Stress Questionnaire     Feeling of Stress : Only a little   Housing Stability: Unknown (1/16/2024)    Housing Stability Vital Sign     Unable to Pay for Housing in the Last Year: No     Unstable Housing in the Last Year: No

## 2025-02-20 NOTE — H&P
Short Stay Endoscopy History and Physical    PCP - Medina Prakash MD    Procedure - Colonoscopy  +  EGD  ASA - per anesthesia  Mallampati - per anesthesia  History of Anesthesia problems - no  Family history Anesthesia problems - no   Plan of anesthesia - General    HPI:  This is a 69 y.o. female here for evaluation of :     + cologuard    Egd for varices surveillance    ROS:  Constitutional: No fevers, chills, No weight loss  CV: No chest pain  Pulm: No cough, No shortness of breath  GI: see HPI  Derm: No rash    Medical History:  has a past medical history of Cataract, Compressive optic atrophy (2015), Diabetes mellitus, type 2, Fatty liver disease, nonalcoholic, Hyperlipidemia, Prolactinoma, Reflux (08/10/2012), Rosacea, Toe fracture, right (2018), and Unspecified cirrhosis of liver.    Surgical History:  has a past surgical history that includes  section; Cholecystectomy; Transphenoidal pituitary resection (); Breast biopsy (Right); Esophagogastroduodenoscopy (N/A, 3/8/2023); and Esophagogastroduodenoscopy (N/A, 2024).    Family History: family history includes Cancer in her paternal grandmother; Diabetes in her mother; Glaucoma in her mother; Heart disease in her paternal grandfather; Hypertension in her mother; Liver disease in her paternal grandmother; No Known Problems in her brother, brother, and father; Thyroid disease in her sister.. Otherwise no colon cancer, inflammatory bowel disease, or GI malignancies.    Social History:  reports that she has never smoked. She has never been exposed to tobacco smoke. She has never used smokeless tobacco. She reports that she does not drink alcohol and does not use drugs.    Review of patient's allergies indicates:  No Known Allergies    Medications:   Prescriptions Prior to Admission[1]      Physical Exam:    Vital Signs: There were no vitals filed for this visit.    General Appearance: Well appearing in no acute distress  Eyes:     "No scleral icterus  ENT: Neck supple, Lips, mucosa, and tongue normal; teeth and gums normal  Abdomen: Soft, non tender, non distended with positive bowel sounds. No hepatosplenomegaly, ascites, or mass.  Extremities: 2+ pulses, no clubbing, cyanosis or edema  Skin: No rash      Labs:  Lab Results   Component Value Date    WBC 3.87 (L) 02/19/2025    HGB 12.7 02/19/2025    HCT 37.7 02/19/2025     (L) 02/19/2025    CHOL 160 11/08/2024    TRIG 133 11/08/2024    HDL 42 11/08/2024    ALT 55 (H) 11/08/2024    AST 50 (H) 11/08/2024     11/08/2024    K 4.1 11/08/2024     11/08/2024    CREATININE 0.7 11/08/2024    BUN 7 (L) 11/08/2024    CO2 19 (L) 11/08/2024    TSH 1.617 11/08/2024    INR 1.0 02/19/2025    HGBA1C 6.3 (H) 11/08/2024       I have explained the risks and benefits of endoscopy procedures to the patient including but not limited to bleeding, perforation, infection, and death.  The patient was asked if they understand and allowed to ask any further questions to their satisfaction.    Nahid Collins MD         [1]   Medications Prior to Admission   Medication Sig Dispense Refill Last Dose/Taking    alcohol swabs (BD ALCOHOL SWABS) PadM Use as needed 200 each 3     atorvastatin (LIPITOR) 20 MG tablet Take 1 tablet by mouth once daily 90 tablet 0     BD ULTRA-FINE WON PEN NEEDLE 32 gauge x 5/32" Ndle Use with insulin pens 100 each 3     blood glucose control, normal Soln Check glucose 1-2 x day 1 each 1     blood sugar diagnostic (FREESTYLE LITE STRIPS) Strp USE 1 STRIP TO CHECK GLUCOSE TWICE DAILY 100 each 0     blood-glucose meter kit Use as directed 1 each 0     cholecalciferol, vitamin D3, (VITAMIN D3) 50 mcg (2,000 unit) Cap Take 1 capsule (2,000 Units total) by mouth once daily.       doxycycline monohydrate 100 mg Tab Take 1 po qday pc. Take with plenty of water. 30 tablet 1     finasteride (PROSCAR) 5 mg tablet Take 5 mg by mouth.       fluticasone propionate (FLONASE) 50 mcg/actuation " nasal spray 2 sprays (100 mcg total) by Each Nostril route once daily. 9.9 mL 11     FREESTYLE LANCETS 28 gauge lancets Apply topically.       insulin glargine U-100, Lantus, (LANTUS SOLOSTAR U-100 INSULIN) 100 unit/mL (3 mL) InPn pen Inject 14 Units into the skin every evening. 30 mL 3     ipratropium (ATROVENT) 42 mcg (0.06 %) nasal spray 2 sprays by Each Nostril route 4 (four) times daily. 15 mL 0     lancets Misc Test glucose twice daily. Freestyle Lite. 200 each 11     latanoprost 0.005 % ophthalmic solution Place 1 drop into both eyes every evening. 7.5 mL 3     melatonin (MELATIN) Take 3 mg by mouth every evening.       metroNIDAZOLE (NORITATE) 1 % cream Compound azelaic acid 15% + ivermectin 1% + metronidazole 1% cream. Apply to face once or twice daily. 30 g 5     OZEMPIC 0.25 mg or 0.5 mg (2 mg/3 mL) pen injector Inject 0.5 mg into the skin every 7 days. 3 mL 5     pregabalin (LYRICA) 100 MG capsule Take 1 capsule (100 mg total) by mouth nightly. 90 capsule 3     sod sulf-pot chloride-mag sulf (SUTAB) 1.479-0.188- 0.225 gram tablet Take 12 tablets by mouth once daily. Take according to instructions provided by Endoscopy Nurse. 24 tablet 0     timolol maleate 0.5% (TIMOPTIC) 0.5 % Drop Place 1 drop into both eyes 2 (two) times daily. 10 mL 11     [DISCONTINUED] insulin aspart U-100 (NOVOLOG FLEXPEN U-100 INSULIN) 100 unit/mL (3 mL) InPn pen Inject 10 Units into the skin 3 (three) times daily with meals. 15 mL 11

## 2025-02-20 NOTE — TELEPHONE ENCOUNTER
----- Message from Danni Calderón NP sent at 2/20/2025  9:18 AM CST -----  Regarding: New prescription  Please call patient and let her know that I have sent in a prescription for protonix 40mg daily to aide in healing of gastritis (stomach inflammation) seen on recent EGD. Thanks!

## 2025-02-20 NOTE — [employer] PATIENT INSTRUCTIONS
Discharge Summary/Instructions after an Endoscopic Procedure  Patient Name: Tona Carlson  Patient MRN: [id number]  Patient YOB: 1955  Thursday, February 20, 2025  Nahid Collins MD  Dear patient,  As a result of recent federal legislation (The Federal Cures Act), you may   receive lab or pathology results from your procedure in your [facility]   account before your physician is able to contact you. Your physician or   their representative will relay the results to you with their   recommendations at their soonest availability.  Thank you,  Your health is very important to us during the Covid Crisis. Following your   procedure today, you will receive a daily text for 2 weeks asking about   signs or symptoms of Covid 19.  Please respond to this text when you   receive it so we can follow up and keep you as safe as possible.   RESTRICTIONS:  During your procedure today, you received medications for sedation.  These   medications may affect your judgment, balance and coordination.  Therefore,   for 24 hours, you have the following restrictions:   - DO NOT drive a car, operate machinery, make legal/financial decisions,   sign important papers or drink alcohol.    ACTIVITY:  Today: no heavy lifting, straining or running due to procedural   sedation/anesthesia.  The following day: return to full activity including work.  DIET:  Eat and drink normally unless instructed otherwise.     TREATMENT FOR COMMON SIDE EFFECTS:  - Mild abdominal pain, nausea, belching, bloating or excessive gas:  rest,   eat lightly and use a heating pad.  - Sore Throat: treat with throat lozenges and/or gargle with warm salt   water.  - Because air was used during the procedure, expelling large amounts of air   from your rectum or belching is normal.  - If a bowel prep was taken, you may not have a bowel movement for 1-3 days.    This is normal.  SYMPTOMS TO WATCH FOR AND REPORT TO YOUR PHYSICIAN:  1. Abdominal pain or bloating, other  than gas cramps.  2. Chest pain.  3. Back pain.  4. Signs of infection such as: chills or fever occurring within 24 hours   after the procedure.  5. Rectal bleeding, which would show as bright red, maroon, or black stools.   (A tablespoon of blood from the rectum is not serious, especially if   hemorrhoids are present.)  6. Vomiting.  7. Weakness or dizziness.  GO DIRECTLY TO THE NEAREST EMERGENCY ROOM IF YOU HAVE ANY OF THE FOLLOWING:      Difficulty breathing              Chills and/or fever over 101 F   Persistent vomiting and/or vomiting blood   Severe abdominal pain   Severe chest pain   Black, tarry stools   Bleeding- more than one tablespoon   Any other symptom or condition that you feel may need urgent attention  Your doctor recommends these additional instructions:  If any biopsies were taken, your doctors clinic will contact you in 1 to 2   weeks with any results.  - Discharge patient to home.   - Patient has a contact number available for emergencies.  The signs and   symptoms of potential delayed complications were discussed with the   patient.  Return to normal activities tomorrow.  Written discharge   instructions were provided to the patient.   - Resume previous diet.   - Continue present medications.   - Await pathology results.   - Repeat colonoscopy in 3 years for surveillance.   - Avoid NSAIDs (ibuprofen, aleve, naproxen, BC / Goodys, aspirin etc) for 10   days; Aspirin is OK to continue if indicated for cardiovascular   protection.  For questions, problems or results please call your physician - Nahid Collins MD.  EMERGENCY PHONE NUMBER: [phone],  LAB RESULTS: [phone]  IF A COMPLICATION OR EMERGENCY SITUATION ARISES AND YOU ARE UNABLE TO REACH   YOUR PHYSICIAN - GO DIRECTLY TO THE EMERGENCY ROOM.  Nahid Collins MD  2/20/2025 9:29:23 AM  This report has been verified and signed electronically.  Dear patient,  As a result of recent federal legislation (The Federal Cures Act),  you may   receive lab or pathology results from your procedure in your [facility]   account before your physician is able to contact you. Your physician or   their representative will relay the results to you with their   recommendations at their soonest availability.  Thank you,  [employer]

## 2025-02-20 NOTE — [employer] PATIENT INSTRUCTIONS
Discharge Summary/Instructions after an Endoscopic Procedure  Patient Name: Tona Carlson  Patient MRN: [id number]  Patient YOB: 1955  Thursday, February 20, 2025  Nahid Collins MD  Dear patient,  As a result of recent federal legislation (The Federal Cures Act), you may   receive lab or pathology results from your procedure in your [facility]   account before your physician is able to contact you. Your physician or   their representative will relay the results to you with their   recommendations at their soonest availability.  Thank you,  Your health is very important to us during the Covid Crisis. Following your   procedure today, you will receive a daily text for 2 weeks asking about   signs or symptoms of Covid 19.  Please respond to this text when you   receive it so we can follow up and keep you as safe as possible.   RESTRICTIONS:  During your procedure today, you received medications for sedation.  These   medications may affect your judgment, balance and coordination.  Therefore,   for 24 hours, you have the following restrictions:   - DO NOT drive a car, operate machinery, make legal/financial decisions,   sign important papers or drink alcohol.    ACTIVITY:  Today: no heavy lifting, straining or running due to procedural   sedation/anesthesia.  The following day: return to full activity including work.  DIET:  Eat and drink normally unless instructed otherwise.     TREATMENT FOR COMMON SIDE EFFECTS:  - Mild abdominal pain, nausea, belching, bloating or excessive gas:  rest,   eat lightly and use a heating pad.  - Sore Throat: treat with throat lozenges and/or gargle with warm salt   water.  - Because air was used during the procedure, expelling large amounts of air   from your rectum or belching is normal.  - If a bowel prep was taken, you may not have a bowel movement for 1-3 days.    This is normal.  SYMPTOMS TO WATCH FOR AND REPORT TO YOUR PHYSICIAN:  1. Abdominal pain or bloating, other  than gas cramps.  2. Chest pain.  3. Back pain.  4. Signs of infection such as: chills or fever occurring within 24 hours   after the procedure.  5. Rectal bleeding, which would show as bright red, maroon, or black stools.   (A tablespoon of blood from the rectum is not serious, especially if   hemorrhoids are present.)  6. Vomiting.  7. Weakness or dizziness.  GO DIRECTLY TO THE NEAREST EMERGENCY ROOM IF YOU HAVE ANY OF THE FOLLOWING:      Difficulty breathing              Chills and/or fever over 101 F   Persistent vomiting and/or vomiting blood   Severe abdominal pain   Severe chest pain   Black, tarry stools   Bleeding- more than one tablespoon   Any other symptom or condition that you feel may need urgent attention  Your doctor recommends these additional instructions:  If any biopsies were taken, your doctors clinic will contact you in 1 to 2   weeks with any results.  - Discharge patient to home.   - Patient has a contact number available for emergencies.  The signs and   symptoms of potential delayed complications were discussed with the   patient.  Return to normal activities tomorrow.  Written discharge   instructions were provided to the patient.   - Resume previous diet.   - Continue present medications.   - Repeat upper endoscopy in 1 year for surveillance.   - would start protonix 40mg daily to aide in healing of gastritis.  - Perform a colonoscopy today.  For questions, problems or results please call your physician - Nahid Collins MD.  EMERGENCY PHONE NUMBER: [phone],  LAB RESULTS: [phone]  IF A COMPLICATION OR EMERGENCY SITUATION ARISES AND YOU ARE UNABLE TO REACH   YOUR PHYSICIAN - GO DIRECTLY TO THE EMERGENCY ROOM.  Nahid Collins MD  2/20/2025 9:03:31 AM  This report has been verified and signed electronically.  Dear patient,  As a result of recent federal legislation (The Federal Cures Act), you may   receive lab or pathology results from your procedure in your [facility]    account before your physician is able to contact you. Your physician or   their representative will relay the results to you with their   recommendations at their soonest availability.  Thank you,  [employer]

## 2025-02-20 NOTE — ANESTHESIA POSTPROCEDURE EVALUATION
Anesthesia Post Evaluation    Patient: Tona Carlson    Procedure(s) Performed: Procedure(s) (LRB):  EGD (ESOPHAGOGASTRODUODENOSCOPY) (N/A)  COLONOSCOPY, SCREENING, LOW RISK PATIENT (N/A)    Final Anesthesia Type: general      Patient location during evaluation: PACU  Patient participation: Yes- Able to Participate  Level of consciousness: awake  Post-procedure vital signs: reviewed and stable  Pain management: adequate  Airway patency: patent    PONV status at discharge: No PONV  Anesthetic complications: no      Cardiovascular status: blood pressure returned to baseline  Respiratory status: unassisted  Hydration status: euvolemic  Follow-up not needed.              Vitals Value Taken Time   /53 02/20/25 10:02   Temp 36.7 °C (98.1 °F) 02/20/25 09:32   Pulse 85 02/20/25 10:02   Resp 15 02/20/25 10:02   SpO2 99 % 02/20/25 10:02         Event Time   Out of Recovery 10:12:00         Pain/Lu Score: Lu Score: 9 (2/20/2025  9:32 AM)

## 2025-02-26 LAB
FINAL PATHOLOGIC DIAGNOSIS: NORMAL
GROSS: NORMAL
Lab: NORMAL

## 2025-03-03 DIAGNOSIS — E11.42 TYPE 2 DIABETES MELLITUS WITH DIABETIC POLYNEUROPATHY, WITHOUT LONG-TERM CURRENT USE OF INSULIN: Chronic | ICD-10-CM

## 2025-03-03 DIAGNOSIS — K29.70 GASTRITIS DETERMINED BY ENDOSCOPY: ICD-10-CM

## 2025-03-03 RX ORDER — INSULIN GLARGINE 100 [IU]/ML
14 INJECTION, SOLUTION SUBCUTANEOUS DAILY
Qty: 12 ML | Refills: 3 | Status: SHIPPED | OUTPATIENT
Start: 2025-03-03

## 2025-03-04 RX ORDER — PANTOPRAZOLE SODIUM 40 MG/1
40 TABLET, DELAYED RELEASE ORAL DAILY
Qty: 90 TABLET | Refills: 3 | Status: SHIPPED | OUTPATIENT
Start: 2025-03-04

## 2025-03-10 DIAGNOSIS — H40.1134 PRIMARY OPEN ANGLE GLAUCOMA (POAG) OF BOTH EYES, INDETERMINATE STAGE: ICD-10-CM

## 2025-03-10 RX ORDER — TIMOLOL MALEATE 5 MG/ML
1 SOLUTION/ DROPS OPHTHALMIC 2 TIMES DAILY
Qty: 30 ML | Refills: 3 | Status: SHIPPED | OUTPATIENT
Start: 2025-03-10 | End: 2026-03-10

## 2025-03-17 ENCOUNTER — OFFICE VISIT (OUTPATIENT)
Dept: HEPATOLOGY | Facility: CLINIC | Age: 70
End: 2025-03-17
Payer: MEDICARE

## 2025-03-17 DIAGNOSIS — E11.69 HYPERLIPIDEMIA ASSOCIATED WITH TYPE 2 DIABETES MELLITUS: ICD-10-CM

## 2025-03-17 DIAGNOSIS — Z85.05 ENCOUNTER FOR FOLLOW-UP SURVEILLANCE OF LIVER CANCER: ICD-10-CM

## 2025-03-17 DIAGNOSIS — Z08 ENCOUNTER FOR FOLLOW-UP SURVEILLANCE OF LIVER CANCER: ICD-10-CM

## 2025-03-17 DIAGNOSIS — K74.60 LIVER CIRRHOSIS SECONDARY TO NASH: Primary | ICD-10-CM

## 2025-03-17 DIAGNOSIS — E66.3 OVERWEIGHT (BMI 25.0-29.9): ICD-10-CM

## 2025-03-17 DIAGNOSIS — I85.10 SECONDARY ESOPHAGEAL VARICES WITHOUT BLEEDING: ICD-10-CM

## 2025-03-17 DIAGNOSIS — E11.42 TYPE 2 DIABETES MELLITUS WITH DIABETIC POLYNEUROPATHY, WITH LONG-TERM CURRENT USE OF INSULIN: ICD-10-CM

## 2025-03-17 DIAGNOSIS — E78.5 HYPERLIPIDEMIA ASSOCIATED WITH TYPE 2 DIABETES MELLITUS: ICD-10-CM

## 2025-03-17 DIAGNOSIS — K29.70 GASTRITIS DETERMINED BY ENDOSCOPY: ICD-10-CM

## 2025-03-17 DIAGNOSIS — Z79.4 TYPE 2 DIABETES MELLITUS WITH DIABETIC POLYNEUROPATHY, WITH LONG-TERM CURRENT USE OF INSULIN: ICD-10-CM

## 2025-03-17 DIAGNOSIS — K76.6 PORTAL HYPERTENSION: ICD-10-CM

## 2025-03-17 DIAGNOSIS — K75.81 LIVER CIRRHOSIS SECONDARY TO NASH: Primary | ICD-10-CM

## 2025-03-17 RX ORDER — INSULIN GLARGINE-YFGN 100 [IU]/ML
INJECTION, SOLUTION SUBCUTANEOUS
COMMUNITY
Start: 2025-01-20

## 2025-03-17 NOTE — PROGRESS NOTES
The patient location is: [place]  The chief complaint leading to consultation is: Cirrhosis    Visit type: audiovisual    30 minutes of total time spent on the encounter, which includes face to face time and non-face to face time preparing to see the patient (eg, review of tests), Obtaining and/or reviewing separately obtained history, Documenting clinical information in the electronic or other health record, Independently interpreting results (not separately reported) and communicating results to the patient/family/caregiver, or Care coordination (not separately reported).     Each patient to whom he or she provides medical services by telemedicine is:  (1) informed of the relationship between the physician and patient and the respective role of any other health care provider with respect to management of the patient; and (2) notified that he or she may decline to receive medical services by telemedicine and may withdraw from such care at any time.    Notes:      HEPATOLOGY CLINIC VISIT NOTE    CHIEF COMPLAINT: MASH Cirrhosis    HISTORY: This is a 69 y.o.  female here for follow up for well compensated cirrhosis, due to MASH. She was last seen in clinic by myself in 8/2024. She was referred for HCV AB (+), but subsequent testing didn't reveal viremia. Work up showed elevated liver enzymes, and was concerning for MASLD/MASH. She has multiple metabolic risk factors, including DMII. Last HgbA1c was 6.3 % (11/2024). She is followed by Endocrinology, on Ozempic - the dose was increased recently. Lipids are well controlled. She is back on Lipitor.     Fibroscan in 2/2019 was suggestive of F4 fibrosis and a high likelihood of cirrhosis. Repeat Fibroscan in 6/2022 was again suggestive of F4 fibrosis, with severe fatty infiltration of the liver (S3). Most recent US for HCC surveillance in 2/2025 showed:    FINDINGS:    Complete scan of the patient's abdomen was obtained.  The liver is notenlarged.  It measures  16.6 cm.  No focal mass lesions are noted.  HR I calculation was not obtained.    The spleen is enlarged.  It measures 11.7 by 4.7 cm ..  No focal defects are seen in the spleen.  The aorta tapers normally. The proximal inferior vena cava is unremarkable.   No para-aortic adenopathy is seen.  The pancreas is not enlarged.  The tail is obscured by gas however.  The gallbladder shows no calculi.  No dilated common bile duct is noted.  No hydronephrosis or significant mass lesion is noted.  No ascites is noted.     Impression:     Hepatosplenomegaly     MELD-Na is 7, CTP Class A Cirrhosis. She has lost 22 lbs since 12/2022. Unfortunately she has gained 18 lbs since last visit. Her liver enzymes had previously normalized, with weight loss and better glycemic control. However, most recent labs in 11/2024 showed mild transaminitis. EGD in 3/2023 showed Grade I esophageal varices. Repeat EGD in 1/2024 showed:    Findings:       Small (< 5 mm) varices with no bleeding and no stigmata of recent        bleeding were found in the lower third of the esophagus. No red renata        signs were present.        The cardia and gastric fundus were normal on retroflexion.        Multiple diminutive sessile fundic gland polyps were found in the        gastric body.        The gastric antrum was normal.        The examined duodenum was normal.     Most recent EGD in 2/2025 showed:    Impression:     - Small (< 5 mm) esophageal varices.                          - Portal hypertensive gastropathy.                          - Gastritis with hemorrhage.                          - Normal examined duodenum.                          - No specimens collected.                          Portal gastropathy with contact irritation and                          oozing but no downstream blood. this is just                          friable gastritis from portal htn.                          small varices similar to prior EGDs.     She is now on Protonix,  given recent EGD findings. She has been exposed to Hepatitis A and is immune through prior exposure. HBV negative. She is S/P vaccination for Hepatitis B. Family history is significant for paternal grandmother with ESLD and liver cancer.     She endorses chronic fatigue, and increased forgetfulness, but denies any other signs or symptoms of hepatic decompensation including: jaundice, dark urine, abdominal distention, lower extremity edema, hematemesis, or melena.    Past Medical History:   Diagnosis Date    Cataract     Compressive optic atrophy 2015    Diabetes mellitus, type 2     Fatty liver disease, nonalcoholic     Hyperlipidemia     Prolactinoma     Reflux 08/10/2012    Rosacea     Toe fracture, right 2018    Unspecified cirrhosis of liver      Past Surgical History:   Procedure Laterality Date    BREAST BIOPSY Right     core     SECTION      CHOLECYSTECTOMY      COLONOSCOPY, SCREENING, LOW RISK PATIENT N/A 2025    Procedure: COLONOSCOPY, SCREENING, LOW RISK PATIENT;  Surgeon: Nahid Collins MD;  Location: [facility];  Service: Endoscopy;  Laterality: N/A;    ESOPHAGOGASTRODUODENOSCOPY N/A 3/8/2023    Procedure: ESOPHAGOGASTRODUODENOSCOPY (EGD);  Surgeon: Raciel Becker MD;  Location: [facility] (4TH FLR);  Service: Gastroenterology;  Laterality: N/A;  refused to have   cirrhosis-labs done on 22  instr mailed/portal-GT  pre-call no answer 2023    ESOPHAGOGASTRODUODENOSCOPY N/A 2024    Procedure: EGD (ESOPHAGOGASTRODUODENOSCOPY);  Surgeon: Cresencio Alonso MD;  Location: [facility] (2ND FLR);  Service: Endoscopy;  Laterality: N/A;  24-Ref Danni Calderón NP, h/o cirrhosis-last CBC, PT/INR 24, Ozempic on Sundays-holding 24 dose, instr portal-DS  -pt will call back to confirm appt (not sure if she has a ride), pt notified of change in floor-KPvt  Moved to    ESOPHAGOGASTRODUODENOSCOPY N/A 2025    Procedure: EGD  (ESOPHAGOGASTRODUODENOSCOPY);  Surgeon: Nahid Collins MD;  Location: [facility] ENDO;  Service: Endoscopy;  Laterality: N/A;  Ref by Charles Guzman, Labs am procedure, portal - PC  2/6/25- colonoscopy added per request of outside provider (DIONICIO Gann), ok to add per Dr. Collins.  Pt requests sutab, instr to portal, ozempic, labs prior to procedure. DBM  2/11-pre call complete-    TRANSPHENOIDAL PITUITARY RESECTION  2014     FAMILY HISTORY: Per HPI    SOCIAL HISTORY:   Social History     Tobacco Use   Smoking Status Never    Passive exposure: Never   Smokeless Tobacco Never     Social History     Substance and Sexual Activity   Alcohol Use No     Social History     Substance and Sexual Activity   Drug Use No     ROS:   No fever, chills, weight loss + chronic fatigue  No chest pain, dyspnea, cough  No abdominal pain,  nausea, vomiting  No headaches, visual changes  + lower extremity edema  No depression or anxiety    PHYSICAL EXAM:  Friendly  female, in no acute distress; alert and oriented to person, place and time.  VITALS: There were no vitals taken for this visit.  HEENT: Sclerae anicteric.   NECK: No obvious masses.  CVS: Trace peripheral edema.  LUNGS: Normal respiratory effort.   ABDOMEN: No obvious ascites.  SKIN: No jaundice, No obvious rashes.   EXTREMITIES: Trace peripheral edema.  NEURO/PSYCH: Memory intact. Thought and speech pattern appropriate. Behavior normal. No depression or anxiety noted.    RECENT LABS:  Lab Results   Component Value Date    WBC 3.87 (L) 02/19/2025    HGB 12.7 02/19/2025     (L) 02/19/2025     Lab Results   Component Value Date    INR 1.0 02/19/2025     Lab Results   Component Value Date    AST 50 (H) 11/08/2024    ALT 55 (H) 11/08/2024    BILITOT 1.2 (H) 11/08/2024    ALBUMIN 4.0 11/08/2024    ALKPHOS 94 11/08/2024    CREATININE 0.7 11/08/2024    BUN 7 (L) 11/08/2024     11/08/2024    K 4.1 11/08/2024    AFP 6.0 11/08/2024     DIAGNOSTIC  STUDIES:    FIBROSCAN 2/15/2019:    Fibroscan readin.1 KPa     Fibrosis:F4      CAP readin dB/m     Steatosis: :S3      FIBROSCAN 2022:    Findings  Median liver stiffness score:  28  CAP Reading: dB/m:  330     IQR/med %:  14  Interpretation  Fibrosis interpretation is based on medial liver stiffness - Kilopascal (kPa).     Fibrosis Stage:  F4  Steatosis interpretation is based on controlled attenuation parameter - (dB/m).     Steatosis Grade:  S3    EGD 3/8/2023:    Findings:        Grade I varices with no bleeding and no stigmata of recent bleeding        were found in the lower third of the esophagus. No red renata signs        were present.        The exam of the esophagus was otherwise normal.        The entire examined stomach was normal.        There is no endoscopic evidence of varices in the stomach.        The duodenal bulb, first portion of the duodenum and second portion        of the duodenum were normal.   Impression:     - Grade I esophageal varices with no bleeding and                          no stigmata of recent bleeding.                          - Normal stomach.                          - Normal duodenal bulb, first portion of the                          duodenum and second portion of the duodenum.                          - No specimens collected.    US ABDOMEN COMPLETE 2023:    FINDINGS:    The visualized pancreas is within normal limits.     The aorta is not aneurysmal.  The visualized inferior vena cava is unremarkable.     The common duct is 4 mm, not dilated.  Gallbladder has been removed.     The liver measures 16.3 cm, not enlarged.  The liver demonstrates no focal mass.  The liver parenchyma is mildly heterogeneous.  The H RI is 1.6, suggestive of greater than 5% steatosis.     The right kidney measures 12.6 cm and has a normal sonographic appearance.     The spleen is mildly enlarged, 12.8 cm.     The left kidney measures 11.6 cm and demonstrates no sonographic  abnormality.     Impression:     Hepatic steatosis.  No hepatic mass.     Mild splenomegaly     Cholecystectomy    US ABDOMEN COMPLETE 12/18/2023:    FINDINGS:    The pancreas aorta and IVC are unremarkable.  Gallbladder has been removed.  The bile duct measures 4 mm.  Liver measures 16 cm, the right kidney 11.7, the left kidney 11.2, the spleen 11.4 cm.  There are a few splenic granulomas.  The liver and spleen are otherwise normal.  The liver demonstrates coarse echotexture.     Impression:     Liver demonstrates coarse echotexture suggesting cirrhosis/medical liver disease.     Biliary tree is normal.  No acute process seen    EGD 1/19/2024:      Findings:       Small (< 5 mm) varices with no bleeding and no stigmata of recent        bleeding were found in the lower third of the esophagus. No red renata        signs were present.        The cardia and gastric fundus were normal on retroflexion.        Multiple diminutive sessile fundic gland polyps were found in the        gastric body.        The gastric antrum was normal.        The examined duodenum was normal.   Impression:     - Small (< 5 mm) esophageal varices with no                          bleeding and no stigmata of recent bleeding.                          - Multiple fundic gland polyps.                          - Normal antrum.                          - Normal examined duodenum.                          - No specimens collected.     US ABDOMEN COMPLETE 2/5/2025:    FINDINGS:    Complete scan of the patient's abdomen was obtained.  The liver is not enlarged.  It measures 16.6 cm.  No focal mass lesions are noted.  HR I calculation was not obtained.    The spleen is enlarged.  It measures 11.7 by 4.7 cm ..  No focal defects are seen in the spleen.  The aorta tapers normally. The proximal inferior vena cava is unremarkable.   No para-aortic adenopathy is seen.  The pancreas is not enlarged.  The tail is obscured by gas however.  The gallbladder shows no  calculi.  No dilated common bile duct is noted.  No hydronephrosis or significant mass lesion is noted. No ascites is noted.     Impression:     Hepatosplenomegaly     EGD 2/20/2025:      Impression:     - Small (< 5 mm) esophageal varices.                          - Portal hypertensive gastropathy.                          - Gastritis with hemorrhage.                          - Normal examined duodenum.                          - No specimens collected.                          Portal gastropathy with contact irritation and                          oozing but no downstream blood. this is just                          friable gastritis from portal htn.                          small varices similar to prior EGDs.   Recommendation:        - Discharge patient to home.                          - Patient has a contact number available for                          emergencies. The signs and symptoms of potential                          delayed complications were discussed with the                          patient. Return to normal activities tomorrow.                          Written discharge instructions were provided to                          the patient.                          - Resume previous diet.                          - Continue present medications.                          - Repeat upper endoscopy in 1 year for                          surveillance.                          - would start protonix 40mg daily to aide in                          healing of gastritis.     ASSESSMENT  69 y.o.  female with:    1. Liver cirrhosis secondary to ZARCO  CBC Auto Differential    AFP Tumor Marker    Protime-INR      2. Portal hypertension  CBC Auto Differential    AFP Tumor Marker    Protime-INR      3. Secondary esophageal varices without bleeding  CBC Auto Differential    AFP Tumor Marker    Protime-INR      4. Gastritis determined by endoscopy        5. Type 2 diabetes mellitus with diabetic polyneuropathy,  with long-term current use of insulin  CBC Auto Differential    AFP Tumor Marker    Protime-INR      6. Hyperlipidemia associated with type 2 diabetes mellitus  CBC Auto Differential    AFP Tumor Marker    Protime-INR      7. Overweight (BMI 25.0-29.9)  CBC Auto Differential    AFP Tumor Marker    Protime-INR      8. Encounter for follow-up surveillance of liver cancer          MELD 3.0: 8 at 11/8/2024 10:02 AM  MELD-Na: 7 at 11/8/2024 10:02 AM  Calculated from:  Serum Creatinine: 0.7 mg/dL (Using min of 1 mg/dL) at 11/8/2024 10:02 AM  Serum Sodium: 143 mmol/L (Using max of 137 mmol/L) at 11/8/2024 10:02 AM  Total Bilirubin: 1.2 mg/dL at 11/8/2024 10:02 AM  Serum Albumin: 4 g/dL (Using max of 3.5 g/dL) at 11/8/2024 10:02 AM  INR(ratio): 1 at 11/8/2024 10:02 AM  Age at listing (hypothetical): 69 years  Sex: Female at 11/8/2024 10:02 AM    - Recommend Ultrasound of the liver every 6 months for HCC surveillance, next due 8/2025.  - Recommend labs every 6 months to monitor liver function (scheduled 5/6/2025).  - Continue Protonix, as prescribed.   - Recommend EGD for variceal surveillance annually, next due 2/2026.  - Avoid alcohol and herbal supplements/alternative remedies.  - Recommend weight loss of 15 lbs, through diet and exercise.  - Recommend good control of cholesterol, blood pressure, & blood sugar levels.  - Return to clinic TBD by lab results, likely 6 months.        Hepatology Nurse Practitioner  [facility]

## 2025-03-25 DIAGNOSIS — H40.1134 PRIMARY OPEN ANGLE GLAUCOMA (POAG) OF BOTH EYES, INDETERMINATE STAGE: ICD-10-CM

## 2025-03-25 RX ORDER — TIMOLOL MALEATE 5 MG/ML
1 SOLUTION/ DROPS OPHTHALMIC 2 TIMES DAILY
Qty: 30 ML | Refills: 3 | Status: SHIPPED | OUTPATIENT
Start: 2025-03-25 | End: 2026-03-25

## 2025-04-03 ENCOUNTER — OFFICE VISIT (OUTPATIENT)
Dept: URGENT CARE | Facility: CLINIC | Age: 70
End: 2025-04-03
Payer: MEDICARE

## 2025-04-03 VITALS
HEART RATE: 89 BPM | TEMPERATURE: 100 F | DIASTOLIC BLOOD PRESSURE: 72 MMHG | BODY MASS INDEX: 29.65 KG/M2 | HEIGHT: 65 IN | OXYGEN SATURATION: 95 % | RESPIRATION RATE: 20 BRPM | SYSTOLIC BLOOD PRESSURE: 116 MMHG | WEIGHT: 177.94 LBS

## 2025-04-03 DIAGNOSIS — R09.82 POSTNASAL DRIP: Primary | ICD-10-CM

## 2025-04-03 PROCEDURE — 99213 OFFICE O/P EST LOW 20 MIN: CPT | Mod: S$GLB,,, | Performed by: FAMILY MEDICINE

## 2025-04-03 RX ORDER — PROMETHAZINE HYDROCHLORIDE AND DEXTROMETHORPHAN HYDROBROMIDE 6.25; 15 MG/5ML; MG/5ML
5 SYRUP ORAL EVERY 8 HOURS PRN
Qty: 180 ML | Refills: 0 | Status: SHIPPED | OUTPATIENT
Start: 2025-04-03 | End: 2025-04-13

## 2025-04-03 NOTE — PROGRESS NOTES
"Subjective:      Patient ID: Tona Carlson is a 69 y.o. female.    Vitals:  height is 5' 5" (1.651 m) and weight is 80.7 kg (177 lb 14.6 oz). Her oral temperature is 99.7 °F (37.6 °C). Her blood pressure is 116/72 and her pulse is 89. Her respiration is 20 and oxygen saturation is 95%.     Chief Complaint: Sinus Problem    Pt presents today with fatigue, chest congestion she hears a sound, wheezing, runny nose sx started a week ago,  tx: mucinex    Sinus Problem  This is a new problem. The current episode started 1 to 4 weeks ago. The problem has been gradually worsening since onset. There has been no fever. Associated symptoms include congestion and sneezing. Pertinent negatives include no chills, coughing, diaphoresis, ear pain, headaches, hoarse voice, neck pain, shortness of breath, sinus pressure, sore throat or swollen glands. Past treatments include oral decongestants. The treatment provided no relief.       Constitution: Negative for chills and sweating.   HENT:  Positive for congestion. Negative for ear pain, sinus pressure and sore throat.    Neck: Negative for neck pain.   Respiratory:  Negative for cough and shortness of breath.    Allergic/Immunologic: Positive for sneezing.   Neurological:  Negative for headaches.      Objective:     Physical Exam   Constitutional: She is oriented to person, place, and time. She appears well-developed. She is cooperative.  Non-toxic appearance. She does not appear ill. No distress.   HENT:   Head: Normocephalic and atraumatic.   Ears:   Right Ear: Hearing, tympanic membrane, external ear and ear canal normal.   Left Ear: Hearing, tympanic membrane, external ear and ear canal normal.   Nose: Nose normal. No mucosal edema, rhinorrhea or nasal deformity. No epistaxis. Right sinus exhibits no maxillary sinus tenderness and no frontal sinus tenderness. Left sinus exhibits no maxillary sinus tenderness and no frontal sinus tenderness.   Mouth/Throat: Uvula is midline, " oropharynx is clear and moist and mucous membranes are normal. No trismus in the jaw. Normal dentition. No uvula swelling. No oropharyngeal exudate, posterior oropharyngeal edema or posterior oropharyngeal erythema.   Eyes: Conjunctivae and lids are normal. Right eye exhibits chemosis. Left eye exhibits chemosis. Right conjunctiva is not injected. Left conjunctiva is not injected. No scleral icterus.   Neck: Trachea normal and phonation normal. Neck supple. No edema present. No erythema present. No neck rigidity present.   Cardiovascular: Normal rate, regular rhythm, normal heart sounds and normal pulses.   Pulmonary/Chest: Effort normal and breath sounds normal. No respiratory distress. She has no decreased breath sounds. She has no rhonchi.   Abdominal: Normal appearance.   Musculoskeletal: Normal range of motion.         General: No deformity. Normal range of motion.   Neurological: She is alert and oriented to person, place, and time. She exhibits normal muscle tone. Coordination normal.   Skin: Skin is warm, dry, intact, not diaphoretic and not pale.   Psychiatric: Her speech is normal and behavior is normal. Judgment and thought content normal.   Nursing note and vitals reviewed.      Assessment:     1. Postnasal drip        Plan:       Postnasal drip  -     promethazine-dextromethorphan (PROMETHAZINE-DM) 6.25-15 mg/5 mL Syrp; Take 5 mLs by mouth every 8 (eight) hours as needed.  Dispense: 180 mL; Refill: 0      Thank you for choosing [facility]!     Our goal in the Urgent Care is to always provide outstanding medical care. You may receive a survey by mail or e-mail in the next week regarding your experience today. We would greatly appreciate you completing and returning the survey. Your feedback provides us with a way to recognize our staff who provide very good care, and it helps us learn how to improve when your experience was below our aspiration of excellence.       We appreciate you trusting us  with your medical care. We hope you feel better soon. We will be happy to take care of you for all of your future medical needs.  You must understand that you've received an Urgent Care treatment only and that you may be released before all your medical problems are known or treated. You, the patient, will arrange for follow up care as instructed.  Follow up with your PCP or specialty clinic as directed in the next 1-2 weeks if not improved or as needed.  You can call [phone] to schedule an appointment with the appropriate provider.  Another option is to follow up with [facility] ([email or web address]) virtually for quick simple medical advice.  If your condition worsens we recommend that you receive another evaluation at the emergency room immediately or contact your primary medical clinics after hours call service to discuss your concerns.  Please return here or go to the Emergency Department for any concerns or worsening of condition.      *If you were prescribed a narcotic or controlled medication, do not drive or operate heavy equipment or machinery while taking these medications.

## 2025-05-06 ENCOUNTER — APPOINTMENT (OUTPATIENT)
Dept: LAB | Facility: HOSPITAL | Age: 70
End: 2025-05-06
Attending: HOSPITALIST
Payer: MEDICARE

## 2025-05-08 ENCOUNTER — RESULTS FOLLOW-UP (OUTPATIENT)
Dept: HEPATOLOGY | Facility: CLINIC | Age: 70
End: 2025-05-08

## 2025-05-08 ENCOUNTER — TELEPHONE (OUTPATIENT)
Dept: HEPATOLOGY | Facility: CLINIC | Age: 70
End: 2025-05-08
Payer: MEDICARE

## 2025-05-08 DIAGNOSIS — Z79.4 TYPE 2 DIABETES MELLITUS WITH DIABETIC POLYNEUROPATHY, WITH LONG-TERM CURRENT USE OF INSULIN: ICD-10-CM

## 2025-05-08 DIAGNOSIS — E66.3 OVERWEIGHT (BMI 25.0-29.9): ICD-10-CM

## 2025-05-08 DIAGNOSIS — K76.6 PORTAL HYPERTENSION: ICD-10-CM

## 2025-05-08 DIAGNOSIS — I85.10 SECONDARY ESOPHAGEAL VARICES WITHOUT BLEEDING: ICD-10-CM

## 2025-05-08 DIAGNOSIS — E78.5 HYPERLIPIDEMIA ASSOCIATED WITH TYPE 2 DIABETES MELLITUS: ICD-10-CM

## 2025-05-08 DIAGNOSIS — E11.69 HYPERLIPIDEMIA ASSOCIATED WITH TYPE 2 DIABETES MELLITUS: ICD-10-CM

## 2025-05-08 DIAGNOSIS — E11.42 TYPE 2 DIABETES MELLITUS WITH DIABETIC POLYNEUROPATHY, WITH LONG-TERM CURRENT USE OF INSULIN: ICD-10-CM

## 2025-05-08 DIAGNOSIS — K74.60 LIVER CIRRHOSIS SECONDARY TO NASH: Primary | ICD-10-CM

## 2025-05-08 DIAGNOSIS — K75.81 LIVER CIRRHOSIS SECONDARY TO NASH: Primary | ICD-10-CM

## 2025-05-08 NOTE — TELEPHONE ENCOUNTER
----- Message from Danni Calderón NP sent at 5/8/2025  9:59 AM CDT -----  Please contact patient to schedule labs and US in 6 months with f/u visit 1 week later. Thanks!  ----- Message -----  From: Lab, Background User  Sent: 5/6/2025   5:30 PM CDT  To: Danni Calderón NP

## 2025-05-08 NOTE — TELEPHONE ENCOUNTER
Called patient. No answer. Left VM stating the appointments dates and times. Mailed appointments reminder to patient.

## 2025-05-13 ENCOUNTER — OFFICE VISIT (OUTPATIENT)
Dept: INTERNAL MEDICINE | Facility: CLINIC | Age: 70
End: 2025-05-13
Payer: MEDICARE

## 2025-05-13 VITALS
WEIGHT: 171.31 LBS | HEART RATE: 71 BPM | TEMPERATURE: 98 F | OXYGEN SATURATION: 99 % | DIASTOLIC BLOOD PRESSURE: 80 MMHG | BODY MASS INDEX: 28.54 KG/M2 | SYSTOLIC BLOOD PRESSURE: 100 MMHG | HEIGHT: 65 IN

## 2025-05-13 DIAGNOSIS — E11.42 TYPE 2 DIABETES MELLITUS WITH DIABETIC POLYNEUROPATHY, WITH LONG-TERM CURRENT USE OF INSULIN: ICD-10-CM

## 2025-05-13 DIAGNOSIS — E78.5 HYPERLIPIDEMIA ASSOCIATED WITH TYPE 2 DIABETES MELLITUS: Primary | ICD-10-CM

## 2025-05-13 DIAGNOSIS — E11.69 HYPERLIPIDEMIA ASSOCIATED WITH TYPE 2 DIABETES MELLITUS: Primary | ICD-10-CM

## 2025-05-13 DIAGNOSIS — Z00.00 ENCOUNTER FOR PREVENTIVE HEALTH EXAMINATION: ICD-10-CM

## 2025-05-13 DIAGNOSIS — Z79.4 TYPE 2 DIABETES MELLITUS WITH DIABETIC POLYNEUROPATHY, WITH LONG-TERM CURRENT USE OF INSULIN: ICD-10-CM

## 2025-05-13 PROCEDURE — 99999 PR PBB SHADOW E&M-EST. PATIENT-LVL V: CPT | Mod: PBBFAC,,, | Performed by: HOSPITALIST

## 2025-05-13 RX ORDER — INSULIN GLARGINE 100 [IU]/ML
16 INJECTION, SOLUTION SUBCUTANEOUS DAILY
Qty: 4.8 ML | Refills: 5 | Status: SHIPPED | OUTPATIENT
Start: 2025-05-13

## 2025-05-13 NOTE — PROGRESS NOTES
"  Subjective:     @Patient ID: Tona Carlson is a 69 y.o. female.    Chief Complaint: Diabetes and Hyperlipidemia    HPI    69 y.o. female with DM2, HLD, liver cirrhosis, ZARCO, hepatitis C ab+, pituitary adenoma s/p resection, obesity, vit d deficiency, aortic atherosclerosis here for:      Dm2: lantus 16 units qday, ozempic 1 mg qweek; follows with endocrine.   HLD: atorvastatin 20 mg qday   3.  Zarco cirrhosis, hep C+: follows with hepatology  4. Pituitary adenoma s/p resection: followed up with endocrine and neurosurgery 11/2022. Had MRI pituitary that showed  " Residual lobular enhancing tissue along the margins of the sella is unchanged from 07/25/2018." Pt has f/u with neurosurgery and endocrine   5. Has Reported having memory changes and feeling forgetful .has been seen by Neurology and concern for vascular etiology.      Review of Systems   Constitutional:  Negative for chills and fever.   Psychiatric/Behavioral:  Negative for agitation.      Past medical history, surgical history, and family medical history reviewed and updated as appropriate.    Medications and allergies reviewed.     Objective:     There were no vitals filed for this visit.  There is no height or weight on file to calculate BMI.  Physical Exam  Constitutional:       Appearance: Normal appearance.   HENT:      Head: Normocephalic and atraumatic.   Eyes:      General:         Right eye: No discharge.         Left eye: No discharge.      Conjunctiva/sclera: Conjunctivae normal.   Pulmonary:      Effort: Pulmonary effort is normal.   Musculoskeletal:         General: Normal range of motion.      Cervical back: Normal range of motion and neck supple.   Skin:     General: Skin is warm and dry.   Neurological:      Mental Status: She is alert and oriented to person, place, and time.   Psychiatric:         Mood and Affect: Mood normal.         Behavior: Behavior normal.         Lab Results   Component Value Date    WBC 4.22 05/06/2025    " HGB 13.2 05/06/2025    HCT 40.1 05/06/2025     (L) 05/06/2025    CHOL 148 05/06/2025    TRIG 152 (H) 05/06/2025    HDL 42 05/06/2025    ALT 44 05/06/2025    AST 38 05/06/2025     05/06/2025    K 3.6 05/06/2025     05/06/2025    CREATININE 0.7 05/06/2025    BUN 6 (L) 05/06/2025    CO2 27 05/06/2025    TSH 1.617 11/08/2024    INR 1.0 05/06/2025    HGBA1C 6.3 (H) 05/06/2025       Assessment:     1. Type 2 diabetes mellitus with diabetic polyneuropathy, with long-term current use of insulin    2. Hyperlipidemia associated with type 2 diabetes mellitus    3. Encounter for preventive health examination      Plan:   Tona was seen today for diabetes and hyperlipidemia.    Diagnoses and all orders for this visit:    Hyperlipidemia associated with type 2 diabetes mellitus  - Stable. Continue home meds     -     Comprehensive Metabolic Panel; Future  -     CBC Auto Differential; Future  -     Lipid Panel; Future  -     TSH; Future  -     Urinalysis; Future  -     Hemoglobin A1C; Future  -     Microalbumin/Creatinine Ratio, Urine; Future        Type 2 diabetes mellitus with diabetic polyneuropathy, with long-term current use of insulin  - Stable. Continue home meds and f/u with endocrine    -     Comprehensive Metabolic Panel; Future  -     CBC Auto Differential; Future  -     Lipid Panel; Future  -     TSH; Future  -     Urinalysis; Future  -     Hemoglobin A1C; Future  -     Microalbumin/Creatinine Ratio, Urine; Future  -     insulin glargine U-100, Lantus, (LANTUS SOLOSTAR U-100 INSULIN) 100 unit/mL (3 mL) InPn pen; Inject 16 Units into the skin once daily.  -     Ambulatory referral/consult to Podiatry; Future    Encounter for preventive health examination  -     Comprehensive Metabolic Panel; Future  -     CBC Auto Differential; Future  -     Lipid Panel; Future  -     TSH; Future  -     Urinalysis; Future  -     Hemoglobin A1C; Future  -     Microalbumin/Creatinine Ratio, Urine; Future          Rtc 6  months for annual with labs     Medina Prakash MD  Internal Medicine    5/13/2025

## 2025-05-18 NOTE — PROGRESS NOTES
Subjective:        Chief Complaint:  Type 2 diabetes, pituitary adenoma and osteoporosis    History of Present Illness:     Tona Carlson is a 69 y.o. female who is here for follow up of DM, pituitary adenoma and Osteoporosis. Patient was last seen in the clinic on 11/19/2024.    Previously seen by Neha Saravia NP on 05/14/2024.    With regards to Diabetes:    Diagnosed: in her 50s  Known complications:   Dencarine  Last eye exam: : 02/10/2025   PN Yes, on pregabalin.   Podiatry: 2/2024  Nephropathy Denies  CAD Denies  DKA Denies    Current Regimen:  Lantus 16 units nightly   Ozempic 1 mg weekly      Reports compliance.     Other medications tried:  Jardiance - cost  Glimepiride  Victoza - GI intolerance  Januvia - cost  Took Trulicity for 2 months then self stopped due to decreased appetite.  Novolog  Metformin - GI side effects      Home glucose checks:   2 times a day testing  Log reviewed:  No  Fasting: On recall around mostly around 130-150s, few >200.     Hypoglycemia: None recently  Knows how to correct with 15 grams of carbs - juice, coke, or a peppermint.   Severe Hypoglycemia: Denies    H/o ZARCO cirrhosis/Hep C    Diet/Exercise:  Eats 3 meals a day.   Snacks : occasionally   Drinks : diet soft drinks, water - 4 bottles a day  Exercise - tries to stay active.      Last A1c:   Lab Results   Component Value Date    HGBA1C 6.2 (H) 05/19/2025    HGBA1C 6.3 (H) 05/06/2025    HGBA1C 6.3 (H) 11/08/2024     microalbumin:   Lab Results   Component Value Date    LABMICR <5.0 05/06/2025    CREATRANDUR 32.0 05/06/2025    MICALBCREAT  05/06/2025      Comment:      UNABLE TO CALCULATE    MICALBCREAT 54.1 (H) 11/08/2024     Lab Results   Component Value Date    EGFRNORACEVR >60 05/19/2025    CREATININE 0.7 05/19/2025     Lipids:   Lab Results   Component Value Date    CHOL 148 05/06/2025    TRIG 152 (H) 05/06/2025    HDL 42 05/06/2025    LDLCALC 75.6 05/06/2025    CHOLHDL 28.4 05/06/2025       TSH:  Lab  Results   Component Value Date    TSH 3.798 05/19/2025       Lab Results   Component Value Date    HGB 13.2 05/06/2025        Aspirin: No  No ASA, atorvastatin has been prescribed by PCP.   Denies HTN      Denies history of pancreatitis or personal/FH medullary thyroid cancer/MEN syndrome.  History recurrent UTI/yeast infections: Denies    Polyuria/Polydipsia: Denies        With regards to Osteoporosis:     2022:    FINDINGS:  The L1 to L4 vertebral bone mineral density is equal to 0.918 g/cm squared with a T score of -2.2.  Previously, -2.5     The left femoral neck bone mineral density is equal to 0.709 g/cm squared with a T score of -2.4.  Previously, -2.8     The right femoral neck bone mineral density is equal to 0.664 g/cm squared with a T score of -2.7.     There is a 13.3% risk of a major osteoporotic fracture and a 3.7% risk of hip fracture in the next 10 years (FRAX).      BMD: 11/7/2024    COMPARISON:  Cannot directly compare to the prior study in 11/3/2022/[facility] due to different densitometer and/or location.     FINDINGS:  Lumbar spine (L1-L4):             T-score is -2.5, and Z-score is -0.5.  Femoral neck:                          T-score is -2.9, and Z-score is -1.3.  Total hip:                                  T-score is -2.7, and Z-score is -1.2.  Distal 1/3 radius:                      Not applicable     Trabecular Bone Score  The trabecular bone score (TBS) indicates degraded bone quality.        Fracture Risk (FRAX adjusted for Trabecular Bone Score)  17% risk of a major osteoporotic fracture in the next 10 years.  5% risk of hip fracture in the next 10 years.     Impression:     *Osteoporosis based on T-score below -2.5.  *Fracture risk is very high due to calculated 10 year risk of hip fracture >4.5% (FRAX).    Medications:   Fosamax - unsure of start and stop dates - believes she took it for a year .    Reclast  11/16/2021, 12/8/2022, 6/17/2024 [did not get in 2023]    Calcium intake:  multivitamin    Vit D intake: over the counter vitamin D3 2000 IU daily, forgot to take it recently.       Lab Results   Component Value Date    IPQYHDMX59YC 23 (L) 05/19/2025    CALCIUM 9.4 05/19/2025    TSH 3.798 05/19/2025       Weight bearing exercise: active lifestyle   Recent falls: Denies  Fractures: History of fractured right wrist at 58 y/o. No new fractures.    Parent with fractured hip: No  Smoking status: No  Glucocorticoid use: No  History of RA: No  Alcohol 3 or more/day: No  Nephrolithiasis: No  Diabetes: Yes    Frequent falls: No  Loss of height: No  Menopause: 50 years    Cancer/XRT: Denies     Dental visits:  Every 6 months. Dental implant this week - says it could take 4 months or longer.     GERD: Yes on PPI    Supplements:   Ca: Takes yogurt, some cheese.    VitD: Not taking vitamin D currently.         Pituitary adenoma    Previously following with Dr Young and Dr Farmer for Pituitary Incidentaloma     She has a hx of a pituitary lesion that was removed via tsr in 2014.    She saw Dr. Farmer 3/19/24 with plans to repeat MRI in 2 years     Initial presentation: Reports nonfunctioning adenoma touching optic chiasm. She had c/o left hemianopsia improved after surgery surgery was in [facility]/ [facility] 2014    Imaging:      MRI 3/15/24  Postoperative change of trans-sphenoidal resection of pituitary adenoma. Minimal (less than 1 mm) increase in size of residual heterogeneously enhancing lobulated lesion within the sella turcica which abuts and partially surrounds the bilateral cavernous ICAs. No new mass effect or significant suprasellar extension. Stable appearance of the infundibulum with slight leftward deviation.     Headache:    Denies     Vision change:   Denies peripheral change but some blurring     Formal Visual fields:   HVF  9/19/2024    Right Eye - fixation losses 9/12, false positives 0%, false negatives 0%, MD -10.37dB, Impression OD: superior>inferior arcuate defects.   Left  "Eye - fixation losses 4/10, false positives 2%, false negatives 0%, MD -3.06dB   Impression OS: superior arcuate defect.       "Neuro-ophthalmologic examination was notable for mildly reduced visual acuities, mildly impaired color vision, normal ocular motility and alignment. OCT with stable peripapillary RNFL thinning. Formal visual fields were notable for roughly stable glaucomatous visual field changes. I will continue to monitor for any changes which would prompt concern for tumor growth".       Most recent pituitary labs: normal dexamethasone supression test (DST)     Lab Results   Component Value Date    LABCORT 12.00 05/19/2025    ACTH 26 05/19/2025       Current symptoms:  Hyperprolactinemia:  []  breast tenderness []  nipple discharge  [x]  Denies     Lab Results   Component Value Date    PROLACTIN 13.7 05/19/2025    PROLACTIN 10.8 05/07/2024    PROLACTIN 20.7 11/16/2022    PROLACTIN 18.5 10/14/2022    PROLACTIN 20.1 06/21/2021    PROLACTIN 23.9 02/15/2019       Thyroid:  [x]  fatigue [x]  weight loss (ozempic) []  temp intolerance   []  Denies    [x]  Loose stool []  skin/hair change [] palpitations []  tremor [x]  Denies    Lab Results   Component Value Date    TSH 3.798 05/19/2025    FREET4 0.89 05/19/2025          Growth Hormone   Last IGF-1:   Lab Results   Component Value Date    SOMATMDN 82 05/07/2024    SOMATMDN 94 06/21/2021    SOMATMDN 143 07/17/2018    SOMATMDN 119 10/20/2015        denies symptoms of adrenal insufficiency (no lightheadedness, N/V/abd pain, hypotension).        Gonadotrophs:     []  Irregular menses [x]  Postmenopausal  []  Decreased libido   []  ED   []  Denies    Lab Results   Component Value Date    FSH 30.45 05/07/2024       Repro hx: no difficulty conceiving, had 2 children  LMP: menopause around age 50    DI:   []  polyuria  []  Polydipsia  []  Nocturia    [x]  Denies        ROS:  see HPI    Objective:   Physical Exam     /72 (BP Location: Right arm, Patient Position: " Sitting)   Pulse 82   Wt 77.6 kg (171 lb 1.2 oz)   SpO2 99%   BMI 28.47 kg/m²     Wt Readings from Last 3 Encounters:   05/13/25 77.7 kg (171 lb 4.8 oz)   04/03/25 80.7 kg (177 lb 14.6 oz)   02/20/25 80.7 kg (178 lb)     Constitutional:  Pleasant,  in no acute distress.   HENT:   Eyes:    No scleral icterus.   Respiratory:   Effort normal   Neurological:  normal speech  Psych:  Normal mood and affect.    Neurological:  No tremor  Skin:    Skin is warm, dry  Extremity:  No edema    DIABETIC FOOT EXAM: 5/21/2025 - normal sensation to monofilament testing bilaterally.  No fissures, wounds, or ulcers.        Chemistry        Component Value Date/Time     05/19/2025 0802     11/08/2024 1002    K 3.9 05/19/2025 0802    K 4.1 11/08/2024 1002     05/19/2025 0802     11/08/2024 1002    CO2 24 05/19/2025 0802    CO2 19 (L) 11/08/2024 1002    BUN 6 (L) 05/19/2025 0802    CREATININE 0.7 05/19/2025 0802     05/19/2025 0802     (H) 11/08/2024 1002        Component Value Date/Time    CALCIUM 9.4 05/19/2025 0802    CALCIUM 9.6 11/08/2024 1002    ALKPHOS 108 05/19/2025 0802    ALKPHOS 94 11/08/2024 1002    AST 37 05/19/2025 0802    AST 50 (H) 11/08/2024 1002    ALT 43 05/19/2025 0802    ALT 55 (H) 11/08/2024 1002    BILITOT 1.2 (H) 05/19/2025 0802    BILITOT 1.2 (H) 11/08/2024 1002    ESTGFRAFRICA >60.0 06/14/2022 1036    EGFRNONAA >60.0 06/14/2022 1036             Assessment/Plan:     1. Type 2 diabetes mellitus with diabetic polyneuropathy, with long-term current use of insulin  Assessment & Plan:      Last A1c was 6.2.   Continue current diabetes regimen.  Call if any side effects from the medications.    Check blood sugars before meals and bedtime.  Call if blood sugars are persistently less than 70 or greater than 200.     Discussed importance of diet and lifestyle modifications for diabetes management  Hypoglycemia management discussed. Carry glucose tablets or snacks at all times.      Discussed risk of complications with uncontrolled diabetes.    Complications:  Follow up for regular diabetes eye exam  Daily self examination of feet.  Continue follow-up with Podiatry.  Microalbumin: Monitor.       Last A1c:   Lab Results   Component Value Date    HGBA1C 6.2 (H) 05/19/2025            2. Other osteoporosis without current pathological fracture  Assessment & Plan:    H/o right wrist fracture.  Patient is on Reclast, had her 3rd dose in 06/2024. Did not get one dose in 2023.   DXA scan not comparable as it was done on a different densitometer/location.  Fracture risk is lower due to her use of bisphosphonate.    Discussed continuing her Reclast for now.   Says she is due for dental procedure which could take up to 4 months or longer. She will call to schedule when she completes her dental work.     Discussed her risk of fractures and benefits of osteoporosis treatment  Side effects including osteonecrosis of the jaw and atypical femoral fractures discussed with patient    Encouraged safe movements and fall precautions  Continue routine dental visits  Instructed on Calcium and vitamin D   Call if any new fractures.  Next DXA scan due in 11/2026.            3. Pituitary adenoma  Assessment & Plan:  Stable residual lesion in left aspect of the sella with no mass effect or significant suprasellar extension.    Not having symptoms of pituitary hormone dysfunction but will check yearly labs.     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  Last visit was in 09/2024.    Continue to follow up with [facility] for periodic imaging, next planned for 3/2026          4. Vitamin D deficiency  Assessment & Plan:    Start vitamin D 63154 IU weekly for 12 weeks and then take vitamin D 1000 IU daily.   Monitor.       Orders:  -     ergocalciferol (ERGOCALCIFEROL) 50,000 unit Cap; Take 1 capsule (50,000 Units total) by mouth every 7 days. After 12 weeks, you can start taking 1000 units  daily.  Dispense: 12 capsule; Refill: 0       Follow-up in 6 months with labs.    Kristina GREENFIELD Rai, MD

## 2025-05-19 ENCOUNTER — LAB VISIT (OUTPATIENT)
Dept: LAB | Facility: HOSPITAL | Age: 70
End: 2025-05-19
Attending: INTERNAL MEDICINE
Payer: MEDICARE

## 2025-05-19 DIAGNOSIS — E11.42 TYPE 2 DIABETES MELLITUS WITH DIABETIC POLYNEUROPATHY, WITHOUT LONG-TERM CURRENT USE OF INSULIN: Chronic | ICD-10-CM

## 2025-05-19 DIAGNOSIS — D35.2 PITUITARY ADENOMA: Chronic | ICD-10-CM

## 2025-05-19 DIAGNOSIS — E55.9 VITAMIN D DEFICIENCY: ICD-10-CM

## 2025-05-19 LAB
25(OH)D3+25(OH)D2 SERPL-MCNC: 23 NG/ML (ref 30–96)
ALBUMIN SERPL BCP-MCNC: 3.8 G/DL (ref 3.5–5.2)
ALP SERPL-CCNC: 108 UNIT/L (ref 40–150)
ALT SERPL W/O P-5'-P-CCNC: 43 UNIT/L (ref 10–44)
ANION GAP (OHS): 11 MMOL/L (ref 8–16)
AST SERPL-CCNC: 37 UNIT/L (ref 11–45)
BILIRUB SERPL-MCNC: 1.2 MG/DL (ref 0.1–1)
BUN SERPL-MCNC: 6 MG/DL (ref 8–23)
CALCIUM SERPL-MCNC: 9.4 MG/DL (ref 8.7–10.5)
CHLORIDE SERPL-SCNC: 106 MMOL/L (ref 95–110)
CO2 SERPL-SCNC: 24 MMOL/L (ref 23–29)
CORTIS SERPL-MCNC: 12 UG/DL (ref 4.3–22.4)
CREAT SERPL-MCNC: 0.7 MG/DL (ref 0.5–1.4)
EAG (OHS): 131 MG/DL (ref 68–131)
GFR SERPLBLD CREATININE-BSD FMLA CKD-EPI: >60 ML/MIN/1.73/M2
GLUCOSE SERPL-MCNC: 109 MG/DL (ref 70–110)
HBA1C MFR BLD: 6.2 % (ref 4–5.6)
POTASSIUM SERPL-SCNC: 3.9 MMOL/L (ref 3.5–5.1)
PROLACTIN SERPL IA-MCNC: 13.7 NG/ML (ref 5.2–26.5)
PROT SERPL-MCNC: 7 GM/DL (ref 6–8.4)
SODIUM SERPL-SCNC: 141 MMOL/L (ref 136–145)
T4 FREE SERPL-MCNC: 0.89 NG/DL (ref 0.71–1.51)
TSH SERPL-ACNC: 3.8 UIU/ML (ref 0.4–4)

## 2025-05-19 PROCEDURE — 84439 ASSAY OF FREE THYROXINE: CPT

## 2025-05-19 PROCEDURE — 83036 HEMOGLOBIN GLYCOSYLATED A1C: CPT

## 2025-05-19 PROCEDURE — 84305 ASSAY OF SOMATOMEDIN: CPT

## 2025-05-19 PROCEDURE — 84450 TRANSFERASE (AST) (SGOT): CPT

## 2025-05-19 PROCEDURE — 82533 TOTAL CORTISOL: CPT

## 2025-05-19 PROCEDURE — 36415 COLL VENOUS BLD VENIPUNCTURE: CPT | Mod: PO

## 2025-05-19 PROCEDURE — 84443 ASSAY THYROID STIM HORMONE: CPT

## 2025-05-19 PROCEDURE — 82306 VITAMIN D 25 HYDROXY: CPT

## 2025-05-19 PROCEDURE — 82024 ASSAY OF ACTH: CPT

## 2025-05-19 PROCEDURE — 84146 ASSAY OF PROLACTIN: CPT

## 2025-05-20 LAB — ACTH (OHS): 26 PG/ML

## 2025-05-21 ENCOUNTER — OFFICE VISIT (OUTPATIENT)
Dept: ENDOCRINOLOGY | Facility: CLINIC | Age: 70
End: 2025-05-21
Payer: MEDICARE

## 2025-05-21 VITALS
HEART RATE: 82 BPM | DIASTOLIC BLOOD PRESSURE: 72 MMHG | WEIGHT: 171.06 LBS | OXYGEN SATURATION: 99 % | BODY MASS INDEX: 28.47 KG/M2 | SYSTOLIC BLOOD PRESSURE: 109 MMHG

## 2025-05-21 DIAGNOSIS — E11.42 TYPE 2 DIABETES MELLITUS WITH DIABETIC POLYNEUROPATHY, WITH LONG-TERM CURRENT USE OF INSULIN: Primary | ICD-10-CM

## 2025-05-21 DIAGNOSIS — Z79.4 TYPE 2 DIABETES MELLITUS WITH DIABETIC POLYNEUROPATHY, WITH LONG-TERM CURRENT USE OF INSULIN: Primary | ICD-10-CM

## 2025-05-21 DIAGNOSIS — M81.8 OTHER OSTEOPOROSIS WITHOUT CURRENT PATHOLOGICAL FRACTURE: ICD-10-CM

## 2025-05-21 DIAGNOSIS — E55.9 VITAMIN D DEFICIENCY: ICD-10-CM

## 2025-05-21 DIAGNOSIS — D35.2 PITUITARY ADENOMA: Chronic | ICD-10-CM

## 2025-05-21 PROCEDURE — 1126F AMNT PAIN NOTED NONE PRSNT: CPT | Mod: CPTII,S$GLB,, | Performed by: INTERNAL MEDICINE

## 2025-05-21 PROCEDURE — 1160F RVW MEDS BY RX/DR IN RCRD: CPT | Mod: CPTII,S$GLB,, | Performed by: INTERNAL MEDICINE

## 2025-05-21 PROCEDURE — 3066F NEPHROPATHY DOC TX: CPT | Mod: CPTII,S$GLB,, | Performed by: INTERNAL MEDICINE

## 2025-05-21 PROCEDURE — 1159F MED LIST DOCD IN RCRD: CPT | Mod: CPTII,S$GLB,, | Performed by: INTERNAL MEDICINE

## 2025-05-21 PROCEDURE — 3061F NEG MICROALBUMINURIA REV: CPT | Mod: CPTII,S$GLB,, | Performed by: INTERNAL MEDICINE

## 2025-05-21 PROCEDURE — 99999 PR PBB SHADOW E&M-EST. PATIENT-LVL IV: CPT | Mod: PBBFAC,,, | Performed by: INTERNAL MEDICINE

## 2025-05-21 PROCEDURE — 99214 OFFICE O/P EST MOD 30 MIN: CPT | Mod: S$GLB,,, | Performed by: INTERNAL MEDICINE

## 2025-05-21 PROCEDURE — 3074F SYST BP LT 130 MM HG: CPT | Mod: CPTII,S$GLB,, | Performed by: INTERNAL MEDICINE

## 2025-05-21 PROCEDURE — 3044F HG A1C LEVEL LT 7.0%: CPT | Mod: CPTII,S$GLB,, | Performed by: INTERNAL MEDICINE

## 2025-05-21 PROCEDURE — 3078F DIAST BP <80 MM HG: CPT | Mod: CPTII,S$GLB,, | Performed by: INTERNAL MEDICINE

## 2025-05-21 PROCEDURE — 3288F FALL RISK ASSESSMENT DOCD: CPT | Mod: CPTII,S$GLB,, | Performed by: INTERNAL MEDICINE

## 2025-05-21 PROCEDURE — 3008F BODY MASS INDEX DOCD: CPT | Mod: CPTII,S$GLB,, | Performed by: INTERNAL MEDICINE

## 2025-05-21 PROCEDURE — 1101F PT FALLS ASSESS-DOCD LE1/YR: CPT | Mod: CPTII,S$GLB,, | Performed by: INTERNAL MEDICINE

## 2025-05-21 RX ORDER — ERGOCALCIFEROL 1.25 MG/1
50000 CAPSULE ORAL
Qty: 12 CAPSULE | Refills: 0 | Status: SHIPPED | OUTPATIENT
Start: 2025-05-21

## 2025-05-21 RX ORDER — AMOXICILLIN 500 MG/1
500 CAPSULE ORAL 4 TIMES DAILY
COMMUNITY
Start: 2025-05-16

## 2025-05-25 DIAGNOSIS — E55.9 VITAMIN D DEFICIENCY: ICD-10-CM

## 2025-05-25 DIAGNOSIS — Z79.4 TYPE 2 DIABETES MELLITUS WITH DIABETIC POLYNEUROPATHY, WITH LONG-TERM CURRENT USE OF INSULIN: Primary | ICD-10-CM

## 2025-05-25 DIAGNOSIS — E11.42 TYPE 2 DIABETES MELLITUS WITH DIABETIC POLYNEUROPATHY, WITH LONG-TERM CURRENT USE OF INSULIN: Primary | ICD-10-CM

## 2025-05-25 NOTE — PATIENT INSTRUCTIONS
Continue current diabetes medications.    Call if you have any side effects from the medication:   Ozempic:  Nausea, vomiting, diarrhea, constipation, decreased appetite, abdominal pain. Other side effects include pancreatitis, gastroparesis, worsening of retinopathy, dehydration and acute kidney injury. Avoid this medication if you have any history of pancreatitis or have personal or family history of medullary thyroid cancer.     Keep well hydrated.     Check blood sugars before meals and bedtime.   Call if blood sugars are frequently less than 70 or above 200.    Call if you need prescriptions for medications.  Carry glucose tablets or snacks with you at all times.           Take calcium total 1200 mg a day from diet and supplements.   Take recommended vitamin D.   Avoid bending forwards at the waist and deep twisting  Continue weight bearing exercises like walking and do light weights  Take fall precautions  Call if you have any new fractures   Get regular dental visit and let your dentist know that you are on osteoporosis medication        Please see links below for further information:    Exercise/safe movement:  [email or web address]/patients/treatment/exercisesafe-movement/    Calcium content of common foods:  [email or web address]/calcium-content-[email or web address]-foods      RECLAST    DOSAGE AND ADMINISTRATION  Treatment of Osteoporosis in Postmenopausal Women, Men and reatment and Prevention of Glucocorticoid-Induced Osteoporosis  The recommended regimen is a 5 mg infusion once a year given intravenously over no less than 15 minutes.    Patients must be appropriately hydrated prior to administration of Reclast. You should drink 2 glasses of fluid at least one hour before receiving your infusion.   You may eat normally before your infusion.   You should not take Zolendronic acid if you are pregnant, breast feeding, have kidney problems, or have low blood calcium.  Administration of  acetaminophen following Reclast administration may reduce the incidence of acute-phase reaction symptoms.    Please follow-up for routine oral examination prior to initiation of Reclast treatment if you have not seen a dentist more than a year.    Calcium and Vitamin D Supplementation  Take supplemental calcium and vitamin D if their dietary intake is inadequate. An average of at least 1200 mg calcium and 800-1000 international units vitamin D daily is recommended.    Reclast is contraindicated in patients with the following conditions:  - Hypocalcemia   - Creatinine clearance less than 35 mL/min and in those with evidence of acute renal impairment due to an increased risk of renal failure   - Known hypersensitivity to zoledronic acid or any components of Reclast. Hypersensitivity reactions, including urticaria, angioedema, and anaphylactic reaction/shock have been reported     Acute Phase Reaction  The signs and symptoms of acute phase reaction occurred Reclast infusion, including fever (18%), myalgia (9%), flu-like symptoms (8%), headache (7%), and arthralgia (7%). The majority of these symptoms occurred within the first 3 days following the dose of Reclast and usually resolved within 3 days of onset but resolution could take up to 7-14 days.     Osteonecrosis of the Jaw  Osteonecrosis of the jaw (ONJ) has been reported in patients treated with bisphosphonates, including zoledronic acid. Most cases have been in cancer patients treated with intravenous bisphosphonates undergoing dental procedures. Some cases have occurred in patients with postmenopausal osteoporosis treated with either oral or intravenous bisphosphonates.    Atypical Subtrochanteric and Diaphyseal Femoral Fractures  Atypical, low-energy, or low trauma fractures of the femoral shaft have been reported in bisphosphonate-treated patients.     Musculoskeletal Pain  Severe and occasionally incapacitating bone, joint, and/or muscle pain have been  infrequently reported in patients taking bisphosphonates, including Reclast. The time to onset of symptoms varied from one day to several months after starting the drug.    Patients With Asthma  There have been reports of bronchoconstriction in aspirin-sensitive patients receiving bisphosphonates.     Injection-Site Reactions  Local reactions at the infusion site, such as itching, redness and/or pain have been reported.      Atrial Fibrillation    Ocular Adverse Events  Cases of iritis/uveitis/episcleritis/conjunctivitis have been reported in patients treated with bisphosphonates, including zoledronic acid.

## 2025-05-25 NOTE — ASSESSMENT & PLAN NOTE
Last A1c was 6.2.   Continue current diabetes regimen.  Call if any side effects from the medications.    Check blood sugars before meals and bedtime.  Call if blood sugars are persistently less than 70 or greater than 200.     Discussed importance of diet and lifestyle modifications for diabetes management  Hypoglycemia management discussed. Carry glucose tablets or snacks at all times.     Discussed risk of complications with uncontrolled diabetes.    Complications:  Follow up for regular diabetes eye exam  Daily self examination of feet.  Continue follow-up with Podiatry.  Microalbumin: Monitor.       Last A1c:   Lab Results   Component Value Date    HGBA1C 6.2 (H) 05/19/2025

## 2025-05-25 NOTE — ASSESSMENT & PLAN NOTE
Start vitamin D 03735 IU weekly for 12 weeks and then take vitamin D 1000 IU daily.   Monitor.

## 2025-05-25 NOTE — ASSESSMENT & PLAN NOTE
H/o right wrist fracture.  Patient is on Reclast, had her 3rd dose in 06/2024. Did not get one dose in 2023.   DXA scan not comparable as it was done on a different densitometer/location.  Fracture risk is lower due to her use of bisphosphonate.    Discussed continuing her Reclast for now.   Says she is due for dental procedure which could take up to 4 months or longer. She will call to schedule when she completes her dental work.     Discussed her risk of fractures and benefits of osteoporosis treatment  Side effects including osteonecrosis of the jaw and atypical femoral fractures discussed with patient    Encouraged safe movements and fall precautions  Continue routine dental visits  Instructed on Calcium and vitamin D   Call if any new fractures.  Next DXA scan due in 11/2026.

## 2025-05-25 NOTE — ASSESSMENT & PLAN NOTE
NEW PT    Patient of Rafael Best    CC: Patient is here for consult about polyp. Records not available.     HPI: Patient is a 82 year old  had vaginal probe usn which showed endometrial polyp     She was having LAP and had usn which showed polyp. She had endometrial bx which showed endometrial polyp 8 M ago and then had repeat usn 3 M later which showed polyp/fluid in the uterus and Gyne recommended to repeat surgery. Patient here for 2nd opinion. She is unsure if she had hysteroscopy or simply office endometrial biopsy.     She denies any vaginal bleeding. No HRT.     No LMP recorded. Patient is postmenopausal.    OB History    Para Term  AB Living   1 1 1     1   SAB IAB Ectopic Multiple Live Births           1      # Outcome Date GA Lbr Hemanth/2nd Weight Sex Delivery Anes PTL Lv   1 Term 1958    F NORMAL SPONT   LB       GYN hx:    Hx Prior Abnormal Pap: Yes (+HPV, LEEP)  Pap Result Notes: Per pt last pap 10/2023  LPS:      Past Medical History:   Diagnosis Date    Cervical polyp     Hypothyroidism      Past Surgical History:   Procedure Laterality Date    D & C  2023    done in MD's office with hysteroscopy    LEEP      +HPV     Allergies   Allergen Reactions    Niacinamide      Family History   Problem Relation Age of Onset    Heart Attack Father     Alcohol abuse Father     Cancer Sister         sinus    High Blood Pressure Sister     High Cholesterol Sister      Social History     Socioeconomic History    Marital status:      Spouse name: Not on file    Number of children: Not on file    Years of education: Not on file    Highest education level: Not on file   Occupational History    Occupation: Retired   Tobacco Use    Smoking status: Former     Types: Cigarettes     Quit date:      Years since quittin.2    Smokeless tobacco: Never   Vaping Use    Vaping Use: Never used   Substance and Sexual Activity    Alcohol use: Not Currently    Drug use: Never    Sexual  Stable residual lesion in left aspect of the sella with no mass effect or significant suprasellar extension.    Not having symptoms of pituitary hormone dysfunction but will check yearly labs.     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  Last visit was in 09/2024.    Continue to follow up with [facility] for periodic imaging, next planned for 3/2026       activity: Not on file   Other Topics Concern    Caffeine Concern Not Asked    Exercise Not Asked    Seat Belt Not Asked    Special Diet Not Asked    Stress Concern Not Asked    Weight Concern Not Asked     Service Not Asked    Blood Transfusions No    Occupational Exposure Not Asked    Hobby Hazards Not Asked    Sleep Concern Not Asked    Back Care Not Asked    Bike Helmet Not Asked    Self-Exams Not Asked   Social History Narrative    Live alone     Social Determinants of Health     Financial Resource Strain: Not on file   Food Insecurity: Not on file   Transportation Needs: Not on file   Physical Activity: Not on file   Stress: Not on file   Social Connections: Not on file   Housing Stability: Not on file       Medications reviewed. See active list.     BP (!) 172/86   Ht 67\"   Wt 166 lb (75.3 kg)   Breastfeeding No   BMI 26.00 kg/m²       Exam:   GENERAL: well developed, well nourished, in no apparent distress  ABDOMEN: Soft, non distended; non tender, no masses.  Liver and spleen non-tender, no enlargement. No palpable hernias  GYNE/:  External Genitalia: Normal appearing, no lesions, normal hair distribution   Urethral meatus appear wnl, no abnormal discharge or lesions noted.   Bladder: well supported, urethra wnl, no palpable tenderness or masses, no discharge  Vagina: normal pink mucosa, no lesions, normal clear discharge.   Uterus: midline, mobile, non-tender, firm and smooth  Cervix: pink, no lesions grossly visible, no discharge  Adnexa: non tender, no palpable masses, normal size  Anus:  No lesions or visible hemorrhoids        A/P: Patient is 82 year old female     1. Thickened endometrium  - Transvaginal US GYNE Only [[id number]]; Future    I do not have records from her previous procedure or usn. Plan to check usn. No c/o vaginal bleeding.     Susana Ramachandran MD

## 2025-05-30 ENCOUNTER — OFFICE VISIT (OUTPATIENT)
Dept: PODIATRY | Facility: CLINIC | Age: 70
End: 2025-05-30
Payer: MEDICARE

## 2025-05-30 VITALS
HEART RATE: 75 BPM | BODY MASS INDEX: 28.5 KG/M2 | WEIGHT: 171.06 LBS | HEIGHT: 65 IN | DIASTOLIC BLOOD PRESSURE: 71 MMHG | SYSTOLIC BLOOD PRESSURE: 113 MMHG

## 2025-05-30 DIAGNOSIS — Z79.4 TYPE 2 DIABETES MELLITUS WITH DIABETIC POLYNEUROPATHY, WITH LONG-TERM CURRENT USE OF INSULIN: Primary | Chronic | ICD-10-CM

## 2025-05-30 DIAGNOSIS — E11.42 TYPE 2 DIABETES MELLITUS WITH DIABETIC POLYNEUROPATHY, WITH LONG-TERM CURRENT USE OF INSULIN: Primary | Chronic | ICD-10-CM

## 2025-05-30 DIAGNOSIS — E11.9 ENCOUNTER FOR DIABETIC FOOT EXAM: ICD-10-CM

## 2025-05-30 PROCEDURE — 99999 PR PBB SHADOW E&M-EST. PATIENT-LVL V: CPT | Mod: PBBFAC,,, | Performed by: PODIATRIST

## 2025-05-30 NOTE — PROGRESS NOTES
Subjective:      Patient ID: Tona Carlson is a 69 y.o. female.    Chief Complaint: Diabetic Foot Exam (Dwain/5/13/25 MD)    Tona is a 69 y.o. female who presents to the clinic upon referral from Dr. Prakash  for evaluation and treatment of diabetic feet. 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 Patient relates no major problem with feet. Only complaints today consist of here for diabetic foot exam.   She has neuropathy.       PCP: Medina Prakash MD    Last PCP Visit: 5/13/2025      Current shoe gear: flip flops        Hemoglobin A1C   Date Value Ref Range Status   11/08/2024 6.3 (H) 4.0 - 5.6 % Final     Comment:     ADA Screening Guidelines:  5.7-6.4%  Consistent with prediabetes  >or=6.5%  Consistent with diabetes    High levels of fetal hemoglobin interfere with the HbA1C  assay. Heterozygous hemoglobin variants (HbS, HgC, etc)do  not significantly interfere with this assay.   However, presence of multiple variants may affect accuracy.     05/07/2024 6.2 (H) 4.0 - 5.6 % Final     Comment:     ADA Screening Guidelines:  5.7-6.4%  Consistent with prediabetes  >or=6.5%  Consistent with diabetes    High levels of fetal hemoglobin interfere with the HbA1C  assay. Heterozygous hemoglobin variants (HbS, HgC, etc)do  not significantly interfere with this assay.   However, presence of multiple variants may affect accuracy.     11/06/2023 5.8 (H) 4.0 - 5.6 % Final     Comment:     ADA Screening Guidelines:  5.7-6.4%  Consistent with prediabetes  >or=6.5%  Consistent with diabetes    High levels of fetal hemoglobin interfere with the HbA1C  assay. Heterozygous hemoglobin variants (HbS, HgC, etc)do  not significantly interfere with this assay.   However, presence of multiple  "variants may affect accuracy.       Hemoglobin A1c   Date Value Ref Range Status   05/19/2025 6.2 (H) 4.0 - 5.6 % Final     Comment:     ADA Screening Guidelines:  5.7-6.4%  Consistent with prediabetes  >=6.5%  Consistent with diabetes    High levels of fetal hemoglobin interfere with the HbA1C  assay. Heterozygous hemoglobin variants (HbS, HgC, etc)do  not significantly interfere with this assay.   However, presence of multiple variants may affect accuracy.   05/06/2025 6.3 (H) 4.0 - 5.6 % Final     Comment:     ADA Screening Guidelines:  5.7-6.4%  Consistent with prediabetes  >=6.5%  Consistent with diabetes    High levels of fetal hemoglobin interfere with the HbA1C  assay. Heterozygous hemoglobin variants (HbS, HgC, etc)do  not significantly interfere with this assay.   However, presence of multiple variants may affect accuracy.         Review of Systems   Constitutional: Negative for chills, decreased appetite, diaphoresis and fever.   HENT:  Negative for congestion and hearing loss.    Cardiovascular:  Negative for chest pain, claudication, leg swelling and syncope.   Respiratory:  Negative for cough and shortness of breath.    Skin:  Negative for color change, dry skin, flushing, itching, nail changes, poor wound healing and rash.   Musculoskeletal:  Negative for arthritis, back pain, joint pain and joint swelling.   Gastrointestinal:  Negative for nausea and vomiting.   Neurological:  Positive for paresthesias. Negative for focal weakness, numbness and weakness.   Psychiatric/Behavioral:  Negative for altered mental status. The patient is not nervous/anxious.            Objective:      Vitals:    05/30/25 1142   BP: 113/71   Pulse: 75   Weight: 77.6 kg (171 lb 1.2 oz)   Height: 5' 5" (1.651 m)       Physical Exam  Constitutional:       General: She is not in acute distress.     Appearance: She is well-developed. She is not diaphoretic.   Cardiovascular:      Pulses:           Dorsalis pedis pulses are 2+ on " the right side and 2+ on the left side.        Posterior tibial pulses are 2+ on the right side and 2+ on the left side.      Comments: Dorsalis pedis and posterior tibial pulses are within normal limits. Skin temperature is within normal limits. Toes are cool to touch and feet are warm proximally. Hair growth is within normal limits. Skin is normotrophic and without hyperpigmentation. No edema noted. No spider veins or varicosities noted, bilaterally.   .  Musculoskeletal:         General: No tenderness.      Comments: Adequate joint range of motion without pain, limitation, nor crepitation to bilateral feet and ankle joints. Muscle strength is 5/5 in all groups bilaterally.    Pes planus, bilaterally  RIGHT bunion.        Feet:      Right foot:      Protective Sensation: 10 sites tested.  8 sites sensed.      Left foot:      Protective Sensation: 10 sites tested.  10 sites sensed.     Skin:     General: Skin is warm and dry.      Findings: No lesion.      Comments: Skin is warm and dry, no acute signs of infection noted. No open wounds, macerations or hyperkeratotic lesions, bilaterally.   Toenails are well trimmed and of normal morphology, bilaterally.   Toenails are polished      Neurological:      Mental Status: She is alert and oriented to person, place, and time.      Sensory: Sensory deficit present.  +paresthesias (abnormal sensation to the feet)      Motor: No abnormal muscle tone.      Comments: Light touch within normal limits. [place] 5.07 monofilamant testing is within normal limits.               Assessment:       Encounter Diagnoses   Name Primary?    Type 2 diabetes mellitus with diabetic polyneuropathy, with long-term current use of insulin Yes    Encounter for diabetic foot exam          Plan:       Tona was seen today for diabetic foot exam.    Diagnoses and all orders for this visit:    Type 2 diabetes mellitus with diabetic polyneuropathy, with long-term current use of  insulin  Comments:  stable  Orders:  -     Ambulatory referral/consult to Podiatry    Encounter for diabetic foot exam      I counseled the patient on her conditions, their implications and medical management.  Shoe inspection.     Continue good nutrition and blood sugar control to help prevent podiatric complications of diabetes.   Maintain proper foot hygiene.   Continue wearing proper shoe gear, daily foot inspections, never walking without protective shoe gear, never putting sharp instruments to feet.  Consider Neurop[facility]way.   Discussed Qutenza.   If interested, referral to pain management.   Annual diabetes foot exam.

## 2025-06-06 DIAGNOSIS — H40.1134 PRIMARY OPEN ANGLE GLAUCOMA (POAG) OF BOTH EYES, INDETERMINATE STAGE: ICD-10-CM

## 2025-06-09 ENCOUNTER — TELEPHONE (OUTPATIENT)
Dept: OPTOMETRY | Facility: CLINIC | Age: 70
End: 2025-06-09
Payer: MEDICARE

## 2025-06-09 RX ORDER — LATANOPROST 50 UG/ML
1 SOLUTION/ DROPS OPHTHALMIC
Qty: 7.5 ML | Refills: 3 | Status: SHIPPED | OUTPATIENT
Start: 2025-06-09

## 2025-07-31 ENCOUNTER — CLINICAL SUPPORT (OUTPATIENT)
Dept: OPHTHALMOLOGY | Facility: CLINIC | Age: 70
End: 2025-07-31
Payer: MEDICARE

## 2025-07-31 ENCOUNTER — OFFICE VISIT (OUTPATIENT)
Dept: OPTOMETRY | Facility: CLINIC | Age: 70
End: 2025-07-31
Payer: MEDICARE

## 2025-07-31 DIAGNOSIS — D35.2 PITUITARY ADENOMA: ICD-10-CM

## 2025-07-31 DIAGNOSIS — H40.1134 PRIMARY OPEN ANGLE GLAUCOMA (POAG) OF BOTH EYES, INDETERMINATE STAGE: Primary | ICD-10-CM

## 2025-07-31 DIAGNOSIS — H40.1134 PRIMARY OPEN ANGLE GLAUCOMA (POAG) OF BOTH EYES, INDETERMINATE STAGE: ICD-10-CM

## 2025-07-31 PROCEDURE — 99214 OFFICE O/P EST MOD 30 MIN: CPT | Mod: HCNC,S$GLB,, | Performed by: OPTOMETRIST

## 2025-07-31 PROCEDURE — 1126F AMNT PAIN NOTED NONE PRSNT: CPT | Mod: CPTII,HCNC,S$GLB, | Performed by: OPTOMETRIST

## 2025-07-31 PROCEDURE — 1101F PT FALLS ASSESS-DOCD LE1/YR: CPT | Mod: CPTII,HCNC,S$GLB, | Performed by: OPTOMETRIST

## 2025-07-31 PROCEDURE — 1160F RVW MEDS BY RX/DR IN RCRD: CPT | Mod: CPTII,HCNC,S$GLB, | Performed by: OPTOMETRIST

## 2025-07-31 PROCEDURE — 99999 PR PBB SHADOW E&M-EST. PATIENT-LVL II: CPT | Mod: PBBFAC,HCNC,, | Performed by: OPTOMETRIST

## 2025-07-31 PROCEDURE — 3044F HG A1C LEVEL LT 7.0%: CPT | Mod: CPTII,HCNC,S$GLB, | Performed by: OPTOMETRIST

## 2025-07-31 PROCEDURE — 1159F MED LIST DOCD IN RCRD: CPT | Mod: CPTII,HCNC,S$GLB, | Performed by: OPTOMETRIST

## 2025-07-31 PROCEDURE — 3066F NEPHROPATHY DOC TX: CPT | Mod: CPTII,HCNC,S$GLB, | Performed by: OPTOMETRIST

## 2025-07-31 PROCEDURE — 3288F FALL RISK ASSESSMENT DOCD: CPT | Mod: CPTII,HCNC,S$GLB, | Performed by: OPTOMETRIST

## 2025-07-31 PROCEDURE — 3061F NEG MICROALBUMINURIA REV: CPT | Mod: CPTII,HCNC,S$GLB, | Performed by: OPTOMETRIST

## 2025-07-31 NOTE — PROGRESS NOTES
Dictation #1  MRN:[id number]  CSN:[id number] HPI     Glaucoma     Additional comments: Patient Tona Carlson is a 69 year old   female.           Comments    JONI: 02/10/2025 with Dr. Lu  Chief complaint (CC): Patient Tona Carlson is here for IOP check with   OCT/HVF review. She states that she did not fill glasses MRX from last   visit, still using PAL glasses from 1 year ago as well as OTC +3.00   readers. She states that she can read well at near with OTC +3.00 readers.   She states that she may have missed using drops one day in the past 2-3   weeks but is using medications as instructed. She denies any eye pain.  Glasses? Yes  Contacts? No  H/o eye surgery, injections or laser: (+)s/p LASIK sx OU  H/o eye injury: None  Known eye conditions?  1. Primary open angle glaucoma (POAG) of both eyes, indeterminate stage  2. Pituitary adenoma  3. Type 2 diabetes mellitus without ophthalmic manifestations  4. Type 2 diabetes mellitus with cataract  5. NS (nuclear sclerosis), bilateral  6. Bilateral presbyopia    Family h/o eye conditions? (+)glaucoma - mother, brother, sister  Eye gtts?  1. Timolol BID OU  2. Latanoprost qhs OU      (-) Flashes (-)  Floaters (-) Mucous   (-)  Tearing (-) Itching (-) Burning   (-) Headaches (-) Eye Pain/discomfort (-) Irritation   (-)  Redness (-) Double vision (-) Blurry vision    Diabetic? Yes  LBS: 123 this A.M., states that BS levels have been elevated lately   (between 170-250).  A1c?  Hemoglobin A1C       Date                     Value               Ref Range             Status       05/19/2025               6.2 (H)             4.0 - 5.6 %           Final                 11/08/2024               6.3 (H)             4.0 - 5.6 %           Final                                    Last edited by Mimi Funes on 7/31/2025  9:59 AM.            Assessment /Plan     For exam results, see Encounter Report.    Primary open angle glaucoma (POAG) of both eyes, indeterminate stage  -      Posterior Segment OCT Optic Nerve- Both eyes; Future  -     Rodas Visual Field - OU - Extended - Both Eyes; Future    Pituitary adenoma  -     Posterior Segment OCT Optic Nerve- Both eyes; Future  -     Rodas Visual Field - OU - Extended - Both Eyes; Future      (+) FHx- mom, sister, brother, MGM. IOP 10 OD, OS. Last 14 OD, OS. Pachy 561 OD, 547 OS. Pt reports drop compliance.  C/d 0.65OD, OS. Pallor OU.   01/12/2024 Gonio SS all quadrants OU  2/10/2025 OCT OD borderline NI, significantly thin in all other areas, OS normal NS and NI, significantly thin in all other areas  7/31/2025 OCt OD borderline NI, significantly thin in all other areas, OS borderline TS, thin N, G, T, TI  2/10/2025 HVF OD sup and inf arc (denser on temporal side), OS low reliability, sup and inf arc (denser on temp side)-- Pt reports that she may have been having trouble staying awake for testing today.  7/31/2025 HVF OD sup and inf arc (dense on etmporal side) OS WNL  HVF stable when compared to 2016 which was post surgery. Pt had surgery for pituitary adenoma in 2015.   Cont Latanoprost QHS OU due to changes on OCT, significant thinning of OCT and strong family history.  Cont Timolol BID OU due to changes on HVF OU (added 2/10/2025)  Educated pt on findings w/understanding.   RTC4 mo IOP. Pt last saw neurosurgery 3/2024 and is supposed to go back in 2 yrs for MRI of brain. Last MRI of brain in 2/2024. Pt saw Dr Neal 9/19/2024  Referral back to Dr Neal

## 2025-07-31 NOTE — PROGRESS NOTES
OCT/HVF done ou-/rel/fix/coop. Good ou./ chart checked for latex allergy./ .75 + .50 x 125/od .50 + .50 x 50/os-[facility]

## 2025-08-04 ENCOUNTER — TELEPHONE (OUTPATIENT)
Dept: INTERNAL MEDICINE | Facility: CLINIC | Age: 70
End: 2025-08-04
Payer: MEDICARE

## 2025-08-04 DIAGNOSIS — Z79.4 TYPE 2 DIABETES MELLITUS WITH DIABETIC POLYNEUROPATHY, WITH LONG-TERM CURRENT USE OF INSULIN: Primary | ICD-10-CM

## 2025-08-04 DIAGNOSIS — M54.9 BACK PAIN, UNSPECIFIED BACK LOCATION, UNSPECIFIED BACK PAIN LATERALITY, UNSPECIFIED CHRONICITY: ICD-10-CM

## 2025-08-04 DIAGNOSIS — E11.42 TYPE 2 DIABETES MELLITUS WITH DIABETIC POLYNEUROPATHY, WITH LONG-TERM CURRENT USE OF INSULIN: Primary | ICD-10-CM

## 2025-08-04 DIAGNOSIS — M25.569 KNEE PAIN, UNSPECIFIED CHRONICITY, UNSPECIFIED LATERALITY: ICD-10-CM

## 2025-08-04 NOTE — TELEPHONE ENCOUNTER
Copied from CRM #[id number]. Topic: Appointments - Amb Referral  >> Aug 4, 2025 12:45 PM Rupa wrote:  Type:  Patient Requesting Referral    Who Called:Juan Francisco Representative 2   Does the patient already have the specialty appointment scheduled?:no  If yes, what is the date of that appointment?:N/A  Referral to What Specialty:Acupuncture  Reason for Referral: knee and back issues due to diabetic neuropathy  Does the patient want the referral with a specific physician?:Yes-Kellee Weller phone   [facility] the [facility]   Patient Requesting a Response?:Y  Would the patient rather a call back or a response via [facility]? Call back   Best Call Back Number:[phone] (M)  Additional Information: Juan Francisco fax

## 2025-08-04 NOTE — TELEPHONE ENCOUNTER
Pt called for referral for  acupuncture with Kellee Weller phone  due to knee and back issues due to diabetic neuropathy